# Patient Record
Sex: FEMALE | Race: WHITE | NOT HISPANIC OR LATINO | Employment: UNEMPLOYED | URBAN - METROPOLITAN AREA
[De-identification: names, ages, dates, MRNs, and addresses within clinical notes are randomized per-mention and may not be internally consistent; named-entity substitution may affect disease eponyms.]

---

## 2017-01-30 PROCEDURE — 99285 EMERGENCY DEPT VISIT HI MDM: CPT | Mod: 25

## 2017-01-30 PROCEDURE — 96361 HYDRATE IV INFUSION ADD-ON: CPT

## 2017-01-30 PROCEDURE — 96374 THER/PROPH/DIAG INJ IV PUSH: CPT

## 2017-01-31 ENCOUNTER — HOSPITAL ENCOUNTER (INPATIENT)
Facility: HOSPITAL | Age: 42
LOS: 3 days | Discharge: HOME | DRG: 442 | End: 2017-02-04
Attending: EMERGENCY MEDICINE | Admitting: HOSPITALIST
Payer: COMMERCIAL

## 2017-01-31 DIAGNOSIS — R16.0 HEPATOMEGALY: ICD-10-CM

## 2017-01-31 DIAGNOSIS — K29.70 GASTRITIS, PRESENCE OF BLEEDING UNSPECIFIED, UNSPECIFIED CHRONICITY, UNSPECIFIED GASTRITIS TYPE: Primary | ICD-10-CM

## 2017-01-31 DIAGNOSIS — B17.9 ACUTE HEPATITIS: ICD-10-CM

## 2017-01-31 DIAGNOSIS — R11.0 NAUSEA: ICD-10-CM

## 2017-01-31 DIAGNOSIS — R74.8 ELEVATED LIVER ENZYMES: ICD-10-CM

## 2017-01-31 DIAGNOSIS — R10.13 EPIGASTRIC PAIN: ICD-10-CM

## 2017-01-31 DIAGNOSIS — R10.11 RUQ ABDOMINAL PAIN: ICD-10-CM

## 2017-01-31 PROBLEM — R10.9 ABDOMINAL PAIN: Status: ACTIVE | Noted: 2017-01-31

## 2017-01-31 PROBLEM — Z79.899 ON ESOMEPRAZOLE PROPHYLAXIS: Status: ACTIVE | Noted: 2017-01-31

## 2017-01-31 PROBLEM — K21.9 GERD (GASTROESOPHAGEAL REFLUX DISEASE): Status: ACTIVE | Noted: 2017-01-31

## 2017-01-31 LAB
ALBUMIN SERPL BCP-MCNC: 3.7 G/DL
ALP SERPL-CCNC: 114 U/L
ALT SERPL W/O P-5'-P-CCNC: 316 U/L
AMORPH CRY URNS QL MICRO: NORMAL
ANION GAP SERPL CALC-SCNC: 9 MMOL/L
APAP SERPL-MCNC: <3 UG/ML
APTT BLDCRRT: 26.2 SEC
AST SERPL-CCNC: 491 U/L
BASOPHILS # BLD AUTO: 0.03 K/UL
BASOPHILS NFR BLD: 0.2 %
BILIRUB SERPL-MCNC: 1.7 MG/DL
BILIRUB UR QL STRIP: ABNORMAL
BUN SERPL-MCNC: 10 MG/DL
CALCIUM SERPL-MCNC: 9.9 MG/DL
CHLORIDE SERPL-SCNC: 108 MMOL/L
CHOLEST/HDLC SERPL: 4.4 {RATIO}
CLARITY UR: ABNORMAL
CO2 SERPL-SCNC: 22 MMOL/L
COLOR UR: YELLOW
CREAT SERPL-MCNC: 0.7 MG/DL
DIFFERENTIAL METHOD: ABNORMAL
EOSINOPHIL # BLD AUTO: 0.1 K/UL
EOSINOPHIL NFR BLD: 0.9 %
ERYTHROCYTE [DISTWIDTH] IN BLOOD BY AUTOMATED COUNT: 13.9 %
EST. GFR  (AFRICAN AMERICAN): >60 ML/MIN/1.73 M^2
EST. GFR  (NON AFRICAN AMERICAN): >60 ML/MIN/1.73 M^2
GLUCOSE SERPL-MCNC: 120 MG/DL
GLUCOSE UR QL STRIP: NEGATIVE
HCT VFR BLD AUTO: 46.4 %
HDL/CHOLESTEROL RATIO: 22.7 %
HDLC SERPL-MCNC: 185 MG/DL
HDLC SERPL-MCNC: 42 MG/DL
HGB BLD-MCNC: 15.5 G/DL
HGB UR QL STRIP: ABNORMAL
INR PPP: 1
KETONES UR QL STRIP: NEGATIVE
LDLC SERPL CALC-MCNC: 126.6 MG/DL
LEUKOCYTE ESTERASE UR QL STRIP: NEGATIVE
LIPASE SERPL-CCNC: 15 U/L
LYMPHOCYTES # BLD AUTO: 2 K/UL
LYMPHOCYTES NFR BLD: 13.5 %
MCH RBC QN AUTO: 30 PG
MCHC RBC AUTO-ENTMCNC: 33.4 %
MCV RBC AUTO: 90 FL
MICROSCOPIC COMMENT: NORMAL
MONOCYTES # BLD AUTO: 0.9 K/UL
MONOCYTES NFR BLD: 6.2 %
NEUTROPHILS # BLD AUTO: 11.4 K/UL
NEUTROPHILS NFR BLD: 79.2 %
NITRITE UR QL STRIP: NEGATIVE
NONHDLC SERPL-MCNC: 143 MG/DL
PH UR STRIP: 7 [PH] (ref 5–8)
PLATELET # BLD AUTO: 379 K/UL
PMV BLD AUTO: 10.2 FL
POCT GLUCOSE: 79 MG/DL (ref 70–110)
POTASSIUM SERPL-SCNC: 4.2 MMOL/L
PROT SERPL-MCNC: 7 G/DL
PROT UR QL STRIP: NEGATIVE
PROTHROMBIN TIME: 10.3 SEC
RBC # BLD AUTO: 5.17 M/UL
RBC #/AREA URNS HPF: 2 /HPF (ref 0–4)
SODIUM SERPL-SCNC: 139 MMOL/L
SP GR UR STRIP: 1.02 (ref 1–1.03)
TRIGL SERPL-MCNC: 82 MG/DL
TSH SERPL DL<=0.005 MIU/L-ACNC: 1.13 UIU/ML
URN SPEC COLLECT METH UR: ABNORMAL
UROBILINOGEN UR STRIP-ACNC: ABNORMAL EU/DL
WBC # BLD AUTO: 14.41 K/UL

## 2017-01-31 PROCEDURE — 85025 COMPLETE CBC W/AUTO DIFF WBC: CPT

## 2017-01-31 PROCEDURE — 63600175 PHARM REV CODE 636 W HCPCS: Performed by: EMERGENCY MEDICINE

## 2017-01-31 PROCEDURE — 81000 URINALYSIS NONAUTO W/SCOPE: CPT

## 2017-01-31 PROCEDURE — 21400001 HC TELEMETRY ROOM

## 2017-01-31 PROCEDURE — 25000003 PHARM REV CODE 250: Performed by: EMERGENCY MEDICINE

## 2017-01-31 PROCEDURE — 25000003 PHARM REV CODE 250: Performed by: NURSE PRACTITIONER

## 2017-01-31 PROCEDURE — 80074 ACUTE HEPATITIS PANEL: CPT

## 2017-01-31 PROCEDURE — 85730 THROMBOPLASTIN TIME PARTIAL: CPT

## 2017-01-31 PROCEDURE — 85610 PROTHROMBIN TIME: CPT

## 2017-01-31 PROCEDURE — 93010 ELECTROCARDIOGRAM REPORT: CPT | Mod: ,,, | Performed by: INTERNAL MEDICINE

## 2017-01-31 PROCEDURE — 63600175 PHARM REV CODE 636 W HCPCS: Performed by: NURSE PRACTITIONER

## 2017-01-31 PROCEDURE — 99244 OFF/OP CNSLTJ NEW/EST MOD 40: CPT | Mod: ,,, | Performed by: INTERNAL MEDICINE

## 2017-01-31 PROCEDURE — 83690 ASSAY OF LIPASE: CPT

## 2017-01-31 PROCEDURE — G0378 HOSPITAL OBSERVATION PER HR: HCPCS

## 2017-01-31 PROCEDURE — 80329 ANALGESICS NON-OPIOID 1 OR 2: CPT

## 2017-01-31 PROCEDURE — 80053 COMPREHEN METABOLIC PANEL: CPT

## 2017-01-31 PROCEDURE — 94761 N-INVAS EAR/PLS OXIMETRY MLT: CPT

## 2017-01-31 PROCEDURE — 80061 LIPID PANEL: CPT

## 2017-01-31 PROCEDURE — C9113 INJ PANTOPRAZOLE SODIUM, VIA: HCPCS | Performed by: NURSE PRACTITIONER

## 2017-01-31 PROCEDURE — 84443 ASSAY THYROID STIM HORMONE: CPT

## 2017-01-31 PROCEDURE — 93005 ELECTROCARDIOGRAM TRACING: CPT

## 2017-01-31 RX ORDER — ONDANSETRON 2 MG/ML
4 INJECTION INTRAMUSCULAR; INTRAVENOUS EVERY 12 HOURS PRN
Status: DISCONTINUED | OUTPATIENT
Start: 2017-01-31 | End: 2017-02-04 | Stop reason: HOSPADM

## 2017-01-31 RX ORDER — ACETAMINOPHEN 500 MG
500 TABLET ORAL EVERY 6 HOURS PRN
COMMUNITY
End: 2017-05-09

## 2017-01-31 RX ORDER — GLUCAGON 1 MG
1 KIT INJECTION
Status: DISCONTINUED | OUTPATIENT
Start: 2017-01-31 | End: 2017-02-04 | Stop reason: HOSPADM

## 2017-01-31 RX ORDER — CETIRIZINE HYDROCHLORIDE 10 MG/1
10 TABLET ORAL DAILY
Status: DISCONTINUED | OUTPATIENT
Start: 2017-01-31 | End: 2017-02-04 | Stop reason: HOSPADM

## 2017-01-31 RX ORDER — KETOROLAC TROMETHAMINE 30 MG/ML
30 INJECTION, SOLUTION INTRAMUSCULAR; INTRAVENOUS
Status: COMPLETED | OUTPATIENT
Start: 2017-01-31 | End: 2017-01-31

## 2017-01-31 RX ORDER — KETOROLAC TROMETHAMINE 30 MG/ML
15 INJECTION, SOLUTION INTRAMUSCULAR; INTRAVENOUS EVERY 6 HOURS PRN
Status: DISCONTINUED | OUTPATIENT
Start: 2017-01-31 | End: 2017-02-03

## 2017-01-31 RX ORDER — IBUPROFEN 200 MG
16 TABLET ORAL
Status: DISCONTINUED | OUTPATIENT
Start: 2017-01-31 | End: 2017-02-04 | Stop reason: HOSPADM

## 2017-01-31 RX ORDER — HYDROGEN PEROXIDE 3 %
40 SOLUTION, NON-ORAL MISCELLANEOUS NIGHTLY
Status: ON HOLD | COMMUNITY
End: 2017-02-04 | Stop reason: HOSPADM

## 2017-01-31 RX ORDER — CETIRIZINE HYDROCHLORIDE 10 MG/1
10 TABLET ORAL DAILY
COMMUNITY
End: 2019-09-18

## 2017-01-31 RX ORDER — SODIUM CHLORIDE 9 MG/ML
INJECTION, SOLUTION INTRAVENOUS CONTINUOUS
Status: DISCONTINUED | OUTPATIENT
Start: 2017-01-31 | End: 2017-02-01

## 2017-01-31 RX ORDER — IBUPROFEN 200 MG
24 TABLET ORAL
Status: DISCONTINUED | OUTPATIENT
Start: 2017-01-31 | End: 2017-02-04 | Stop reason: HOSPADM

## 2017-01-31 RX ORDER — PANTOPRAZOLE SODIUM 40 MG/10ML
40 INJECTION, POWDER, LYOPHILIZED, FOR SOLUTION INTRAVENOUS DAILY
Status: DISCONTINUED | OUTPATIENT
Start: 2017-01-31 | End: 2017-02-03

## 2017-01-31 RX ORDER — DICLOFENAC POTASSIUM 50 MG/1
1 POWDER, FOR SOLUTION ORAL DAILY PRN
Status: ON HOLD | COMMUNITY
End: 2017-01-31 | Stop reason: CLARIF

## 2017-01-31 RX ADMIN — CETIRIZINE HYDROCHLORIDE 10 MG: 10 TABLET, FILM COATED ORAL at 01:01

## 2017-01-31 RX ADMIN — KETOROLAC TROMETHAMINE 15 MG: 30 INJECTION, SOLUTION INTRAMUSCULAR at 06:01

## 2017-01-31 RX ADMIN — SODIUM CHLORIDE: 0.9 INJECTION, SOLUTION INTRAVENOUS at 11:01

## 2017-01-31 RX ADMIN — PROMETHAZINE HYDROCHLORIDE 6.25 MG: 25 INJECTION INTRAMUSCULAR; INTRAVENOUS at 11:01

## 2017-01-31 RX ADMIN — SODIUM CHLORIDE 1000 ML: 0.9 INJECTION, SOLUTION INTRAVENOUS at 03:01

## 2017-01-31 RX ADMIN — KETOROLAC TROMETHAMINE 30 MG: 30 INJECTION, SOLUTION INTRAMUSCULAR at 03:01

## 2017-01-31 RX ADMIN — PANTOPRAZOLE SODIUM 40 MG: 40 INJECTION, POWDER, FOR SOLUTION INTRAVENOUS at 01:01

## 2017-01-31 RX ADMIN — LIDOCAINE HYDROCHLORIDE: 20 SOLUTION ORAL; TOPICAL at 04:01

## 2017-01-31 RX ADMIN — SODIUM CHLORIDE: 0.9 INJECTION, SOLUTION INTRAVENOUS at 01:01

## 2017-01-31 NOTE — PLAN OF CARE
01/31/17 1242   Discharge Assessment   Assessment Type Discharge Planning Assessment   Confirmed/corrected address and phone number on facesheet? Yes   Assessment information obtained from? Patient   Expected Length of Stay (days) 1   Prior to hospitilization cognitive status: Alert/Oriented   Prior to hospitalization functional status: Independent   Current cognitive status: Alert/Oriented   Current Functional Status: Independent   Arrived From home or self-care   Lives With significant other   Able to Return to Prior Arrangements yes   Is patient able to care for self after discharge? Yes   How many people do you have in your home that can help with your care after discharge? 2   Who are your caregiver(s) and their phone number(s)? Ashok Hemphill Other [phone]    Patient's perception of discharge disposition home or selfcare   Readmission Within The Last 30 Days no previous admission in last 30 days   Patient currently being followed by outpatient case management? No   Patient currently receives home health services? No   Does the patient currently use HME? No   Patient currently receives private duty nursing? No   Equipment Currently Used at Home none   Do you have any problems affording any of your prescribed medications? TBD   Is the patient taking medications as prescribed? yes   Do you have any financial concerns preventing you from receiving the healthcare you need? No   Does the patient have transportation to healthcare appointments? Yes   Transportation Available family or friend will provide   On Dialysis? No   Does the patient receive services at the Coumadin Clinic? No   Discharge Plan A Home with family   Patient/Family In Agreement With Plan yes   Jeremías Grissom RN  Transitional Navigator/Case Management  [phone]

## 2017-01-31 NOTE — IP AVS SNAPSHOT
[facility]  [address]  [place] [address]  Phone: [phone]           Patient Discharge Instructions     Our goal is to set you up for success. This packet includes information on your condition, medications, and your home care. It will help you to care for yourself so you don't get sicker and need to go back to the hospital.     Please ask your nurse if you have any questions.        There are many details to remember when preparing to leave the hospital. Here is what you will need to do:    1. Take your medicine. If you are prescribed medications, review your Medication List in the following pages. You may have new medications to  at the pharmacy and others that you'll need to stop taking. Review the instructions for how and when to take your medications. Talk with your doctor or nurses if you are unsure of what to do.     2. Go to your follow-up appointments. Specific follow-up information is listed in the following pages. Your may be contacted by a transition nurse or clinical provider about future appointments. Be sure we have all of the phone numbers to reach you, if needed. Please contact your provider's office if you are unable to make an appointment.     3. Watch for warning signs. Your doctor or nurse will give you detailed warning signs to watch for and when to call for assistance. These instructions may also include educational information about your condition. If you experience any of warning signs to your health, call your doctor.               ** Verify the list of medication(s) below is accurate and up to date. Carry this with you in case of emergency. If your medications have changed, please notify your healthcare provider.             Medication List      START taking these medications        Additional Info                      ondansetron 4 MG Jonelle   Commonly known as:  ZOFRAN-ODT   Quantity:  50 tablet   Refills:  1   Dose:  4 mg    Instructions:  Take 1 tablet  (4 mg total) by mouth every 6 (six) hours as needed.     Begin Date    AM    Noon    PM    Bedtime       pantoprazole 40 MG tablet   Commonly known as:  PROTONIX   Quantity:  60 tablet   Refills:  1   Dose:  40 mg    Instructions:  Take 1 tablet (40 mg total) by mouth 2 (two) times daily.     Begin Date    AM    Noon    PM    Bedtime       promethazine 25 MG tablet   Commonly known as:  PHENERGAN   Quantity:  30 tablet   Refills:  1   Dose:  25 mg    Instructions:  Take 1 tablet (25 mg total) by mouth every 6 (six) hours as needed for Nausea.     Begin Date    AM    Noon    PM    Bedtime       sucralfate 100 mg/mL suspension   Commonly known as:  CARAFATE   Quantity:  1 Bottle   Refills:  3   Dose:  500 mg    Last time this was given:  1 g on 2/4/2017  6:08 AM   Instructions:  Take 5 mLs (500 mg total) by mouth 4 (four) times daily with meals and nightly.     Begin Date    AM    Noon    PM    Bedtime         CONTINUE taking these medications        Additional Info                      acetaminophen 500 MG tablet   Commonly known as:  TYLENOL   Refills:  0   Dose:  500 mg   Indications:  Headache Disorder    Instructions:  Take 500 mg by mouth every 6 (six) hours as needed.     Begin Date    AM    Noon    PM    Bedtime       cetirizine 10 MG tablet   Commonly known as:  ZYRTEC   Refills:  0   Dose:  10 mg    Last time this was given:  10 mg on 2/4/2017  8:43 AM   Instructions:  Take 10 mg by mouth once daily.     Begin Date    AM    Noon    PM    Bedtime         STOP taking these medications     esomeprazole 20 MG capsule   Commonly known as:  NEXIUM            Where to Get Your Medications      You can get these medications from any pharmacy     Bring a paper prescription for each of these medications     ondansetron 4 MG Tbdl    pantoprazole 40 MG tablet    promethazine 25 MG tablet    sucralfate 100 mg/mL suspension                  Please bring to all follow up appointments:    1. A copy of your discharge  instructions.  2. All medicines you are currently taking in their original bottles.  3. Identification and insurance card.    Please arrive 15 minutes ahead of scheduled appointment time.    Please call 24 hours in advance if you must reschedule your appointment and/or time.        Your Scheduled Appointments     Feb 16, 2017  9:20 AM CST   Hospital Follow Up with Nataliia Boston PA-C   [facility] ([facility])    [address], [address]  [facility] [id number] [phone]            Mar 13, 2017  2:30 PM CDT   Physical with MD Santos Lackey [facility] ([facility])    [address]  [place] [address] [phone]              Follow-up Information     Follow up with Mikel Rizvi MD.    Specialty:  Gastroenterology    Why:  This clinic will call you with your appointment.    Contact information:    [address]  [address]  [facility] [address]  [phone]          Follow up with Nataliia Boston PA-C. Go on 2/16/2017.    Specialty:  Family Medicine    Why:  @9:20am for your hospital follow up appointment    Contact information:    [address]  [address]  [facility]  [facility] [address]  [phone]          Follow up with Annette Burton MD. Go on 3/13/2017.    Specialty:  Family Medicine    Why:  @2:30pm    Contact information:    [address]  [place] [id number] [phone]          Discharge Instructions     Future Orders    Activity as tolerated     Call MD for:  persistent nausea and vomiting or diarrhea     Call MD for:  severe uncontrolled pain     Diet general     Scheduling Instructions:    Low Fat, [place]    Questions:    Total calories:      Fat restriction, if any:      Protein restriction, if any:      Na restriction, if any:      Fluid restriction:      Additional restrictions:          Discharge Instructions       ABDOMINAL PAIN, ADULT  "(ENGLISH) View Edit Remove  EPIGASTRIC PAIN (UNCERTAIN CAUSE) (ENGLISH) View Edit Remove  ACID REFLUX, TIPS TO CONTROL (ENGLISH) View Edit Remove  ACID REFLUX, MEDICINES FOR (ENGLISH) View Edit Remove  GASTRITIS (ADULT) (ENGLISH) View Edit Remove  DIET, BLAND (ADULT) (ENGLISH) View Edit Remove  DIET: SOFT, DISCHARGE INSTRUCTIONS (ENGLISH) View Edit Remove  ONDANSETRON TABLETS (ENGLISH) View Edit Remove  PROMETHAZINE TABLETS (ENGLISH) View Edit Remove  PANTOPRAZOLE TABLETS (ENGLISH) View Edit Remove  SUCRALFATE TABLETS (ENGLISH) View Edit Remove  DIET, LOW FAT (ENGLISH) View Edit Remove          Discharge References/Attachments     ABDOMINAL PAIN, ADULT (ENGLISH)    EPIGASTRIC PAIN (UNCERTAIN CAUSE) (ENGLISH)    ACID REFLUX, TIPS TO CONTROL (ENGLISH)    ACID REFLUX, MEDICINES FOR (ENGLISH)    GASTRITIS (ADULT) (ENGLISH)    DIET, BLAND (ADULT) (ENGLISH)    DIET: SOFT, DISCHARGE INSTRUCTIONS (ENGLISH)    ONDANSETRON TABLETS (ENGLISH)    PROMETHAZINE TABLETS (ENGLISH)    PANTOPRAZOLE TABLETS (ENGLISH)    SUCRALFATE TABLETS (ENGLISH)    DIET, LOW FAT (ENGLISH)        Primary Diagnosis     Your primary diagnosis was:  Elevated Liver Enzymes      Admission Information     Date & Time Provider Department CSN    1/31/2017  2:30 AM Forrest Bergman MD [facility] [id number]      Care Providers     Provider Role Specialty Primary office phone    Forrest Bergman MD Attending Provider Hospitalist [phone]    Forrest Bergman MD Physician  Hospitalist  [phone]    Mikel Rizvi MD Consulting Physician  Gastroenterology [phone]    Mikel Rizvi MD Surgeon  Gastroenterology [phone]    Aristides Castillo MD Surgeon  Gastroenterology [phone]      Your Vitals Were     BP Pulse Temp Resp Height Weight    89/50 53 98.2 °F (36.8 °C) (Oral) 18 5' 9" (1.753 m) 114.9 kg (253 lb 4.9 oz)    SpO2 BMI             98% 37.41 kg/m2         Recent Lab Values     No lab values to display.    "   Pending Labs     Order Current Status    Specimen to Pathology - Surgery Collected (02/03/17 2443)      Allergies as of 2/4/2017        Reactions    Codeine Nausea And Vomiting    Demerol [Meperidine] Nausea And Vomiting    Imitrex [Sumatriptan Succinate]     Hysterics    Morphine Nausea And Vomiting    Tetracyclines Nausea And Vomiting    Azithromycin Rash    Ciprofloxacin Hcl Rash      [facility]     [facility] Nurse Care Line - 24/7 Assistance  Unless otherwise directed by your provider, please contact [facility], our nurse care line that is available for 24/7 assistance.     Registered nurses in the [facility] provide clinical advisement, health education, appointment booking, and other advisory services.  Call for this free service at [phone].        Advance Directives     An advance directive is a document which, in the event you are no longer able to make decisions for yourself, tells your healthcare team what kind of treatment you do or do not want to receive, or who you would like to make those decisions for you.  If you do not currently have an advance directive, [facility] encourages you to create one.  For more information call:  [phone]), [phone] ([phone]),  or log on to [email or web address].        Smoking Cessation     If you would like to quit smoking:   You may be eligible for free services if you are a [place] resident and started smoking cigarettes before September 1, 1988.  Call the Smoking Cessation Trust ([facility]) toll free at (576) 675-9921 or (995) 932-5016.   Call 8-999-QUIT-NOW if you do not meet the above criteria.            Language Assistance Services     ATTENTION: Language assistance services are available, free of charge. Please call [phone].      ATENCIÓN: Si habla español, tiene a jacques disposición servicios gratuitos de asistencia lingüística. Llame al [phone].     CHÚ Ý: N?u b?n nói Ti?ng Vi?t, có các  d?ch v? h? tr? ngôn ng? mi?n phí dành cho b?n. G?i s? [phone].        [facility] Sign-Up     Activating your [facility] account is as easy as 1-2-3!     1) Visit [email or web address], select Sign Up Now, enter this activation code and your date of birth, then select Next.  [email or web address]  Expires: 3/9/2017  2:56 PM      2) Create a username and password to use when you visit [facility] in the future and select a security question in case you lose your password and select Next.    3) Enter your e-mail address and click Sign Up!    Additional Information  If you have questions, please e-mail [email or web address] or call [phone] to talk to our [facility] staff. Remember, [facility] is NOT to be used for urgent needs. For medical emergencies, dial 911.          [facility] complies with applicable Federal civil rights laws and does not discriminate on the basis of race, color, national origin, age, disability, or sex.

## 2017-01-31 NOTE — ED NOTES
Pt presents to ED c/o abd pain to epigastric/ LUQ. Pt states eating a granola bar earlier today made the pain worse. Pt reports that she is nauseous, denies vomiting and diarrhea. Reports that the pain travels to her upper back. States that it feels like gall stones, but she has had her gall bladder removed and a partial hysterectomy. Pt reports that the pain felt like gas at first, felt like a cramp. Pain is rated 5/10.

## 2017-01-31 NOTE — ED PROVIDER NOTES
Encounter Date: 1/30/2017       History   Chief Complaint: Abdominal pain    History of Present Illness: Patient presents to the ED with epigastric abdominal pain that started this morning described as sharp, constant, worse with eating and radiating from right side into the back with associated with nausea.  Patient appears his to previous gallstones but notes history of cholecystectomy as well as partial hysterectomy.  Patient does have a history of GERD and takes daily Nexium.  No additional medications taken prior to arrival.  Patient denies any ill contacts or suspicious foods eaten.  No vomiting or diarrhea reported.    Review of Symptoms:  Constitutional: No fever, no chills  Eyes: No discharge, No pain  ENT: No nasal drainage, No earache, No sore throat  Cardiovascular: No chest pain, no palpitations  Respiratory: No cough, no shortness of breath  Gastrointestinal: As above  Musculoskeletal: No back pain  Skin: No rashes or lesions  Neurological: No headache, no focal weakness  Chief Complaint   Patient presents with    Abdominal Pain     epigastric pain, described as cramping. Moving/ sitting up makes pain worse, lying on stomach gives slight relief. nausea, -vomiting, -diarrhea. radiates up ribs, into shoulders      Review of patient's allergies indicates:   Allergen Reactions    Codeine Nausea And Vomiting    Demerol [meperidine] Nausea And Vomiting    Imitrex [sumatriptan succinate]      Hysterics      Morphine Nausea And Vomiting    Tetracyclines Nausea And Vomiting    Azithromycin Rash     The history is provided by the patient.     History reviewed. No pertinent past medical history.  No past medical history pertinent negatives.  No past surgical history on file.  History reviewed. No pertinent family history.  Social History   Substance Use Topics    Smoking status: None    Smokeless tobacco: None    Alcohol use None     Review of Systems   Constitutional: Negative for fever.   Respiratory:  Negative for shortness of breath.    Cardiovascular: Negative for chest pain.   Genitourinary: Negative for dysuria.   All other systems reviewed and are negative.      Physical Exam   Initial Vitals   BP Pulse Resp Temp SpO2   01/30/17 2244 01/30/17 2244 01/30/17 2244 01/30/17 2244 01/31/17 0233   119/79 109 30 98.1 °F (36.7 °C) 99 %     Physical Exam    Nursing note and vitals reviewed.  Constitutional: She appears well-developed and well-nourished. She is not diaphoretic. No distress.   HENT:   Head: Normocephalic and atraumatic.   Right Ear: External ear normal.   Left Ear: External ear normal.   Nose: Nose normal.   Mouth/Throat: Oropharynx is clear and moist. No oropharyngeal exudate.   Eyes: Conjunctivae and EOM are normal. Pupils are equal, round, and reactive to light. No scleral icterus.   Neck: Normal range of motion. Neck supple. No thyromegaly present. No tracheal deviation present.   Cardiovascular: Normal rate, regular rhythm, normal heart sounds and intact distal pulses. Exam reveals no gallop and no friction rub.    No murmur heard.  Pulmonary/Chest: Breath sounds normal. No respiratory distress. She exhibits no tenderness.   Abdominal: Soft. Bowel sounds are normal. She exhibits no distension and no mass. There is tenderness. There is no rebound and no guarding.   Epigastric mild abdominal pain noted to deep palpation   Musculoskeletal: Normal range of motion. She exhibits no edema or tenderness.   Lymphadenopathy:     She has no cervical adenopathy.   Neurological: She is alert and oriented to person, place, and time. She has normal strength. No cranial nerve deficit or sensory deficit.   Skin: Skin is warm and dry. No rash noted. No erythema.   Psychiatric: She has a normal mood and affect. Her behavior is normal. Judgment and thought content normal.         ED Course   Procedures  Labs Reviewed   CBC W/ AUTO DIFFERENTIAL - Abnormal; Notable for the following:        Result Value    WBC 14.41 (*)      Platelets 379 (*)     Gran # 11.4 (*)     Gran% 79.2 (*)     Lymph% 13.5 (*)     All other components within normal limits   COMPREHENSIVE METABOLIC PANEL   LIPASE   URINALYSIS     EKG Readings: (Independently Interpreted)   Initial Reading: No STEMI. Rhythm: Normal Sinus Rhythm. Heart Rate: 61. Ectopy: No Ectopy. Clinical Impression: Normal Sinus Rhythm Other Impression: No ischemic changes.          Medical Decision Making:   Differential Diagnosis:   Pancreatitis, choledocholithiasis, hepatitis, gastritis, referred, peptic ulcer disease, IBS, gastroparesis, viral syndrome, food poisoning, medication side effect, stress.  Clinical Tests:   Lab Tests: Ordered and Reviewed  The following lab test(s) were unremarkable: CBC, CMP and Lipase  Radiological Study: Ordered and Reviewed  ED Management:  Stable ED course.  Pain and nausea sniffily improved with medication.  Abnormal liver enzymes lead II ultrasound which is unremarkable other than some liver enlargement without obstruction status post cholecystectomy.  Had lengthy discussion with patient who denies any history of stomach and alcohol use or known liver issues.  I discussed this with the hospitalist who agreed patient should be admitted for further observation and further evaluation including acute hepatitis panel                   ED Course     Clinical Impression:   The primary encounter diagnosis was Acute hepatitis. Diagnoses of Epigastric pain and RUQ abdominal pain were also pertinent to this visit.    Disposition:   Disposition: Admitted  Condition: Stable       Yony Jackson,   01/31/17 0739

## 2017-01-31 NOTE — SUBJECTIVE & OBJECTIVE
Past Medical History   Diagnosis Date    GERD (gastroesophageal reflux disease)     Hyperlipidemia     Pollen allergies        Past Surgical History   Procedure Laterality Date    Cholecystectomy      Hysterectomy         Review of patient's allergies indicates:   Allergen Reactions    Codeine Nausea And Vomiting    Demerol [meperidine] Nausea And Vomiting    Imitrex [sumatriptan succinate]      Hysterics      Morphine Nausea And Vomiting    Tetracyclines Nausea And Vomiting    Azithromycin Rash       No current facility-administered medications on file prior to encounter.      No current outpatient prescriptions on file prior to encounter.     Family History     Problem Relation (Age of Onset)    No Known Problems Mother, Father        Social History Main Topics    Smoking status: Current Every Day Smoker     Packs/day: 0.50     Years: 15.00    Smokeless tobacco: Not on file    Alcohol use No      Comment: 2-3 drinks per year    Drug use: No    Sexual activity: Yes     Partners: Male     Birth control/ protection: Other-see comments      Comment: Hysterectomy     Review of Systems   Constitutional: Negative for chills and fever.   HENT: Negative for sore throat and voice change.    Eyes: Negative for photophobia and visual disturbance.   Respiratory: Positive for shortness of breath (with the abdominal pain). Negative for cough and wheezing.    Cardiovascular: Negative for chest pain, palpitations and leg swelling.   Gastrointestinal: Positive for abdominal pain and nausea. Negative for blood in stool, constipation, diarrhea and vomiting.   Endocrine: Negative for polydipsia and polyphagia.   Genitourinary: Negative for dysuria, flank pain and hematuria.   Musculoskeletal: Negative for joint swelling, neck pain and neck stiffness.   Skin: Negative for pallor and rash.   Allergic/Immunologic: Positive for environmental allergies and food allergies (Gluten Intolerance). Negative for immunocompromised  state.   Neurological: Negative for syncope, weakness, light-headedness and numbness.   Hematological: Does not bruise/bleed easily.   Psychiatric/Behavioral: Negative for agitation. The patient is not nervous/anxious.         Stress related to work     Objective:     Vital Signs (Most Recent):  Temp: 96.7 °F (35.9 °C) (01/31/17 0233)  Pulse: 79 (01/31/17 0821)  Resp: (!) 30 (01/30/17 2244)  BP: 119/86 (01/31/17 0821)  SpO2: 98 % (01/31/17 0821) Vital Signs (24h Range):  Temp:  [96.7 °F (35.9 °C)-98.1 °F (36.7 °C)] 96.7 °F (35.9 °C)  Pulse:  [] 79  Resp:  [30] 30  SpO2:  [98 %-99 %] 98 %  BP: (119-137)/(70-86) 119/86     Weight: 99.8 kg (220 lb)  Body mass index is 32.49 kg/(m^2).    Physical Exam   Constitutional: She is oriented to person, place, and time. She appears well-developed and well-nourished. No distress.   HENT:   Head: Normocephalic and atraumatic.   Mouth/Throat: Oropharynx is clear and moist. No oropharyngeal exudate.   Eyes: Conjunctivae are normal. Pupils are equal, round, and reactive to light. No scleral icterus.   Neck: Neck supple.   Cardiovascular: Normal rate, regular rhythm, normal heart sounds and intact distal pulses.  Exam reveals no gallop and no friction rub.    No murmur heard.  Pulmonary/Chest: Effort normal and breath sounds normal. No respiratory distress. She has no wheezes. She has no rales.   Abdominal: Soft. Bowel sounds are normal. She exhibits no distension. There is tenderness (Epigastric). There is no rebound and no guarding.   Musculoskeletal: She exhibits no edema, tenderness or deformity.   Neurological: She is alert and oriented to person, place, and time. She exhibits normal muscle tone.   Skin: Skin is warm and dry. No rash noted. She is not diaphoretic.   Psychiatric: She has a normal mood and affect. Her behavior is normal.   Nursing note and vitals reviewed.       Significant Labs:   CBC:   Recent Labs  Lab 01/31/17  0320   WBC 14.41*   HGB 15.5   HCT 46.4    *     CMP:   Recent Labs  Lab 01/31/17  0320      K 4.2      CO2 22*   *   BUN 10   CREATININE 0.7   CALCIUM 9.9   PROT 7.0   ALBUMIN 3.7   BILITOT 1.7*   ALKPHOS 114   *   *   ANIONGAP 9   EGFRNONAA >60     Coagulation:   Recent Labs  Lab 01/31/17  0753   INR 1.0   APTT 26.2     Urine Studies:   Recent Labs  Lab 01/31/17  0408   COLORU Yellow   APPEARANCEUA Cloudy*   PHUR 7.0   SPECGRAV 1.020   PROTEINUA Negative   GLUCUA Negative   KETONESU Negative   BILIRUBINUA 1+*   OCCULTUA 2+*   NITRITE Negative   UROBILINOGEN 4.0-6.0*   LEUKOCYTESUR Negative   RBCUA 2       Significant Imaging:   Imaging Results         US Abdomen Limited (Gallbladder) (Final result) Result time:  01/31/17 06:27:21    Final result by Agnieszka Garduno MD (01/31/17 06:27:21)    Impression:        1. Status post cholecystectomy. Normal caliber of the visualized common bile duct without evidence of intrahepatic biliary ductal dilatation.    2. Mild hepatomegaly.        Electronically signed by: AGNIESZKA GARDUNO  Date:     01/31/17  Time:    06:27     Narrative:    Time of Procedure: 01/31/17 06:12:35  Accession # [id number]    Reason for study: Elevated liver enzymes    Comparison: None.    Technique: Limited right upper quadrant ultrasound was performed.    Findings: The liver is mildly enlarged measuring 19.2cm.  Hepatic parenchyma is homogeneous without evidence for masses.  No intra- or extrahepatic biliary ductal dilatation. The common bile duct measures 0.5 cm.  The gallbladder is surgically absent.  The visualized portions of the pancreas appear normal. The right kidney is normal in size without evidence of hydronephrosis. No ascites.            X-Ray Abdomen Flat And Erect (Final result) Result time:  01/31/17 04:21:32    Final result by Josephine Martins MD (01/31/17 04:21:32)    Impression:      1.  Nonobstructive bowel gas pattern.      Electronically signed by: JOSEPHINE MARTINS MD  Date:      01/31/17  Time:    04:21     Narrative:    Abdomen flat and erect    Clinical history: Epigastric pain    Comparison: None    Findings:  2 upright views, one supine view.    No significant air-fluid levels are appreciated on upright view.  Air and stool is seen within the large bowel, and projected over the rectum.  No focally dilated small bowel loops.  Post surgical changes overlie the right upper quadrant.  There no calcifications to convincingly suggest nephrolithiasis.  Lower lung zones are grossly clear.  No large volume free air or pneumatosis.  No acute osseous abnormality.

## 2017-01-31 NOTE — PHARMACY MED REC
"[facility] Medication Reconciliation  Template    Patient was admitted on 1/31/2017 for Elevated liver enzymes.      Patient's prior to admission medication regimen was as follows:  Prescriptions Prior to Admission   Medication Sig Dispense Refill Last Dose    acetaminophen (TYLENOL) 500 MG tablet Take 500 mg by mouth every 6 (six) hours as needed.       cetirizine (ZYRTEC) 10 MG tablet Take 10 mg by mouth once daily.   1/30/2017 at Unknown time    esomeprazole (NEXIUM) 20 MG capsule Take 40 mg by mouth nightly.    1/30/2017 at Unknown time         Please add appropriate    SmartPhrase below:      Admission Medication Reconciliation - Pharmacy Consult Note    The home medication history was taken by Elda Tovar CPHT.  Based on information gathered and subsequent review by the clinical pharmacist, the items below may need attention.    You may go to "Admission" then "Reconcile Home Medications" tabs to review and/or act upon these items.        No issues noted with the medication reconciliation.        Potential issues to be addressed PRIOR TO DISCHARGE  none  Please address this information as you see fit.  Feel free to contact us if you have any questions or require assistance.    Curtis Lilly  [phone]      "

## 2017-01-31 NOTE — ASSESSMENT & PLAN NOTE
Hepatomegaly, Abdominal Pain  ,  , Tbili 1.7, Normal Alk Phos. Abd US shows enlarged liver.  History of cholecystectomy  Abdominal Pain since yesterday, with nausea and no vomiting. Vaccinated against Hep A and Hep B.  Does not admit to high risk behaviors for Hep B or C. or history of hepatitis.  Check Acute Hep Panel, Acetaminophen level, NPO, IVF, Consult GI. PRN IV Ketoralac.

## 2017-01-31 NOTE — PLAN OF CARE
Pt arrived on unit from ER via wheelchair, pt AAO x4,vital signs stable, tele applied, fall/safety precautions maintained,pt oriented to room and call bell, no acute distress noted, will continue to monitor pt.

## 2017-01-31 NOTE — H&P
[facility]  History & Physical    Patient Name: Dianne Scott  MRN: [id number]  Admission Date: 1/31/2017  Attending Physician: Forrest Bergman MD   Primary Care Provider: No primary care provider on file.         Patient information was obtained from patient and ER records.     Subjective:     Principal Problem:Elevated liver enzymes    Chief Complaint:  Abdominal Pain     HPI: Dianne Scott is a 41 year  from [place] who relocated here about 6 months ago.  She has a history of GERD, seasonal allergies, Cholecystectomy, hysterectomy, and hyperlipidemia. She has a gluten intolerance, sometimes causing a rash.  She has never had an endoscopy.  She has not seen a PCP here, but has an appointment Dr. ERIC Burton in the future.  She presented to [facility] ED with abdominal pain that started yesterday evening and worsened when she ate a granola bar, so severe that it took her breath away.  She has not felt this type of pain since she was a teenager, when she had her cholecystectomy.  She also has associated nausea, but no vomiting. She states that she is under increased stress do to her job. She had a normal bowel movement yesterday, and denies diarrhea, constipation, dark or bloody stools, chest pain, fever or chills.  The pain was relieved in the ED with IV ketorolac.  But she is still displaying epigastric tenderness on exam. An abdominal US showed Normal caliber of the visualized common bile duct without evidence of intrahepatic biliary ductal dilatation with hepatomegaly.  She has elevated liver enzymes, Tbile 1.7, ,  and WBC 14.41.  She has been vaccinated for Hep A and Hep B.  She is in a monogamous relationship with her boyfriend.  She is admitted to [facility] with Hepatomegaly. Acute Hepatitis panel pending. GI consulted.         Past Medical History   Diagnosis Date    GERD (gastroesophageal reflux disease)     Hyperlipidemia      Pollen allergies        Past Surgical History   Procedure Laterality Date    Cholecystectomy      Hysterectomy         Review of patient's allergies indicates:   Allergen Reactions    Codeine Nausea And Vomiting    Demerol [meperidine] Nausea And Vomiting    Imitrex [sumatriptan succinate]      Hysterics      Morphine Nausea And Vomiting    Tetracyclines Nausea And Vomiting    Azithromycin Rash       No current facility-administered medications on file prior to encounter.      No current outpatient prescriptions on file prior to encounter.     Family History     Problem Relation (Age of Onset)    No Known Problems Mother, Father        Social History Main Topics    Smoking status: Current Every Day Smoker     Packs/day: 0.50     Years: 15.00    Smokeless tobacco: Not on file    Alcohol use No      Comment: 2-3 drinks per year    Drug use: No    Sexual activity: Yes     Partners: Male     Birth control/ protection: Other-see comments      Comment: Hysterectomy     Review of Systems   Constitutional: Negative for chills and fever.   HENT: Negative for sore throat and voice change.    Eyes: Negative for photophobia and visual disturbance.   Respiratory: Positive for shortness of breath (with the abdominal pain). Negative for cough and wheezing.    Cardiovascular: Negative for chest pain, palpitations and leg swelling.   Gastrointestinal: Positive for abdominal pain and nausea. Negative for blood in stool, constipation, diarrhea and vomiting.   Endocrine: Negative for polydipsia and polyphagia.   Genitourinary: Negative for dysuria, flank pain and hematuria.   Musculoskeletal: Negative for joint swelling, neck pain and neck stiffness.   Skin: Negative for pallor and rash.   Allergic/Immunologic: Positive for environmental allergies and food allergies (Gluten Intolerance). Negative for immunocompromised state.   Neurological: Negative for syncope, weakness, light-headedness and numbness.   Hematological:  Does not bruise/bleed easily.   Psychiatric/Behavioral: Negative for agitation. The patient is not nervous/anxious.         Stress related to work     Objective:     Vital Signs (Most Recent):  Temp: 96.7 °F (35.9 °C) (01/31/17 0233)  Pulse: 79 (01/31/17 0821)  Resp: (!) 30 (01/30/17 2244)  BP: 119/86 (01/31/17 0821)  SpO2: 98 % (01/31/17 0821) Vital Signs (24h Range):  Temp:  [96.7 °F (35.9 °C)-98.1 °F (36.7 °C)] 96.7 °F (35.9 °C)  Pulse:  [] 79  Resp:  [30] 30  SpO2:  [98 %-99 %] 98 %  BP: (119-137)/(70-86) 119/86     Weight: 99.8 kg (220 lb)  Body mass index is 32.49 kg/(m^2).    Physical Exam   Constitutional: She is oriented to person, place, and time. She appears well-developed and well-nourished. No distress.   HENT:   Head: Normocephalic and atraumatic.   Mouth/Throat: Oropharynx is clear and moist. No oropharyngeal exudate.   Eyes: Conjunctivae are normal. Pupils are equal, round, and reactive to light. No scleral icterus.   Neck: Neck supple.   Cardiovascular: Normal rate, regular rhythm, normal heart sounds and intact distal pulses.  Exam reveals no gallop and no friction rub.    No murmur heard.  Pulmonary/Chest: Effort normal and breath sounds normal. No respiratory distress. She has no wheezes. She has no rales.   Abdominal: Soft. Bowel sounds are normal. She exhibits no distension. There is tenderness (Epigastric). There is no rebound and no guarding.   Musculoskeletal: She exhibits no edema, tenderness or deformity.   Neurological: She is alert and oriented to person, place, and time. She exhibits normal muscle tone.   Skin: Skin is warm and dry. No rash noted. She is not diaphoretic.   Psychiatric: She has a normal mood and affect. Her behavior is normal.   Nursing note and vitals reviewed.       Significant Labs:   CBC:   Recent Labs  Lab 01/31/17  0320   WBC 14.41*   HGB 15.5   HCT 46.4   *     CMP:   Recent Labs  Lab 01/31/17  0320      K 4.2      CO2 22*   *   BUN  10   CREATININE 0.7   CALCIUM 9.9   PROT 7.0   ALBUMIN 3.7   BILITOT 1.7*   ALKPHOS 114   *   *   ANIONGAP 9   EGFRNONAA >60     Coagulation:   Recent Labs  Lab 01/31/17  0753   INR 1.0   APTT 26.2     Urine Studies:   Recent Labs  Lab 01/31/17  0408   COLORU Yellow   APPEARANCEUA Cloudy*   PHUR 7.0   SPECGRAV 1.020   PROTEINUA Negative   GLUCUA Negative   KETONESU Negative   BILIRUBINUA 1+*   OCCULTUA 2+*   NITRITE Negative   UROBILINOGEN 4.0-6.0*   LEUKOCYTESUR Negative   RBCUA 2       Significant Imaging:   Imaging Results         US Abdomen Limited (Gallbladder) (Final result) Result time:  01/31/17 06:27:21    Final result by Agnieszka Garduno MD (01/31/17 06:27:21)    Impression:        1. Status post cholecystectomy. Normal caliber of the visualized common bile duct without evidence of intrahepatic biliary ductal dilatation.    2. Mild hepatomegaly.        Electronically signed by: AGNIESZKA GARDUNO  Date:     01/31/17  Time:    06:27     Narrative:    Time of Procedure: 01/31/17 06:12:35  Accession # [id number]    Reason for study: Elevated liver enzymes    Comparison: None.    Technique: Limited right upper quadrant ultrasound was performed.    Findings: The liver is mildly enlarged measuring 19.2cm.  Hepatic parenchyma is homogeneous without evidence for masses.  No intra- or extrahepatic biliary ductal dilatation. The common bile duct measures 0.5 cm.  The gallbladder is surgically absent.  The visualized portions of the pancreas appear normal. The right kidney is normal in size without evidence of hydronephrosis. No ascites.            X-Ray Abdomen Flat And Erect (Final result) Result time:  01/31/17 04:21:32    Final result by Josephine Martins MD (01/31/17 04:21:32)    Impression:      1.  Nonobstructive bowel gas pattern.      Electronically signed by: JOSEPHINE MARTINS MD  Date:     01/31/17  Time:    04:21     Narrative:    Abdomen flat and erect    Clinical history: Epigastric  pain    Comparison: None    Findings:  2 upright views, one supine view.    No significant air-fluid levels are appreciated on upright view.  Air and stool is seen within the large bowel, and projected over the rectum.  No focally dilated small bowel loops.  Post surgical changes overlie the right upper quadrant.  There no calcifications to convincingly suggest nephrolithiasis.  Lower lung zones are grossly clear.  No large volume free air or pneumatosis.  No acute osseous abnormality.                Assessment/Plan:     * Elevated liver enzymes  Hepatomegaly, Abdominal Pain  ,  , Tbili 1.7, Normal Alk Phos. Abd US shows enlarged liver.  History of cholecystectomy  Abdominal Pain since yesterday, with nausea and no vomiting. Vaccinated against Hep A and Hep B.  Does not admit to high risk behaviors for Hep B or C. or history of hepatitis.  Check Acute Hep Panel, Acetaminophen level, NPO, IVF, Consult GI. PRN IV Ketoralac.       Nausea  PRN nausea medications      GERD (gastroesophageal reflux disease)  Takes esomeprazole daily.  IV pantoprazole.        VTE Risk Mitigation     None        Sun Bueno NP  Department of Hospital Medicine   [facility]

## 2017-01-31 NOTE — PLAN OF CARE
Problem: Patient Care Overview  Goal: Plan of Care Review  Outcome: Ongoing (interventions implemented as appropriate)  Pt stable, no apparent distress noted, fall and safety precautions maintained,  Bed in lowest position, locked , call light within reach, side rails up x's 2, slip resistant socks on. Bed alarm on, pt remains NPO for GI evaluation, Toradol ordered PRN for pt's pain, plan of care reviewed with pt, pt on telemetry, no ectopy or true red alarms noted, will continue to monitor pt.

## 2017-02-01 PROBLEM — R10.11 RUQ ABDOMINAL PAIN: Status: ACTIVE | Noted: 2017-02-01

## 2017-02-01 LAB
ALBUMIN SERPL BCP-MCNC: 2.9 G/DL
ALBUMIN SERPL BCP-MCNC: 2.9 G/DL
ALP SERPL-CCNC: 97 U/L
ALP SERPL-CCNC: 97 U/L
ALT SERPL W/O P-5'-P-CCNC: 220 U/L
ALT SERPL W/O P-5'-P-CCNC: 220 U/L
ANION GAP SERPL CALC-SCNC: 7 MMOL/L
AST SERPL-CCNC: 99 U/L
AST SERPL-CCNC: 99 U/L
BASOPHILS # BLD AUTO: 0.05 K/UL
BASOPHILS NFR BLD: 0.6 %
BILIRUB DIRECT SERPL-MCNC: 0.2 MG/DL
BILIRUB SERPL-MCNC: 0.7 MG/DL
BILIRUB SERPL-MCNC: 0.7 MG/DL
BUN SERPL-MCNC: 8 MG/DL
CALCIUM SERPL-MCNC: 8.3 MG/DL
CHLORIDE SERPL-SCNC: 108 MMOL/L
CO2 SERPL-SCNC: 23 MMOL/L
CREAT SERPL-MCNC: 0.7 MG/DL
DIFFERENTIAL METHOD: ABNORMAL
EOSINOPHIL # BLD AUTO: 0.7 K/UL
EOSINOPHIL NFR BLD: 7.8 %
ERYTHROCYTE [DISTWIDTH] IN BLOOD BY AUTOMATED COUNT: 13.6 %
EST. GFR  (AFRICAN AMERICAN): >60 ML/MIN/1.73 M^2
EST. GFR  (NON AFRICAN AMERICAN): >60 ML/MIN/1.73 M^2
GLUCOSE SERPL-MCNC: 86 MG/DL
HAV IGM SERPL QL IA: NEGATIVE
HBV CORE IGM SERPL QL IA: NEGATIVE
HBV SURFACE AG SERPL QL IA: NEGATIVE
HCT VFR BLD AUTO: 39 %
HCV AB SERPL QL IA: NEGATIVE
HGB BLD-MCNC: 12.9 G/DL
LYMPHOCYTES # BLD AUTO: 3.5 K/UL
LYMPHOCYTES NFR BLD: 39.7 %
MAGNESIUM SERPL-MCNC: 1.9 MG/DL
MCH RBC QN AUTO: 30.4 PG
MCHC RBC AUTO-ENTMCNC: 33.1 %
MCV RBC AUTO: 92 FL
MONOCYTES # BLD AUTO: 0.6 K/UL
MONOCYTES NFR BLD: 6.7 %
NEUTROPHILS # BLD AUTO: 4 K/UL
NEUTROPHILS NFR BLD: 45.1 %
PHOSPHATE SERPL-MCNC: 3.3 MG/DL
PLATELET # BLD AUTO: 314 K/UL
PMV BLD AUTO: 10.1 FL
POCT GLUCOSE: 78 MG/DL (ref 70–110)
POTASSIUM SERPL-SCNC: 3.7 MMOL/L
PROT SERPL-MCNC: 5.4 G/DL
PROT SERPL-MCNC: 5.4 G/DL
RBC # BLD AUTO: 4.25 M/UL
SODIUM SERPL-SCNC: 138 MMOL/L
WBC # BLD AUTO: 8.81 K/UL

## 2017-02-01 PROCEDURE — 36415 COLL VENOUS BLD VENIPUNCTURE: CPT

## 2017-02-01 PROCEDURE — C9113 INJ PANTOPRAZOLE SODIUM, VIA: HCPCS | Performed by: NURSE PRACTITIONER

## 2017-02-01 PROCEDURE — 25000003 PHARM REV CODE 250: Performed by: NURSE PRACTITIONER

## 2017-02-01 PROCEDURE — 63600175 PHARM REV CODE 636 W HCPCS: Performed by: NURSE PRACTITIONER

## 2017-02-01 PROCEDURE — 25000003 PHARM REV CODE 250: Performed by: PHYSICIAN ASSISTANT

## 2017-02-01 PROCEDURE — 84100 ASSAY OF PHOSPHORUS: CPT

## 2017-02-01 PROCEDURE — 83735 ASSAY OF MAGNESIUM: CPT

## 2017-02-01 PROCEDURE — 85025 COMPLETE CBC W/AUTO DIFF WBC: CPT

## 2017-02-01 PROCEDURE — 94761 N-INVAS EAR/PLS OXIMETRY MLT: CPT

## 2017-02-01 PROCEDURE — 21400001 HC TELEMETRY ROOM

## 2017-02-01 PROCEDURE — 80053 COMPREHEN METABOLIC PANEL: CPT

## 2017-02-01 PROCEDURE — 99232 SBSQ HOSP IP/OBS MODERATE 35: CPT | Mod: ,,, | Performed by: INTERNAL MEDICINE

## 2017-02-01 RX ORDER — HYDROMORPHONE HYDROCHLORIDE 1 MG/ML
0.2 INJECTION, SOLUTION INTRAMUSCULAR; INTRAVENOUS; SUBCUTANEOUS EVERY 6 HOURS PRN
Status: DISCONTINUED | OUTPATIENT
Start: 2017-02-01 | End: 2017-02-04 | Stop reason: HOSPADM

## 2017-02-01 RX ADMIN — KETOROLAC TROMETHAMINE 15 MG: 30 INJECTION, SOLUTION INTRAMUSCULAR at 12:02

## 2017-02-01 RX ADMIN — PANTOPRAZOLE SODIUM 40 MG: 40 INJECTION, POWDER, FOR SOLUTION INTRAVENOUS at 09:02

## 2017-02-01 RX ADMIN — PROMETHAZINE HYDROCHLORIDE 6.25 MG: 25 INJECTION INTRAMUSCULAR; INTRAVENOUS at 02:02

## 2017-02-01 RX ADMIN — SODIUM CHLORIDE 1000 ML: 0.9 INJECTION, SOLUTION INTRAVENOUS at 03:02

## 2017-02-01 RX ADMIN — SODIUM CHLORIDE 500 ML: 0.9 INJECTION, SOLUTION INTRAVENOUS at 05:02

## 2017-02-01 RX ADMIN — CETIRIZINE HYDROCHLORIDE 10 MG: 10 TABLET, FILM COATED ORAL at 09:02

## 2017-02-01 RX ADMIN — KETOROLAC TROMETHAMINE 15 MG: 30 INJECTION, SOLUTION INTRAMUSCULAR at 08:02

## 2017-02-01 NOTE — PLAN OF CARE
Problem: Patient Care Overview  Goal: Plan of Care Review  No distress noted at this time, will continue to monitor.

## 2017-02-01 NOTE — PLAN OF CARE
Problem: Patient Care Overview  Goal: Plan of Care Review  Outcome: Ongoing (interventions implemented as appropriate)  Plan of care reviewed with patient. Patient and spouse verbalized complete understanding. Fall/safety precautions maintained. Slip resistant socks on. Bed in lowest position, locked, call light within reach. Side rails up x's 2. Nurse instructed patient to notify staff for any assistance and pt verbalized complete understanding.      Phenergan given for nausea, GI to come and see pt due to unable to tolerate regular diet, bolus in progress due to pt c/o dizziness and lightheadedneed upon standing, pt stable, pt resting at this time, will continue to monitor pt.

## 2017-02-01 NOTE — ASSESSMENT & PLAN NOTE
Hepatomegaly, Abdominal Pain  ,  , Tbili 1.7, Normal Alk Phos. Abd US shows enlarged liver.  History of cholecystectomy  Abdominal Pain since yesterday, with nausea and no vomiting. Vaccinated against Hep A and Hep B.  Does not admit to high risk behaviors for Hep B or C. or history of hepatitis.    Acute Hep. A, B, and C negative. Acetaminophen leve negative. Abd MRI for evidence of Duct stone or obstruction.  Liver enzymes are improving.    If tolerates regular diet, will discharge and follow up with GI out-patient.

## 2017-02-01 NOTE — PROGRESS NOTES
[facility] Medicine  Progress Note    Patient Name: Dianne Scott  MRN: [id number]  Patient Class: OP- Observation   Admission Date: 1/31/2017  Length of Stay: 0 days  Attending Physician: Forrest Bergman MD  Primary Care Provider: Primary Doctor No        Subjective:     Principal Problem:Elevated liver enzymes    HPI:  Dianne Scott is a 41 year  from [place] who relocated here about 6 months ago.  She has a history of GERD, seasonal allergies, Cholecystectomy, hysterectomy, and hyperlipidemia. She has a gluten intolerance, sometimes causing a rash.  She has never had an endoscopy.  She has not seen a PCP here, but has an appointment Dr. ERIC Burton in the future.  She presented to [facility] ED with abdominal pain that started yesterday evening and worsened when she ate a granola bar, so severe that it took her breath away.  She has not felt this type of pain since she was a teenager, when she had her cholecystectomy.  She also has associated nausea, but no vomiting. She states that she is under increased stress do to her job. She had a normal bowel movement yesterday, and denies diarrhea, constipation, dark or bloody stools, chest pain, fever or chills.  The pain was relieved in the ED with IV ketorolac.  But she is still displaying epigastric tenderness on exam. An abdominal US showed Normal caliber of the visualized common bile duct without evidence of intrahepatic biliary ductal dilatation with hepatomegaly.  She has elevated liver enzymes, Tbile 1.7, ,  and WBC 14.41.  She has been vaccinated for Hep A and Hep B.  She is in a monogamous relationship with her boyfriend.  She is admitted to [facility] Medicine with Hepatomegaly. Acute Hepatitis panel pending. GI consulted.         Hospital Course:  Had Abdominal MRI which showed no evidence of stone or obstruction of common bile duct. Acute Hep. A, B, and C negative.  Liver enzymes are improving.       Interval History:   Negative abdominal MRI.  If she tolerates a regular diet, she will be discharge with out-patient follow up to GI.  She had one episode of hypotension (80/50).  Will check orthostatics.     Addendum: She did not tolerated diet. Became nauseous and had epigastric pain.  Also was symptomatic when checking orthostatics with subjective lightheadedness and visible swaying, although HR and BP has no significant change.  1 liter NS Bolus ordered. Will keep overngiht.    Review of Systems   Constitutional: Negative for chills, diaphoresis and fever.   Respiratory: Negative for cough, shortness of breath and wheezing.    Cardiovascular: Negative for chest pain and palpitations.   Gastrointestinal: Positive for abdominal pain (Mild epigastric ). Negative for nausea and vomiting.     Objective:     Vital Signs (Most Recent):  Temp: 96.3 °F (35.7 °C) (02/01/17 0700)  Pulse: (!) 58 (02/01/17 0700)  Resp: 20 (02/01/17 0700)  BP: 110/72 (02/01/17 0930)  SpO2: 96 % (02/01/17 0300) Vital Signs (24h Range):  Temp:  [96.3 °F (35.7 °C)-98.7 °F (37.1 °C)] 96.3 °F (35.7 °C)  Pulse:  [56-66] 58  Resp:  [20] 20  SpO2:  [96 %-98 %] 96 %  BP: ()/(49-72) 110/72     Weight: 114.9 kg (253 lb 4.9 oz)  Body mass index is 37.41 kg/(m^2).    Intake/Output Summary (Last 24 hours) at 02/01/17 1346  Last data filed at 02/01/17 0600   Gross per 24 hour   Intake          2203.75 ml   Output              800 ml   Net          1403.75 ml      Physical Exam   Constitutional: She is oriented to person, place, and time. No distress.   HENT:   Head: Normocephalic and atraumatic.   Eyes: No scleral icterus.   Cardiovascular: Normal rate, regular rhythm, normal heart sounds and intact distal pulses.    No murmur heard.  Pulmonary/Chest: Effort normal and breath sounds normal. No respiratory distress. She has no wheezes. She has no rales.   Abdominal: Soft. Bowel sounds are normal. She exhibits no distension. There is tenderness  (Mild epigastric).   Neurological: She is alert and oriented to person, place, and time.   Skin: Skin is warm and dry. She is not diaphoretic.   Psychiatric: She has a normal mood and affect.   Nursing note and vitals reviewed.      Significant Labs:   Bilirubin:   Recent Labs  Lab 01/31/17 0320 02/01/17 0445   BILIDIR  --  0.2   BILITOT 1.7* 0.7  0.7     CBC:   Recent Labs  Lab 01/31/17 0320 02/01/17 0445   WBC 14.41* 8.81   HGB 15.5 12.9   HCT 46.4 39.0   * 314     CMP:   Recent Labs  Lab 01/31/17 0320 02/01/17 0445    138   K 4.2 3.7    108   CO2 22* 23   * 86   BUN 10 8   CREATININE 0.7 0.7   CALCIUM 9.9 8.3*   PROT 7.0 5.4*  5.4*   ALBUMIN 3.7 2.9*  2.9*   BILITOT 1.7* 0.7  0.7   ALKPHOS 114 97  97   * 99*  99*   * 220*  220*   ANIONGAP 9 7*   EGFRNONAA >60 >60     Coagulation:   Recent Labs  Lab 01/31/17  0753   INR 1.0   APTT 26.2     Lipid Panel:   Recent Labs  Lab 01/31/17  0753   CHOL 185   HDL 42   LDLCALC 126.6   TRIG 82   CHOLHDL 22.7       Significant Imaging:   Imaging Results         MRI MRCP Without Contrast (Final result) Result time:  02/01/17 10:31:58    Final result by Bere Hernandes MD (02/01/17 10:31:58)    Impression:     No filling defect along the length of the common bile duct to suggest stone is seen on MRCP.      Electronically signed by: BERE HERNANDES MD  Date:     02/01/17  Time:    10:31     Narrative:    MRI abdomen without contrast MRCP    MIP reconstructions were provided.    The patient is status post cholecystectomy. There is no evidence of hepatic lesion. The common bile duct is minimally prominent at 6 mm. No discrete filling defect is seen along the length of the common bile duct is seen.    There is no evidence of hepatic lesion. The liver, spleen, adrenal glands, pancreas are unremarkable.            US Abdomen Limited (Gallbladder) (Final result) Result time:  01/31/17 06:27:21    Final result by Alex Garduno MD  (01/31/17 06:27:21)    Impression:        1. Status post cholecystectomy. Normal caliber of the visualized common bile duct without evidence of intrahepatic biliary ductal dilatation.    2. Mild hepatomegaly.        Electronically signed by: AGNIESZKA MÉNDEZ  Date:     01/31/17  Time:    06:27     Narrative:    Time of Procedure: 01/31/17 06:12:35  Accession # [id number]    Reason for study: Elevated liver enzymes    Comparison: None.    Technique: Limited right upper quadrant ultrasound was performed.    Findings: The liver is mildly enlarged measuring 19.2cm.  Hepatic parenchyma is homogeneous without evidence for masses.  No intra- or extrahepatic biliary ductal dilatation. The common bile duct measures 0.5 cm.  The gallbladder is surgically absent.  The visualized portions of the pancreas appear normal. The right kidney is normal in size without evidence of hydronephrosis. No ascites.            X-Ray Abdomen Flat And Erect (Final result) Result time:  01/31/17 04:21:32    Final result by Paul Martins MD (01/31/17 04:21:32)    Impression:      1.  Nonobstructive bowel gas pattern.      Electronically signed by: PAUL MARTINS MD  Date:     01/31/17  Time:    04:21     Narrative:    Abdomen flat and erect    Clinical history: Epigastric pain    Comparison: None    Findings:  2 upright views, one supine view.    No significant air-fluid levels are appreciated on upright view.  Air and stool is seen within the large bowel, and projected over the rectum.  No focally dilated small bowel loops.  Post surgical changes overlie the right upper quadrant.  There no calcifications to convincingly suggest nephrolithiasis.  Lower lung zones are grossly clear.  No large volume free air or pneumatosis.  No acute osseous abnormality.                Assessment/Plan:      * Elevated liver enzymes  Hepatomegaly, Abdominal Pain  ,  , Tbili 1.7, Normal Alk Phos. Abd US shows enlarged liver.  History of  cholecystectomy  Abdominal Pain since yesterday, with nausea and no vomiting. Vaccinated against Hep A and Hep B.  Does not admit to high risk behaviors for Hep B or C. or history of hepatitis.    Acute Hep. A, B, and C negative. Acetaminophen leve negative. Abd MRI for evidence of Duct stone or obstruction.  Liver enzymes are improving.    If tolerates regular diet, will discharge and follow up with GI out-patient.     GERD (gastroesophageal reflux disease)  Takes esomeprazole daily.  IV pantoprazole.        VTE Risk Mitigation         Ordered     Low Risk of VTE  Once      01/31/17 4419          Sun Bueno NP  Department of Hospital Medicine   [facility]

## 2017-02-01 NOTE — PROGRESS NOTES
"Gastroenterology  CC: Abdominal pain    HPI 41 y.o. female - here with acute abdominal pain. Feels better, no current pain.       Past Medical History   Diagnosis Date    GERD (gastroesophageal reflux disease)     Hyperlipidemia     Pollen allergies          Review of Systems  General ROS: negative for chills, fever or weight loss  Cardiovascular ROS: no chest pain or dyspnea on exertion  Gastrointestinal ROS: no  change in bowel habits, or black/ bloody stools; + abdominal pain,    Physical Examination  Visit Vitals    /72 (BP Location: Left arm, Patient Position: Lying, BP Method: Automatic)    Pulse (!) 58    Temp 96.3 °F (35.7 °C) (Oral)    Resp 20    Ht 5' 9" (1.753 m)    Wt 114.9 kg (253 lb 4.9 oz)    SpO2 96%    Breastfeeding No    BMI 37.41 kg/m2     General appearance: alert, cooperative, no distress  HENT: Normocephalic, atraumatic, neck symmetrical, no nasal discharge   Lungs: clear to auscultation bilaterally, no dullness to percussion bilaterally  Heart: regular rate and rhythm without rub; no displacement of the PMI   Abdomen: soft, non-tender; bowel sounds normoactive; no organomegaly  Extremities: extremities symmetric; no clubbing, cyanosis, or edema  Neurologic: Alert and oriented X 3, normal strength, normal coordination and gait    Labs: LFTs improving    Imaging: MRCP report/images reviewed, no retained stone identified    Assessment/Plan: 42yo female with:    1. Abdominal pain/Elevated LFTs   - lfts trending down   - will try enteral nutrition   - egd vs eus if pain worsens  "

## 2017-02-01 NOTE — PLAN OF CARE
Pt BP 80/50 manually, THERESE Hassan notified. Pt is asymptomatic. No new orders. Will continue to monitor.

## 2017-02-01 NOTE — SUBJECTIVE & OBJECTIVE
Interval History:   Negative abdominal MRI.  If she tolerates a regular diet, she will be discharge with out-patient follow up to GI.  She had one episode of hypotension (80/50).  Will check orthostatics.     Review of Systems   Constitutional: Negative for chills, diaphoresis and fever.   Respiratory: Negative for cough, shortness of breath and wheezing.    Cardiovascular: Negative for chest pain and palpitations.   Gastrointestinal: Positive for abdominal pain (Mild epigastric ). Negative for nausea and vomiting.     Objective:     Vital Signs (Most Recent):  Temp: 96.3 °F (35.7 °C) (02/01/17 0700)  Pulse: (!) 58 (02/01/17 0700)  Resp: 20 (02/01/17 0700)  BP: 110/72 (02/01/17 0930)  SpO2: 96 % (02/01/17 0300) Vital Signs (24h Range):  Temp:  [96.3 °F (35.7 °C)-98.7 °F (37.1 °C)] 96.3 °F (35.7 °C)  Pulse:  [56-66] 58  Resp:  [20] 20  SpO2:  [96 %-98 %] 96 %  BP: ()/(49-72) 110/72     Weight: 114.9 kg (253 lb 4.9 oz)  Body mass index is 37.41 kg/(m^2).    Intake/Output Summary (Last 24 hours) at 02/01/17 1346  Last data filed at 02/01/17 0600   Gross per 24 hour   Intake          2203.75 ml   Output              800 ml   Net          1403.75 ml      Physical Exam   Constitutional: She is oriented to person, place, and time. No distress.   HENT:   Head: Normocephalic and atraumatic.   Eyes: No scleral icterus.   Cardiovascular: Normal rate, regular rhythm, normal heart sounds and intact distal pulses.    No murmur heard.  Pulmonary/Chest: Effort normal and breath sounds normal. No respiratory distress. She has no wheezes. She has no rales.   Abdominal: Soft. Bowel sounds are normal. She exhibits no distension. There is tenderness (Mild epigastric).   Neurological: She is alert and oriented to person, place, and time.   Skin: Skin is warm and dry. She is not diaphoretic.   Psychiatric: She has a normal mood and affect.   Nursing note and vitals reviewed.      Significant Labs:   Bilirubin:   Recent Labs  Lab  01/31/17 0320 02/01/17  0445   BILIDIR  --  0.2   BILITOT 1.7* 0.7  0.7     CBC:   Recent Labs  Lab 01/31/17 0320 02/01/17 0445   WBC 14.41* 8.81   HGB 15.5 12.9   HCT 46.4 39.0   * 314     CMP:   Recent Labs  Lab 01/31/17 0320 02/01/17 0445    138   K 4.2 3.7    108   CO2 22* 23   * 86   BUN 10 8   CREATININE 0.7 0.7   CALCIUM 9.9 8.3*   PROT 7.0 5.4*  5.4*   ALBUMIN 3.7 2.9*  2.9*   BILITOT 1.7* 0.7  0.7   ALKPHOS 114 97  97   * 99*  99*   * 220*  220*   ANIONGAP 9 7*   EGFRNONAA >60 >60     Coagulation:   Recent Labs  Lab 01/31/17 0753   INR 1.0   APTT 26.2     Lipid Panel:   Recent Labs  Lab 01/31/17  0753   CHOL 185   HDL 42   LDLCALC 126.6   TRIG 82   CHOLHDL 22.7       Significant Imaging:   Imaging Results         MRI MRCP Without Contrast (Final result) Result time:  02/01/17 10:31:58    Final result by Bere Hernandes MD (02/01/17 10:31:58)    Impression:     No filling defect along the length of the common bile duct to suggest stone is seen on MRCP.      Electronically signed by: BERE HERNANDES MD  Date:     02/01/17  Time:    10:31     Narrative:    MRI abdomen without contrast MRCP    MIP reconstructions were provided.    The patient is status post cholecystectomy. There is no evidence of hepatic lesion. The common bile duct is minimally prominent at 6 mm. No discrete filling defect is seen along the length of the common bile duct is seen.    There is no evidence of hepatic lesion. The liver, spleen, adrenal glands, pancreas are unremarkable.            US Abdomen Limited (Gallbladder) (Final result) Result time:  01/31/17 06:27:21    Final result by Agnieszka Garduno MD (01/31/17 06:27:21)    Impression:        1. Status post cholecystectomy. Normal caliber of the visualized common bile duct without evidence of intrahepatic biliary ductal dilatation.    2. Mild hepatomegaly.        Electronically signed by: AGNIESZKA GARDUNO  Date:      01/31/17  Time:    06:27     Narrative:    Time of Procedure: 01/31/17 06:12:35  Accession # [id number]    Reason for study: Elevated liver enzymes    Comparison: None.    Technique: Limited right upper quadrant ultrasound was performed.    Findings: The liver is mildly enlarged measuring 19.2cm.  Hepatic parenchyma is homogeneous without evidence for masses.  No intra- or extrahepatic biliary ductal dilatation. The common bile duct measures 0.5 cm.  The gallbladder is surgically absent.  The visualized portions of the pancreas appear normal. The right kidney is normal in size without evidence of hydronephrosis. No ascites.            X-Ray Abdomen Flat And Erect (Final result) Result time:  01/31/17 04:21:32    Final result by Josephine Martins MD (01/31/17 04:21:32)    Impression:      1.  Nonobstructive bowel gas pattern.      Electronically signed by: JOSEPHINE MARTINS MD  Date:     01/31/17  Time:    04:21     Narrative:    Abdomen flat and erect    Clinical history: Epigastric pain    Comparison: None    Findings:  2 upright views, one supine view.    No significant air-fluid levels are appreciated on upright view.  Air and stool is seen within the large bowel, and projected over the rectum.  No focally dilated small bowel loops.  Post surgical changes overlie the right upper quadrant.  There no calcifications to convincingly suggest nephrolithiasis.  Lower lung zones are grossly clear.  No large volume free air or pneumatosis.  No acute osseous abnormality.

## 2017-02-01 NOTE — PLAN OF CARE
EDY Bueno made of aware of orthostatic BPs and that became symptomatic upon standing, pt feeling dizzy and lightheaded and nurse observing pt swaying, pt also nauseous at the time, pt unable to tolerate regular diet.

## 2017-02-01 NOTE — CONSULTS
Gastroenterology  CC: Abdominal pain    HPI 41 y.o. female here with one day of abdominal pain. It is an epigastric pain radiating to her back beginning suddenly the day prior to presentation. She stated it felt similar to the pain when her gallbladder was removed for cholelithiasis at age 14.  No associated changes in bowel habits. No chest pain or SOB. She denies fever or dark urine. No tylenol ingestion.  The pain is constant, but fluctuates in intensity and is sharp. No vomiting or nausea. No history of liver disease or etoh ingestion.       Past Medical History   Diagnosis Date    GERD (gastroesophageal reflux disease)     Hyperlipidemia     Pollen allergies          Past Surgical History   Procedure Laterality Date    Cholecystectomy      Hysterectomy         Social History  Social History   Substance Use Topics    Smoking status: Current Every Day Smoker     Packs/day: 0.50     Years: 15.00    Smokeless tobacco: None    Alcohol use No      Comment: 2-3 drinks per year         Family History   Problem Relation Age of Onset    No Known Problems Mother     No Known Problems Father        Review of Systems  General ROS: negative for chills, fever or weight loss  Psychological ROS: negative for hallucination, depression or suicidal ideation  Ophthalmic ROS: negative for blurry vision, photophobia or eye pain  ENT ROS: negative for epistaxis, sore throat or rhinorrhea  Respiratory ROS: no cough, shortness of breath, or wheezing  Cardiovascular ROS: no chest pain or dyspnea on exertion  Gastrointestinal ROS: no change in bowel habits, or black/ bloody stools  Genito-Urinary ROS: no dysuria, trouble voiding, or hematuria  Musculoskeletal ROS: negative for gait disturbance or muscular weakness  Neurological ROS: no syncope or seizures; no ataxia  Dermatological ROS: negative for pruritis, rash and jaundice    Physical Examination  Visit Vitals    BP (!) 121/57 (BP Location: Right arm, Patient Position:  "Lying, BP Method: Automatic)    Pulse 66    Temp 97.7 °F (36.5 °C) (Axillary)    Resp 20    Ht 5' 9" (1.753 m)    Wt 114.9 kg (253 lb 4.9 oz)    SpO2 97%    Breastfeeding No    BMI 37.41 kg/m2     General appearance: alert, cooperative, no distress  HENT: Normocephalic, atraumatic, neck symmetrical, no nasal discharge   Eyes: conjunctivae/corneas clear, PERRL, EOM's intact  Lungs: clear to auscultation bilaterally, no dullness to percussion bilaterally  Heart: regular rate and rhythm without rub; no displacement of the PMI   Abdomen: soft, ttp in epigastrium; bowel sounds normoactive; no organomegaly  Extremities: extremities symmetric; no clubbing, cyanosis, or edema  Integument: Skin color, texture, turgor normal; no rashes; hair distrubution normal  Neurologic: Alert and oriented X 3, normal strength, normal coordination and gait  Psychiatric: no pressured speech; normal affect; no evidence of impaired cognition     Labs: TB 1.7    Imaging: U/s images/report reviewed, normal cbd    Assessment/Plan: 40yo female with:    1. Epigastric pain/Elevated LFTs   - will check MRCP   - may need endoscopic evaluation as well    - LFTs in am   - pain control  "

## 2017-02-02 ENCOUNTER — ANESTHESIA (OUTPATIENT)
Dept: ENDOSCOPY | Facility: HOSPITAL | Age: 42
DRG: 442 | End: 2017-02-02
Payer: COMMERCIAL

## 2017-02-02 ENCOUNTER — ANESTHESIA EVENT (OUTPATIENT)
Dept: ENDOSCOPY | Facility: HOSPITAL | Age: 42
DRG: 442 | End: 2017-02-02
Payer: COMMERCIAL

## 2017-02-02 LAB
ALBUMIN SERPL BCP-MCNC: 3.1 G/DL
ALP SERPL-CCNC: 142 U/L
ALT SERPL W/O P-5'-P-CCNC: 366 U/L
ANION GAP SERPL CALC-SCNC: 9 MMOL/L
AST SERPL-CCNC: 352 U/L
BASOPHILS # BLD AUTO: 0.03 K/UL
BASOPHILS NFR BLD: 0.3 %
BILIRUB SERPL-MCNC: 1.2 MG/DL
BUN SERPL-MCNC: 8 MG/DL
CALCIUM SERPL-MCNC: 8.8 MG/DL
CHLORIDE SERPL-SCNC: 110 MMOL/L
CO2 SERPL-SCNC: 22 MMOL/L
CREAT SERPL-MCNC: 0.7 MG/DL
DIFFERENTIAL METHOD: NORMAL
EOSINOPHIL # BLD AUTO: 0.4 K/UL
EOSINOPHIL NFR BLD: 4.9 %
ERYTHROCYTE [DISTWIDTH] IN BLOOD BY AUTOMATED COUNT: 13.3 %
EST. GFR  (AFRICAN AMERICAN): >60 ML/MIN/1.73 M^2
EST. GFR  (NON AFRICAN AMERICAN): >60 ML/MIN/1.73 M^2
GLUCOSE SERPL-MCNC: 105 MG/DL
HCT VFR BLD AUTO: 40.3 %
HGB BLD-MCNC: 13.6 G/DL
LIPASE SERPL-CCNC: 19 U/L
LYMPHOCYTES # BLD AUTO: 2.4 K/UL
LYMPHOCYTES NFR BLD: 26.9 %
MCH RBC QN AUTO: 30.5 PG
MCHC RBC AUTO-ENTMCNC: 33.7 %
MCV RBC AUTO: 90 FL
MONOCYTES # BLD AUTO: 0.7 K/UL
MONOCYTES NFR BLD: 8 %
NEUTROPHILS # BLD AUTO: 5.2 K/UL
NEUTROPHILS NFR BLD: 59.7 %
PLATELET # BLD AUTO: 318 K/UL
PMV BLD AUTO: 9.8 FL
POTASSIUM SERPL-SCNC: 3.9 MMOL/L
PROT SERPL-MCNC: 6 G/DL
RBC # BLD AUTO: 4.46 M/UL
SODIUM SERPL-SCNC: 141 MMOL/L
WBC # BLD AUTO: 8.75 K/UL

## 2017-02-02 PROCEDURE — C9113 INJ PANTOPRAZOLE SODIUM, VIA: HCPCS | Performed by: NURSE PRACTITIONER

## 2017-02-02 PROCEDURE — 63600175 PHARM REV CODE 636 W HCPCS: Performed by: NURSE PRACTITIONER

## 2017-02-02 PROCEDURE — 25000003 PHARM REV CODE 250: Performed by: NURSE PRACTITIONER

## 2017-02-02 PROCEDURE — 80053 COMPREHEN METABOLIC PANEL: CPT

## 2017-02-02 PROCEDURE — 83690 ASSAY OF LIPASE: CPT

## 2017-02-02 PROCEDURE — 21400001 HC TELEMETRY ROOM

## 2017-02-02 PROCEDURE — 63600175 PHARM REV CODE 636 W HCPCS: Performed by: PHYSICIAN ASSISTANT

## 2017-02-02 PROCEDURE — 36415 COLL VENOUS BLD VENIPUNCTURE: CPT

## 2017-02-02 PROCEDURE — 85025 COMPLETE CBC W/AUTO DIFF WBC: CPT

## 2017-02-02 PROCEDURE — 94761 N-INVAS EAR/PLS OXIMETRY MLT: CPT

## 2017-02-02 RX ORDER — DEXTROSE MONOHYDRATE AND SODIUM CHLORIDE 5; .45 G/100ML; G/100ML
INJECTION, SOLUTION INTRAVENOUS ONCE
Status: COMPLETED | OUTPATIENT
Start: 2017-02-02 | End: 2017-02-02

## 2017-02-02 RX ORDER — DEXTROSE MONOHYDRATE AND SODIUM CHLORIDE 5; .45 G/100ML; G/100ML
INJECTION, SOLUTION INTRAVENOUS CONTINUOUS
Status: DISCONTINUED | OUTPATIENT
Start: 2017-02-02 | End: 2017-02-03

## 2017-02-02 RX ADMIN — DEXTROSE AND SODIUM CHLORIDE: 5; .45 INJECTION, SOLUTION INTRAVENOUS at 07:02

## 2017-02-02 RX ADMIN — PANTOPRAZOLE SODIUM 40 MG: 40 INJECTION, POWDER, FOR SOLUTION INTRAVENOUS at 09:02

## 2017-02-02 RX ADMIN — DEXTROSE AND SODIUM CHLORIDE: 5; .45 INJECTION, SOLUTION INTRAVENOUS at 12:02

## 2017-02-02 RX ADMIN — ONDANSETRON 4 MG: 2 INJECTION INTRAMUSCULAR; INTRAVENOUS at 12:02

## 2017-02-02 RX ADMIN — CETIRIZINE HYDROCHLORIDE 10 MG: 10 TABLET, FILM COATED ORAL at 09:02

## 2017-02-02 RX ADMIN — HYDROMORPHONE HYDROCHLORIDE 0.2 MG: 1 INJECTION, SOLUTION INTRAMUSCULAR; INTRAVENOUS; SUBCUTANEOUS at 12:02

## 2017-02-02 NOTE — ASSESSMENT & PLAN NOTE
Hepatomegaly, Abdominal Pain  ,  , Tbili 1.7, Normal Alk Phos. Abd US shows enlarged liver.  History of cholecystectomy  Abdominal Pain since yesterday, with nausea and no vomiting. Vaccinated against Hep A and Hep B.  Does not admit to high risk behaviors for Hep B or C. or history of hepatitis.    Acute Hep. A, B, and C negative. Acetaminophen leve negative. Abd MRI for evidence of Duct stone or obstruction.  Liver enzymes are improved, but anny again after eating.  Increased Abd pain and Nausea after eating.  EUS with GI today.

## 2017-02-02 NOTE — ANESTHESIA PREPROCEDURE EVALUATION
02/02/2017     Dianne Scott is a 41 y.o., female admitted thru ED for severe epigastric pain;, elevatedf liver enzymes & hepatomegaly; for EGD    Hx of PONV in Past.    Vitals:    02/02/17 1110   BP: (!) 101/59   Pulse: (!) 58   Resp: 20   Temp: 36.4 °C (97.6 °F)     Past Medical History   Diagnosis Date    GERD (gastroesophageal reflux disease)     Hyperlipidemia     Pollen allergies      Past Surgical History   Procedure Laterality Date    Cholecystectomy      Hysterectomy         Review of patient's allergies indicates:   Allergen Reactions    Codeine Nausea And Vomiting    Demerol [meperidine] Nausea And Vomiting    Imitrex [sumatriptan succinate]      Hysterics      Morphine Nausea And Vomiting    Tetracyclines Nausea And Vomiting    Azithromycin Rash    Ciprofloxacin hcl Rash       ANES-RELATED MAR  pantoprazole      [facility] Anesthesia Evaluation    I have reviewed the Patient Summary Reports.    I have reviewed the Nursing Notes.   I have reviewed the Medications.     Review of Systems  Anesthesia Hx:  No problems with previous Anesthesia  History of prior surgery of interest to airway management or planning: Denies Family Hx of Anesthesia complications.  Personal Hx of Anesthesia complications, Post-Operative Nausea/Vomiting, in the past, but not with recent anesthetics / prophylaxis   Social:  No Alcohol Use, Smoker 0.5 pk/day   Hematology/Oncology:  Hematology Normal   Oncology Normal     Cardiovascular:   Exercise tolerance: good    Renal/:  Renal/ Normal     Hepatic/GI:   GERD Liver Disease,    Musculoskeletal:  Musculoskeletal Normal    Neurological:  Neurology Normal    Endocrine:   Gluten intolerance     Wt Readings from Last 3 Encounters:   01/31/17 114.9 kg (253 lb 4.9 oz)     Temp Readings from Last 3 Encounters:   02/02/17 36.4 °C (97.6 °F) (Oral)     BP Readings from Last  3 Encounters:   02/02/17 (!) 101/59     Pulse Readings from Last 3 Encounters:   02/02/17 (!) 58       Physical Exam  General:  Well nourished, Obesity    Airway/Jaw/Neck:       Eyes/Ears/Nose:  EYES/EARS/NOSE FINDINGS: Normal    Chest/Lungs:  Chest/Lungs Findings: Clear to auscultation, Normal Respiratory Rate     Heart/Vascular:  Heart Findings: Rate: Normal  Rhythm: Regular Rhythm        Mental Status:  Mental Status Findings: Normal      Lab Results   Component Value Date    WBC 8.75 02/02/2017    HGB 13.6 02/02/2017    HCT 40.3 02/02/2017    MCV 90 02/02/2017     02/02/2017       Chemistry        Component Value Date/Time     02/02/2017 0837    K 3.9 02/02/2017 0837     02/02/2017 0837    CO2 22 (L) 02/02/2017 0837    BUN 8 02/02/2017 0837    CREATININE 0.7 02/02/2017 0837     02/02/2017 0837        Component Value Date/Time    CALCIUM 8.8 02/02/2017 0837    ALKPHOS 142 (H) 02/02/2017 0837     (H) 02/02/2017 0837     (H) 02/02/2017 0837    BILITOT 1.2 (H) 02/02/2017 0837            Anesthesia Plan  Type of Anesthesia, risks & benefits discussed:  Anesthesia Type:  general, MAC  Patient's Preference:   Intra-op Monitoring Plan:   Intra-op Monitoring Plan Comments:   Post Op Pain Control Plan:   Post Op Pain Control Plan Comments:   Induction:   IV  Beta Blocker:  Patient is not currently on a Beta-Blocker (No further documentation required).       Informed Consent: Patient understands risks and agrees with Anesthesia plan.  Questions answered. Anesthesia consent signed with patient.  ASA Score: 2  emergent   Day of Surgery Review of History & Physical:     H&P completed by Anesthesiologist.   Anesthesia Plan Notes: Patient does not want opiates. They make her shake and feel nauseated. Prefers tylenol        Ready For Surgery From Anesthesia Perspective.

## 2017-02-02 NOTE — SUBJECTIVE & OBJECTIVE
Interval History:   Patient had pain nausea through the night and still has lightheadedness when she ambulates, although HR and BR remain stable. Started in D5/.45 NS infusaion. Will have EUS today with GI.       Review of Systems   Constitutional: Negative for chills, diaphoresis and fever.   Respiratory: Negative for cough, shortness of breath and wheezing.    Cardiovascular: Negative for chest pain and palpitations.   Gastrointestinal: Positive for abdominal pain (worse after eating) and vomiting. Negative for nausea.   Neurological: Positive for light-headedness.     Objective:     Vital Signs (Most Recent):  Temp: 97.6 °F (36.4 °C) (02/02/17 1110)  Pulse: (!) 58 (02/02/17 1110)  Resp: 20 (02/02/17 1110)  BP: (!) 101/59 (02/02/17 1110)  SpO2: 97 % (02/02/17 1100) Vital Signs (24h Range):  Temp:  [96.6 °F (35.9 °C)-98.5 °F (36.9 °C)] 97.6 °F (36.4 °C)  Pulse:  [58-98] 58  Resp:  [18-20] 20  SpO2:  [96 %-97 %] 97 %  BP: (100-139)/(52-86) 101/59     Weight: 114.9 kg (253 lb 4.9 oz)  Body mass index is 37.41 kg/(m^2).    Intake/Output Summary (Last 24 hours) at 02/02/17 1529  Last data filed at 02/02/17 1225   Gross per 24 hour   Intake              880 ml   Output             3350 ml   Net            -2470 ml      Physical Exam   Constitutional: She is oriented to person, place, and time. No distress.   HENT:   Head: Normocephalic and atraumatic.   Eyes: No scleral icterus.   Cardiovascular: Normal rate, regular rhythm, normal heart sounds and intact distal pulses.    No murmur heard.  Pulmonary/Chest: Effort normal and breath sounds normal. No respiratory distress. She has no wheezes. She has no rales.   Abdominal: Soft. Bowel sounds are normal. She exhibits no distension. There is tenderness (Mild epigastric).   Neurological: She is alert and oriented to person, place, and time.   Skin: Skin is warm and dry. She is not diaphoretic.   Psychiatric: She has a normal mood and affect.   Nursing note and vitals  reviewed.      Significant Labs:   CBC:   Recent Labs  Lab 02/01/17  0445 02/02/17  0837   WBC 8.81 8.75   HGB 12.9 13.6   HCT 39.0 40.3    318     CMP:   Recent Labs  Lab 02/01/17  0445 02/02/17  0837    141   K 3.7 3.9    110   CO2 23 22*   GLU 86 105   BUN 8 8   CREATININE 0.7 0.7   CALCIUM 8.3* 8.8   PROT 5.4*  5.4* 6.0   ALBUMIN 2.9*  2.9* 3.1*   BILITOT 0.7  0.7 1.2*   ALKPHOS 97  97 142*   AST 99*  99* 352*   *  220* 366*   ANIONGAP 7* 9   EGFRNONAA >60 >60     POCT Glucose:   Recent Labs  Lab 01/31/17  1713 02/01/17  1104   POCTGLUCOSE 79 78       Significant Imaging:   No New

## 2017-02-02 NOTE — PROGRESS NOTES
TN rounded, patient unable to eat, planning gi scope today.  Jeremías Grissom RN  Transitional Navigator/Case Management  [phone]

## 2017-02-02 NOTE — PROGRESS NOTES
Pt with worsened pain, LFTs increased again. I think an EUS is the next best step. SOD also a possibility. D/w Dr. Olvera as well. D/w pt and family as well at bedside, answered questions/concerns. Risks/benefits explained. They understand and are amenable to the plan.

## 2017-02-02 NOTE — PROGRESS NOTES
[facility]  Progress Note    Patient Name: Dianne Scott  MRN: [id number]  Patient Class: IP- Inpatient   Admission Date: 1/31/2017  Length of Stay: 1 days  Attending Physician: Forrest Bergman MD  Primary Care Provider: Primary Doctor No        Subjective:     Principal Problem:Elevated liver enzymes    HPI:  Dianne Scott is a 41 year  from [place] who relocated here about 6 months ago.  She has a history of GERD, seasonal allergies, Cholecystectomy, hysterectomy, and hyperlipidemia. She has a gluten intolerance, sometimes causing a rash.  She has never had an endoscopy.  She has not seen a PCP here, but has an appointment Dr. ERIC Burton in the future.  She presented to [facility] [place] with abdominal pain that started yesterday evening and worsened when she ate a granola bar, so severe that it took her breath away.  She has not felt this type of pain since she was a teenager, when she had her cholecystectomy.  She also has associated nausea, but no vomiting. She states that she is under increased stress do to her job. She had a normal bowel movement yesterday, and denies diarrhea, constipation, dark or bloody stools, chest pain, fever or chills.  The pain was relieved in the ED with IV ketorolac.  But she is still displaying epigastric tenderness on exam. An abdominal US showed Normal caliber of the visualized common bile duct without evidence of intrahepatic biliary ductal dilatation with hepatomegaly.  She has elevated liver enzymes, Tbile 1.7, ,  and WBC 14.41.  She has been vaccinated for Hep A and Hep B.  She is in a monogamous relationship with her boyfriend.  She is admitted to [facility] Medicine with Hepatomegaly. Acute Hepatitis panel pending. GI consulted.         Hospital Course:  Had Abdominal MRI which showed no evidence of stone or obstruction of common bile duct. Acute Hep. A, B, and C negative.  Liver enzymes are improved, but when  she tried a regular diet, her pain got worse and liver enzymes anny again.    Interval History:   Patient had pain nausea through the night and still has lightheadedness when she ambulates, although HR and BR remain stable. Started in D5/.45 NS infusaion. Will have EUS today with GI.       Review of Systems   Constitutional: Negative for chills, diaphoresis and fever.   Respiratory: Negative for cough, shortness of breath and wheezing.    Cardiovascular: Negative for chest pain and palpitations.   Gastrointestinal: Positive for abdominal pain (worse after eating) and vomiting. Negative for nausea.   Neurological: Positive for light-headedness.     Objective:     Vital Signs (Most Recent):  Temp: 97.6 °F (36.4 °C) (02/02/17 1110)  Pulse: (!) 58 (02/02/17 1110)  Resp: 20 (02/02/17 1110)  BP: (!) 101/59 (02/02/17 1110)  SpO2: 97 % (02/02/17 1100) Vital Signs (24h Range):  Temp:  [96.6 °F (35.9 °C)-98.5 °F (36.9 °C)] 97.6 °F (36.4 °C)  Pulse:  [58-98] 58  Resp:  [18-20] 20  SpO2:  [96 %-97 %] 97 %  BP: (100-139)/(52-86) 101/59     Weight: 114.9 kg (253 lb 4.9 oz)  Body mass index is 37.41 kg/(m^2).    Intake/Output Summary (Last 24 hours) at 02/02/17 1529  Last data filed at 02/02/17 1225   Gross per 24 hour   Intake              880 ml   Output             3350 ml   Net            -2470 ml      Physical Exam   Constitutional: She is oriented to person, place, and time. No distress.   HENT:   Head: Normocephalic and atraumatic.   Eyes: No scleral icterus.   Cardiovascular: Normal rate, regular rhythm, normal heart sounds and intact distal pulses.    No murmur heard.  Pulmonary/Chest: Effort normal and breath sounds normal. No respiratory distress. She has no wheezes. She has no rales.   Abdominal: Soft. Bowel sounds are normal. She exhibits no distension. There is tenderness (Mild epigastric).   Neurological: She is alert and oriented to person, place, and time.   Skin: Skin is warm and dry. She is not diaphoretic.    Psychiatric: She has a normal mood and affect.   Nursing note and vitals reviewed.      Significant Labs:   CBC:   Recent Labs  Lab 02/01/17  0445 02/02/17  0837   WBC 8.81 8.75   HGB 12.9 13.6   HCT 39.0 40.3    318     CMP:   Recent Labs  Lab 02/01/17  0445 02/02/17  0837    141   K 3.7 3.9    110   CO2 23 22*   GLU 86 105   BUN 8 8   CREATININE 0.7 0.7   CALCIUM 8.3* 8.8   PROT 5.4*  5.4* 6.0   ALBUMIN 2.9*  2.9* 3.1*   BILITOT 0.7  0.7 1.2*   ALKPHOS 97  97 142*   AST 99*  99* 352*   *  220* 366*   ANIONGAP 7* 9   EGFRNONAA >60 >60     POCT Glucose:   Recent Labs  Lab 01/31/17  1713 02/01/17  1104   POCTGLUCOSE 79 78       Significant Imaging:   No New    Assessment/Plan:      * Elevated liver enzymes  Hepatomegaly, Abdominal Pain  ,  , Tbili 1.7, Normal Alk Phos. Abd US shows enlarged liver.  History of cholecystectomy  Abdominal Pain since yesterday, with nausea and no vomiting. Vaccinated against Hep A and Hep B.  Does not admit to high risk behaviors for Hep B or C. or history of hepatitis.    Acute Hep. A, B, and C negative. Acetaminophen leve negative. Abd MRI for evidence of Duct stone or obstruction.  Liver enzymes are improved, but anny again after eating.  Increased Abd pain and Nausea after eating.  EUS with GI today.        Nausea  PRN nausea medications. IVF      GERD (gastroesophageal reflux disease)  Takes esomeprazole daily.  IV pantoprazole.        VTE Risk Mitigation         Ordered     Low Risk of VTE  Once      01/31/17 2885          Sun Bueno NP  Department of Hospital Medicine   [facility]

## 2017-02-03 LAB
ALBUMIN SERPL BCP-MCNC: 3.2 G/DL
ALP SERPL-CCNC: 136 U/L
ALT SERPL W/O P-5'-P-CCNC: 288 U/L
ANION GAP SERPL CALC-SCNC: 7 MMOL/L
AST SERPL-CCNC: 100 U/L
BILIRUB SERPL-MCNC: 0.6 MG/DL
BUN SERPL-MCNC: 6 MG/DL
CALCIUM SERPL-MCNC: 8.7 MG/DL
CHLORIDE SERPL-SCNC: 110 MMOL/L
CO2 SERPL-SCNC: 24 MMOL/L
CREAT SERPL-MCNC: 0.7 MG/DL
EST. GFR  (AFRICAN AMERICAN): >60 ML/MIN/1.73 M^2
EST. GFR  (NON AFRICAN AMERICAN): >60 ML/MIN/1.73 M^2
GLUCOSE SERPL-MCNC: 107 MG/DL
POTASSIUM SERPL-SCNC: 4 MMOL/L
PROT SERPL-MCNC: 6 G/DL
SODIUM SERPL-SCNC: 141 MMOL/L

## 2017-02-03 PROCEDURE — 0DB68ZX EXCISION OF STOMACH, VIA NATURAL OR ARTIFICIAL OPENING ENDOSCOPIC, DIAGNOSTIC: ICD-10-PCS | Performed by: INTERNAL MEDICINE

## 2017-02-03 PROCEDURE — 43239 EGD BIOPSY SINGLE/MULTIPLE: CPT | Performed by: INTERNAL MEDICINE

## 2017-02-03 PROCEDURE — 43239 EGD BIOPSY SINGLE/MULTIPLE: CPT | Mod: 59,,, | Performed by: INTERNAL MEDICINE

## 2017-02-03 PROCEDURE — 25000003 PHARM REV CODE 250: Performed by: HOSPITALIST

## 2017-02-03 PROCEDURE — 88305 TISSUE EXAM BY PATHOLOGIST: CPT | Performed by: PATHOLOGY

## 2017-02-03 PROCEDURE — 36415 COLL VENOUS BLD VENIPUNCTURE: CPT

## 2017-02-03 PROCEDURE — 88305 TISSUE EXAM BY PATHOLOGIST: CPT | Mod: 26,,, | Performed by: PATHOLOGY

## 2017-02-03 PROCEDURE — 25000003 PHARM REV CODE 250: Performed by: NURSE ANESTHETIST, CERTIFIED REGISTERED

## 2017-02-03 PROCEDURE — 63600175 PHARM REV CODE 636 W HCPCS: Performed by: NURSE PRACTITIONER

## 2017-02-03 PROCEDURE — 43231 ESOPHAGOSCOP ULTRASOUND EXAM: CPT | Performed by: INTERNAL MEDICINE

## 2017-02-03 PROCEDURE — 63600175 PHARM REV CODE 636 W HCPCS: Performed by: HOSPITALIST

## 2017-02-03 PROCEDURE — 21400001 HC TELEMETRY ROOM

## 2017-02-03 PROCEDURE — 43237 ENDOSCOPIC US EXAM ESOPH: CPT | Mod: ,,, | Performed by: INTERNAL MEDICINE

## 2017-02-03 PROCEDURE — 63600175 PHARM REV CODE 636 W HCPCS: Performed by: NURSE ANESTHETIST, CERTIFIED REGISTERED

## 2017-02-03 PROCEDURE — 37000009 HC ANESTHESIA EA ADD 15 MINS: Performed by: INTERNAL MEDICINE

## 2017-02-03 PROCEDURE — C9113 INJ PANTOPRAZOLE SODIUM, VIA: HCPCS | Performed by: NURSE PRACTITIONER

## 2017-02-03 PROCEDURE — 94761 N-INVAS EAR/PLS OXIMETRY MLT: CPT

## 2017-02-03 PROCEDURE — C9113 INJ PANTOPRAZOLE SODIUM, VIA: HCPCS | Performed by: HOSPITALIST

## 2017-02-03 PROCEDURE — 80053 COMPREHEN METABOLIC PANEL: CPT

## 2017-02-03 PROCEDURE — 37000008 HC ANESTHESIA 1ST 15 MINUTES: Performed by: INTERNAL MEDICINE

## 2017-02-03 PROCEDURE — 27201012 HC FORCEPS, HOT/COLD, DISP: Performed by: INTERNAL MEDICINE

## 2017-02-03 PROCEDURE — 25000003 PHARM REV CODE 250: Performed by: NURSE PRACTITIONER

## 2017-02-03 RX ORDER — ONDANSETRON 2 MG/ML
4 INJECTION INTRAMUSCULAR; INTRAVENOUS DAILY PRN
Status: DISCONTINUED | OUTPATIENT
Start: 2017-02-03 | End: 2017-02-04 | Stop reason: HOSPADM

## 2017-02-03 RX ORDER — HYDROMORPHONE HYDROCHLORIDE 2 MG/ML
0.4 INJECTION, SOLUTION INTRAMUSCULAR; INTRAVENOUS; SUBCUTANEOUS EVERY 5 MIN PRN
Status: DISCONTINUED | OUTPATIENT
Start: 2017-02-03 | End: 2017-02-04 | Stop reason: HOSPADM

## 2017-02-03 RX ORDER — LIDOCAINE HCL/PF 100 MG/5ML
SYRINGE (ML) INTRAVENOUS
Status: DISCONTINUED | OUTPATIENT
Start: 2017-02-03 | End: 2017-02-03

## 2017-02-03 RX ORDER — PANTOPRAZOLE SODIUM 40 MG/10ML
40 INJECTION, POWDER, LYOPHILIZED, FOR SOLUTION INTRAVENOUS 2 TIMES DAILY
Status: DISCONTINUED | OUTPATIENT
Start: 2017-02-03 | End: 2017-02-04 | Stop reason: HOSPADM

## 2017-02-03 RX ORDER — MIDAZOLAM HYDROCHLORIDE 1 MG/ML
INJECTION, SOLUTION INTRAMUSCULAR; INTRAVENOUS
Status: DISCONTINUED | OUTPATIENT
Start: 2017-02-03 | End: 2017-02-03

## 2017-02-03 RX ORDER — PROPOFOL 10 MG/ML
VIAL (ML) INTRAVENOUS CONTINUOUS PRN
Status: DISCONTINUED | OUTPATIENT
Start: 2017-02-03 | End: 2017-02-03

## 2017-02-03 RX ORDER — SUCRALFATE 1 G/10ML
1 SUSPENSION ORAL EVERY 6 HOURS
Status: DISCONTINUED | OUTPATIENT
Start: 2017-02-03 | End: 2017-02-04 | Stop reason: HOSPADM

## 2017-02-03 RX ORDER — SODIUM CHLORIDE 0.9 % (FLUSH) 0.9 %
3 SYRINGE (ML) INJECTION
Status: DISCONTINUED | OUTPATIENT
Start: 2017-02-03 | End: 2017-02-04 | Stop reason: HOSPADM

## 2017-02-03 RX ORDER — PROPOFOL 10 MG/ML
VIAL (ML) INTRAVENOUS
Status: DISCONTINUED | OUTPATIENT
Start: 2017-02-03 | End: 2017-02-03

## 2017-02-03 RX ORDER — SODIUM CHLORIDE 9 MG/ML
INJECTION, SOLUTION INTRAVENOUS CONTINUOUS PRN
Status: DISCONTINUED | OUTPATIENT
Start: 2017-02-03 | End: 2017-02-03

## 2017-02-03 RX ADMIN — PANTOPRAZOLE SODIUM 40 MG: 40 INJECTION, POWDER, FOR SOLUTION INTRAVENOUS at 09:02

## 2017-02-03 RX ADMIN — MIDAZOLAM 2 MG: 1 INJECTION INTRAMUSCULAR; INTRAVENOUS at 04:02

## 2017-02-03 RX ADMIN — SUCRALFATE 1 G: 1 SUSPENSION ORAL at 07:02

## 2017-02-03 RX ADMIN — PROPOFOL 150 MCG/KG/MIN: 10 INJECTION, EMULSION INTRAVENOUS at 04:02

## 2017-02-03 RX ADMIN — DEXTROSE AND SODIUM CHLORIDE: 5; .45 INJECTION, SOLUTION INTRAVENOUS at 09:02

## 2017-02-03 RX ADMIN — CETIRIZINE HYDROCHLORIDE 10 MG: 10 TABLET, FILM COATED ORAL at 09:02

## 2017-02-03 RX ADMIN — LIDOCAINE HYDROCHLORIDE 50 MG: 20 INJECTION, SOLUTION INTRAVENOUS at 04:02

## 2017-02-03 RX ADMIN — PROPOFOL 50 MG: 10 INJECTION, EMULSION INTRAVENOUS at 04:02

## 2017-02-03 RX ADMIN — SODIUM CHLORIDE: 0.9 INJECTION, SOLUTION INTRAVENOUS at 04:02

## 2017-02-03 NOTE — PLAN OF CARE
Problem: Patient Care Overview  Goal: Plan of Care Review  Outcome: Ongoing (interventions implemented as appropriate)  General well being improved after initiation of iv fluids   Remained npo until orders attained for liquid diet   egd postponed until tomorrow  Dr potts  Spoke to pt regarding delay over the phone    No pain complaints greater than 3   Requested nothing for pain this shift  Safety maintained   No complaint of nausea or vomiting

## 2017-02-03 NOTE — TRANSFER OF CARE
"Anesthesia Transfer of Care Note    Patient: Dianne Scott    Procedure(s) Performed: Procedure(s) (LRB):  ESOPHAGOGASTRODUODENOSCOPY (EGD) (N/A)  ULTRASOUND-ENDOSCOPIC-UPPER (N/A)    Patient location: PACU    Anesthesia Type: MAC    Transport from OR: Transported from OR on room air with adequate spontaneous ventilation    Post pain: adequate analgesia    Post assessment: no apparent anesthetic complications and tolerated procedure well    Post vital signs: stable    Level of consciousness: awake, alert and oriented    Nausea/Vomiting: no nausea/vomiting    Complications: none          Last vitals:   Visit Vitals    BP (!) 95/52    Pulse (!) 50    Temp 36.9 °C (98.4 °F) (Oral)    Resp 20    Ht 5' 9" (1.753 m)    Wt 114.9 kg (253 lb 4.9 oz)    SpO2 96%    Breastfeeding No    BMI 37.41 kg/m2     "

## 2017-02-03 NOTE — PLAN OF CARE
Problem: Nausea/Vomiting (Adult)  Goal: Identify Related Risk Factors and Signs and Symptoms  Related risk factors and signs and symptoms are identified upon initiation of Human Response Clinical Practice Guideline (CPG)   Outcome: Ongoing (interventions implemented as appropriate)  Alteration  In comfort due to abdominal  Pain. Patient denies any pain at this time.  Iv restarted in left arm #22 at this time. Patient up to shower, tolerated well. Will continue to observe at tjos t o,e/

## 2017-02-03 NOTE — ASSESSMENT & PLAN NOTE
Hepatomegaly, Abdominal Pain  ,  , Tbili 1.7, Normal Alk Phos. Abd US shows enlarged liver.  History of cholecystectomy  Abdominal Pain since yesterday, with nausea and no vomiting. Vaccinated against Hep A and Hep B.  Does not admit to high risk behaviors for Hep B or C. or history of hepatitis.    Acute Hep. A, B, and C negative. Acetaminophen leve negative. Abd MRI for evidence of Duct stone or obstruction.  Liver enzymes are improved, but anny again after eating.  Increased Abd pain and Nausea after eating solid foof, but able to tolerate full liquid.   EUS with GI today.

## 2017-02-03 NOTE — PROGRESS NOTES
[facility] Medicine  Progress Note    Patient Name: Dianne Scott  MRN: [id number]  Patient Class: IP- Inpatient   Admission Date: 1/31/2017  Length of Stay: 2 days  Attending Physician: Forrest Bergman MD  Primary Care Provider: Primary Doctor No        Subjective:     Principal Problem:Elevated liver enzymes    HPI:  Dianne Scott is a 41 year  from [place] who relocated here about 6 months ago.  She has a history of GERD, seasonal allergies, Cholecystectomy, hysterectomy, and hyperlipidemia. She has a gluten intolerance, sometimes causing a rash.  She has never had an endoscopy.  She has not seen a PCP here, but has an appointment Dr. ERIC Burton in the future.  She presented to [facility] ED with abdominal pain that started yesterday evening and worsened when she ate a granola bar, so severe that it took her breath away.  She has not felt this type of pain since she was a teenager, when she had her cholecystectomy.  She also has associated nausea, but no vomiting. She states that she is under increased stress do to her job. She had a normal bowel movement yesterday, and denies diarrhea, constipation, dark or bloody stools, chest pain, fever or chills.  The pain was relieved in the ED with IV ketorolac.  But she is still displaying epigastric tenderness on exam. An abdominal US showed Normal caliber of the visualized common bile duct without evidence of intrahepatic biliary ductal dilatation with hepatomegaly.  She has elevated liver enzymes, Tbile 1.7, ,  and WBC 14.41.  She has been vaccinated for Hep A and Hep B.  She is in a monogamous relationship with her boyfriend.  She is admitted to [facility] with Hepatomegaly. Acute Hepatitis panel pending. GI consulted.         Hospital Course:  Had Abdominal MRI which showed no evidence of stone or obstruction of common bile duct. Acute Hep. A, B, and C negative.  Liver enzymes are improved, but when  she tried a regular diet, her pain got worse and liver enzymes anny again. Was able to tolerate Full Liquid diet, with enzymes coming down again.     Interval History:   Tolerated Full Liquid diet without increase in pain.  Liver enzymes trending down today.  Still having gnawing nausea.  EUS and possible ERCP this afternoon.        She hopes to be discharged  By tomorrow morning of her daughters birthday party.     Review of Systems  Objective:     Vital Signs (Most Recent):  Temp: 98.4 °F (36.9 °C) (02/03/17 1110)  Pulse: (!) 50 (02/03/17 1110)  Resp: 20 (02/03/17 1110)  BP: (!) 95/52 (02/03/17 1110)  SpO2: 96 % (02/03/17 1138) Vital Signs (24h Range):  Temp:  [98 °F (36.7 °C)-98.5 °F (36.9 °C)] 98.4 °F (36.9 °C)  Pulse:  [48-62] 50  Resp:  [18-20] 20  SpO2:  [96 %-99 %] 96 %  BP: ()/(48-80) 95/52     Weight: 114.9 kg (253 lb 4.9 oz)  Body mass index is 37.41 kg/(m^2).    Intake/Output Summary (Last 24 hours) at 02/03/17 1148  Last data filed at 02/03/17 0827   Gross per 24 hour   Intake             1850 ml   Output             1400 ml   Net              450 ml      Physical Exam    Significant Labs:   CMP:   Recent Labs  Lab 02/02/17  0837 02/03/17  0835    141   K 3.9 4.0    110   CO2 22* 24    107   BUN 8 6   CREATININE 0.7 0.7   CALCIUM 8.8 8.7   PROT 6.0 6.0   ALBUMIN 3.1* 3.2*   BILITOT 1.2* 0.6   ALKPHOS 142* 136*   * 100*   * 288*   ANIONGAP 9 7*   EGFRNONAA >60 >60       Significant Imaging:  No new    Assessment/Plan:      * Elevated liver enzymes  Hepatomegaly, Abdominal Pain  ,  , Tbili 1.7, Normal Alk Phos. Abd US shows enlarged liver.  History of cholecystectomy  Abdominal Pain since yesterday, with nausea and no vomiting. Vaccinated against Hep A and Hep B.  Does not admit to high risk behaviors for Hep B or C. or history of hepatitis.    Acute Hep. A, B, and C negative. Acetaminophen leve negative. Abd MRI for evidence of Duct stone or  obstruction.  Liver enzymes are improved, but anny again after eating.  Increased Abd pain and Nausea after eating solid foof, but able to tolerate full liquid.   EUS with GI today.        Nausea  PRN nausea medications. IVF      GERD (gastroesophageal reflux disease)  Takes esomeprazole daily.  IV pantoprazole.        VTE Risk Mitigation         Ordered     Low Risk of VTE  Once      01/31/17 4532          Sun Bueno NP  Department of Hospital Medicine   [facility]

## 2017-02-03 NOTE — OR NURSING
"Hep loc, #22g left forearm clotted and patient states it's "uncomfortable", discontinued and new IV place, see flowsheet.  "

## 2017-02-03 NOTE — SUBJECTIVE & OBJECTIVE
Interval History:   Tolerated Full Liquid diet without increase in pain.  Liver enzymes trending down today.  Still having gnawing nausea.  EUS and possible ERCP this afternoon.        She hopes to be discharged  By tomorrow morning of her daughters birthday party.     Review of Systems  Objective:     Vital Signs (Most Recent):  Temp: 98.4 °F (36.9 °C) (02/03/17 1110)  Pulse: (!) 50 (02/03/17 1110)  Resp: 20 (02/03/17 1110)  BP: (!) 95/52 (02/03/17 1110)  SpO2: 96 % (02/03/17 1138) Vital Signs (24h Range):  Temp:  [98 °F (36.7 °C)-98.5 °F (36.9 °C)] 98.4 °F (36.9 °C)  Pulse:  [48-62] 50  Resp:  [18-20] 20  SpO2:  [96 %-99 %] 96 %  BP: ()/(48-80) 95/52     Weight: 114.9 kg (253 lb 4.9 oz)  Body mass index is 37.41 kg/(m^2).    Intake/Output Summary (Last 24 hours) at 02/03/17 1148  Last data filed at 02/03/17 0827   Gross per 24 hour   Intake             1850 ml   Output             1400 ml   Net              450 ml      Physical Exam    Significant Labs:   CMP:   Recent Labs  Lab 02/02/17  0837 02/03/17  0835    141   K 3.9 4.0    110   CO2 22* 24    107   BUN 8 6   CREATININE 0.7 0.7   CALCIUM 8.8 8.7   PROT 6.0 6.0   ALBUMIN 3.1* 3.2*   BILITOT 1.2* 0.6   ALKPHOS 142* 136*   * 100*   * 288*   ANIONGAP 9 7*   EGFRNONAA >60 >60       Significant Imaging:  No new

## 2017-02-03 NOTE — PLAN OF CARE
Future Appointments  Date Time Provider Department Center   2/16/2017 9:20 AM Nataliia Boston PA-C [facility]   3/13/2017 2:30 PM Annette Burton MD [facility]     Patient going for scope today (was canceled yesterday)    No dc needs identified.       02/03/17 1040   Discharge Reassessment   Assessment Type Discharge Planning Reassessment   Can the patient answer the patient profile reliably? Yes, cognitively intact   How does the patient rate their overall health at the present time? Good   Describe the patient's ability to walk at the present time. No restrictions   How often would a person be available to care for the patient? Whenever needed   During the past month, has the patient often been bothered by feeling down, depressed or hopeless? No   During the past month, has the patient often been bothered by little interest or pleasure in doing things? No   Discharge plan remains the same: Yes   Provided patient/caregiver education on the expected discharge date and the discharge plan Yes   Discharge Plan A Home;Home with family   Discharge Plan B Home;Home with family   Change in patient condition or support system No   Patient choice form signed by patient/caregiver No   Involved the patient/caregiver in establishing a new discharge plan: No     Radha Pollock, RN, CCM, CMSRN  RN Transition Navigator  [phone]

## 2017-02-04 VITALS
OXYGEN SATURATION: 98 % | HEIGHT: 69 IN | DIASTOLIC BLOOD PRESSURE: 50 MMHG | TEMPERATURE: 98 F | BODY MASS INDEX: 37.52 KG/M2 | SYSTOLIC BLOOD PRESSURE: 89 MMHG | HEART RATE: 53 BPM | RESPIRATION RATE: 18 BRPM | WEIGHT: 253.31 LBS

## 2017-02-04 PROBLEM — K29.70 GASTRITIS: Status: ACTIVE | Noted: 2017-02-04

## 2017-02-04 LAB
ALBUMIN SERPL BCP-MCNC: 3.1 G/DL
ALP SERPL-CCNC: 113 U/L
ALT SERPL W/O P-5'-P-CCNC: 201 U/L
ANION GAP SERPL CALC-SCNC: 7 MMOL/L
AST SERPL-CCNC: 46 U/L
BILIRUB SERPL-MCNC: 0.6 MG/DL
BUN SERPL-MCNC: 7 MG/DL
CALCIUM SERPL-MCNC: 8.8 MG/DL
CHLORIDE SERPL-SCNC: 108 MMOL/L
CO2 SERPL-SCNC: 24 MMOL/L
CREAT SERPL-MCNC: 0.7 MG/DL
EST. GFR  (AFRICAN AMERICAN): >60 ML/MIN/1.73 M^2
EST. GFR  (NON AFRICAN AMERICAN): >60 ML/MIN/1.73 M^2
GLUCOSE SERPL-MCNC: 95 MG/DL
POTASSIUM SERPL-SCNC: 3.7 MMOL/L
PROT SERPL-MCNC: 5.8 G/DL
SODIUM SERPL-SCNC: 139 MMOL/L

## 2017-02-04 PROCEDURE — 25000003 PHARM REV CODE 250: Performed by: NURSE PRACTITIONER

## 2017-02-04 PROCEDURE — 80053 COMPREHEN METABOLIC PANEL: CPT

## 2017-02-04 PROCEDURE — C9113 INJ PANTOPRAZOLE SODIUM, VIA: HCPCS | Performed by: HOSPITALIST

## 2017-02-04 PROCEDURE — 63600175 PHARM REV CODE 636 W HCPCS: Performed by: HOSPITALIST

## 2017-02-04 PROCEDURE — 25000003 PHARM REV CODE 250: Performed by: HOSPITALIST

## 2017-02-04 PROCEDURE — 94761 N-INVAS EAR/PLS OXIMETRY MLT: CPT

## 2017-02-04 PROCEDURE — 36415 COLL VENOUS BLD VENIPUNCTURE: CPT

## 2017-02-04 RX ORDER — ONDANSETRON 4 MG/1
4 TABLET, ORALLY DISINTEGRATING ORAL EVERY 6 HOURS PRN
Qty: 50 TABLET | Refills: 1 | Status: SHIPPED | OUTPATIENT
Start: 2017-02-04 | End: 2017-05-09

## 2017-02-04 RX ORDER — ONDANSETRON 4 MG/1
4 TABLET, ORALLY DISINTEGRATING ORAL EVERY 6 HOURS PRN
Qty: 50 TABLET | Refills: 1 | OUTPATIENT
Start: 2017-02-04 | End: 2017-02-04

## 2017-02-04 RX ORDER — SUCRALFATE 1 G/10ML
500 SUSPENSION ORAL
Qty: 1 BOTTLE | Refills: 3 | OUTPATIENT
Start: 2017-02-04 | End: 2017-02-04

## 2017-02-04 RX ORDER — PANTOPRAZOLE SODIUM 40 MG/1
40 TABLET, DELAYED RELEASE ORAL 2 TIMES DAILY
Qty: 60 TABLET | Refills: 1 | OUTPATIENT
Start: 2017-02-04 | End: 2017-02-04

## 2017-02-04 RX ORDER — PANTOPRAZOLE SODIUM 40 MG/1
40 TABLET, DELAYED RELEASE ORAL 2 TIMES DAILY
Status: DISCONTINUED | OUTPATIENT
Start: 2017-02-04 | End: 2017-02-04 | Stop reason: HOSPADM

## 2017-02-04 RX ORDER — PROMETHAZINE HYDROCHLORIDE 25 MG/1
25 TABLET ORAL EVERY 6 HOURS PRN
Qty: 30 TABLET | Refills: 1 | Status: SHIPPED | OUTPATIENT
Start: 2017-02-04 | End: 2017-05-09

## 2017-02-04 RX ORDER — PANTOPRAZOLE SODIUM 40 MG/1
40 TABLET, DELAYED RELEASE ORAL 2 TIMES DAILY
Qty: 60 TABLET | Refills: 1 | Status: SHIPPED | OUTPATIENT
Start: 2017-02-04 | End: 2017-05-15

## 2017-02-04 RX ORDER — SUCRALFATE 1 G/10ML
500 SUSPENSION ORAL
Qty: 1 BOTTLE | Refills: 3 | Status: SHIPPED | OUTPATIENT
Start: 2017-02-04 | End: 2017-03-13

## 2017-02-04 RX ORDER — ONDANSETRON 4 MG/1
4 TABLET, ORALLY DISINTEGRATING ORAL EVERY 6 HOURS PRN
Status: DISCONTINUED | OUTPATIENT
Start: 2017-02-04 | End: 2017-02-04 | Stop reason: HOSPADM

## 2017-02-04 RX ORDER — PROMETHAZINE HYDROCHLORIDE 25 MG/1
25 TABLET ORAL EVERY 6 HOURS PRN
Qty: 30 TABLET | Refills: 1 | OUTPATIENT
Start: 2017-02-04 | End: 2017-02-04

## 2017-02-04 RX ADMIN — PANTOPRAZOLE SODIUM 40 MG: 40 INJECTION, POWDER, FOR SOLUTION INTRAVENOUS at 08:02

## 2017-02-04 RX ADMIN — SUCRALFATE 1 G: 1 SUSPENSION ORAL at 12:02

## 2017-02-04 RX ADMIN — CETIRIZINE HYDROCHLORIDE 10 MG: 10 TABLET, FILM COATED ORAL at 08:02

## 2017-02-04 RX ADMIN — SUCRALFATE 1 G: 1 SUSPENSION ORAL at 06:02

## 2017-02-04 NOTE — SUBJECTIVE & OBJECTIVE
Interval History:   Had EGD with Dr. Castillo yesterday that significant chronic gastritis, which was biopsied.  There was no pathology in the bile duct or pancreas. She was allowed a full liquid diet and liver enzymes again trended down.      Review of Systems   Constitutional: Negative for chills, diaphoresis and fever.   Respiratory: Negative for cough, shortness of breath and wheezing.    Cardiovascular: Negative for chest pain and palpitations.   Gastrointestinal: Negative for abdominal pain (worse after eating), nausea and vomiting.   Neurological: Negative for light-headedness.     Objective:     Vital Signs (Most Recent):  Temp: 98.2 °F (36.8 °C) (02/04/17 0800)  Pulse: (!) 53 (02/04/17 0800)  Resp: 18 (02/04/17 0800)  BP: (!) 89/50 (02/04/17 0800)  SpO2: 98 % (02/04/17 0918) Vital Signs (24h Range):  Temp:  [98 °F (36.7 °C)-98.6 °F (37 °C)] 98.2 °F (36.8 °C)  Pulse:  [50-82] 53  Resp:  [12-23] 18  SpO2:  [96 %-99 %] 98 %  BP: ()/(50-87) 89/50     Weight: 114.9 kg (253 lb 4.9 oz)  Body mass index is 37.41 kg/(m^2).    Intake/Output Summary (Last 24 hours) at 02/04/17 0920  Last data filed at 02/03/17 1710   Gross per 24 hour   Intake           1019.5 ml   Output                0 ml   Net           1019.5 ml      Physical Exam   Constitutional: She is oriented to person, place, and time. No distress.   HENT:   Head: Normocephalic and atraumatic.   Eyes: No scleral icterus.   Cardiovascular: Normal rate, regular rhythm, normal heart sounds and intact distal pulses.    No murmur heard.  Pulmonary/Chest: Effort normal and breath sounds normal. No respiratory distress. She has no wheezes. She has no rales.   Abdominal: Soft. Bowel sounds are normal. She exhibits no distension. There is tenderness (Mild epigastric).   Neurological: She is alert and oriented to person, place, and time.   Skin: Skin is warm and dry. She is not diaphoretic.   Psychiatric: She has a normal mood and affect.   Nursing note and vitals  reviewed.      Significant Labs:   CMP:   Recent Labs  Lab 17  0835 17  0424    139   K 4.0 3.7    108   CO2 24 24    95   BUN 6 7   CREATININE 0.7 0.7   CALCIUM 8.7 8.8   PROT 6.0 5.8*   ALBUMIN 3.2* 3.1*   BILITOT 0.6 0.6   ALKPHOS 136* 113   * 46*   * 201*   ANIONGAP 7* 7*   EGFRNONAA >60 >60       Significant Imagin/3/17 EUS Dr. Castillo  Normal esophagus.                        - Chronic gastritis. Biopsied.                        - Normal examined duodenum.                        - There was no sign of significant pathology in the                         entire pancreas.                        - A few abnormal lymph nodes were visualized in the                         right hilar region (level 10R).                        - There was no sign of significant pathology in the                         common bile duct.

## 2017-02-04 NOTE — PLAN OF CARE
Problem: Patient Care Overview  Goal: Plan of Care Review  Outcome: Ongoing (interventions implemented as appropriate)  Pt's SpO2 98% on RA. No respiratory distress noted. Will continue to monitor SpO2.

## 2017-02-04 NOTE — PLAN OF CARE
Problem: Patient Care Overview  Goal: Plan of Care Review  Outcome: Ongoing (interventions implemented as appropriate)  Pt received on RA.  SPO2  99%.  Pt in no apparent respiratory distress.  Will continue to monitor.

## 2017-02-04 NOTE — DISCHARGE INSTRUCTIONS
ABDOMINAL PAIN, ADULT (ENGLISH) View Edit Remove  EPIGASTRIC PAIN (UNCERTAIN CAUSE) (ENGLISH) View Edit Remove  ACID REFLUX, TIPS TO CONTROL (ENGLISH) View Edit Remove  ACID REFLUX, MEDICINES FOR (ENGLISH) View Edit Remove  GASTRITIS (ADULT) (ENGLISH) View Edit Remove  DIET, BLAND (ADULT) (ENGLISH) View Edit Remove  DIET: SOFT, DISCHARGE INSTRUCTIONS (ENGLISH) View Edit Remove  ONDANSETRON TABLETS (ENGLISH) View Edit Remove  PROMETHAZINE TABLETS (ENGLISH) View Edit Remove  PANTOPRAZOLE TABLETS (ENGLISH) View Edit Remove  SUCRALFATE TABLETS (ENGLISH) View Edit Remove  DIET, LOW FAT (ENGLISH) View Edit Remove

## 2017-02-04 NOTE — PLAN OF CARE
Problem: Patient Care Overview  Goal: Plan of Care Review  Outcome: Ongoing (interventions implemented as appropriate)  Plan of care reviewed with the patient and spouse. Verbalized clear understanding. Bed in lowest position. Call light within reach. Denies any pain or discomfort. No report of SOB or lightheadedness. IV fluid infusing. Will continue to monitor.

## 2017-02-04 NOTE — DISCHARGE SUMMARY
[facility] Medicine  Discharge Summary      Patient Name: Dianne Scott  MRN: [id number]  Admission Date: 1/31/2017  Hospital Length of Stay: 3 days  Discharge Date and Time: 2/4/2017 10:43 AM  Attending Physician: Forrest Bergman MD   Discharging Provider: Sun Bueno NP  Primary Care Provider: Primary Doctor Jaci      HPI:   Dianne Scott is a 41 year  from [place] who relocated here about 6 months ago.  She has a history of GERD, seasonal allergies, Cholecystectomy, hysterectomy, and hyperlipidemia. She has a gluten intolerance, sometimes causing a rash.  She has never had an endoscopy.  She has not seen a PCP here, but has an appointment Dr. ERIC Burton in the future.  She presented to [facility] ED with abdominal pain that started yesterday evening and worsened when she ate a granola bar, so severe that it took her breath away.  She has not felt this type of pain since she was a teenager, when she had her cholecystectomy.  She also has associated nausea, but no vomiting. She states that she is under increased stress do to her job. She had a normal bowel movement yesterday, and denies diarrhea, constipation, dark or bloody stools, chest pain, fever or chills.  The pain was relieved in the ED with IV ketorolac.  But she is still displaying epigastric tenderness on exam. An abdominal US showed Normal caliber of the visualized common bile duct without evidence of intrahepatic biliary ductal dilatation with hepatomegaly.  She has elevated liver enzymes, Tbile 1.7, ,  and WBC 14.41.  She has been vaccinated for Hep A and Hep B.  She is in a monogamous relationship with her boyfriend.  She is admitted to [facility] Medicine with Hepatomegaly. Acute Hepatitis panel pending. GI consulted.         Procedure(s) (LRB):  ESOPHAGOGASTRODUODENOSCOPY (EGD) (N/A)  ULTRASOUND-ENDOSCOPIC-UPPER (N/A)      Indwelling Lines/Drains at time of discharge:    Lines/Drains/Airways     Airway                 Airway - Non-Surgical 02/03/17 1628 Nasal Cannula less than 1 day              Hospital Course:   Had Abdominal MRI which showed no evidence of stone or obstruction of common bile duct. Acute Hep. A, B, and C negative.  Liver enzymes are improved, but when she tried a regular diet, her pain got worse and liver enzymes anny again. Was able to tolerate Full Liquid diet, with enzymes coming down again. Had EGD with Dr. Castillo yesterday that significant chronic gastritis, which was biopsied.  There was no pathology in the bile duct or pancreas. She was allowed a full liquid diet and liver enzymes again trended down.      Patient discharged with instructions on very bland, very low fat diet. She was given paper prescription for pantoprazole 40 mg BID, Carafate 5 ml AC and HS, ondansetron 4 mg SL every 6 hours prn nausea and promethazine 25 mg PO every 6 hours prn nausea. She has a follow up appointment with her PCP and will follow up with GI for further workup.       Consults:   Consults         Status Ordering Provider     Inpatient consult to [facility]     Provider:  Mikel Rizvi MD    Completed ESTRELLA WILLARD          Significant Diagnostic Studies: Labs:   CMP   Recent Labs  Lab 02/03/17  0835 02/04/17  0424    139   K 4.0 3.7    108   CO2 24 24    95   BUN 6 7   CREATININE 0.7 0.7   CALCIUM 8.7 8.8   PROT 6.0 5.8*   ALBUMIN 3.2* 3.1*   BILITOT 0.6 0.6   ALKPHOS 136* 113   * 46*   * 201*   ANIONGAP 7* 7*   ESTGFRAFRICA >60 >60   EGFRNONAA >60 >60   , CBC No results for input(s): WBC, HGB, HCT, PLT in the last 48 hours., INR   Lab Results   Component Value Date    INR 1.0 01/31/2017    and Lipid Panel   Lab Results   Component Value Date    CHOL 185 01/31/2017    HDL 42 01/31/2017    LDLCALC 126.6 01/31/2017    TRIG 82 01/31/2017    CHOLHDL 22.7 01/31/2017     Imaging Results         MRI MRCP Without  Contrast (Final result) Result time:  02/01/17 10:31:58    Final result by Christelle Hernandes MD (02/01/17 10:31:58)    Impression:     No filling defect along the length of the common bile duct to suggest stone is seen on MRCP.      Electronically signed by: CHRISTELLE HERNANDES MD  Date:     02/01/17  Time:    10:31     Narrative:    MRI abdomen without contrast MRCP    MIP reconstructions were provided.    The patient is status post cholecystectomy. There is no evidence of hepatic lesion. The common bile duct is minimally prominent at 6 mm. No discrete filling defect is seen along the length of the common bile duct is seen.    There is no evidence of hepatic lesion. The liver, spleen, adrenal glands, pancreas are unremarkable.            US Abdomen Limited (Gallbladder) (Final result) Result time:  01/31/17 06:27:21    Final result by Agnieszka Garduno MD (01/31/17 06:27:21)    Impression:        1. Status post cholecystectomy. Normal caliber of the visualized common bile duct without evidence of intrahepatic biliary ductal dilatation.    2. Mild hepatomegaly.        Electronically signed by: AGNIESZKA GARDUNO  Date:     01/31/17  Time:    06:27     Narrative:    Time of Procedure: 01/31/17 06:12:35  Accession # [id number]    Reason for study: Elevated liver enzymes    Comparison: None.    Technique: Limited right upper quadrant ultrasound was performed.    Findings: The liver is mildly enlarged measuring 19.2cm.  Hepatic parenchyma is homogeneous without evidence for masses.  No intra- or extrahepatic biliary ductal dilatation. The common bile duct measures 0.5 cm.  The gallbladder is surgically absent.  The visualized portions of the pancreas appear normal. The right kidney is normal in size without evidence of hydronephrosis. No ascites.            X-Ray Abdomen Flat And Erect (Final result) Result time:  01/31/17 04:21:32    Final result by Paul Martins MD (01/31/17 04:21:32)    Impression:      1.  Nonobstructive  bowel gas pattern.      Electronically signed by: JOSEPHINE ARENAS MD  Date:     01/31/17  Time:    04:21     Narrative:    Abdomen flat and erect    Clinical history: Epigastric pain    Comparison: None    Findings:  2 upright views, one supine view.    No significant air-fluid levels are appreciated on upright view.  Air and stool is seen within the large bowel, and projected over the rectum.  No focally dilated small bowel loops.  Post surgical changes overlie the right upper quadrant.  There no calcifications to convincingly suggest nephrolithiasis.  Lower lung zones are grossly clear.  No large volume free air or pneumatosis.  No acute osseous abnormality.            EGD with EUS 2/4/17 Dr. Castillo  Impression:   - Normal esophagus.                        - Chronic gastritis. Biopsied.                        - Normal examined duodenum.                        - There was no sign of significant pathology in the                         entire pancreas.                        - A few abnormal lymph nodes were visualized in the                         right hilar region (level 10R).                        - There was no sign of significant pathology in the                         common bile duct.    Pending Diagnostic Studies:     None        Final Active Diagnoses:    Diagnosis Date Noted POA    PRINCIPAL PROBLEM:  Elevated liver enzymes [R74.8] 01/31/2017 Unknown    Gastritis [K29.70] 02/04/2017 Yes    RUQ abdominal pain [R10.11] 02/01/2017 Yes    On esomeprazole prophylaxis [Z79.899] 01/31/2017 Unknown    Nausea [R11.0] 01/31/2017 Unknown    Abdominal pain [R10.9] 01/31/2017 Unknown    Hepatomegaly [R16.0] 01/31/2017 Unknown    GERD (gastroesophageal reflux disease) [K21.9] 01/31/2017 Unknown      Problems Resolved During this Admission:    Diagnosis Date Noted Date Resolved POA      * Elevated liver enzymes  Hepatomegaly, Gastritis/ Abdominal Pain  ,  , Tbili 1.7, Normal Alk Phos. Abd US shows  enlarged liver.  History of cholecystectomy  Abdominal Pain since yesterday, with nausea and no vomiting. Vaccinated against Hep A and Hep B.  Does not admit to high risk behaviors for Hep B or C. or history of hepatitis.    Acute Hep. A, B, and C negative. Acetaminophen level negative. Abd MRI for evidence of Duct stone or obstruction.  Liver enzymes are improved, but anny again after eating.  Increased Abd pain and Nausea after eating solid foof, but able to tolerate full liquid.       EUS yesterday with Dr. Castillo showed Had EGD with Dr. Castillo yesterday that significant chronic gastritis, which was biopsied.  There was no pathology in the bile duct or pancreas. She was allowed a full liquid diet and liver enzymes again trended down.    Will discharge with bland, low fat diet, BID po Pantoprazole, Carafate and nausea medication with GI follow up.       Nausea  Will discharge with prescriptions for Oral ondansetron 4 mg SL and Oral promethazine 25 mg .      GERD (gastroesophageal reflux disease)  Takes esomeprazole daily.  Will discharge with   Pantoprazole 40 mg BID and Carafate QID before meals and at bedtime.        Discharged Condition: good    Disposition: Home or Self Care    Follow Up:  Follow-up Information     Follow up with Mikel Rizvi MD.    Specialty:  Gastroenterology    Why:  This clinic will call you with your appointment.    Contact information:    [address]  [address]  [place] [address]  [phone]          Follow up with Nataliia Boston PA-C. Go on 2/16/2017.    Specialty:  Family Medicine    Why:  @9:20am for your hospital follow up appointment    Contact information:    [address]  [address]  [facility]  [place] [address]  [phone]          Follow up with Annette Burton MD. Go on 3/13/2017.    Specialty:  Family Medicine    Why:  @2:30pm    Contact information:    [address]  [place] [id number] [phone]          Patient  Instructions:     Diet general   Scheduling Instructions: Low Fat, [place]     Activity as tolerated     Call MD for:  persistent nausea and vomiting or diarrhea     Call MD for:  severe uncontrolled pain       Medications:  Reconciled Home Medications:   Current Discharge Medication List      START taking these medications    Details   ondansetron (ZOFRAN-ODT) 4 MG TbDL Take 1 tablet (4 mg total) by mouth every 6 (six) hours as needed.  Qty: 50 tablet, Refills: 1    Associated Diagnoses: Acute hepatitis; Gastritis, presence of bleeding unspecified, unspecified chronicity, unspecified gastritis type; Nausea      pantoprazole (PROTONIX) 40 MG tablet Take 1 tablet (40 mg total) by mouth 2 (two) times daily.  Qty: 60 tablet, Refills: 1    Associated Diagnoses: Acute hepatitis      promethazine (PHENERGAN) 25 MG tablet Take 1 tablet (25 mg total) by mouth every 6 (six) hours as needed for Nausea.  Qty: 30 tablet, Refills: 1    Associated Diagnoses: Acute hepatitis; Nausea      sucralfate (CARAFATE) 100 mg/mL suspension Take 5 mLs (500 mg total) by mouth 4 (four) times daily with meals and nightly.  Qty: 1 Bottle, Refills: 3    Associated Diagnoses: Acute hepatitis         CONTINUE these medications which have NOT CHANGED    Details   acetaminophen (TYLENOL) 500 MG tablet Take 500 mg by mouth every 6 (six) hours as needed.      cetirizine (ZYRTEC) 10 MG tablet Take 10 mg by mouth once daily.         STOP taking these medications       esomeprazole (NEXIUM) 20 MG capsule Comments:   Reason for Stopping:             Time spent on the discharge of patient: 35 minutes    Sun Bueno NP  Department of Hospital Medicine  [facility]

## 2017-02-04 NOTE — PLAN OF CARE
Problem: Patient Care Overview  Goal: Plan of Care Review  Outcome: Ongoing (interventions implemented as appropriate)  Had small container of vanilla pudding. C/o mild abdominal pain afterwards, but did not require any pain medication. No c/o pain this am.

## 2017-02-04 NOTE — ASSESSMENT & PLAN NOTE
Hepatomegaly, Gastritis/ Abdominal Pain  ,  , Tbili 1.7, Normal Alk Phos. Abd US shows enlarged liver.  History of cholecystectomy  Abdominal Pain since yesterday, with nausea and no vomiting. Vaccinated against Hep A and Hep B.  Does not admit to high risk behaviors for Hep B or C. or history of hepatitis.    Acute Hep. A, B, and C negative. Acetaminophen level negative. Abd MRI for evidence of Duct stone or obstruction.  Liver enzymes are improved, but anny again after eating.  Increased Abd pain and Nausea after eating solid foof, but able to tolerate full liquid.       EUS yesterday with Dr. Castillo showed Had EGD with Dr. Castillo yesterday that significant chronic gastritis, which was biopsied.  There was no pathology in the bile duct or pancreas. She was allowed a full liquid diet and liver enzymes again trended down.    Will discharge with bland, low fat diet, BID po Pantoprazole, Carafate and nausea medication with GI follow up.

## 2017-02-04 NOTE — ASSESSMENT & PLAN NOTE
Takes esomeprazole daily.  Will discharge with   Pantoprazole 40 mg BID and Carafate QID before meals and at bedtime.

## 2017-02-04 NOTE — NURSING
Pt arrived from surgery to unit. She is AAO x 4. Family at the bedside. Restarted IV fluid. No pain reported. VS documented refer to flowsheet.

## 2017-02-04 NOTE — PROGRESS NOTES
[facility] Medicine  Progress Note    Patient Name: Dianne Scott  MRN: [id number]  Patient Class: IP- Inpatient   Admission Date: 1/31/2017  Length of Stay: 3 days  Attending Physician: Forrest Bergman MD  Primary Care Provider: Primary Doctor No        Subjective:     Principal Problem:Elevated liver enzymes    HPI:  Dianne Scott is a 41 year  from [place] who relocated here about 6 months ago.  She has a history of GERD, seasonal allergies, Cholecystectomy, hysterectomy, and hyperlipidemia. She has a gluten intolerance, sometimes causing a rash.  She has never had an endoscopy.  She has not seen a PCP here, but has an appointment Dr. ERIC Burton in the future.  She presented to [facility] ED with abdominal pain that started yesterday evening and worsened when she ate a granola bar, so severe that it took her breath away.  She has not felt this type of pain since she was a teenager, when she had her cholecystectomy.  She also has associated nausea, but no vomiting. She states that she is under increased stress do to her job. She had a normal bowel movement yesterday, and denies diarrhea, constipation, dark or bloody stools, chest pain, fever or chills.  The pain was relieved in the ED with IV ketorolac.  But she is still displaying epigastric tenderness on exam. An abdominal US showed Normal caliber of the visualized common bile duct without evidence of intrahepatic biliary ductal dilatation with hepatomegaly.  She has elevated liver enzymes, Tbile 1.7, ,  and WBC 14.41.  She has been vaccinated for Hep A and Hep B.  She is in a monogamous relationship with her boyfriend.  She is admitted to [facility] with Hepatomegaly. Acute Hepatitis panel pending. GI consulted.         Hospital Course:  Had Abdominal MRI which showed no evidence of stone or obstruction of common bile duct. Acute Hep. A, B, and C negative.  Liver enzymes are improved, but when  she tried a regular diet, her pain got worse and liver enzymes anny again. Was able to tolerate Full Liquid diet, with enzymes coming down again.     Interval History:   Had EGD with Dr. Castillo yesterday that significant chronic gastritis, which was biopsied.  There was no pathology in the bile duct or pancreas. She was allowed a full liquid diet and liver enzymes again trended down.      Review of Systems   Constitutional: Negative for chills, diaphoresis and fever.   Respiratory: Negative for cough, shortness of breath and wheezing.    Cardiovascular: Negative for chest pain and palpitations.   Gastrointestinal: Negative for abdominal pain (worse after eating), nausea and vomiting.   Neurological: Negative for light-headedness.     Objective:     Vital Signs (Most Recent):  Temp: 98.2 °F (36.8 °C) (02/04/17 0800)  Pulse: (!) 53 (02/04/17 0800)  Resp: 18 (02/04/17 0800)  BP: (!) 89/50 (02/04/17 0800)  SpO2: 98 % (02/04/17 0918) Vital Signs (24h Range):  Temp:  [98 °F (36.7 °C)-98.6 °F (37 °C)] 98.2 °F (36.8 °C)  Pulse:  [50-82] 53  Resp:  [12-23] 18  SpO2:  [96 %-99 %] 98 %  BP: ()/(50-87) 89/50     Weight: 114.9 kg (253 lb 4.9 oz)  Body mass index is 37.41 kg/(m^2).    Intake/Output Summary (Last 24 hours) at 02/04/17 0920  Last data filed at 02/03/17 1710   Gross per 24 hour   Intake           1019.5 ml   Output                0 ml   Net           1019.5 ml      Physical Exam   Constitutional: She is oriented to person, place, and time. No distress.   HENT:   Head: Normocephalic and atraumatic.   Eyes: No scleral icterus.   Cardiovascular: Normal rate, regular rhythm, normal heart sounds and intact distal pulses.    No murmur heard.  Pulmonary/Chest: Effort normal and breath sounds normal. No respiratory distress. She has no wheezes. She has no rales.   Abdominal: Soft. Bowel sounds are normal. She exhibits no distension. There is tenderness (Mild epigastric).   Neurological: She is alert and oriented to  person, place, and time.   Skin: Skin is warm and dry. She is not diaphoretic.   Psychiatric: She has a normal mood and affect.   Nursing note and vitals reviewed.      Significant Labs:   CMP:   Recent Labs  Lab 17  0835 17  0424    139   K 4.0 3.7    108   CO2 24 24    95   BUN 6 7   CREATININE 0.7 0.7   CALCIUM 8.7 8.8   PROT 6.0 5.8*   ALBUMIN 3.2* 3.1*   BILITOT 0.6 0.6   ALKPHOS 136* 113   * 46*   * 201*   ANIONGAP 7* 7*   EGFRNONAA >60 >60       Significant Imagin/3/17 EUS Dr. Castillo  Normal esophagus.                        - Chronic gastritis. Biopsied.                        - Normal examined duodenum.                        - There was no sign of significant pathology in the                         entire pancreas.                        - A few abnormal lymph nodes were visualized in the                         right hilar region (level 10R).                        - There was no sign of significant pathology in the                         common bile duct.    Assessment/Plan:      * Elevated liver enzymes  Hepatomegaly, Gastritis/ Abdominal Pain  ,  , Tbili 1.7, Normal Alk Phos. Abd US shows enlarged liver.  History of cholecystectomy  Abdominal Pain since yesterday, with nausea and no vomiting. Vaccinated against Hep A and Hep B.  Does not admit to high risk behaviors for Hep B or C. or history of hepatitis.    Acute Hep. A, B, and C negative. Acetaminophen level negative. Abd MRI for evidence of Duct stone or obstruction.  Liver enzymes are improved, but anny again after eating.  Increased Abd pain and Nausea after eating solid foof, but able to tolerate full liquid.       EUS yesterday with Dr. Castillo showed Had EGD with Dr. Castillo yesterday that significant chronic gastritis, which was biopsied.  There was no pathology in the bile duct or pancreas. She was allowed a full liquid diet and liver enzymes again trended down.    Will discharge  with bland, low fat diet, BID po Pantoprazole, Carafate and nausea medication with GI follow up.       Nausea  Will discharge with prescriptions for Oral ondansetron 8 mg SL and Oral promethazine 25 mg .      GERD (gastroesophageal reflux disease)  Takes esomeprazole daily.  Will discharge with   Pantoprazole 40 mg BID and Carafate QID before meals and at bedtime.      VTE Risk Mitigation         Ordered     Low Risk of VTE  Once      01/31/17 3525          Sun Bueno NP  Department of Hospital Medicine   [facility]

## 2017-02-04 NOTE — PLAN OF CARE
Pt stable, discharge instructions reviewed with pt , pt verbalized understanding, Pt educated on newly started, continuing , and/or discontinuing medications. Prescriptions given to pt,Abd pain/gastritis education given. IV removed, cath tip intact. Pt awaiting to be discharged via wheelchair with personal belongings with staff to main entrance.

## 2017-02-04 NOTE — PLAN OF CARE
Discharge orders noted. No HH or DME.    Future Appointments  Date Time Provider Department Center   2/16/2017 9:20 AM Nataliia Boston PA-C [facility]   3/13/2017 2:30 PM Annette Burton MD [facility]          02/04/17 1012   Final Note   Assessment Type Final Discharge Note   Discharge Disposition Home   What phone number can be called within the next 1-3 days to see how you are doing after discharge? [phone]   Hospital Follow Up  Appt(s) scheduled? Yes  (GI clinic to call pt with follow up)   Offered [facility] Bedside Delivery? n/a  (not available on weekend)   Discharge/Hospital Encounter Summary to ([facility]) PCP No  (Pt does not have PCP)   Referral to Outpatient Case Management complete? n/a   Referral to / orders for Home Health Complete? n/a   30 day supply of medicines given at discharge, if documented non-compliance / non-adherence? n/a   Any social issues identified prior to discharge? n/a   Did you assess the readiness or willingness of the family or caregiver to support self management of care? n/a   Right Care Referral Info   Post Acute Recommendation No Care     Deepthi Bonilla, RN Transitional Navigator  [phone]

## 2017-02-05 ENCOUNTER — TELEPHONE (OUTPATIENT)
Dept: GASTROENTEROLOGY | Facility: CLINIC | Age: 42
End: 2017-02-05

## 2017-02-05 NOTE — ANESTHESIA RELEASE NOTE
"Anesthesia Release from PACU Note    Patient: Dianne Scott    Procedure(s) Performed: Procedure(s) (LRB):  ESOPHAGOGASTRODUODENOSCOPY (EGD) (N/A)  ULTRASOUND-ENDOSCOPIC-UPPER (N/A)    Anesthesia type: MAC    Post pain: Adequate analgesia    Post assessment: no apparent anesthetic complications, tolerated procedure well and no evidence of recall    Last Vitals:   Visit Vitals    BP (!) 89/50    Pulse (!) 53    Temp 36.8 °C (98.2 °F) (Oral)    Resp 18    Ht 5' 9" (1.753 m)    Wt 114.9 kg (253 lb 4.9 oz)    SpO2 98%    Breastfeeding No    BMI 37.41 kg/m2       Post vital signs: stable    Level of consciousness: awake, alert  and oriented    Nausea/Vomiting: no nausea/no vomiting    Complications: none    Airway Patency: patent    Respiratory: unassisted    Cardiovascular: stable and blood pressure at baseline    Hydration: euvolemic  "

## 2017-02-05 NOTE — ANESTHESIA POSTPROCEDURE EVALUATION
"Anesthesia Post Evaluation    Patient: Dianne Scott    Procedure(s) Performed: Procedure(s) (LRB):  ESOPHAGOGASTRODUODENOSCOPY (EGD) (N/A)  ULTRASOUND-ENDOSCOPIC-UPPER (N/A)    Final Anesthesia Type: MAC  Patient location during evaluation: PACU  Patient participation: Yes- Able to Participate  Level of consciousness: awake and alert  Post-procedure vital signs: reviewed and stable  Pain management: adequate  Airway patency: patent  PONV status at discharge: No PONV  Anesthetic complications: no      Cardiovascular status: hemodynamically stable  Respiratory status: unassisted  Hydration status: euvolemic  Follow-up not needed.        Visit Vitals    BP (!) 89/50    Pulse (!) 53    Temp 36.8 °C (98.2 °F) (Oral)    Resp 18    Ht 5' 9" (1.753 m)    Wt 114.9 kg (253 lb 4.9 oz)    SpO2 98%    Breastfeeding No    BMI 37.41 kg/m2       Pain/Laureen Score: Pain Assessment Performed: Yes (2/4/2017  9:00 AM)  Presence of Pain: denies (2/4/2017  9:00 AM)      "

## 2017-02-06 ENCOUNTER — TELEPHONE (OUTPATIENT)
Dept: GASTROENTEROLOGY | Facility: CLINIC | Age: 42
End: 2017-02-06

## 2017-02-06 ENCOUNTER — PATIENT OUTREACH (OUTPATIENT)
Dept: ADMINISTRATIVE | Facility: CLINIC | Age: 42
End: 2017-02-06
Payer: COMMERCIAL

## 2017-02-06 NOTE — PROGRESS NOTES
C3 nurse attempted to contact patient. No answer. The following message was left for the patient to return the call:  Good afternoon  I am a nurse calling on behalf of [facility] from the [facility]ination [facility].  This is a Transitional Care Call for Dianne Scott. When you have a moment please contact us at [phone] or [phone] Monday through Friday, between the hours of 8 am to 4 pm. We look forward to speaking with you. On behalf of [facility] have a nice day.    The patient has a scheduled [facility] appointment with Nataliia Boston PA-C ON Feb 16, 2017 @ 9:20 AM. Message sent to Physician staff.

## 2017-02-06 NOTE — PATIENT INSTRUCTIONS
Gastritis (Adult)    Gastritis is inflammation and irritation of the stomach lining. It can be present for a short time (acute) or be long lasting (chronic). Gastritis is often caused by infection with bacteria called H pylori. More than a third of people in the [place] have this bacteria in their bodies. In many cases, H pylori causes no problems or symptoms. In some people, though, the infection irritates the stomach lining and causes gastritis. Other causes of stomach irritation include drinking alcohol or taking pain-relieving medicines called NSAIDs (such as aspirin or ibuprofen).   Symptoms of gastritis can include:  · Abdominal pain or bloating  · Loss of appetite  · Nausea or vomiting  · Vomiting blood or having black stools  · Feeling more tired than usual  An inflamed and irritated stomach lining is more likely to develop a sore called an ulcer. To help prevent this, gastritis should be treated.  Home care  If needed, medicines may be prescribed. If you have H pylori infection, treating it will likely relieve your symptoms. Other changes can help reduce stomach irritation and help it heal.  · If you have been prescribed medicines for H pylori infection, take them as directed. Take all of the medicine until it is finished or your healthcare provider tells you to stop, even if you feel better.  · Your healthcare provider may recommend avoiding NSAIDs. If you take daily aspirin for your heart or other medical reasons, do not stop without talking to your healthcare provider first.  · Avoid drinking alcohol.  · Stop smoking. Smoking can irritate the stomach and delay healing. As much as possible, stay away from second hand smoke.  Follow-up care  Follow up with your healthcare provider, or as advised by our staff. Testing may be needed to check for inflammation or an ulcer.  When to seek medical advice  Call your healthcare provider for any of the following:  · Stomach pain that gets worse or moves to the lower  right abdomen (appendix area)  · Chest pain that appears or gets worse, or spreads to the back, neck, shoulder, or arm  · Frequent vomiting (cant keep down liquids)  · Blood in the stool or vomit (red or black in color)  · Feeling weak or dizzy  · Fever of 100.4ºF (38ºC) or higher, or as directed by your healthcare provider  Date Last Reviewed: 6/22/2015  © 5361-4649 [employer]. [address] [place] [address]. All rights reserved. This information is not intended as a substitute for professional medical care. Always follow your healthcare professional's instructions.

## 2017-02-06 NOTE — TELEPHONE ENCOUNTER
----- Message from Monica Fabienne sent at 2/6/2017  9:56 AM CST -----  Contact: self, [phone]  Patient has an appointment scheduled on 2/8 but requests to be seen sooner if possible, states she was seen at the hospital this past weekend by Dr. Rizvi also. Please advise.

## 2017-02-06 NOTE — TELEPHONE ENCOUNTER
Called pt. To inform that Dr. Rizvi will have to see her on Wednesday as that is the soonest opening he has avaliable.

## 2017-02-08 ENCOUNTER — LAB VISIT (OUTPATIENT)
Dept: LAB | Facility: HOSPITAL | Age: 42
End: 2017-02-08
Attending: INTERNAL MEDICINE
Payer: COMMERCIAL

## 2017-02-08 ENCOUNTER — INITIAL CONSULT (OUTPATIENT)
Dept: GASTROENTEROLOGY | Facility: CLINIC | Age: 42
End: 2017-02-08
Payer: COMMERCIAL

## 2017-02-08 VITALS
HEIGHT: 69 IN | SYSTOLIC BLOOD PRESSURE: 130 MMHG | HEART RATE: 70 BPM | DIASTOLIC BLOOD PRESSURE: 90 MMHG | WEIGHT: 240.75 LBS | BODY MASS INDEX: 35.66 KG/M2

## 2017-02-08 DIAGNOSIS — R10.11 RUQ ABDOMINAL PAIN: ICD-10-CM

## 2017-02-08 DIAGNOSIS — B17.9 ACUTE HEPATITIS: ICD-10-CM

## 2017-02-08 DIAGNOSIS — R74.8 ELEVATED LIVER ENZYMES: ICD-10-CM

## 2017-02-08 DIAGNOSIS — B17.9 ACUTE HEPATITIS: Primary | ICD-10-CM

## 2017-02-08 LAB
ALBUMIN SERPL BCP-MCNC: 3.9 G/DL
ALP SERPL-CCNC: 95 U/L
ALT SERPL W/O P-5'-P-CCNC: 68 U/L
AST SERPL-CCNC: 23 U/L
BILIRUB DIRECT SERPL-MCNC: 0.2 MG/DL
BILIRUB SERPL-MCNC: 0.4 MG/DL
PROT SERPL-MCNC: 7.1 G/DL

## 2017-02-08 PROCEDURE — 99999 PR PBB SHADOW E&M-EST. PATIENT-LVL III: CPT | Mod: PBBFAC,,, | Performed by: INTERNAL MEDICINE

## 2017-02-08 PROCEDURE — 80076 HEPATIC FUNCTION PANEL: CPT

## 2017-02-08 PROCEDURE — 99214 OFFICE O/P EST MOD 30 MIN: CPT | Mod: S$GLB,,, | Performed by: INTERNAL MEDICINE

## 2017-02-08 PROCEDURE — 36415 COLL VENOUS BLD VENIPUNCTURE: CPT

## 2017-02-08 NOTE — PHYSICIAN QUERY
PT Name: Dianne Scott  MR #: [id number]     Physician Query Form - Documentation Clarification    Reviewer  [email or web address]        This form is a permanent document in the medical record.     Query Date: February 8, 2017  By submitting this query, we are merely seeking further clarification of documentation to reflect the severity of illness of your patient. Please utilize your independent clinical judgment when addressing the question(s) below.    (The Medical record reflects the following:)      Supporting Clinical Findings Location in Medical Record   * Elevated liver enzymes   Hepatomegaly, Gastritis/ Abdominal Pain   ,  , Tbili 1.7, Normal Alk Phos. Abd US shows enlarged liver.  History of cholecystectomy   Abdominal Pain since yesterday, with nausea and no vomiting. Vaccinated against Hep A and Hep B.  Does not admit to high risk behaviors for Hep B or C. or history of hepatitis.   N  IV Ketoralac.        Discharge summary                                                                                      Doctor, Please further specify hepatomegaly, hypertrophic liver    Physician Use Only      ( x ) Acute - presumed  (  ) Cirrhotic  (  ) Fatty  (  ) Other (please specify) __________________  (  ) Unable to determine

## 2017-02-08 NOTE — LETTER
February 8, 2017      Nataliia [place] PA-C  [address]  [address]  [facility]  [place] [address]           [place] [facility]  [address]  [address]  [place] [address]  Phone: [phone]          Patient: Dianne Scott   MR Number: [id number]   YOB: 1975   Date of Visit: 2/8/2017       Dear Nataliia [place]:    Thank you for referring Dianne Scott to me for evaluation. Attached you will find relevant portions of my assessment and plan of care.    If you have questions, please do not hesitate to call me. I look forward to following Dianne Scott along with you.    Sincerely,    Mikel Rizvi MD    Enclosure  CC:  No Recipients    If you would like to receive this communication electronically, please contact [email or web address] or [phone] to request more information on [employer] Link access.    For providers and/or their staff who would like to refer a patient to [facility], please contact us through our one-stop-shop provider referral line, [facility], at [phone].    If you feel you have received this communication in error or would no longer like to receive these types of communications, please e-mail [email or web address]

## 2017-02-08 NOTE — PROGRESS NOTES
Subjective:       Patient ID: Dianne Scott is a 41 y.o. female.    Chief Complaint: Abdominal Pain (follow up post hospitalization)    This is a 41-year-old female here for a follow-up visit.  She is known to me from recent hospitalization when she presented with sharp epigastric and right upper quadrant pain which was severe, nonradiating and associated with nausea.  It was worsened with eating, relieved by rest.  She underwent a remote cholecystectomy for cholelithiasis and subsequent evaluation with endoscopic ultrasound and MRCP was negative for a retained common bile duct stone.  She improved slowly with improvement in her liver function she subsequently discharged home.  Since returning home she has noted continued problems with postprandial abdominal pain.  It is unchanged in character is mild to moderate in intensity and nonradiating.  It is still associated with nausea but no vomiting.  No dark urine, Tylenol usage.  No chest pain.    The following portions of the patient's history were reviewed and updated as appropriate: allergies, current medications, past family history, past medical history, past social history, past surgical history and problem list.    (Portions of this note were dictated using voice recognition software and may contain dictation related errors in spelling/grammar/syntax not found on text review)    HPI  Review of Systems   Constitutional: Negative for appetite change, chills and fever.   HENT: Negative for postnasal drip and trouble swallowing.    Eyes: Negative for pain and redness.   Respiratory: Negative for cough, choking, chest tightness and shortness of breath.    Cardiovascular: Negative for chest pain and leg swelling.   Gastrointestinal: Positive for abdominal distention, abdominal pain and nausea. Negative for blood in stool, constipation, diarrhea, rectal pain and vomiting.   Endocrine: Negative for cold intolerance and heat intolerance.   Genitourinary: Negative for  difficulty urinating and hematuria.   Musculoskeletal: Negative for arthralgias and back pain.   Skin: Negative for color change and pallor.   Allergic/Immunologic: Negative for environmental allergies and food allergies.   Neurological: Negative for dizziness and light-headedness.   Hematological: Negative for adenopathy. Does not bruise/bleed easily.   Psychiatric/Behavioral: Negative for agitation and behavioral problems.       Objective:      Physical Exam   Constitutional: She is oriented to person, place, and time. She appears well-developed and well-nourished. No distress.   HENT:   Head: Normocephalic and atraumatic.   Eyes: Conjunctivae are normal. No scleral icterus.   Neck: Normal range of motion. Neck supple. No tracheal deviation present. No thyromegaly present.   Cardiovascular: Normal rate and regular rhythm.  Exam reveals no gallop and no friction rub.    No murmur heard.  Pulmonary/Chest: Effort normal and breath sounds normal. No respiratory distress. She has no wheezes.   Abdominal: Soft. Bowel sounds are normal. She exhibits distension. There is tenderness.   Musculoskeletal:        Right wrist: She exhibits normal range of motion and no tenderness.        Left wrist: She exhibits normal range of motion and no tenderness.   Lymphadenopathy:        Head (right side): No submental and no submandibular adenopathy present.        Head (left side): No submental and no submandibular adenopathy present.   Neurological: She is alert and oriented to person, place, and time.   Skin: Skin is warm and dry. No rash noted. She is not diaphoretic. No erythema.   Psychiatric: She has a normal mood and affect. Her behavior is normal.   Nursing note and vitals reviewed.      Labs: LFTs trending down  Assessment:       1. Acute hepatitis    2. Elevated liver enzymes    3. RUQ abdominal pain        Plan:   1. Check LFTs today  2. CCK stimulated u/s  3. Consideration for sod, ercp with manometry

## 2017-02-16 ENCOUNTER — OFFICE VISIT (OUTPATIENT)
Dept: FAMILY MEDICINE | Facility: HOSPITAL | Age: 42
End: 2017-02-16
Attending: FAMILY MEDICINE
Payer: COMMERCIAL

## 2017-02-16 VITALS
DIASTOLIC BLOOD PRESSURE: 76 MMHG | HEIGHT: 69 IN | WEIGHT: 237 LBS | SYSTOLIC BLOOD PRESSURE: 112 MMHG | BODY MASS INDEX: 35.1 KG/M2 | TEMPERATURE: 98 F | HEART RATE: 70 BPM

## 2017-02-16 DIAGNOSIS — Z72.0 TOBACCO ABUSE: ICD-10-CM

## 2017-02-16 DIAGNOSIS — R74.8 ELEVATED LIVER ENZYMES: ICD-10-CM

## 2017-02-16 DIAGNOSIS — R05.9 COUGH IN ADULT: Primary | ICD-10-CM

## 2017-02-16 PROCEDURE — 99214 OFFICE O/P EST MOD 30 MIN: CPT | Performed by: PHYSICIAN ASSISTANT

## 2017-02-16 RX ORDER — BENZONATATE 100 MG/1
100 CAPSULE ORAL 3 TIMES DAILY PRN
Qty: 42 CAPSULE | Refills: 1 | Status: SHIPPED | OUTPATIENT
Start: 2017-02-16 | End: 2017-02-26

## 2017-02-16 NOTE — MR AVS SNAPSHOT
[facility] [address], [address]  [place] [address]  Phone: [phone]  Fax: [phone]                  Dianne Scott   2017 9:20 AM   Office Visit    Description:  Female : 1975   Provider:  Nataliia [place] PA-C   Department:  [facility]           Reason for Visit     Hospital Follow Up     Cough           Diagnoses this Visit        Comments    Cough in adult    -  Primary            To Do List           Future Appointments        Provider Department Dept Phone    3/13/2017 2:30 PM Annette [place] MD [facility] - Internal Medicine [phone]      Goals (5 Years of Data)     None       These Medications        Disp Refills Start End    benzonatate (TESSALON) 100 MG capsule 42 capsule 1 2017    Take 1 capsule (100 mg total) by mouth 3 (three) times daily as needed for Cough. - Oral    Pharmacy: [email or web address] [address] [place], [place] [address]JO[address]JAKE [address]ROLANDO AT [facility] #: [phone]         [facility]     [facility] Care [facility] -  Assistance  Registered nurses in the [facility] provide clinical advisement, health education, appointment booking, and other advisory services.  Call for this free service at [phone].             Medications           Message regarding Medications     Verify the changes and/or additions to your medication regime listed below are the same as discussed with your clinician today.  If any of these changes or additions are incorrect, please notify your healthcare provider.        START taking these NEW medications        Refills    benzonatate (TESSALON) 100 MG capsule 1    Sig: Take 1 capsule (100 mg total) by mouth 3 (three) times daily as needed for Cough.    Class: Print    Route: Oral           Verify that the below list of medications is an accurate representation of the medications you are currently taking.  If none  "reported, the list may be blank. If incorrect, please contact your healthcare provider. Carry this list with you in case of emergency.           Current Medications     acetaminophen (TYLENOL) 500 MG tablet Take 500 mg by mouth every 6 (six) hours as needed.    cetirizine (ZYRTEC) 10 MG tablet Take 10 mg by mouth once daily.    pantoprazole (PROTONIX) 40 MG tablet Take 1 tablet (40 mg total) by mouth 2 (two) times daily.    promethazine (PHENERGAN) 25 MG tablet Take 1 tablet (25 mg total) by mouth every 6 (six) hours as needed for Nausea.    sucralfate (CARAFATE) 100 mg/mL suspension Take 5 mLs (500 mg total) by mouth 4 (four) times daily with meals and nightly.    benzonatate (TESSALON) 100 MG capsule Take 1 capsule (100 mg total) by mouth 3 (three) times daily as needed for Cough.    ondansetron (ZOFRAN-ODT) 4 MG TbDL Take 1 tablet (4 mg total) by mouth every 6 (six) hours as needed.           Clinical Reference Information           Your Vitals Were     BP Pulse Temp Height Weight BMI    112/76 70 97.7 °F (36.5 °C) 5' 9" (1.753 m) 107.5 kg (236 lb 15.9 oz) 35 kg/m2      Blood Pressure          Most Recent Value    BP  112/76      Allergies as of 2/16/2017     Codeine    Demerol [Meperidine]    Imitrex [Sumatriptan Succinate]    Morphine    Tetracyclines    Azithromycin    Ciprofloxacin Hcl      Immunizations Administered on Date of Encounter - 2/16/2017     None      [facility] Sign-Up     Activating your [facility] account is as easy as 1-2-3!     1) Visit [email or web address], select Sign Up Now, enter this activation code and your date of birth, then select Next.  [email or web address]  Expires: 3/9/2017  2:56 PM      2) Create a username and password to use when you visit [facility] in the future and select a security question in case you lose your password and select Next.    3) Enter your e-mail address and click Sign Up!    Additional Information  If you have questions, please e-mail [email or web address] or call " [phone] to talk to our [facility] staff. Remember, [facility] is NOT to be used for urgent needs. For medical emergencies, dial 911.         Smoking Cessation     If you would like to quit smoking:   You may be eligible for free services if you are a [place] resident and started smoking cigarettes before September 1, 1988.  Call the Smoking Cessation Trust (SCT) toll free at (086) 848-8861 or (394) 686-3066.   Call 1-800-QUIT-NOW if you do not meet the above criteria.            Language Assistance Services     ATTENTION: Language assistance services are available, free of charge. Please call [phone].      ATENCIÓN: Si habla español, tiene a jacques disposición servicios gratuitos de asistencia lingüística. Llame al [phone].     CHÚ Ý: N?u b?n nói Ti?ng Vi?t, có các d?ch v? h? tr? ngôn ng? mi?n phí dành cho b?n. G?i s? [phone].         [facility] complies with applicable Federal civil rights laws and does not discriminate on the basis of race, color, national origin, age, disability, or sex.

## 2017-02-16 NOTE — PROGRESS NOTES
"Subjective:       Patient ID: Dianne Scott is a 41 y.o. female.    Chief Complaint: "Here for hospital followup"     HPI: Patient is a 40 yo female with hx of tobacco abuse here for hospital followup of acute hepatitis and abdominal pain. Patient continues to be worked up by gastroenterology for the cause of her continued pain (See Dr. Rizvi's note from 2/8). Patient reports that she still cannot eat a normal diet with having pain after meals. She is taking phenergan only if necessary to sleep. Taking protonix and carafate as directed by GI.     Patient also reports a cough for 5 days. Non-productive. No history of COPD or asthma. Does smoke 0.5ppd. Not interested in quitting at this time. No fever, chills, nausea, vomiting, ear pain, sore throat, or generalized body aches. Taking Robitussin but not getting much relief.    Review of Systems   Constitutional: Negative for chills and fever.   HENT: Positive for postnasal drip and sinus pressure. Negative for ear pain, rhinorrhea and sore throat.    Eyes: Negative for pain.   Respiratory: Positive for cough. Negative for chest tightness and shortness of breath.    Cardiovascular: Negative for chest pain and palpitations.   Gastrointestinal: Positive for abdominal pain and nausea. Negative for abdominal distention, constipation, diarrhea and vomiting.   Genitourinary: Negative for dysuria and urgency.   Musculoskeletal: Negative for back pain.   Skin: Negative for color change, pallor and rash.   Neurological: Negative for numbness and headaches.   Psychiatric/Behavioral: The patient is not nervous/anxious.        Objective:      Vitals:    02/16/17 0939   BP: 112/76   Pulse: 70   Temp: 97.7 °F (36.5 °C)     Physical Exam   Constitutional: She is oriented to person, place, and time. She appears well-developed and well-nourished.   overweight   HENT:   Head: Normocephalic and atraumatic.   Right Ear: External ear normal.   Left Ear: External ear normal. "   Mouth/Throat: Oropharynx is clear and moist.   Eyes: EOM are normal.   Neck: Normal range of motion.   Cardiovascular: Normal rate, regular rhythm and normal heart sounds.    Pulmonary/Chest: Effort normal and breath sounds normal. No respiratory distress. She has no wheezes.   Musculoskeletal: Normal range of motion.   Neurological: She is alert and oriented to person, place, and time.   Skin: Skin is warm and dry.   Psychiatric: She has a normal mood and affect. Her behavior is normal. Judgment and thought content normal.       Assessment:       1. Cough in adult    2. Elevated liver enzymes    3. Tobacco abuse        Plan:       Cough in adult  -do not feel that patient needs any antibiotics at this time  -instructed patient to continue flonase and zyrtec   -robitussin and tessalon as needed  -     benzonatate (TESSALON) 100 MG capsule; Take 1 capsule (100 mg total) by mouth 3 (three) times daily as needed for Cough.  Dispense: 42 capsule; Refill: 1    Elevated liver enzymes  -continue GI workup    Tobacco abuse  -strongly encouraged cessation      Followup in 2 months

## 2017-02-16 NOTE — PROGRESS NOTES
I reviewed the note and discussed with Nataliia. The GI evaluation continues. I agree with the plans for cough control

## 2017-02-17 DIAGNOSIS — K21.9 GASTROESOPHAGEAL REFLUX DISEASE, ESOPHAGITIS PRESENCE NOT SPECIFIED: ICD-10-CM

## 2017-02-17 DIAGNOSIS — R10.9 ABDOMINAL PAIN, UNSPECIFIED LOCATION: Primary | ICD-10-CM

## 2017-03-07 ENCOUNTER — TELEPHONE (OUTPATIENT)
Dept: GASTROENTEROLOGY | Facility: CLINIC | Age: 42
End: 2017-03-07

## 2017-03-07 ENCOUNTER — HOSPITAL ENCOUNTER (OUTPATIENT)
Dept: RADIOLOGY | Facility: HOSPITAL | Age: 42
Discharge: HOME | End: 2017-03-07
Attending: NURSE PRACTITIONER
Payer: COMMERCIAL

## 2017-03-07 DIAGNOSIS — K21.9 GASTROESOPHAGEAL REFLUX DISEASE, ESOPHAGITIS PRESENCE NOT SPECIFIED: ICD-10-CM

## 2017-03-07 DIAGNOSIS — R10.9 ABDOMINAL PAIN, UNSPECIFIED LOCATION: ICD-10-CM

## 2017-03-07 DIAGNOSIS — B17.9 ACUTE HEPATITIS: ICD-10-CM

## 2017-03-07 PROCEDURE — 78264 GASTRIC EMPTYING IMG STUDY: CPT | Mod: 26,,, | Performed by: RADIOLOGY

## 2017-03-07 PROCEDURE — 78264 GASTRIC EMPTYING IMG STUDY: CPT | Mod: TC

## 2017-03-07 NOTE — TELEPHONE ENCOUNTER
Spoke to Pt about how her GES. Pt was also concerned about the next steps that needed to be taken and I let her know that I would forward this message to Dr. Rizvi about her concerns. Verbal agreement.

## 2017-03-08 ENCOUNTER — TELEPHONE (OUTPATIENT)
Dept: GASTROENTEROLOGY | Facility: CLINIC | Age: 42
End: 2017-03-08

## 2017-03-08 DIAGNOSIS — R79.89 LFT ELEVATION: Primary | ICD-10-CM

## 2017-03-08 DIAGNOSIS — R10.9 ABDOMINAL PAIN, UNSPECIFIED LOCATION: ICD-10-CM

## 2017-03-09 ENCOUNTER — HOSPITAL ENCOUNTER (OUTPATIENT)
Dept: RADIOLOGY | Facility: HOSPITAL | Age: 42
Discharge: HOME | End: 2017-03-09
Attending: INTERNAL MEDICINE
Payer: COMMERCIAL

## 2017-03-09 DIAGNOSIS — R79.89 LFT ELEVATION: ICD-10-CM

## 2017-03-09 DIAGNOSIS — R10.9 ABDOMINAL PAIN, UNSPECIFIED LOCATION: ICD-10-CM

## 2017-03-09 PROCEDURE — 74177 CT ABD & PELVIS W/CONTRAST: CPT | Mod: 26,,, | Performed by: RADIOLOGY

## 2017-03-09 PROCEDURE — 25500020 PHARM REV CODE 255: Performed by: INTERNAL MEDICINE

## 2017-03-09 PROCEDURE — 74177 CT ABD & PELVIS W/CONTRAST: CPT | Mod: TC

## 2017-03-09 RX ADMIN — IOHEXOL 30 ML: 350 INJECTION, SOLUTION INTRAVENOUS at 12:03

## 2017-03-09 RX ADMIN — IOHEXOL 100 ML: 350 INJECTION, SOLUTION INTRAVENOUS at 02:03

## 2017-03-10 ENCOUNTER — TELEPHONE (OUTPATIENT)
Dept: GASTROENTEROLOGY | Facility: CLINIC | Age: 42
End: 2017-03-10

## 2017-03-10 NOTE — TELEPHONE ENCOUNTER
----- Message from Pavithra Alamo sent at 3/10/2017  1:41 PM CST -----  Contact: [phone]/self   Patient called in returning your call. Please advise

## 2017-03-13 ENCOUNTER — LAB VISIT (OUTPATIENT)
Dept: LAB | Facility: HOSPITAL | Age: 42
End: 2017-03-13
Attending: FAMILY MEDICINE
Payer: COMMERCIAL

## 2017-03-13 ENCOUNTER — LAB VISIT (OUTPATIENT)
Dept: LAB | Facility: HOSPITAL | Age: 42
End: 2017-03-13
Attending: INTERNAL MEDICINE
Payer: COMMERCIAL

## 2017-03-13 ENCOUNTER — OFFICE VISIT (OUTPATIENT)
Dept: INTERNAL MEDICINE | Facility: CLINIC | Age: 42
End: 2017-03-13
Payer: COMMERCIAL

## 2017-03-13 ENCOUNTER — OFFICE VISIT (OUTPATIENT)
Dept: GASTROENTEROLOGY | Facility: CLINIC | Age: 42
End: 2017-03-13
Payer: COMMERCIAL

## 2017-03-13 ENCOUNTER — TELEPHONE (OUTPATIENT)
Dept: GASTROENTEROLOGY | Facility: CLINIC | Age: 42
End: 2017-03-13

## 2017-03-13 VITALS
HEIGHT: 69 IN | DIASTOLIC BLOOD PRESSURE: 85 MMHG | BODY MASS INDEX: 35.53 KG/M2 | SYSTOLIC BLOOD PRESSURE: 130 MMHG | HEART RATE: 74 BPM | WEIGHT: 239.88 LBS

## 2017-03-13 VITALS
WEIGHT: 237.19 LBS | BODY MASS INDEX: 35.13 KG/M2 | OXYGEN SATURATION: 99 % | HEART RATE: 87 BPM | HEIGHT: 69 IN | DIASTOLIC BLOOD PRESSURE: 82 MMHG | SYSTOLIC BLOOD PRESSURE: 124 MMHG

## 2017-03-13 DIAGNOSIS — R74.8 ELEVATED LIVER ENZYMES: ICD-10-CM

## 2017-03-13 DIAGNOSIS — Z72.0 TOBACCO ABUSE: ICD-10-CM

## 2017-03-13 DIAGNOSIS — R10.9 ABDOMINAL PAIN, UNSPECIFIED LOCATION: ICD-10-CM

## 2017-03-13 DIAGNOSIS — N83.209 CYST OF OVARY, UNSPECIFIED LATERALITY: ICD-10-CM

## 2017-03-13 DIAGNOSIS — R53.83 FATIGUE, UNSPECIFIED TYPE: ICD-10-CM

## 2017-03-13 DIAGNOSIS — R16.0 HEPATOMEGALY: ICD-10-CM

## 2017-03-13 DIAGNOSIS — Z13.21 ENCOUNTER FOR VITAMIN DEFICIENCY SCREENING: ICD-10-CM

## 2017-03-13 DIAGNOSIS — R63.0 DECREASED APPETITE: ICD-10-CM

## 2017-03-13 DIAGNOSIS — Z00.00 ANNUAL PHYSICAL EXAM: Primary | ICD-10-CM

## 2017-03-13 DIAGNOSIS — Z80.8 FAMILY HISTORY OF SKIN CANCER: ICD-10-CM

## 2017-03-13 DIAGNOSIS — Z12.83 SKIN CANCER SCREENING: ICD-10-CM

## 2017-03-13 DIAGNOSIS — R74.8 ELEVATED LIVER ENZYMES: Primary | ICD-10-CM

## 2017-03-13 DIAGNOSIS — Z12.31 ENCOUNTER FOR SCREENING MAMMOGRAM FOR BREAST CANCER: ICD-10-CM

## 2017-03-13 DIAGNOSIS — R10.11 RUQ ABDOMINAL PAIN: ICD-10-CM

## 2017-03-13 DIAGNOSIS — K21.9 GASTROESOPHAGEAL REFLUX DISEASE, ESOPHAGITIS PRESENCE NOT SPECIFIED: ICD-10-CM

## 2017-03-13 DIAGNOSIS — Z79.899 USE OF PROTON PUMP INHIBITOR THERAPY: ICD-10-CM

## 2017-03-13 DIAGNOSIS — R19.7 DIARRHEA, UNSPECIFIED TYPE: ICD-10-CM

## 2017-03-13 PROBLEM — B17.9 ACUTE HEPATITIS: Status: RESOLVED | Noted: 2017-01-31 | Resolved: 2017-03-13

## 2017-03-13 LAB
ALBUMIN SERPL BCP-MCNC: 3.9 G/DL
ALP SERPL-CCNC: 68 U/L
ALT SERPL W/O P-5'-P-CCNC: 24 U/L
AST SERPL-CCNC: 15 U/L
BASOPHILS # BLD AUTO: 0.05 K/UL
BASOPHILS NFR BLD: 0.4 %
BILIRUB DIRECT SERPL-MCNC: 0.1 MG/DL
BILIRUB SERPL-MCNC: 0.3 MG/DL
DIFFERENTIAL METHOD: ABNORMAL
EOSINOPHIL # BLD AUTO: 0.3 K/UL
EOSINOPHIL NFR BLD: 2.8 %
ERYTHROCYTE [DISTWIDTH] IN BLOOD BY AUTOMATED COUNT: 13.5 %
HCT VFR BLD AUTO: 45.3 %
HGB BLD-MCNC: 15.5 G/DL
LYMPHOCYTES # BLD AUTO: 3.7 K/UL
LYMPHOCYTES NFR BLD: 32.9 %
MCH RBC QN AUTO: 30.3 PG
MCHC RBC AUTO-ENTMCNC: 34.2 %
MCV RBC AUTO: 89 FL
MONOCYTES # BLD AUTO: 0.5 K/UL
MONOCYTES NFR BLD: 4.8 %
NEUTROPHILS # BLD AUTO: 6.7 K/UL
NEUTROPHILS NFR BLD: 58.8 %
PLATELET # BLD AUTO: 421 K/UL
PMV BLD AUTO: 10.4 FL
PROT SERPL-MCNC: 7.1 G/DL
RBC # BLD AUTO: 5.11 M/UL
RETICS/RBC NFR AUTO: 1.4 %
T4 FREE SERPL-MCNC: 0.79 NG/DL
TSH SERPL DL<=0.005 MIU/L-ACNC: 2.67 UIU/ML
WBC # BLD AUTO: 11.36 K/UL

## 2017-03-13 PROCEDURE — 36415 COLL VENOUS BLD VENIPUNCTURE: CPT

## 2017-03-13 PROCEDURE — 99999 PR PBB SHADOW E&M-EST. PATIENT-LVL II: CPT | Mod: PBBFAC,,, | Performed by: INTERNAL MEDICINE

## 2017-03-13 PROCEDURE — 84439 ASSAY OF FREE THYROXINE: CPT

## 2017-03-13 PROCEDURE — 85045 AUTOMATED RETICULOCYTE COUNT: CPT

## 2017-03-13 PROCEDURE — 84443 ASSAY THYROID STIM HORMONE: CPT

## 2017-03-13 PROCEDURE — 99999 PR PBB SHADOW E&M-EST. PATIENT-LVL IV: CPT | Mod: PBBFAC,,, | Performed by: FAMILY MEDICINE

## 2017-03-13 PROCEDURE — 80076 HEPATIC FUNCTION PANEL: CPT

## 2017-03-13 PROCEDURE — 1160F RVW MEDS BY RX/DR IN RCRD: CPT | Mod: S$GLB,,, | Performed by: INTERNAL MEDICINE

## 2017-03-13 PROCEDURE — 83540 ASSAY OF IRON: CPT

## 2017-03-13 PROCEDURE — 83735 ASSAY OF MAGNESIUM: CPT

## 2017-03-13 PROCEDURE — 99214 OFFICE O/P EST MOD 30 MIN: CPT | Mod: S$GLB,,, | Performed by: INTERNAL MEDICINE

## 2017-03-13 PROCEDURE — 82728 ASSAY OF FERRITIN: CPT

## 2017-03-13 PROCEDURE — 82746 ASSAY OF FOLIC ACID SERUM: CPT

## 2017-03-13 PROCEDURE — 82530 CORTISOL FREE: CPT

## 2017-03-13 PROCEDURE — 82607 VITAMIN B-12: CPT

## 2017-03-13 PROCEDURE — 85025 COMPLETE CBC W/AUTO DIFF WBC: CPT

## 2017-03-13 PROCEDURE — 82306 VITAMIN D 25 HYDROXY: CPT

## 2017-03-13 PROCEDURE — 99396 PREV VISIT EST AGE 40-64: CPT | Mod: S$GLB,,, | Performed by: FAMILY MEDICINE

## 2017-03-13 RX ORDER — BUPROPION HYDROCHLORIDE 150 MG/1
150 TABLET, EXTENDED RELEASE ORAL 2 TIMES DAILY
Qty: 60 TABLET | Refills: 11 | Status: SHIPPED | OUTPATIENT
Start: 2017-03-13 | End: 2017-03-13 | Stop reason: SDUPTHER

## 2017-03-13 RX ORDER — DICLOFENAC POTASSIUM 50 MG/1
1 POWDER, FOR SOLUTION ORAL DAILY PRN
COMMUNITY
End: 2017-05-09

## 2017-03-13 RX ORDER — BUPROPION HYDROCHLORIDE 150 MG/1
150 TABLET, EXTENDED RELEASE ORAL 2 TIMES DAILY
Qty: 60 TABLET | Refills: 11 | Status: SHIPPED | OUTPATIENT
Start: 2017-03-13 | End: 2017-05-09

## 2017-03-13 NOTE — PROGRESS NOTES
Subjective:       Patient ID: Dianne Scott is a 41 y.o. female.    Chief Complaint: Follow-up    This is a 41-year-old female who presents for a follow-up visit.  Her abdominal pain overall is improving, though she still notes a daily discomfort.  It is located in the right upper quadrant is nonradiating and is sharp.  It can be intense at times though at this point is less than a 5 out of 10.  Associated with some nausea.  Occasionally she'll have some postprandial loose stools.  No unintentional weight loss, dark urine, joint pains, arthralgias, blurry vision, jaundice.  A CT scan was done noting an adnexal cyst, which she states has been present for many years and she will bring up with her physicians appointment later today as well.  Of note the liver and pancreas were normal appearing.  She's began eating regular food again.  A gastric emptying study was also normal.  Some fatigue and lethargy persist.  No other exacerbating or relieving factors or other associated symptoms.    The following portions of the patient's history were reviewed and updated as appropriate: allergies, current medications, past family history, past medical history, past social history, past surgical history and problem list.    (Portions of this note were dictated using voice recognition software and may contain dictation related errors in spelling/grammar/syntax not found on text review)    HPI  Review of Systems   Constitutional: Positive for appetite change. Negative for chills and fever.   Cardiovascular: Negative for chest pain and leg swelling.   Gastrointestinal: Positive for abdominal distention, abdominal pain and nausea. Negative for blood in stool, constipation and rectal pain.   Musculoskeletal: Negative for arthralgias and back pain.       Objective:      Physical Exam   Constitutional: She is oriented to person, place, and time. She appears well-developed and well-nourished. No distress.   HENT:   Head: Normocephalic  and atraumatic.   Eyes: Conjunctivae are normal. No scleral icterus.   Neck: Normal range of motion. Neck supple. No tracheal deviation present. No thyromegaly present.   Cardiovascular: Normal rate and regular rhythm.  Exam reveals no gallop and no friction rub.    No murmur heard.  Pulmonary/Chest: Effort normal and breath sounds normal. No respiratory distress. She has no wheezes.   Abdominal: Soft. Bowel sounds are normal. She exhibits no distension. There is no tenderness.   Neurological: She is alert and oriented to person, place, and time.   Skin: Skin is warm and dry. No rash noted. She is not diaphoretic. No erythema.   Psychiatric: She has a normal mood and affect. Her behavior is normal.   Nursing note and vitals reviewed.      Labs: ALT 68  Imaging: CT images/report reviewed as above  Assessment:       1. Elevated liver enzymes    2. RUQ abdominal pain        Plan:   1. Unclear etiology, CT without obstructing lesion. SOD possible  2. No current imaging studies available here to investigate, reached out to colleagues and manometry done by Dr. Sánchez in [place] if needed  3. Recheck labs

## 2017-03-13 NOTE — PROGRESS NOTES
Subjective:       Patient ID: Dianne Scott is a 41 y.o. female.    Chief Complaint: Establish Care    HPI 40 y/o female smoker is here to establish care. She recently moved here from [place]. She was in the ER in January with abdominal pain, found to have gastritis on EGD and enlarged lymph node, since then she has had normal CT abdomen and pelvis except ovarian cyst, gastric emptying study normal.  She reports since early December she is tired all the time despite nighttime sleep.  She is able to eat more regularly now for the past week, denies f/body aches, she has nausea on and off, nausea and worse with her sharp abdominal pain, she tends to be worse after meals, denies v, she she reports after any food she has watery diarrhea, just a little, denies cp/sob/urinary sx. She has continued heartburn despite bid ppi use.  Her appetite has decreased since December, she has however gained 50 pounds in the past 6 months. 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    Elevated liver enzymes, have resolved  Hx of migraines, takes Cambia about 1 time every six months  Eye exam utd and normal  Dental due  mmg due  OTC: zyrtec, cambia   Vaccines: script for pcv 23, tdap utd, declined flu    Review of Systems   Constitutional: Positive for appetite change and fatigue. Negative for activity change and fever.   Respiratory: Negative for cough and shortness of breath.    Cardiovascular: Negative for chest pain, palpitations and leg swelling.   Gastrointestinal: Positive for abdominal pain, diarrhea and nausea. Negative for constipation and vomiting.   Genitourinary: Negative for difficulty urinating and dysuria.   Skin: Negative for rash.   Neurological: Negative for dizziness and light-headedness.   Psychiatric/Behavioral:  "Negative for sleep disturbance.       Objective:      /82  Pulse 87  Ht 5' 9" (1.753 m)  Wt 107.6 kg (237 lb 3.4 oz)  SpO2 99%  BMI 35.03 kg/m2  Physical Exam   Constitutional: She appears well-developed and well-nourished.   HENT:   Head: Normocephalic and atraumatic.   Mouth/Throat: No oropharyngeal exudate.   Neck: Normal range of motion. Neck supple. No thyromegaly present.   Cardiovascular: Normal rate, regular rhythm and normal heart sounds.    Pulmonary/Chest: Effort normal and breath sounds normal. No respiratory distress.   Abdominal: Soft. Bowel sounds are normal. She exhibits no distension. There is tenderness (epigastric).   Musculoskeletal: She exhibits no edema.   Lymphadenopathy:     She has no cervical adenopathy.   Neurological: She is alert.   Skin: Skin is warm and dry.   Psychiatric: She has a normal mood and affect.   Nursing note and vitals reviewed.      Assessment:       1. Annual physical exam    2. Hepatomegaly    3. Elevated liver enzymes    4. Encounter for screening mammogram for breast cancer    5. Tobacco abuse    6. Abdominal pain, unspecified location    7. Decreased appetite    8. Diarrhea, unspecified type    9. Fatigue, unspecified type    10. Encounter for vitamin deficiency screening    11. Use of proton pump inhibitor therapy    12. Cyst of ovary, unspecified laterality    13. Skin cancer screening    14. Family history of skin cancer    15. Gastroesophageal reflux disease, esophagitis presence not specified        Plan:   Dianne was seen today for establish care.    Diagnoses and all orders for this visit:    Annual physical exam    Hepatomegaly    Elevated liver enzymes    Encounter for screening mammogram for breast cancer  -     Mammo Digital Screening Bilat with CAD; Future    Tobacco abuse  -     Discontinue: buPROPion (WELLBUTRIN SR) 150 MG TBSR 12 hr tablet; Take 1 tablet (150 mg total) by mouth 2 (two) times daily.  -     buPROPion (WELLBUTRIN SR) 150 MG " TBSR 12 hr tablet; Take 1 tablet (150 mg total) by mouth 2 (two) times daily.    Abdominal pain, unspecified location  -     US Transvaginal Non OB; Future  -     Discontinue: ranitidine (ZANTAC) 150 MG tablet; Take 1 tablet (150 mg total) by mouth 2 (two) times daily.  -     ranitidine (ZANTAC) 150 MG tablet; Take 1 tablet (150 mg total) by mouth 2 (two) times daily.    Decreased appetite  -     Discontinue: ranitidine (ZANTAC) 150 MG tablet; Take 1 tablet (150 mg total) by mouth 2 (two) times daily.  -     ranitidine (ZANTAC) 150 MG tablet; Take 1 tablet (150 mg total) by mouth 2 (two) times daily.    Diarrhea, unspecified type    Fatigue, unspecified type  -     Iron and TIBC; Future  -     Ferritin; Future  -     Folate; Future  -     Vitamin B12; Future  -     Reticulocytes; Future  -     CBC auto differential; Future  -     Discontinue: ranitidine (ZANTAC) 150 MG tablet; Take 1 tablet (150 mg total) by mouth 2 (two) times daily.  -     ranitidine (ZANTAC) 150 MG tablet; Take 1 tablet (150 mg total) by mouth 2 (two) times daily.    Encounter for vitamin deficiency screening  -     Vitamin D; Future    Use of proton pump inhibitor therapy  -     Magnesium; Future    Cyst of ovary, unspecified laterality  -     US Transvaginal Non OB; Future    Skin cancer screening  -     Ambulatory Referral to Dermatology    Family history of skin cancer  -     Ambulatory Referral to Dermatology    Gastroesophageal reflux disease, esophagitis presence not specified  -     Discontinue: ranitidine (ZANTAC) 150 MG tablet; Take 1 tablet (150 mg total) by mouth 2 (two) times daily.  -     ranitidine (ZANTAC) 150 MG tablet; Take 1 tablet (150 mg total) by mouth 2 (two) times daily.

## 2017-03-13 NOTE — TELEPHONE ENCOUNTER
Spoke with patient to inform her that LFTs are normal. Advised patient that we are waiting other labs. Patient verbalized understanding.  ----- Message from Mikel Rizvi MD sent at 3/13/2017 11:50 AM CDT -----  Lfts normal, await other labs

## 2017-03-13 NOTE — MR AVS SNAPSHOT
[facility]  [address]  [place] [address]  Phone: [phone]  Fax: [phone]                  Dianne Scott   3/13/2017 2:30 PM   Office Visit    Description:  Female : 1975   Provider:  Annette Burton MD   Department:  [facility]           Reason for Visit     Establish Care           Diagnoses this Visit        Comments    Annual physical exam    -  Primary     Hepatomegaly         Elevated liver enzymes         Encounter for screening mammogram for breast cancer         Tobacco abuse         Abdominal pain, unspecified location         Decreased appetite         Diarrhea, unspecified type         Fatigue, unspecified type         Encounter for vitamin deficiency screening         Use of proton pump inhibitor therapy         Cyst of ovary, unspecified laterality         Skin cancer screening         Family history of skin cancer         Gastroesophageal reflux disease, esophagitis presence not specified                To Do List           Goals (5 Years of Data)     None      Follow-Up and Disposition     Return in about 8 weeks (around 2017), or if symptoms worsen or fail to improve.       These Medications        Disp Refills Start End    buPROPion (WELLBUTRIN SR) 150 MG TBSR 12 hr tablet 60 tablet 11 3/13/2017 3/13/2018    Take 1 tablet (150 mg total) by mouth 2 (two) times daily. - Oral    [facility] [address] [place] [address] [place] [address] AT [facility] Ph #: [phone]       ranitidine (ZANTAC) 150 MG tablet 60 tablet 6 3/13/2017 3/13/2018    Take 1 tablet (150 mg total) by mouth 2 (two) times daily. - Oral    Pharmacy: [facility] [address] [place], [place] [address] - [address] AT [facility] Ph #: [phone]         [facility] On Call     [facility] On Call Nurse Care Line -  Assistance  Registered nurses in the [facility] provide clinical  advisement, health education, appointment booking, and other advisory services.  Call for this free service at [phone].             Medications           Message regarding Medications     Verify the changes and/or additions to your medication regime listed below are the same as discussed with your clinician today.  If any of these changes or additions are incorrect, please notify your healthcare provider.        START taking these NEW medications        Refills    buPROPion (WELLBUTRIN SR) 150 MG TBSR 12 hr tablet 11    Sig: Take 1 tablet (150 mg total) by mouth 2 (two) times daily.    Class: Print    Route: Oral    ranitidine (ZANTAC) 150 MG tablet 6    Sig: Take 1 tablet (150 mg total) by mouth 2 (two) times daily.    Class: Print    Route: Oral           Verify that the below list of medications is an accurate representation of the medications you are currently taking.  If none reported, the list may be blank. If incorrect, please contact your healthcare provider. Carry this list with you in case of emergency.           Current Medications     acetaminophen (TYLENOL) 500 MG tablet Take 500 mg by mouth every 6 (six) hours as needed.    cetirizine (ZYRTEC) 10 MG tablet Take 10 mg by mouth once daily.    diclofenac potassium 50 mg PwPk Take 1 tablet by mouth daily as needed.    pantoprazole (PROTONIX) 40 MG tablet Take 1 tablet (40 mg total) by mouth 2 (two) times daily.    promethazine (PHENERGAN) 25 MG tablet Take 1 tablet (25 mg total) by mouth every 6 (six) hours as needed for Nausea.    buPROPion (WELLBUTRIN SR) 150 MG TBSR 12 hr tablet Take 1 tablet (150 mg total) by mouth 2 (two) times daily.    ondansetron (ZOFRAN-ODT) 4 MG TbDL Take 1 tablet (4 mg total) by mouth every 6 (six) hours as needed.    ranitidine (ZANTAC) 150 MG tablet Take 1 tablet (150 mg total) by mouth 2 (two) times daily.           Clinical Reference Information           Your Vitals Were     BP Pulse Height Weight SpO2 BMI    124/82  "87 5' 9" (1.753 m) 107.6 kg (237 lb 3.4 oz) 99% 35.03 kg/m2      Blood Pressure          Most Recent Value    BP  124/82      Allergies as of 3/13/2017     Codeine    Demerol [Meperidine]    Imitrex [Sumatriptan Succinate]    Morphine    Tetracyclines    Azithromycin    Ciprofloxacin Hcl      Immunizations Administered on Date of Encounter - 3/13/2017     None      Orders Placed During Today's Visit      Normal Orders This Visit    Ambulatory Referral to Dermatology     Future Labs/Procedures Expected by Expires    CBC auto differential  3/13/2017 5/12/2018    Ferritin  3/13/2017 5/12/2018    Folate  3/13/2017 5/12/2018    Iron and TIBC  3/13/2017 5/12/2018    Magnesium  3/13/2017 5/12/2018    Mammo Digital Screening Bilat with CAD  3/13/2017 (Approximate) 3/13/2018    Reticulocytes  3/13/2017 5/12/2018    US Transvaginal Non OB  3/13/2017 3/13/2018    Vitamin B12  3/13/2017 5/12/2018    Vitamin D  3/13/2017 5/12/2018      Smoking Cessation     If you would like to quit smoking:   You may be eligible for free services if you are a [place] resident and started smoking cigarettes before September 1, 1988.  Call the Smoking Cessation Trust ([facility]) toll free at ([phone] or [phone] 747-0818.   Call 6-800-QUIT-NOW if you do not meet the above criteria.            Language Assistance Services     ATTENTION: Language assistance services are available, free of charge. Please call [phone].      ATENCIÓN: Si habla betsy, tiene a jacques disposición servicios gratuitos de asistencia lingüística. Llame al [phone].     CHÚ Ý: N?u b?n nói Ti?ng Vi?t, có các d?ch v? h? tr? ngôn ng? mi?n phí dành cho b?n. G?i s? [phone].         [facility] complies with applicable Federal civil rights laws and does not discriminate on the basis of race, color, national origin, age, disability, or sex.        "

## 2017-03-14 LAB
25(OH)D3+25(OH)D2 SERPL-MCNC: 17 NG/ML
FERRITIN SERPL-MCNC: 142 NG/ML
FOLATE SERPL-MCNC: 10.4 NG/ML
IRON SERPL-MCNC: 75 UG/DL
MAGNESIUM SERPL-MCNC: 2.1 MG/DL
SATURATED IRON: 23 %
TOTAL IRON BINDING CAPACITY: 333 UG/DL
TRANSFERRIN SERPL-MCNC: 225 MG/DL
VIT B12 SERPL-MCNC: 545 PG/ML

## 2017-03-20 LAB — CORTIS F SERPL-MCNC: 0.24 MCG/DL

## 2017-03-22 ENCOUNTER — PATIENT MESSAGE (OUTPATIENT)
Dept: INTERNAL MEDICINE | Facility: CLINIC | Age: 42
End: 2017-03-22

## 2017-04-10 ENCOUNTER — HOSPITAL ENCOUNTER (OUTPATIENT)
Dept: RADIOLOGY | Facility: HOSPITAL | Age: 42
Discharge: HOME | End: 2017-04-10
Attending: FAMILY MEDICINE
Payer: COMMERCIAL

## 2017-04-10 ENCOUNTER — TELEPHONE (OUTPATIENT)
Dept: INTERNAL MEDICINE | Facility: CLINIC | Age: 42
End: 2017-04-10

## 2017-04-10 DIAGNOSIS — N83.209 CYST OF OVARY, UNSPECIFIED LATERALITY: ICD-10-CM

## 2017-04-10 DIAGNOSIS — N83.209 CYST OF OVARY, UNSPECIFIED LATERALITY: Primary | ICD-10-CM

## 2017-04-10 DIAGNOSIS — R10.9 ABDOMINAL PAIN, UNSPECIFIED LOCATION: ICD-10-CM

## 2017-04-10 DIAGNOSIS — Z12.31 ENCOUNTER FOR SCREENING MAMMOGRAM FOR BREAST CANCER: ICD-10-CM

## 2017-04-10 PROCEDURE — 77067 SCR MAMMO BI INCL CAD: CPT | Mod: TC

## 2017-04-10 PROCEDURE — 76856 US EXAM PELVIC COMPLETE: CPT | Mod: TC

## 2017-04-10 PROCEDURE — 76830 TRANSVAGINAL US NON-OB: CPT | Mod: 26,,, | Performed by: RADIOLOGY

## 2017-04-10 PROCEDURE — 77067 SCR MAMMO BI INCL CAD: CPT | Mod: 26,,, | Performed by: RADIOLOGY

## 2017-04-10 PROCEDURE — 77063 BREAST TOMOSYNTHESIS BI: CPT | Mod: 26,,, | Performed by: RADIOLOGY

## 2017-04-10 PROCEDURE — 76856 US EXAM PELVIC COMPLETE: CPT | Mod: 26,,, | Performed by: RADIOLOGY

## 2017-04-12 NOTE — TELEPHONE ENCOUNTER
Spoke to patient, scheduled appointments. Patient was busy, she will check date/time and let me know if it will work.

## 2017-05-09 ENCOUNTER — OFFICE VISIT (OUTPATIENT)
Dept: OBSTETRICS AND GYNECOLOGY | Facility: CLINIC | Age: 42
End: 2017-05-09
Payer: COMMERCIAL

## 2017-05-09 VITALS
DIASTOLIC BLOOD PRESSURE: 74 MMHG | SYSTOLIC BLOOD PRESSURE: 120 MMHG | HEIGHT: 69 IN | WEIGHT: 235.88 LBS | BODY MASS INDEX: 34.94 KG/M2

## 2017-05-09 DIAGNOSIS — Z90.710 S/P HYSTERECTOMY: ICD-10-CM

## 2017-05-09 DIAGNOSIS — N83.201 CYST OF RIGHT OVARY: Primary | ICD-10-CM

## 2017-05-09 PROCEDURE — 1160F RVW MEDS BY RX/DR IN RCRD: CPT | Mod: S$GLB,,, | Performed by: NURSE PRACTITIONER

## 2017-05-09 PROCEDURE — 99203 OFFICE O/P NEW LOW 30 MIN: CPT | Mod: S$GLB,,, | Performed by: NURSE PRACTITIONER

## 2017-05-09 PROCEDURE — 99999 PR PBB SHADOW E&M-EST. PATIENT-LVL III: CPT | Mod: PBBFAC,,, | Performed by: NURSE PRACTITIONER

## 2017-05-09 NOTE — LETTER
May 9, 2017      Annette Burton MD  [address]  [facility] [address]           [facility]  [address]  [facility] [address]  Phone: [phone]          Patient: Dianne Scott   MR Number: [id number]   YOB: 1975   Date of Visit: 5/9/2017       Dear Dr. Annette Burton:    Thank you for referring Dianne Scott to me for evaluation. Attached you will find relevant portions of my assessment and plan of care.    If you have questions, please do not hesitate to call me. I look forward to following Dianne Scott along with you.    Sincerely,    JASON Salgado, EDY    Enclosure  CC:  No Recipients    If you would like to receive this communication electronically, please contact [email or web address] or [phone] to request more information on [employer] access.    For providers and/or their staff who would like to refer a patient to [facility], please contact us through our one-stop-shop provider referral line, [facility] , at [phone].    If you feel you have received this communication in error or would no longer like to receive these types of communications, please e-mail [email or web address]

## 2017-05-09 NOTE — PROGRESS NOTES
HISTORY OF PRESENT ILLNESS:    Dianne Scott is a 41 y.o. female,  presents today for follow up of ovarian cyst.    -States cyst was found on Left ovary on CT (ordered for abd pain and GI sx) and then had a pelvic US which documented a mildly complex ovarian cyst.   -She is asymptomatic and has a repeat US scheduled in July.     DATA REVIEWED:  ABD - PELVIC CT 3-8-17  The visualized portion of the base of the lungs is significant for bilateral dependent atelectatic versus fibrotic change. There are several left lower lobe calcifications consistent prior granulomatous disease. The visualized portion of the heart, stomach, and pancreas are unremarkable. There are multiple splenic calcifications consistent prior granulomatous disease. The gallbladder is absent. The liver is unremarkable. The adrenal glands and visualized portion of aorta are unremarkable. The kidneys have a normal appearance. The kidneys appropriately concentrate and excrete contrast on the delayed images. The bladder is unremarkable. There is a 3.4 cm adnexal cyst likely ovarian in origin. The bowel has a normal appearance. There is a small fat containing umbilical hernia. The osseous structures are significant for bilateral L5 pars defects with resultant grade 1 anterolisthesis of L5 in relation to S1.  Impression:  Left adnexal cyst likely ovarian in origin  PELVIC US 3-13-17  UTERUS: Status post hysterectomy.  RIGHT OVARY: The right ovary is not visualized.  LEFT OVARY: The left ovary is normal in size and measures 4.5 x 4.3 x 3.5 cm.   Arterial and venous flow are preserved.  Resistive index is 0.59. A minimally complex cyst is seen within the left ovary measuring 3.6 x 3.3 x 3.0 cm, demonstrating a thin septation. Lesion is likely benign in nature however given its minimally complex characteristics followup ultrasound is recommended in 3 months to assess for stability.  No free fluid is seen.  Impression:  Mildly complex 3.6 x 3.3 x 3.0  cm cyst within the left ovary.  Given its mildly complex morphology, recommend followup ultrasound in 3 months to assess for resolution.    Past Medical History:   Diagnosis Date    GERD (gastroesophageal reflux disease)     History of migraine headaches     Obesity     Pollen allergies     Smoker        Past Surgical History:   Procedure Laterality Date    CHOLECYSTECTOMY      HYSTERECTOMY      fibroids    TONSILLECTOMY         MEDICATIONS AND ALLERGIES:      Current Outpatient Prescriptions:     cetirizine (ZYRTEC) 10 MG tablet, Take 10 mg by mouth once daily., Disp: , Rfl:     pantoprazole (PROTONIX) 40 MG tablet, Take 1 tablet (40 mg total) by mouth 2 (two) times daily., Disp: 60 tablet, Rfl: 1    Review of patient's allergies indicates:   Allergen Reactions    Codeine Nausea And Vomiting    Demerol [meperidine] Nausea And Vomiting    Imitrex [sumatriptan succinate]      Hysterics      Morphine Nausea And Vomiting    Tetracyclines Nausea And Vomiting    Azithromycin Rash    Ciprofloxacin hcl Rash       Family History   Problem Relation Age of Onset    No Known Problems Mother     No Known Problems Father     Cancer Neg Hx     Diabetes Neg Hx     Heart disease Neg Hx     Stroke Neg Hx        Social History     Social History    Marital status: Single     Spouse name: N/A    Number of children: 2    Years of education: N/A     Occupational History    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  Infection History: Denies STDs. Denies PID.  Benign History: Reports uterine fibroids. Denies ovarian cysts. Denies endometriosis. Denies other conditions.  Cancer History: Denies cervical cancer. Denies uterine cancer or hyperplasia. Denies ovarian cancer. Denies vulvar cancer or pre-cancer. Denies vaginal cancer or pre-cancer. Denies breast cancer. Denies colon cancer.  Sexual Activity History: Reports currently being sexually active  Menstrual History: Denies menses, Pt is  (hyst) not on ERT..     ROS:  GENERAL: No fever or chills. + FATIGUE.  ABDOMEN: No pain. No nausea. No vomiting. + LOOSE STOOLS. No constipation.  REPRODUCTIVE: No abnormal bleeding.  VULVA: No pain. No lesions. No itching.  VAGINA: No relaxation. No itching. No odor. No discharge. No lesions.  URINARY: No incontinence. No nocturia. No frequency. No dysuria.    /74  Ht 5' 9" (1.753 m)  Wt 107 kg (235 lb 14.3 oz)  BMI 34.84 kg/m2    PE:  APPEARANCE: Well nourished, well developed, in no acute distress.  AFFECT: WNL, alert and oriented x 3.  PELVIC: Normal external female genitalia without lesions. Normal hair distribution. Adequate perineal body, normal urethral meatus. Vagina pink, well rugated without lesions or discharge. No significant cystocele or rectocele. Bimanual exam shows CERVIX and UTERUS to be SURGICALLY ABSENT. Adnexa without masses or tenderness.    DIAGNOSIS:  1. Mildly complex cyst of right ovary    2. S/P hysterectomy        LABS AND TESTS ORDERED:  Already has 3 month US scheduled    MEDICATIONS PRESCRIBED:  None    COUNSELING:  The patient was counseled today on;  -types of ovarian cysts and recommended follow up in 3 months;  -that if the cyst is larger, more complex or solid will refer to Gyn MD for surgery consul.     FOLLOW-UP with me for next routine visit.     "

## 2017-05-15 ENCOUNTER — OFFICE VISIT (OUTPATIENT)
Dept: INTERNAL MEDICINE | Facility: CLINIC | Age: 42
End: 2017-05-15
Payer: COMMERCIAL

## 2017-05-15 DIAGNOSIS — Z72.0 TOBACCO ABUSE: ICD-10-CM

## 2017-05-15 DIAGNOSIS — G89.29 CHRONIC NECK PAIN: ICD-10-CM

## 2017-05-15 DIAGNOSIS — M54.2 CHRONIC NECK PAIN: ICD-10-CM

## 2017-05-15 DIAGNOSIS — J02.9 PHARYNGITIS, UNSPECIFIED ETIOLOGY: Primary | ICD-10-CM

## 2017-05-15 LAB — DEPRECATED S PYO AG THROAT QL EIA: NEGATIVE

## 2017-05-15 PROCEDURE — 99214 OFFICE O/P EST MOD 30 MIN: CPT | Mod: S$GLB,,, | Performed by: FAMILY MEDICINE

## 2017-05-15 PROCEDURE — 99999 PR PBB SHADOW E&M-EST. PATIENT-LVL IV: CPT | Mod: PBBFAC,,, | Performed by: FAMILY MEDICINE

## 2017-05-15 PROCEDURE — 87880 STREP A ASSAY W/OPTIC: CPT

## 2017-05-15 PROCEDURE — 1160F RVW MEDS BY RX/DR IN RCRD: CPT | Mod: S$GLB,,, | Performed by: FAMILY MEDICINE

## 2017-05-15 PROCEDURE — 87081 CULTURE SCREEN ONLY: CPT

## 2017-05-15 NOTE — MR AVS SNAPSHOT
[facility]  [address]  [place] [address]  Phone: [phone]  Fax: [phone]                  Dianne Scott   5/15/2017 3:00 PM   Office Visit    Description:  Female : 1975   Provider:  Annette Burton MD   Department:  [facility]           Reason for Visit     Follow-up           Diagnoses this Visit        Comments    Pharyngitis, unspecified etiology    -  Primary     Tobacco abuse         Chronic neck pain                To Do List           Future Appointments        Provider Department Dept Phone    2017 8:00 AM [facility] [address] [facility] [phone]      Goals (5 Years of Data)     None      Follow-Up and Disposition     Return if symptoms worsen or fail to improve.      [facility]     [facility] Nurse Care Line -  Assistance  Unless otherwise directed by your provider, please contact [facility], our nurse care line that is available for  assistance.     Registered nurses in the [facility] provide: appointment scheduling, clinical advisement, health education, and other advisory services.  Call: [phone] (toll free)               Medications           Message regarding Medications     Verify the changes and/or additions to your medication regime listed below are the same as discussed with your clinician today.  If any of these changes or additions are incorrect, please notify your healthcare provider.        STOP taking these medications     pantoprazole (PROTONIX) 40 MG tablet Take 1 tablet (40 mg total) by mouth 2 (two) times daily.           Verify that the below list of medications is an accurate representation of the medications you are currently taking.  If none reported, the list may be blank. If incorrect, please contact your healthcare provider. Carry this list with you in case of emergency.           Current Medications     cetirizine (ZYRTEC) 10 MG tablet Take  "10 mg by mouth once daily.           Clinical Reference Information           Your Vitals Were     BP Pulse Height Weight SpO2 BMI    108/70 81 5' 8.5" (1.74 m) 105.8 kg (233 lb 3.2 oz) 98% 34.94 kg/m2      Blood Pressure          Most Recent Value    BP  108/70      Allergies as of 5/15/2017     Codeine    Demerol [Meperidine]    Imitrex [Sumatriptan Succinate]    Morphine    Tetracyclines    Azithromycin    Ciprofloxacin Hcl      Immunizations Administered on Date of Encounter - 5/15/2017     None      Orders Placed During Today's Visit      Normal Orders This Visit    Ambulatory Referral to Medical Massage Therapy     THROAT SCREEN, RAPID       Smoking Cessation     If you would like to quit smoking:   You may be eligible for free services if you are a [place] resident and started smoking cigarettes before September 1, 1988.  Call the Smoking Cessation Trust ([facility]) toll free at (811) 166-8374 or [phone]) 421-0694.   Call 1-800-QUIT-NOW if you do not meet the above criteria.   Contact us via email: [email or web address]   View our website for more information: [email or web address]        Language Assistance Services     ATTENTION: Language assistance services are available, free of charge. Please call [phone].      ATENCIÓN: Si habla español, tiene a jacques disposición servicios gratuitos de asistencia lingüística. Llame al [phone].     CHÚ Ý: N?u b?n nói Ti?ng Vi?t, có các d?ch v? h? tr? ngôn ng? mi?n phí dành cho b?n. G?i s? [phone].         [facility] complies with applicable Federal civil rights laws and does not discriminate on the basis of race, color, national origin, age, disability, or sex.        "

## 2017-05-15 NOTE — PROGRESS NOTES
"Subjective:       Patient ID: Dianne Scott is a 41 y.o. female.    Chief Complaint: Follow-up    HPI 42 y/o female with hepatomegaly, gerd, and tobacco use.  She has L sided abdominal pain for an hour over the weekend, it has since resolved, denies f/n/v/d/constiaption/cp/sob/urinary sx, she started [employer] a week ago , she felt lightheaded 2 times since last visit, both when going from kneeling to standing, was fleeting. She woke up with sore throat, denies sinus pressure, post nasal drip, cough, her son has pick eye, sleeping ok.  Ovarian cyst has repeat US set for June, plan to follow up with Gyn-onc  Has muscle tension in  Her neck, never had mri, has migraines and getting massage helps    Review of Systems   Constitutional: Negative for activity change, appetite change, fatigue and fever.   Respiratory: Negative for cough and shortness of breath.    Cardiovascular: Negative for chest pain, palpitations and leg swelling.   Gastrointestinal: Negative for constipation, diarrhea, nausea and vomiting.   Genitourinary: Negative for difficulty urinating and dysuria.   Skin: Negative for rash.   Neurological: Negative for dizziness and light-headedness.   Psychiatric/Behavioral: Negative for sleep disturbance.       Objective:      /75 (BP Location: Right arm, Patient Position: Standing, BP Method: Automatic)  Pulse 93  Ht 5' 8.5" (1.74 m)  Wt 105.8 kg (233 lb 3.2 oz)  SpO2 96%  BMI 34.94 kg/m2  Physical Exam   Constitutional: She appears well-developed and well-nourished.   HENT:   Head: Normocephalic and atraumatic.   Mouth/Throat: No oropharyngeal exudate.   Mild posterior pharyngeal erythema  Inflamed nasal turbinates bilaterally     Neck: Normal range of motion. Neck supple. No thyromegaly present.   Cardiovascular: Normal rate, regular rhythm and normal heart sounds.    Pulmonary/Chest: Effort normal and breath sounds normal. No respiratory distress.   Musculoskeletal: She exhibits no edema. "   Lymphadenopathy:     She has no cervical adenopathy.   Neurological: She is alert.   Skin: Skin is warm and dry.   Psychiatric: She has a normal mood and affect.   Nursing note and vitals reviewed.      Assessment:       1. Pharyngitis, unspecified etiology    2. Tobacco abuse    3. Chronic neck pain        Plan:   Dianne was seen today for follow-up.    Diagnoses and all orders for this visit:    Pharyngitis, unspecified etiology  -     THROAT SCREEN, RAPID    Tobacco abuse    Chronic neck pain  -     Ambulatory Referral to Medical Massage Therapy    Other orders  -     Strep A culture, throat

## 2017-05-16 VITALS
DIASTOLIC BLOOD PRESSURE: 75 MMHG | HEART RATE: 93 BPM | WEIGHT: 233.19 LBS | HEIGHT: 69 IN | OXYGEN SATURATION: 96 % | SYSTOLIC BLOOD PRESSURE: 110 MMHG | BODY MASS INDEX: 34.54 KG/M2

## 2017-05-17 ENCOUNTER — TELEPHONE (OUTPATIENT)
Dept: INTERNAL MEDICINE | Facility: CLINIC | Age: 42
End: 2017-05-17

## 2017-05-17 DIAGNOSIS — J02.9 PHARYNGITIS, UNSPECIFIED ETIOLOGY: Primary | ICD-10-CM

## 2017-05-17 RX ORDER — METHYLPREDNISOLONE 4 MG/1
TABLET ORAL
Qty: 1 PACKAGE | Refills: 0 | Status: SHIPPED | OUTPATIENT
Start: 2017-05-17 | End: 2017-06-07

## 2017-05-18 LAB — BACTERIA THROAT CULT: NORMAL

## 2017-06-15 ENCOUNTER — TELEPHONE (OUTPATIENT)
Dept: INTERNAL MEDICINE | Facility: CLINIC | Age: 42
End: 2017-06-15

## 2017-06-28 ENCOUNTER — TELEPHONE (OUTPATIENT)
Dept: INTERNAL MEDICINE | Facility: CLINIC | Age: 42
End: 2017-06-28

## 2017-07-24 ENCOUNTER — HOSPITAL ENCOUNTER (OUTPATIENT)
Dept: RADIOLOGY | Facility: HOSPITAL | Age: 42
Discharge: HOME | End: 2017-07-24
Attending: FAMILY MEDICINE
Payer: COMMERCIAL

## 2017-07-24 DIAGNOSIS — N83.209 CYST OF OVARY: ICD-10-CM

## 2017-07-24 PROCEDURE — 76830 TRANSVAGINAL US NON-OB: CPT | Mod: 26,,, | Performed by: RADIOLOGY

## 2017-07-24 PROCEDURE — 76856 US EXAM PELVIC COMPLETE: CPT | Mod: 26,,, | Performed by: RADIOLOGY

## 2017-07-24 PROCEDURE — 76856 US EXAM PELVIC COMPLETE: CPT | Mod: TC

## 2017-09-14 ENCOUNTER — PATIENT MESSAGE (OUTPATIENT)
Dept: INTERNAL MEDICINE | Facility: CLINIC | Age: 42
End: 2017-09-14

## 2017-09-15 ENCOUNTER — OFFICE VISIT (OUTPATIENT)
Dept: INTERNAL MEDICINE | Facility: CLINIC | Age: 42
End: 2017-09-15
Payer: COMMERCIAL

## 2017-09-15 DIAGNOSIS — J32.9 SINUSITIS, UNSPECIFIED CHRONICITY, UNSPECIFIED LOCATION: Primary | ICD-10-CM

## 2017-09-15 PROCEDURE — 99213 OFFICE O/P EST LOW 20 MIN: CPT | Mod: S$GLB,,, | Performed by: INTERNAL MEDICINE

## 2017-09-15 PROCEDURE — 3008F BODY MASS INDEX DOCD: CPT | Mod: S$GLB,,, | Performed by: INTERNAL MEDICINE

## 2017-09-15 PROCEDURE — 99999 PR PBB SHADOW E&M-EST. PATIENT-LVL III: CPT | Mod: PBBFAC,,, | Performed by: INTERNAL MEDICINE

## 2017-09-15 RX ORDER — AMOXICILLIN 875 MG/1
875 TABLET, FILM COATED ORAL EVERY 12 HOURS
Qty: 14 TABLET | Refills: 0 | Status: SHIPPED | OUTPATIENT
Start: 2017-09-15 | End: 2018-03-06 | Stop reason: ALTCHOICE

## 2017-09-17 VITALS
SYSTOLIC BLOOD PRESSURE: 124 MMHG | DIASTOLIC BLOOD PRESSURE: 80 MMHG | OXYGEN SATURATION: 97 % | HEART RATE: 75 BPM | HEIGHT: 69 IN | BODY MASS INDEX: 30.36 KG/M2 | WEIGHT: 205 LBS | TEMPERATURE: 98 F

## 2017-09-17 NOTE — PROGRESS NOTES
Subjective:       Patient ID: Dianne Scott is a 42 y.o. female.    Chief Complaint: Sinus Problem    HPI:  She complains of sinus congestion and green nasal discharge.  No fever, chills, night sweats  Review of Systems   Constitutional: Negative for chills, fatigue, fever and unexpected weight change.   Respiratory: Negative for chest tightness and shortness of breath.    Cardiovascular: Negative for chest pain and palpitations.   Gastrointestinal: Negative for abdominal pain and blood in stool.   Neurological: Negative for dizziness, syncope, numbness and headaches.       Objective:      Physical Exam   HENT:   Right Ear: External ear normal.   Left Ear: External ear normal.   Nose: Nose normal.   Mouth/Throat: Oropharynx is clear and moist.   Eyes: Pupils are equal, round, and reactive to light.   Neck: Normal range of motion.   Cardiovascular: Normal rate and regular rhythm.    No murmur heard.  Pulmonary/Chest: Breath sounds normal.   Abdominal: She exhibits no distension. There is no hepatosplenomegaly. There is no tenderness.   Lymphadenopathy:     She has no cervical adenopathy.     She has no axillary adenopathy.   Neurological: She has normal strength and normal reflexes. No cranial nerve deficit or sensory deficit.       Assessment:         assessment and plan: Sinusitis: Amoxil 875 mg by mouth twice a day ×7 days.  Call if no relief.  She was here for an urgent care only appointment.  She will follow-up with her primary care physician for a physical  Plan:       As above

## 2017-11-07 ENCOUNTER — TELEPHONE (OUTPATIENT)
Dept: GASTROENTEROLOGY | Facility: CLINIC | Age: 42
End: 2017-11-07

## 2017-11-07 DIAGNOSIS — R10.9 ACUTE ABDOMINAL PAIN: Primary | ICD-10-CM

## 2017-11-07 NOTE — TELEPHONE ENCOUNTER
----- Message from Shantel Rojas sent at 11/7/2017  2:59 PM CST -----  Contact: Self/ [phone]  Patient would like to speak with you about a personal matter. Please advise

## 2017-11-08 ENCOUNTER — LAB VISIT (OUTPATIENT)
Dept: LAB | Facility: HOSPITAL | Age: 42
End: 2017-11-08
Attending: INTERNAL MEDICINE
Payer: COMMERCIAL

## 2017-11-08 DIAGNOSIS — R10.9 ACUTE ABDOMINAL PAIN: ICD-10-CM

## 2017-11-08 LAB
ALBUMIN SERPL BCP-MCNC: 3.8 G/DL
ALP SERPL-CCNC: 67 U/L
ALT SERPL W/O P-5'-P-CCNC: 14 U/L
AST SERPL-CCNC: 13 U/L
BASOPHILS # BLD AUTO: 0.07 K/UL
BASOPHILS NFR BLD: 0.6 %
BILIRUB DIRECT SERPL-MCNC: 0.1 MG/DL
BILIRUB SERPL-MCNC: 0.2 MG/DL
DIFFERENTIAL METHOD: ABNORMAL
EOSINOPHIL # BLD AUTO: 0.5 K/UL
EOSINOPHIL NFR BLD: 3.7 %
ERYTHROCYTE [DISTWIDTH] IN BLOOD BY AUTOMATED COUNT: 13.3 %
HCT VFR BLD AUTO: 44.9 %
HGB BLD-MCNC: 14.9 G/DL
LIPASE SERPL-CCNC: 25 U/L
LYMPHOCYTES # BLD AUTO: 4.5 K/UL
LYMPHOCYTES NFR BLD: 35.7 %
MCH RBC QN AUTO: 30.5 PG
MCHC RBC AUTO-ENTMCNC: 33.2 G/DL
MCV RBC AUTO: 92 FL
MONOCYTES # BLD AUTO: 0.6 K/UL
MONOCYTES NFR BLD: 4.7 %
NEUTROPHILS # BLD AUTO: 7 K/UL
NEUTROPHILS NFR BLD: 55.1 %
PLATELET # BLD AUTO: 413 K/UL
PMV BLD AUTO: 10.5 FL
PROT SERPL-MCNC: 7.4 G/DL
RBC # BLD AUTO: 4.88 M/UL
WBC # BLD AUTO: 12.66 K/UL

## 2017-11-08 PROCEDURE — 80076 HEPATIC FUNCTION PANEL: CPT

## 2017-11-08 PROCEDURE — 85025 COMPLETE CBC W/AUTO DIFF WBC: CPT

## 2017-11-08 PROCEDURE — 83690 ASSAY OF LIPASE: CPT

## 2017-11-08 PROCEDURE — 36415 COLL VENOUS BLD VENIPUNCTURE: CPT

## 2017-11-14 ENCOUNTER — TELEPHONE (OUTPATIENT)
Dept: GASTROENTEROLOGY | Facility: CLINIC | Age: 42
End: 2017-11-14

## 2017-11-14 NOTE — TELEPHONE ENCOUNTER
----- Message from Mikel Rizvi MD sent at 11/13/2017  3:07 PM CST -----  Labs normal, can we check on her symptoms

## 2017-12-14 ENCOUNTER — PATIENT MESSAGE (OUTPATIENT)
Dept: INTERNAL MEDICINE | Facility: CLINIC | Age: 42
End: 2017-12-14

## 2018-03-06 ENCOUNTER — OFFICE VISIT (OUTPATIENT)
Dept: INTERNAL MEDICINE | Facility: CLINIC | Age: 43
End: 2018-03-06
Payer: COMMERCIAL

## 2018-03-06 ENCOUNTER — PATIENT MESSAGE (OUTPATIENT)
Dept: INTERNAL MEDICINE | Facility: CLINIC | Age: 43
End: 2018-03-06

## 2018-03-06 VITALS
OXYGEN SATURATION: 99 % | WEIGHT: 231.56 LBS | SYSTOLIC BLOOD PRESSURE: 118 MMHG | HEIGHT: 69 IN | HEART RATE: 78 BPM | DIASTOLIC BLOOD PRESSURE: 74 MMHG | BODY MASS INDEX: 34.3 KG/M2

## 2018-03-06 DIAGNOSIS — Z79.899 ENCOUNTER FOR MONITORING LONG-TERM PROTON PUMP INHIBITOR THERAPY: ICD-10-CM

## 2018-03-06 DIAGNOSIS — R14.0 FLATULENCE/GAS PAIN/BELCHING: ICD-10-CM

## 2018-03-06 DIAGNOSIS — R10.12 LEFT UPPER QUADRANT PAIN: ICD-10-CM

## 2018-03-06 DIAGNOSIS — K29.70 GASTRITIS, PRESENCE OF BLEEDING UNSPECIFIED, UNSPECIFIED CHRONICITY, UNSPECIFIED GASTRITIS TYPE: Primary | ICD-10-CM

## 2018-03-06 DIAGNOSIS — Z13.220 ENCOUNTER FOR LIPID SCREENING FOR CARDIOVASCULAR DISEASE: ICD-10-CM

## 2018-03-06 DIAGNOSIS — R11.0 NAUSEA: ICD-10-CM

## 2018-03-06 DIAGNOSIS — R42 DIZZINESS: ICD-10-CM

## 2018-03-06 DIAGNOSIS — Z51.81 ENCOUNTER FOR MONITORING LONG-TERM PROTON PUMP INHIBITOR THERAPY: ICD-10-CM

## 2018-03-06 DIAGNOSIS — R63.5 ABNORMAL WEIGHT GAIN: ICD-10-CM

## 2018-03-06 DIAGNOSIS — R42 LIGHTHEADEDNESS: ICD-10-CM

## 2018-03-06 DIAGNOSIS — Z13.6 ENCOUNTER FOR LIPID SCREENING FOR CARDIOVASCULAR DISEASE: ICD-10-CM

## 2018-03-06 DIAGNOSIS — J30.9 ALLERGIC RHINITIS, UNSPECIFIED CHRONICITY, UNSPECIFIED SEASONALITY, UNSPECIFIED TRIGGER: ICD-10-CM

## 2018-03-06 PROBLEM — R10.11 RUQ ABDOMINAL PAIN: Status: RESOLVED | Noted: 2017-02-01 | Resolved: 2018-03-06

## 2018-03-06 PROCEDURE — 99214 OFFICE O/P EST MOD 30 MIN: CPT | Mod: S$GLB,,, | Performed by: FAMILY MEDICINE

## 2018-03-06 PROCEDURE — 99999 PR PBB SHADOW E&M-EST. PATIENT-LVL III: CPT | Mod: PBBFAC,,, | Performed by: FAMILY MEDICINE

## 2018-03-06 RX ORDER — CHOLECALCIFEROL (VITAMIN D3) 50 MCG
5000 TABLET ORAL DAILY
COMMUNITY

## 2018-03-06 RX ORDER — AZELASTINE 1 MG/ML
1 SPRAY, METERED NASAL 2 TIMES DAILY
Qty: 30 ML | Refills: 0 | Status: SHIPPED | OUTPATIENT
Start: 2018-03-06 | End: 2019-09-18

## 2018-03-06 RX ORDER — PANTOPRAZOLE SODIUM 20 MG/1
20 TABLET, DELAYED RELEASE ORAL DAILY
Qty: 90 TABLET | Refills: 1 | Status: SHIPPED | OUTPATIENT
Start: 2018-03-06 | End: 2018-08-27 | Stop reason: SDUPTHER

## 2018-03-06 RX ORDER — FLUTICASONE PROPIONATE 50 MCG
1 SPRAY, SUSPENSION (ML) NASAL DAILY
COMMUNITY
End: 2018-03-06 | Stop reason: SDUPTHER

## 2018-03-06 RX ORDER — FLUTICASONE PROPIONATE 50 MCG
1 SPRAY, SUSPENSION (ML) NASAL DAILY
Qty: 16 G | Refills: 3 | Status: SHIPPED | OUTPATIENT
Start: 2018-03-06 | End: 2019-09-18

## 2018-03-06 NOTE — PROGRESS NOTES
"Subjective:       Patient ID: Dianne Scott is a 42 y.o. female.    Chief Complaint: Dizziness and Nausea    HPI  41 y/o female smoker with hepatomegaly, gerd,  Is here with dizziness and nausea.  She has been feeling bad for 3 weeks, she has nausea, fatigue, and dizziness, the dizziness occurs from sit to stand, occurs at least once day sometimes more, lasts 5 min and resolves, makes her feel foggy in her head like she may pass out, feels like she is still but the room is crooked, denies v/d/constipation, occasionally a day of looser stools/abdominal pain/bloating, has been belching and more flatulence lately, has occasional heartburn but not lately, denies cp/sob/urinary sx. Does have worsening sinus symptoms lately, she has pnd/congestion/runny nose, denies sinus pressure/sore throat. The Claritin, Xyzal do not work, has not tried Allegra, doing flonase as well. Appetite decreased, sleeping ok. She has been gaining weight over the past 3 months, weight up 30 pounds since December per her, she is eating healthy, walking occasionally no dedicated exercise. Sleeping well, wakes up tired as she does not get enough sleep.    Review of Systems   Constitutional: Positive for appetite change, fatigue and unexpected weight change. Negative for activity change and fever.   HENT: Positive for congestion, postnasal drip and rhinorrhea.    Respiratory: Negative for cough and shortness of breath.    Cardiovascular: Negative for chest pain, palpitations and leg swelling.   Gastrointestinal: Positive for nausea. Negative for constipation, diarrhea and vomiting.   Genitourinary: Negative for difficulty urinating and dysuria.   Skin: Negative for rash.   Neurological: Positive for dizziness. Negative for light-headedness.   Psychiatric/Behavioral: Negative for sleep disturbance.       Objective:      /74   Pulse 78   Ht 5' 8.5" (1.74 m)   Wt 105 kg (231 lb 9.5 oz)   SpO2 99%   BMI 34.70 kg/m²   Physical Exam "   Constitutional: She appears well-developed and well-nourished.   HENT:   Head: Normocephalic and atraumatic.   Mouth/Throat: No oropharyngeal exudate.   Neck: Normal range of motion. Neck supple. No thyromegaly present.   Cardiovascular: Normal rate, regular rhythm and normal heart sounds.    Pulmonary/Chest: Effort normal and breath sounds normal. No respiratory distress.   Abdominal: Soft. Bowel sounds are normal. She exhibits no distension. There is tenderness (L upper quadrant).   Musculoskeletal: She exhibits no edema.   Lymphadenopathy:     She has no cervical adenopathy.   Neurological: She is alert.   Skin: Skin is warm and dry.   Psychiatric: She has a normal mood and affect.   Nursing note and vitals reviewed.      Assessment:       1. Gastritis, presence of bleeding unspecified, unspecified chronicity, unspecified gastritis type    2. Nausea    3. Lightheadedness    4. Dizziness    5. Encounter for lipid screening for cardiovascular disease    6. Encounter for monitoring long-term proton pump inhibitor therapy    7. Abnormal weight gain    8. Left upper quadrant pain    9. Flatulence/gas pain/belching    10. Allergic rhinitis, unspecified chronicity, unspecified seasonality, unspecified trigger        Plan:   Dianne was seen today for dizziness and nausea.    Diagnoses and all orders for this visit:    Gastritis, presence of bleeding unspecified, unspecified chronicity, unspecified gastritis type  -     Ferritin; Future  -     Folate; Future  -     Iron and TIBC; Future  -     Vitamin B12; Future  -     Reticulocytes; Future  -     CBC auto differential; Future  -     Amylase; Future  -     Lipase; Future  -     pantoprazole (PROTONIX) 20 MG tablet; Take 1 tablet (20 mg total) by mouth once daily.  -     Ambulatory referral to Gastroenterology    Nausea  -     CBC auto differential; Future  -     Comprehensive metabolic panel; Future  -     TSH; Future  -     Amylase; Future  -     Lipase; Future  -      pantoprazole (PROTONIX) 20 MG tablet; Take 1 tablet (20 mg total) by mouth once daily.  -     Ambulatory referral to Gastroenterology    Lightheadedness  -     TSH; Future    Dizziness  -     TSH; Future    Encounter for lipid screening for cardiovascular disease  -     Lipid panel; Future    Encounter for monitoring long-term proton pump inhibitor therapy  -     Vitamin B12; Future  -     Vitamin D; Future  -     Magnesium; Future    Abnormal weight gain  -     Hemoglobin A1c; Future  -     TSH; Future  -     Amylase; Future  -     Lipase; Future  -     Ambulatory referral to Gastroenterology    Left upper quadrant pain  -     Amylase; Future  -     Lipase; Future  -     Ambulatory referral to Gastroenterology    Flatulence/gas pain/belching  -     pantoprazole (PROTONIX) 20 MG tablet; Take 1 tablet (20 mg total) by mouth once daily.  -     Ambulatory referral to Gastroenterology    Allergic rhinitis, unspecified chronicity, unspecified seasonality, unspecified trigger  -     azelastine (ASTELIN) 137 mcg (0.1 %) nasal spray; 1 spray (137 mcg total) by Nasal route 2 (two) times daily.  -     fluticasone (FLONASE) 50 mcg/actuation nasal spray; 1 spray (50 mcg total) by Each Nare route once daily.

## 2018-03-07 ENCOUNTER — OFFICE VISIT (OUTPATIENT)
Dept: GASTROENTEROLOGY | Facility: CLINIC | Age: 43
End: 2018-03-07
Payer: COMMERCIAL

## 2018-03-07 ENCOUNTER — LAB VISIT (OUTPATIENT)
Dept: LAB | Facility: HOSPITAL | Age: 43
End: 2018-03-07
Attending: FAMILY MEDICINE
Payer: COMMERCIAL

## 2018-03-07 ENCOUNTER — TELEPHONE (OUTPATIENT)
Dept: GASTROENTEROLOGY | Facility: CLINIC | Age: 43
End: 2018-03-07

## 2018-03-07 ENCOUNTER — LAB VISIT (OUTPATIENT)
Dept: LAB | Facility: HOSPITAL | Age: 43
End: 2018-03-07
Attending: INTERNAL MEDICINE
Payer: COMMERCIAL

## 2018-03-07 VITALS
BODY MASS INDEX: 34.49 KG/M2 | DIASTOLIC BLOOD PRESSURE: 57 MMHG | HEART RATE: 80 BPM | WEIGHT: 230.19 LBS | SYSTOLIC BLOOD PRESSURE: 118 MMHG

## 2018-03-07 DIAGNOSIS — Z13.220 ENCOUNTER FOR LIPID SCREENING FOR CARDIOVASCULAR DISEASE: ICD-10-CM

## 2018-03-07 DIAGNOSIS — Z79.899 ENCOUNTER FOR MONITORING LONG-TERM PROTON PUMP INHIBITOR THERAPY: ICD-10-CM

## 2018-03-07 DIAGNOSIS — R11.0 NAUSEA: Primary | ICD-10-CM

## 2018-03-07 DIAGNOSIS — R10.9 ABDOMINAL PAIN, UNSPECIFIED ABDOMINAL LOCATION: ICD-10-CM

## 2018-03-07 DIAGNOSIS — Z51.81 ENCOUNTER FOR MONITORING LONG-TERM PROTON PUMP INHIBITOR THERAPY: ICD-10-CM

## 2018-03-07 DIAGNOSIS — K29.70 GASTRITIS, PRESENCE OF BLEEDING UNSPECIFIED, UNSPECIFIED CHRONICITY, UNSPECIFIED GASTRITIS TYPE: ICD-10-CM

## 2018-03-07 DIAGNOSIS — R11.0 NAUSEA: ICD-10-CM

## 2018-03-07 DIAGNOSIS — R42 LIGHTHEADEDNESS: ICD-10-CM

## 2018-03-07 DIAGNOSIS — R42 DIZZINESS: ICD-10-CM

## 2018-03-07 DIAGNOSIS — Z13.6 ENCOUNTER FOR LIPID SCREENING FOR CARDIOVASCULAR DISEASE: ICD-10-CM

## 2018-03-07 DIAGNOSIS — R63.5 ABNORMAL WEIGHT GAIN: ICD-10-CM

## 2018-03-07 DIAGNOSIS — R10.12 LEFT UPPER QUADRANT PAIN: ICD-10-CM

## 2018-03-07 LAB
25(OH)D3+25(OH)D2 SERPL-MCNC: 22 NG/ML
ALBUMIN SERPL BCP-MCNC: 3.6 G/DL
ALP SERPL-CCNC: 58 U/L
ALT SERPL W/O P-5'-P-CCNC: 25 U/L
AMYLASE SERPL-CCNC: 44 U/L
ANION GAP SERPL CALC-SCNC: 7 MMOL/L
AST SERPL-CCNC: 13 U/L
BASOPHILS # BLD AUTO: 0.03 K/UL
BASOPHILS NFR BLD: 0.3 %
BILIRUB SERPL-MCNC: 0.2 MG/DL
BUN SERPL-MCNC: 15 MG/DL
CALCIUM SERPL-MCNC: 9.4 MG/DL
CERULOPLASMIN SERPL-MCNC: 24 MG/DL
CHLORIDE SERPL-SCNC: 105 MMOL/L
CHOLEST SERPL-MCNC: 197 MG/DL
CHOLEST/HDLC SERPL: 4 {RATIO}
CO2 SERPL-SCNC: 27 MMOL/L
CREAT SERPL-MCNC: 0.7 MG/DL
DIFFERENTIAL METHOD: ABNORMAL
EOSINOPHIL # BLD AUTO: 0.3 K/UL
EOSINOPHIL NFR BLD: 3.6 %
ERYTHROCYTE [DISTWIDTH] IN BLOOD BY AUTOMATED COUNT: 13.2 %
EST. GFR  (AFRICAN AMERICAN): >60 ML/MIN/1.73 M^2
EST. GFR  (NON AFRICAN AMERICAN): >60 ML/MIN/1.73 M^2
ESTIMATED AVG GLUCOSE: 91 MG/DL
FERRITIN SERPL-MCNC: 94 NG/ML
FOLATE SERPL-MCNC: 9.4 NG/ML
GLUCOSE SERPL-MCNC: 93 MG/DL
HBA1C MFR BLD HPLC: 4.8 %
HCT VFR BLD AUTO: 46.4 %
HDLC SERPL-MCNC: 49 MG/DL
HDLC SERPL: 24.9 %
HGB BLD-MCNC: 15.3 G/DL
IRON SERPL-MCNC: 63 UG/DL
LDLC SERPL CALC-MCNC: 130.2 MG/DL
LIPASE SERPL-CCNC: 17 U/L
LYMPHOCYTES # BLD AUTO: 2.7 K/UL
LYMPHOCYTES NFR BLD: 28.6 %
MAGNESIUM SERPL-MCNC: 2.1 MG/DL
MCH RBC QN AUTO: 30 PG
MCHC RBC AUTO-ENTMCNC: 33 G/DL
MCV RBC AUTO: 91 FL
MONOCYTES # BLD AUTO: 0.6 K/UL
MONOCYTES NFR BLD: 6.1 %
NEUTROPHILS # BLD AUTO: 5.8 K/UL
NEUTROPHILS NFR BLD: 61.2 %
NONHDLC SERPL-MCNC: 148 MG/DL
PLATELET # BLD AUTO: 372 K/UL
PMV BLD AUTO: 10.4 FL
POTASSIUM SERPL-SCNC: 3.9 MMOL/L
PROT SERPL-MCNC: 6.7 G/DL
RBC # BLD AUTO: 5.1 M/UL
RETICS/RBC NFR AUTO: 1.3 %
SATURATED IRON: 18 %
SODIUM SERPL-SCNC: 139 MMOL/L
TOTAL IRON BINDING CAPACITY: 343 UG/DL
TRANSFERRIN SERPL-MCNC: 232 MG/DL
TRIGL SERPL-MCNC: 89 MG/DL
TSH SERPL DL<=0.005 MIU/L-ACNC: 0.66 UIU/ML
VIT B12 SERPL-MCNC: 545 PG/ML
WBC # BLD AUTO: 9.49 K/UL

## 2018-03-07 PROCEDURE — 82728 ASSAY OF FERRITIN: CPT

## 2018-03-07 PROCEDURE — 82607 VITAMIN B-12: CPT

## 2018-03-07 PROCEDURE — 80053 COMPREHEN METABOLIC PANEL: CPT

## 2018-03-07 PROCEDURE — 82150 ASSAY OF AMYLASE: CPT

## 2018-03-07 PROCEDURE — 80061 LIPID PANEL: CPT

## 2018-03-07 PROCEDURE — 83735 ASSAY OF MAGNESIUM: CPT

## 2018-03-07 PROCEDURE — 99999 PR PBB SHADOW E&M-EST. PATIENT-LVL II: CPT | Mod: PBBFAC,,, | Performed by: INTERNAL MEDICINE

## 2018-03-07 PROCEDURE — 83540 ASSAY OF IRON: CPT

## 2018-03-07 PROCEDURE — 82306 VITAMIN D 25 HYDROXY: CPT

## 2018-03-07 PROCEDURE — 36415 COLL VENOUS BLD VENIPUNCTURE: CPT

## 2018-03-07 PROCEDURE — 85045 AUTOMATED RETICULOCYTE COUNT: CPT

## 2018-03-07 PROCEDURE — 84443 ASSAY THYROID STIM HORMONE: CPT

## 2018-03-07 PROCEDURE — 99214 OFFICE O/P EST MOD 30 MIN: CPT | Mod: S$GLB,,, | Performed by: INTERNAL MEDICINE

## 2018-03-07 PROCEDURE — 83690 ASSAY OF LIPASE: CPT

## 2018-03-07 PROCEDURE — 85025 COMPLETE CBC W/AUTO DIFF WBC: CPT

## 2018-03-07 PROCEDURE — 82746 ASSAY OF FOLIC ACID SERUM: CPT

## 2018-03-07 PROCEDURE — 82390 ASSAY OF CERULOPLASMIN: CPT

## 2018-03-07 PROCEDURE — 83036 HEMOGLOBIN GLYCOSYLATED A1C: CPT

## 2018-03-07 RX ORDER — ONDANSETRON 4 MG/1
4 TABLET, ORALLY DISINTEGRATING ORAL
Qty: 60 TABLET | Refills: 0 | Status: SHIPPED | OUTPATIENT
Start: 2018-03-07 | End: 2019-09-18

## 2018-03-07 NOTE — PROGRESS NOTES
Subjective:       Patient ID: Dianne Scott is a 42 y.o. female.    Chief Complaint: Abdominal Pain    This is a 42-year-old female who presents for a follow-up visit.   she has noted worsening of her abdominal symptoms over the past several weeks.  It is a mid abdominal pain in the epigastric region which radiates bilaterally to the flanks.  No particular exacerbating or relieving factors or other associated symptoms.  It is unrelated to food intake.  No associated change in bowel habits.  No dark urine or jaundice.  It is moderate in intensity.  No recent changes in medications.  No constipation or diarrhea currently.   Some fatigue and lethargy persist.  No other exacerbating or relieving factors or other associated symptoms.     The following portions of the patient's history were reviewed and updated as appropriate: allergies, current medications, past family history, past medical history, past social history, past surgical history and problem list.     (Portions of this note were dictated using voice recognition software and may contain dictation related errors in spelling/grammar/syntax not found on text review)    HPI  Review of Systems   Respiratory: Negative for apnea and shortness of breath.    Cardiovascular: Negative for chest pain and leg swelling.   Gastrointestinal: Positive for abdominal distention, abdominal pain and nausea.       Objective:      Physical Exam   Constitutional: She is oriented to person, place, and time. She appears well-developed and well-nourished. No distress.   HENT:   Head: Normocephalic and atraumatic.   Eyes: Conjunctivae are normal. No scleral icterus.   Neck: Normal range of motion. Neck supple. No tracheal deviation present. No thyromegaly present.   Cardiovascular: Normal rate and regular rhythm.  Exam reveals no gallop and no friction rub.    No murmur heard.  Pulmonary/Chest: Effort normal and breath sounds normal. No respiratory distress. She has no wheezes.    Abdominal: Soft. Bowel sounds are normal. She exhibits no distension. There is no tenderness.   Neurological: She is alert and oriented to person, place, and time.   Skin: Skin is warm and dry. No rash noted. She is not diaphoretic. No erythema.   Psychiatric: She has a normal mood and affect. Her behavior is normal.   Nursing note and vitals reviewed.      Labs; last lfts wnl, f/u pending today  Assessment:       1. Nausea    2. Abdominal pain, unspecified abdominal location        Plan:   1. Add ceruloplasmin  2. May need further imaging, would consider MR abdomen  3. F/u LFTs, pancreatic enzymes  4. Zofran for nausea

## 2018-03-20 ENCOUNTER — TELEPHONE (OUTPATIENT)
Dept: GASTROENTEROLOGY | Facility: CLINIC | Age: 43
End: 2018-03-20

## 2018-03-20 NOTE — TELEPHONE ENCOUNTER
----- Message from Mikel Rizvi MD sent at 3/19/2018  7:51 AM CDT -----  Labs are ok including ceruloplasmin, if she still has symptoms we can order an MRA of the abdomen

## 2018-03-27 ENCOUNTER — OFFICE VISIT (OUTPATIENT)
Dept: INTERNAL MEDICINE | Facility: CLINIC | Age: 43
End: 2018-03-27
Payer: COMMERCIAL

## 2018-03-27 ENCOUNTER — PATIENT MESSAGE (OUTPATIENT)
Dept: INTERNAL MEDICINE | Facility: CLINIC | Age: 43
End: 2018-03-27

## 2018-03-27 ENCOUNTER — LAB VISIT (OUTPATIENT)
Dept: LAB | Facility: HOSPITAL | Age: 43
End: 2018-03-27
Attending: NURSE PRACTITIONER
Payer: COMMERCIAL

## 2018-03-27 VITALS
HEART RATE: 82 BPM | DIASTOLIC BLOOD PRESSURE: 82 MMHG | OXYGEN SATURATION: 98 % | SYSTOLIC BLOOD PRESSURE: 122 MMHG | HEIGHT: 68 IN | BODY MASS INDEX: 35.85 KG/M2 | WEIGHT: 236.56 LBS | TEMPERATURE: 98 F

## 2018-03-27 DIAGNOSIS — R63.5 WEIGHT GAIN: ICD-10-CM

## 2018-03-27 DIAGNOSIS — M54.41 ACUTE MIDLINE LOW BACK PAIN WITH RIGHT-SIDED SCIATICA: Primary | ICD-10-CM

## 2018-03-27 LAB
T3FREE SERPL-MCNC: 2.9 PG/ML
T4 FREE SERPL-MCNC: 0.83 NG/DL
TSH SERPL DL<=0.005 MIU/L-ACNC: 1.29 UIU/ML

## 2018-03-27 PROCEDURE — 84439 ASSAY OF FREE THYROXINE: CPT

## 2018-03-27 PROCEDURE — 99214 OFFICE O/P EST MOD 30 MIN: CPT | Mod: S$GLB,,, | Performed by: NURSE PRACTITIONER

## 2018-03-27 PROCEDURE — 84443 ASSAY THYROID STIM HORMONE: CPT

## 2018-03-27 PROCEDURE — 84481 FREE ASSAY (FT-3): CPT

## 2018-03-27 PROCEDURE — 36415 COLL VENOUS BLD VENIPUNCTURE: CPT

## 2018-03-27 PROCEDURE — 99999 PR PBB SHADOW E&M-EST. PATIENT-LVL IV: CPT | Mod: PBBFAC,,, | Performed by: NURSE PRACTITIONER

## 2018-03-27 RX ORDER — SULINDAC 150 MG/1
150 TABLET ORAL 2 TIMES DAILY
Qty: 20 TABLET | Refills: 0 | Status: SHIPPED | OUTPATIENT
Start: 2018-03-27 | End: 2018-04-06

## 2018-03-27 RX ORDER — METHYLPREDNISOLONE 4 MG/1
TABLET ORAL
Qty: 1 PACKAGE | Refills: 0 | Status: SHIPPED | OUTPATIENT
Start: 2018-03-27 | End: 2018-11-16

## 2018-03-27 NOTE — PROGRESS NOTES
Subjective:       Patient ID: Dianne Scott is a 42 y.o. female.    Chief Complaint: Back Pain (also right leg pain)    Ms. Scott present today for back pain that radiates down her right leg, anterior and lateral, and causes tingling and numbness. She has had sciatica before but never this badly. She has a bilateral pars defect of L5, which was shown on CT done 3/9/17.  She is a new patient to me.  She is also concerned about weight gain. She is a new patient to me.       Back Pain   This is a recurrent problem. The current episode started in the past 7 days. The problem occurs constantly. The problem has been gradually worsening since onset. The pain is present in the lumbar spine and sacro-iliac. The quality of the pain is described as aching, shooting and stabbing. The pain radiates to the right thigh. The pain is severe. The pain is the same all the time. The symptoms are aggravated by twisting and position. Associated symptoms include leg pain, numbness, paresthesias, tingling and weakness. Pertinent negatives include no abdominal pain, bladder incontinence, bowel incontinence, chest pain, dysuria, fever, headaches, paresis, pelvic pain, perianal numbness or weight loss. She has tried NSAIDs for the symptoms. The treatment provided mild relief.     Review of Systems   Constitutional: Negative for fever and weight loss.   HENT: Negative for facial swelling.    Eyes: Negative for visual disturbance.   Respiratory: Negative for shortness of breath.    Cardiovascular: Negative for chest pain.   Gastrointestinal: Negative for abdominal pain and bowel incontinence.   Genitourinary: Negative for bladder incontinence, dysuria and pelvic pain.   Musculoskeletal: Positive for back pain.   Skin: Negative for rash.   Neurological: Positive for tingling, weakness, numbness and paresthesias. Negative for headaches.   Psychiatric/Behavioral: Negative for confusion.       Objective:      Physical Exam   Constitutional:  She is oriented to person, place, and time. She appears well-developed. No distress.   obese   HENT:   Head: Atraumatic.   Eyes: No scleral icterus.   Neck: Neck supple.   Cardiovascular: Normal rate, regular rhythm and normal heart sounds.    Pulmonary/Chest: Effort normal and breath sounds normal. No respiratory distress. She has no wheezes. She has no rales.   Musculoskeletal:        Right hip: She exhibits decreased range of motion and tenderness. She exhibits normal strength and no bony tenderness.        Thoracic back: She exhibits tenderness and pain. She exhibits no spasm.        Lumbar back: She exhibits decreased range of motion, tenderness and pain.   Normal dorsi and plantar flexion of feet bilat. +straight leg raise.    Neurological: She is alert and oriented to person, place, and time.   Skin: Skin is warm and dry. She is not diaphoretic.   Psychiatric: She has a normal mood and affect. Her behavior is normal.   Nursing note and vitals reviewed.      Assessment:       1. Acute midline low back pain with right-sided sciatica    2. Weight gain        Plan:   1. Acute midline low back pain with right-sided sciatica  - sulindac (CLINORIL) 150 MG tablet; Take 1 tablet (150 mg total) by mouth 2 (two) times daily.  Dispense: 20 tablet; Refill: 0  - methylPREDNISolone (MEDROL DOSEPACK) 4 mg tablet; use as directed  Dispense: 1 Package; Refill: 0    2. Weight gain  - TSH; Future  - T3, free; Future  - T4, free; Future      Pt has been given instructions populated from [employer] database and has verbalized understanding of the after visit summary and information contained wherein.    Follow up with a primary care provider. May go to ER for acute shortness of breath, lightheadedness, fever, or any other emergent complaints or changes in condition.

## 2018-08-27 ENCOUNTER — PATIENT MESSAGE (OUTPATIENT)
Dept: INTERNAL MEDICINE | Facility: CLINIC | Age: 43
End: 2018-08-27

## 2018-08-27 DIAGNOSIS — K29.70 GASTRITIS, PRESENCE OF BLEEDING UNSPECIFIED, UNSPECIFIED CHRONICITY, UNSPECIFIED GASTRITIS TYPE: ICD-10-CM

## 2018-08-27 DIAGNOSIS — R14.0 FLATULENCE/GAS PAIN/BELCHING: ICD-10-CM

## 2018-08-27 DIAGNOSIS — R11.0 NAUSEA: ICD-10-CM

## 2018-08-27 RX ORDER — PANTOPRAZOLE SODIUM 20 MG/1
20 TABLET, DELAYED RELEASE ORAL DAILY
Qty: 90 TABLET | Refills: 1 | Status: SHIPPED | OUTPATIENT
Start: 2018-08-27 | End: 2018-10-29 | Stop reason: SDUPTHER

## 2018-10-26 ENCOUNTER — PATIENT MESSAGE (OUTPATIENT)
Dept: INTERNAL MEDICINE | Facility: CLINIC | Age: 43
End: 2018-10-26

## 2018-10-26 DIAGNOSIS — K29.70 GASTRITIS, PRESENCE OF BLEEDING UNSPECIFIED, UNSPECIFIED CHRONICITY, UNSPECIFIED GASTRITIS TYPE: ICD-10-CM

## 2018-10-26 DIAGNOSIS — R14.0 FLATULENCE/GAS PAIN/BELCHING: ICD-10-CM

## 2018-10-26 DIAGNOSIS — R11.0 NAUSEA: ICD-10-CM

## 2018-10-29 ENCOUNTER — PATIENT MESSAGE (OUTPATIENT)
Dept: INTERNAL MEDICINE | Facility: CLINIC | Age: 43
End: 2018-10-29

## 2018-10-29 RX ORDER — PANTOPRAZOLE SODIUM 20 MG/1
20 TABLET, DELAYED RELEASE ORAL DAILY
Qty: 90 TABLET | Refills: 1 | Status: SHIPPED | OUTPATIENT
Start: 2018-10-29 | End: 2019-03-27 | Stop reason: SDUPTHER

## 2018-10-29 NOTE — TELEPHONE ENCOUNTER
Lm on pt's voicemail and sent my chart message informing pt prescription was sent to [facility] in [place].

## 2018-11-15 ENCOUNTER — PATIENT MESSAGE (OUTPATIENT)
Dept: INTERNAL MEDICINE | Facility: CLINIC | Age: 43
End: 2018-11-15

## 2018-11-16 ENCOUNTER — HOSPITAL ENCOUNTER (EMERGENCY)
Facility: HOSPITAL | Age: 43
Discharge: HOME | End: 2018-11-16
Attending: EMERGENCY MEDICINE
Payer: COMMERCIAL

## 2018-11-16 ENCOUNTER — TELEPHONE (OUTPATIENT)
Dept: INTERNAL MEDICINE | Facility: CLINIC | Age: 43
End: 2018-11-16

## 2018-11-16 ENCOUNTER — NURSE TRIAGE (OUTPATIENT)
Dept: ADMINISTRATIVE | Facility: CLINIC | Age: 43
End: 2018-11-16

## 2018-11-16 VITALS
HEIGHT: 69 IN | RESPIRATION RATE: 16 BRPM | DIASTOLIC BLOOD PRESSURE: 81 MMHG | WEIGHT: 260 LBS | TEMPERATURE: 98 F | HEART RATE: 85 BPM | SYSTOLIC BLOOD PRESSURE: 138 MMHG | BODY MASS INDEX: 38.51 KG/M2 | OXYGEN SATURATION: 99 %

## 2018-11-16 DIAGNOSIS — R07.9 CHEST PAIN: Primary | ICD-10-CM

## 2018-11-16 LAB
ALBUMIN SERPL BCP-MCNC: 3.8 G/DL
ALP SERPL-CCNC: 61 U/L
ALT SERPL W/O P-5'-P-CCNC: 30 U/L
ANION GAP SERPL CALC-SCNC: 7 MMOL/L
AST SERPL-CCNC: 22 U/L
B-HCG UR QL: NEGATIVE
BASOPHILS # BLD AUTO: 0.07 K/UL
BASOPHILS NFR BLD: 0.7 %
BILIRUB SERPL-MCNC: 0.2 MG/DL
BNP SERPL-MCNC: 20 PG/ML
BUN SERPL-MCNC: 8 MG/DL
CALCIUM SERPL-MCNC: 9.7 MG/DL
CHLORIDE SERPL-SCNC: 108 MMOL/L
CO2 SERPL-SCNC: 26 MMOL/L
CREAT SERPL-MCNC: 0.7 MG/DL
CTP QC/QA: YES
DIFFERENTIAL METHOD: ABNORMAL
EOSINOPHIL # BLD AUTO: 0.5 K/UL
EOSINOPHIL NFR BLD: 5 %
ERYTHROCYTE [DISTWIDTH] IN BLOOD BY AUTOMATED COUNT: 13.1 %
EST. GFR  (AFRICAN AMERICAN): >60 ML/MIN/1.73 M^2
EST. GFR  (NON AFRICAN AMERICAN): >60 ML/MIN/1.73 M^2
GLUCOSE SERPL-MCNC: 89 MG/DL
HCT VFR BLD AUTO: 46.5 %
HGB BLD-MCNC: 15.2 G/DL
IMM GRANULOCYTES # BLD AUTO: 0.02 K/UL
IMM GRANULOCYTES NFR BLD AUTO: 0.2 %
LYMPHOCYTES # BLD AUTO: 3.3 K/UL
LYMPHOCYTES NFR BLD: 31.3 %
MCH RBC QN AUTO: 29.8 PG
MCHC RBC AUTO-ENTMCNC: 32.7 G/DL
MCV RBC AUTO: 91 FL
MONOCYTES # BLD AUTO: 0.6 K/UL
MONOCYTES NFR BLD: 5.8 %
NEUTROPHILS # BLD AUTO: 6 K/UL
NEUTROPHILS NFR BLD: 57 %
NRBC BLD-RTO: 0 /100 WBC
PLATELET # BLD AUTO: 450 K/UL
PMV BLD AUTO: 10.3 FL
POTASSIUM SERPL-SCNC: 4 MMOL/L
PROT SERPL-MCNC: 7.1 G/DL
RBC # BLD AUTO: 5.1 M/UL
SODIUM SERPL-SCNC: 141 MMOL/L
TROPONIN I SERPL DL<=0.01 NG/ML-MCNC: <0.006 NG/ML
TROPONIN I SERPL DL<=0.01 NG/ML-MCNC: <0.006 NG/ML
WBC # BLD AUTO: 10.43 K/UL

## 2018-11-16 PROCEDURE — 83880 ASSAY OF NATRIURETIC PEPTIDE: CPT

## 2018-11-16 PROCEDURE — 93010 ELECTROCARDIOGRAM REPORT: CPT | Mod: ,,, | Performed by: INTERNAL MEDICINE

## 2018-11-16 PROCEDURE — 99284 EMERGENCY DEPT VISIT MOD MDM: CPT | Mod: ,,, | Performed by: PHYSICIAN ASSISTANT

## 2018-11-16 PROCEDURE — 93005 ELECTROCARDIOGRAM TRACING: CPT

## 2018-11-16 PROCEDURE — 99285 EMERGENCY DEPT VISIT HI MDM: CPT | Mod: 25

## 2018-11-16 PROCEDURE — 84484 ASSAY OF TROPONIN QUANT: CPT

## 2018-11-16 PROCEDURE — 85025 COMPLETE CBC W/AUTO DIFF WBC: CPT

## 2018-11-16 PROCEDURE — 81025 URINE PREGNANCY TEST: CPT | Performed by: PHYSICIAN ASSISTANT

## 2018-11-16 PROCEDURE — 25000003 PHARM REV CODE 250: Performed by: PHYSICIAN ASSISTANT

## 2018-11-16 PROCEDURE — 80053 COMPREHEN METABOLIC PANEL: CPT

## 2018-11-16 RX ORDER — FEXOFENADINE HCL AND PSEUDOEPHEDRINE HCI 180; 240 MG/1; MG/1
1 TABLET, EXTENDED RELEASE ORAL DAILY
COMMUNITY
End: 2019-09-18

## 2018-11-16 RX ORDER — ASPIRIN 325 MG
325 TABLET, DELAYED RELEASE (ENTERIC COATED) ORAL
Status: COMPLETED | OUTPATIENT
Start: 2018-11-16 | End: 2018-11-16

## 2018-11-16 RX ORDER — NAPROXEN 500 MG/1
500 TABLET ORAL 2 TIMES DAILY WITH MEALS
Qty: 20 TABLET | Refills: 0 | Status: SHIPPED | OUTPATIENT
Start: 2018-11-16 | End: 2019-04-22

## 2018-11-16 RX ADMIN — ASPIRIN 325 MG: 325 TABLET, DELAYED RELEASE ORAL at 03:11

## 2018-11-16 NOTE — PROVIDER PROGRESS NOTES - EMERGENCY DEPT.
Encounter Date: 11/16/2018    ED Physician Progress Notes         EKG - STEMI Decision  Initial Reading: No STEMI present.    I, Thao Kohli, am scribing for, and in the presence of, Dr. Preciado. I performed the above scribed service and the documentation accurately describes the services I performed. I attest to the accuracy of the note.

## 2018-11-16 NOTE — DISCHARGE INSTRUCTIONS
Please follow-up with your primary care provider for cardiac risk stratification. Please take the prescribed Naprosyn as directed for ongoing management of your pain. Please hydrate by drinking plenty of water. Please return to the ED for new, worsening, or concerning symptoms.     Our goal in the emergency department is to always give you outstanding care and exceptional service. You may receive a survey by mail or e-mail in the next week regarding your experience in our ED. We would greatly appreciate your completing and returning the survey. Your feedback provides us with a way to recognize our staff who give very good care and it helps us learn how to improve when your experience was below our aspiration of excellence.

## 2018-11-16 NOTE — TELEPHONE ENCOUNTER
Reason for Disposition   Chest pain lasting longer than 5 minutes    Protocols used: ST CHEST PAIN-A-[place]    Patient is a smoker and she has been having chest pain for 3 or 4 days. She went to urgent care last night and was told to go to the ED. Her bp last night was normal. Unsure what the patient will decide.

## 2018-11-16 NOTE — ED NOTES
"The patient came to the ER today with c/o chest x 4 days. Seen at  in [facility] and was advised to go to the ER but the pt went home. Pain begins in the midsternal area and radiates to the right arm and goes into the back, described as a tightness at times, and sometimes it is so bad it will "double her over". Pain is intermittent. Pt is a smoker. ekg not done at  last night.   "

## 2018-11-16 NOTE — TELEPHONE ENCOUNTER
Pt states that she is experiencing chest pain that is more of a muscular pain. Pt denies radiating pain in the arm and elephant sitting on the chest. Pt states that the pain comes an goes. Offered pt an appt today with Dr. Vidales and at [facility]. Pt states that she will have to look at her schedule to see if she can go today. Pt also refused to go to ED to have the chest pain looked at. Pt states that she will call back to schedule an appt for urgent care.

## 2018-11-16 NOTE — ED PROVIDER NOTES
Encounter Date: 11/16/2018    SCRIBE #1 NOTE: I, Aman Lyons , am scribing for, and in the presence of,  Parris Garland MD. I have scribed the following portions of the note - the APC attestation.       History     Chief Complaint   Patient presents with    Chest Pain     reports seen at urgent care and told to come to ED     43 year old female with medical history of tobacco use (1/2 PPD), GERD, Migraines presenting to the ED with the chief complaint of chest pain. Patient reports the chest pain began 4 days ago. She describes it as a substernal dull, tight sensation that radiates to the right shoulder. She reports the pain is intermittent lasting a few minutes at a time. She denies exacerbation with activity and reports it occurs randomly. She denies having pain like this previously. She was initially seen at an Urgent Care last night and refused transfer to an ED for evaluation. She reports being at work today and experiencing the chest pain while sitting at her desk at around 11:45am. She reports the pain travelled to her back this time which was new. She denies having pain currently. She reports increased SOB with activity recently. She denies taking any medications for pain today. She denies fever, cough, abdominal pain, nausea, vomiting, dysuria, hematuria, diarrhea, constipation, melena. She denies history of DM, CAD, blood clots, recent travel/surgeries, estrogen use. Family history significant for her father having a heart attack in his 50's.           Review of patient's allergies indicates:   Allergen Reactions    Codeine Nausea And Vomiting    Demerol [meperidine] Nausea And Vomiting    Imitrex [sumatriptan succinate]      Hysterics      Morphine Nausea And Vomiting    Tetracyclines Nausea And Vomiting    Azithromycin Rash    Ciprofloxacin hcl Rash     Past Medical History:   Diagnosis Date    GERD (gastroesophageal reflux disease)     History of migraine headaches     Obesity      Pollen allergies     Smoker      Past Surgical History:   Procedure Laterality Date    CHOLECYSTECTOMY      ESOPHAGOGASTRODUODENOSCOPY (EGD) N/A 2/3/2017    Performed by [facility]ides [facility]illo [facility] at [facility]    HYSTERECTOMY      fibroids    TONSILLECTOMY      ULTRASOUND-ENDOSCOPIC-UPPER N/A 2/3/2017    Performed by [facility] at [facility]     Family History   Problem Relation Age of Onset    No Known Problems Mother     No Known Problems Father     Cancer Neg Hx     Diabetes Neg Hx     Heart disease Neg Hx     Stroke Neg Hx      Social History     Tobacco Use    Smoking status: Current Every Day Smoker     Packs/day: 0.50     Years: 15.00     Pack years: 7.50   Substance Use Topics    Alcohol use: Yes     Comment: 2-3 drinks per year    Drug use: No     Review of Systems   Constitutional: Negative for chills, diaphoresis, fatigue and fever.   HENT: Negative for congestion, sore throat and trouble swallowing.    Eyes: Negative for pain and redness.   Respiratory: Positive for shortness of breath. Negative for cough.    Cardiovascular: Positive for chest pain.   Gastrointestinal: Negative for abdominal pain, diarrhea, nausea and vomiting.   Genitourinary: Negative for dysuria, flank pain and hematuria.   Musculoskeletal: Negative for arthralgias, neck pain and neck stiffness.   Skin: Negative for rash and wound.   Neurological: Negative for weakness, light-headedness, numbness and headaches.       Physical Exam     Initial Vitals [11/16/18 1357]   BP Pulse Resp Temp SpO2   138/81 85 16 97.5 °F (36.4 °C) 99 %      MAP       --         Physical Exam    Constitutional: She appears well-developed and well-nourished. She is not diaphoretic. No distress.   Obese female   HENT:   Head: Normocephalic and atraumatic.   Mouth/Throat: Oropharynx is clear and moist. No oropharyngeal exudate.   Eyes: EOM are normal. Pupils are equal, round, and reactive to light.   Neck: Normal range of motion. Neck supple.    Cardiovascular: Normal rate, regular rhythm and intact distal pulses.   Distant heart sounds 2/2 body habitus   Pulmonary/Chest: Breath sounds normal. No respiratory distress. She has no wheezes. She exhibits no tenderness (No reproducible chest pain).   Speaking in full sentences without difficulty   Abdominal: Soft. There is no tenderness.   Musculoskeletal: Normal range of motion. She exhibits no edema or tenderness.   No midline spinal tenderness   Neurological: She is alert and oriented to person, place, and time. She has normal strength. No cranial nerve deficit or sensory deficit.   Skin: Skin is warm and dry. No erythema.       ED Course   Procedures  Labs Reviewed   CBC W/ AUTO DIFFERENTIAL - Abnormal; Notable for the following components:       Result Value    Platelets 450 (*)     All other components within normal limits    Narrative:     lav shared   COMPREHENSIVE METABOLIC PANEL - Abnormal; Notable for the following components:    Anion Gap 7 (*)     All other components within normal limits    Narrative:     lav shared   POCT URINE PREGNANCY - Normal   B-TYPE NATRIURETIC PEPTIDE    Narrative:     lav shared   TROPONIN I    Narrative:     lav shared   TROPONIN I    Narrative:     Second troponin at 1728        ECG Results          EKG 12-lead (Final result)  Result time 11/16/18 15:21:01    Final result by Interface, Lab In [facility] (11/16/18 15:21:01)                 Narrative:    Test Reason : R07.9,  Vent. Rate : 082 BPM     Atrial Rate : 082 BPM     P-R Int : 184 ms          QRS Dur : 094 ms      QT Int : 398 ms       P-R-T Axes : 057 015 061 degrees     QTc Int : 464 ms    Normal sinus rhythm  Low voltage QRS  Otherwise normal ECG    When compared with ECG of 31-JAN-2017 03:48,  No significant change was found  Confirmed by HERI NEGRETE MD ([id number]) on 11/16/2018 3:20:52 PM    Referred By: BARRETT   SELF           Confirmed By:HERI NEGRETE MD                            Imaging Results          X-Ray  Chest PA And Lateral (Final result)  Result time 11/16/18 14:51:41    Final result by Paul Martins MD (11/16/18 14:51:41)                 Impression:      1. No acute cardiopulmonary process.      Electronically signed by: Paul Martins MD  Date:    11/16/2018  Time:    14:51             Narrative:    EXAMINATION:  XR CHEST PA AND LATERAL    CLINICAL HISTORY:  Chest pain, unspecified    TECHNIQUE:  PA and lateral views of the chest were performed.    COMPARISON:  None    FINDINGS:  The cardiomediastinal silhouette is not enlarged.  There is no pleural effusion.  The trachea is midline.  The lungs are symmetrically expanded bilaterally without evidence of acute parenchymal process. No large focal consolidation seen.  There is no pneumothorax.  The osseous structures are remarkable for mild degenerative change..                                       APC / Resident Notes:   43 year old female with medical history of tobacco use (1/2 PPD), GERD, Migraines presenting to the ED c/o atraumatic chest pain for 4 days. DDx includes but not limited to chest wall strain, ACS, cardiac arrhythmia, pneumonia, pneumothorax, rib injury, viral syndrome, PAUL, electrolyte disturbance, dehydration, aortic dissection, PE. Will get cardiac labs and CXR. Will give ASA.     Work-up shows Trop negative x2, BNP 20, WBC 10, H/H 15/46, CMP WNL, CXR without acute intrathoracic processes. ECG shows NSR at 82 bpm without ST ischemic changes.     Do not suspect ACS or other emergent processes at this time. Heart score 1. Patient reports her chest pain has improved on reassessment. Patient stable for outpatient management with PCP follow-up for cardiac risk stratification. Will give RX for Naprosyn for ongoing pain management. Patient expresses understanding and agreeable to the plan. Return to ED precautions given for new, worsening, or concerning symptoms. I have discussed the care of this patient with my supervising physician.         Scribe Attestation:   Scribe #1: I performed the above scribed service and the documentation accurately describes the services I performed. I attest to the accuracy of the note.    Attending Attestation:     Physician Attestation Statement for NP/PA:   I discussed this assessment and plan of this patient with the NP/PA, but I did not personally examine the patient. The face to face encounter was performed by the NP/PA.                     Clinical Impression:   The encounter diagnosis was Chest pain.      Disposition:   Disposition: Discharged  Condition: Stable                        Yony Callahan PA-C  11/17/18 0918

## 2019-03-27 ENCOUNTER — OFFICE VISIT (OUTPATIENT)
Dept: INTERNAL MEDICINE | Facility: CLINIC | Age: 44
End: 2019-03-27
Payer: COMMERCIAL

## 2019-03-27 ENCOUNTER — PATIENT MESSAGE (OUTPATIENT)
Dept: INTERNAL MEDICINE | Facility: CLINIC | Age: 44
End: 2019-03-27

## 2019-03-27 ENCOUNTER — PATIENT OUTREACH (OUTPATIENT)
Dept: ADMINISTRATIVE | Facility: HOSPITAL | Age: 44
End: 2019-03-27

## 2019-03-27 VITALS
OXYGEN SATURATION: 98 % | SYSTOLIC BLOOD PRESSURE: 126 MMHG | HEART RATE: 69 BPM | WEIGHT: 274.06 LBS | HEIGHT: 69 IN | DIASTOLIC BLOOD PRESSURE: 85 MMHG | BODY MASS INDEX: 40.59 KG/M2

## 2019-03-27 DIAGNOSIS — M48.061 NEURAL FORAMINAL STENOSIS OF LUMBAR SPINE: ICD-10-CM

## 2019-03-27 DIAGNOSIS — M43.10 ANTEROLISTHESIS: ICD-10-CM

## 2019-03-27 DIAGNOSIS — K29.70 GASTRITIS, PRESENCE OF BLEEDING UNSPECIFIED, UNSPECIFIED CHRONICITY, UNSPECIFIED GASTRITIS TYPE: ICD-10-CM

## 2019-03-27 DIAGNOSIS — R14.0 FLATULENCE/GAS PAIN/BELCHING: ICD-10-CM

## 2019-03-27 DIAGNOSIS — Z13.6 ENCOUNTER FOR LIPID SCREENING FOR CARDIOVASCULAR DISEASE: ICD-10-CM

## 2019-03-27 DIAGNOSIS — R06.83 SNORING: ICD-10-CM

## 2019-03-27 DIAGNOSIS — K21.9 GASTROESOPHAGEAL REFLUX DISEASE, ESOPHAGITIS PRESENCE NOT SPECIFIED: ICD-10-CM

## 2019-03-27 DIAGNOSIS — N83.202 CYST OF LEFT OVARY: ICD-10-CM

## 2019-03-27 DIAGNOSIS — Z12.31 SCREENING MAMMOGRAM, ENCOUNTER FOR: ICD-10-CM

## 2019-03-27 DIAGNOSIS — R63.5 UNEXPLAINED WEIGHT GAIN: ICD-10-CM

## 2019-03-27 DIAGNOSIS — Z13.220 ENCOUNTER FOR LIPID SCREENING FOR CARDIOVASCULAR DISEASE: ICD-10-CM

## 2019-03-27 DIAGNOSIS — Z00.00 ANNUAL PHYSICAL EXAM: ICD-10-CM

## 2019-03-27 DIAGNOSIS — R11.0 NAUSEA: ICD-10-CM

## 2019-03-27 DIAGNOSIS — R40.0 DAYTIME SOMNOLENCE: ICD-10-CM

## 2019-03-27 DIAGNOSIS — M47.816 LUMBAR SPONDYLOSIS: ICD-10-CM

## 2019-03-27 DIAGNOSIS — N30.01 ACUTE CYSTITIS WITH HEMATURIA: Primary | ICD-10-CM

## 2019-03-27 DIAGNOSIS — R53.83 FATIGUE, UNSPECIFIED TYPE: ICD-10-CM

## 2019-03-27 LAB
BACTERIA #/AREA URNS AUTO: ABNORMAL /HPF
BILIRUB SERPL-MCNC: ABNORMAL MG/DL
BILIRUB UR QL STRIP: NEGATIVE
BLOOD URINE, POC: ABNORMAL
CLARITY UR REFRACT.AUTO: ABNORMAL
COLOR UR AUTO: YELLOW
COLOR, POC UA: YELLOW
GLUCOSE UR QL STRIP: NEGATIVE
GLUCOSE UR QL STRIP: NORMAL
HGB UR QL STRIP: ABNORMAL
HYALINE CASTS UR QL AUTO: 0 /LPF
KETONES UR QL STRIP: ABNORMAL
KETONES UR QL STRIP: NEGATIVE
LEUKOCYTE ESTERASE UR QL STRIP: ABNORMAL
LEUKOCYTE ESTERASE URINE, POC: ABNORMAL
MICROSCOPIC COMMENT: ABNORMAL
NITRITE UR QL STRIP: NEGATIVE
NITRITE, POC UA: ABNORMAL
PH UR STRIP: 6 [PH] (ref 5–8)
PH, POC UA: 5
PROT UR QL STRIP: NEGATIVE
PROTEIN, POC: ABNORMAL
RBC #/AREA URNS AUTO: 3 /HPF (ref 0–4)
SP GR UR STRIP: 1 (ref 1–1.03)
SPECIFIC GRAVITY, POC UA: 1.01
SQUAMOUS #/AREA URNS AUTO: 16 /HPF
URN SPEC COLLECT METH UR: ABNORMAL
UROBILINOGEN, POC UA: 1
WBC #/AREA URNS AUTO: 31 /HPF (ref 0–5)
YEAST UR QL AUTO: ABNORMAL

## 2019-03-27 PROCEDURE — 3008F BODY MASS INDEX DOCD: CPT | Mod: CPTII,S$GLB,, | Performed by: FAMILY MEDICINE

## 2019-03-27 PROCEDURE — 99999 PR PBB SHADOW E&M-EST. PATIENT-LVL IV: ICD-10-PCS | Mod: PBBFAC,,, | Performed by: FAMILY MEDICINE

## 2019-03-27 PROCEDURE — 87086 URINE CULTURE/COLONY COUNT: CPT

## 2019-03-27 PROCEDURE — 81001 URINALYSIS AUTO W/SCOPE: CPT

## 2019-03-27 PROCEDURE — 87088 URINE BACTERIA CULTURE: CPT

## 2019-03-27 PROCEDURE — 99214 PR OFFICE/OUTPT VISIT, EST, LEVL IV, 30-39 MIN: ICD-10-PCS | Mod: 25,S$GLB,, | Performed by: FAMILY MEDICINE

## 2019-03-27 PROCEDURE — 87106 FUNGI IDENTIFICATION YEAST: CPT

## 2019-03-27 PROCEDURE — 99999 PR PBB SHADOW E&M-EST. PATIENT-LVL IV: CPT | Mod: PBBFAC,,, | Performed by: FAMILY MEDICINE

## 2019-03-27 PROCEDURE — 3008F PR BODY MASS INDEX (BMI) DOCUMENTED: ICD-10-PCS | Mod: CPTII,S$GLB,, | Performed by: FAMILY MEDICINE

## 2019-03-27 PROCEDURE — 81002 URINALYSIS NONAUTO W/O SCOPE: CPT | Mod: S$GLB,,, | Performed by: FAMILY MEDICINE

## 2019-03-27 PROCEDURE — 99214 OFFICE O/P EST MOD 30 MIN: CPT | Mod: 25,S$GLB,, | Performed by: FAMILY MEDICINE

## 2019-03-27 PROCEDURE — 81002 POCT URINE DIPSTICK WITHOUT MICROSCOPE: ICD-10-PCS | Mod: S$GLB,,, | Performed by: FAMILY MEDICINE

## 2019-03-27 RX ORDER — SULFAMETHOXAZOLE AND TRIMETHOPRIM 800; 160 MG/1; MG/1
1 TABLET ORAL 2 TIMES DAILY
Qty: 14 TABLET | Refills: 0 | Status: SHIPPED | OUTPATIENT
Start: 2019-03-27 | End: 2019-04-03

## 2019-03-27 RX ORDER — PANTOPRAZOLE SODIUM 40 MG/1
40 TABLET, DELAYED RELEASE ORAL DAILY
Qty: 90 TABLET | Refills: 1 | Status: SHIPPED | OUTPATIENT
Start: 2019-03-27 | End: 2019-10-23 | Stop reason: SDUPTHER

## 2019-03-27 NOTE — PROGRESS NOTES
Subjective:       Patient ID: Dianne Hemphill is a 43 y.o. female.    Chief Complaint: Anxiety; Cystitis; and Abdominal Pain    HPI  43 y/o female former smoker but now vaping, with hepatomegaly and gerd, is here with urinary sx and reflux.    She has been having urinary sx she was having pelvic pressure a week ago went to outside  and was told her urine culture was normal, she now has R sided flank pain that is achy and worse with urination, denies dysuria/frequency/hematuria.  She has had worsening heartburn over the past 3 months, she has had increased gassiness, nausea, she denies v, does feel like food gets stuck but this has been unchanged since childhood, she occasionally gets reflux of foodstuffs, she has doubled up on her protonix to 20 mg twice a day, she is taking tums and gaviscon, usually having symptoms before meals, she is trying to eat a bland diet. She denies f/d/constipation/abdominal pain/sob.  Appetite good.  She is being followed by GI, EUS done 2017. She is not sleeping well even when she does sleep she wakes up tired, she snores and falls asleep in her class. She is walking about 30 min lightly 3 days a week.  She has had chronic lower back pain for the past 15 years, was evaluated in [place] in the past and done PT, she has been following with a chiropractor, she has had a recent MRI, she takes otc aleve every night which helps some. Weight up 44 pounds in the past year.    She completed 10 day course of Augmentin for tooth abcess.     Review of Systems   Constitutional: Positive for fatigue. Negative for activity change, appetite change and fever.   Respiratory: Negative for cough and shortness of breath.    Cardiovascular: Negative for chest pain, palpitations and leg swelling.   Gastrointestinal: Positive for nausea. Negative for constipation, diarrhea and vomiting.   Genitourinary: Positive for pelvic pain. Negative for difficulty urinating and dysuria.   Musculoskeletal: Positive for  "arthralgias and back pain.   Skin: Negative for rash.   Neurological: Negative for dizziness and light-headedness.   Psychiatric/Behavioral: Negative for sleep disturbance.       Objective:      /85   Pulse 69   Ht 5' 9" (1.753 m)   Wt 124.3 kg (274 lb 0.5 oz)   SpO2 98%   BMI 40.47 kg/m²   Physical Exam   Constitutional: She appears well-developed and well-nourished.   HENT:   Head: Normocephalic and atraumatic.   Mouth/Throat: No oropharyngeal exudate.   Neck: Normal range of motion. Neck supple. No thyromegaly present.   Cardiovascular: Normal rate, regular rhythm and normal heart sounds.   Pulmonary/Chest: Effort normal and breath sounds normal. No respiratory distress.   Musculoskeletal: She exhibits no edema.   Lymphadenopathy:     She has no cervical adenopathy.   Neurological: She is alert.   Skin: Skin is warm and dry.   Psychiatric: She has a normal mood and affect.   Nursing note and vitals reviewed.      Assessment:       1. Acute cystitis with hematuria    2. Gastroesophageal reflux disease, esophagitis presence not specified    3. Annual physical exam    4. Encounter for lipid screening for cardiovascular disease    5. Screening mammogram, encounter for    6. Cyst of left ovary    7. Unexplained weight gain    8. Fatigue, unspecified type    9. Daytime somnolence    10. Snoring    11. Gastritis, presence of bleeding unspecified, unspecified chronicity, unspecified gastritis type    12. Nausea    13. Flatulence/gas pain/belching    14. Lumbar spondylosis    15. Neural foraminal stenosis of lumbar spine    16. Anterolisthesis        Plan:   Dianne was seen today for anxiety, cystitis and abdominal pain.    Diagnoses and all orders for this visit:    Acute cystitis with hematuria  -     Urinalysis  -     Urine culture  -     sulfamethoxazole-trimethoprim 800-160mg (BACTRIM DS) 800-160 mg Tab; Take 1 tablet by mouth 2 (two) times daily. for 7 days  -     POCT URINE DIPSTICK WITHOUT " MICROSCOPE    Gastroesophageal reflux disease, esophagitis presence not specified  -     Vitamin B12; Future  -     Magnesium; Future    Annual physical exam  -     CBC auto differential; Future  -     Comprehensive metabolic panel; Future  -     Hemoglobin A1c; Future  -     Lipid panel; Future  -     TSH; Future  -     Vitamin D; Future  -     Urinalysis; Future  -     Vitamin B12; Future  -     Magnesium; Future    Encounter for lipid screening for cardiovascular disease  -     Lipid panel; Future    Screening mammogram, encounter for  -     Mammo Digital Screening Bilat w/ Gonzales; Future    Cyst of left ovary  -     US Transvaginal Non OB; Future    Unexplained weight gain  -     Luteinizing hormone; Future  -     Follicle stimulating hormone; Future  -     Estradiol; Future    Fatigue, unspecified type  -     Luteinizing hormone; Future  -     Follicle stimulating hormone; Future  -     Estradiol; Future    Daytime somnolence  -     Polysomnogram (CPAP will be added if patient meets diagnostic criteria.); Future    Snoring  -     Polysomnogram (CPAP will be added if patient meets diagnostic criteria.); Future    Gastritis, presence of bleeding unspecified, unspecified chronicity, unspecified gastritis type  -     pantoprazole (PROTONIX) 40 MG tablet; Take 1 tablet (40 mg total) by mouth once daily.    Nausea  -     pantoprazole (PROTONIX) 40 MG tablet; Take 1 tablet (40 mg total) by mouth once daily.    Flatulence/gas pain/belching  -     pantoprazole (PROTONIX) 40 MG tablet; Take 1 tablet (40 mg total) by mouth once daily.    Lumbar spondylosis  -     Ambulatory Referral to Physical/Occupational Therapy    Neural foraminal stenosis of lumbar spine  -     Ambulatory Referral to Physical/Occupational Therapy    Anterolisthesis  -     Ambulatory Referral to Physical/Occupational Therapy    Other orders  -     Urinalysis Microscopic

## 2019-03-28 PROBLEM — M47.816 LUMBAR SPONDYLOSIS: Status: ACTIVE | Noted: 2019-03-28

## 2019-03-28 PROBLEM — M48.061 NEURAL FORAMINAL STENOSIS OF LUMBAR SPINE: Status: ACTIVE | Noted: 2019-03-28

## 2019-03-28 PROBLEM — M43.10 ANTEROLISTHESIS: Status: ACTIVE | Noted: 2019-03-28

## 2019-03-29 LAB — BACTERIA UR CULT: NORMAL

## 2019-04-01 ENCOUNTER — PATIENT MESSAGE (OUTPATIENT)
Dept: INTERNAL MEDICINE | Facility: CLINIC | Age: 44
End: 2019-04-01

## 2019-04-01 DIAGNOSIS — B37.31 YEAST VAGINITIS: Primary | ICD-10-CM

## 2019-04-02 RX ORDER — FLUCONAZOLE 150 MG/1
150 TABLET ORAL DAILY
Qty: 1 TABLET | Refills: 0 | Status: SHIPPED | OUTPATIENT
Start: 2019-04-02 | End: 2019-04-03

## 2019-04-05 ENCOUNTER — TELEPHONE (OUTPATIENT)
Dept: SLEEP MEDICINE | Facility: OTHER | Age: 44
End: 2019-04-05

## 2019-04-08 ENCOUNTER — PATIENT OUTREACH (OUTPATIENT)
Dept: ADMINISTRATIVE | Facility: HOSPITAL | Age: 44
End: 2019-04-08

## 2019-04-17 ENCOUNTER — TELEPHONE (OUTPATIENT)
Dept: INTERNAL MEDICINE | Facility: CLINIC | Age: 44
End: 2019-04-17

## 2019-04-17 ENCOUNTER — TELEPHONE (OUTPATIENT)
Dept: SLEEP MEDICINE | Facility: OTHER | Age: 44
End: 2019-04-17

## 2019-04-17 DIAGNOSIS — R92.8 ABNORMAL MAMMOGRAM OF LEFT BREAST: ICD-10-CM

## 2019-04-17 DIAGNOSIS — N83.202 CYST OF LEFT OVARY: Primary | ICD-10-CM

## 2019-04-17 NOTE — TELEPHONE ENCOUNTER
Please let her know her ultrasound shows that she still has a L sided ovarian cyst, I recommend we get an opinion from her OBGYN.  We should repeat the ultrasound in 6-12 weeks to see if the cyst changes.  Please help her with her appointment and scheduling her repeat US.    Additionally, her mammogram shows an asymmetry in the left breast.  I recommend we do a diagnostic mammogram for further evaluation.

## 2019-04-22 ENCOUNTER — HOSPITAL ENCOUNTER (OUTPATIENT)
Dept: RADIOLOGY | Facility: HOSPITAL | Age: 44
Discharge: HOME | End: 2019-04-22
Attending: FAMILY MEDICINE
Payer: COMMERCIAL

## 2019-04-22 ENCOUNTER — OFFICE VISIT (OUTPATIENT)
Dept: INTERNAL MEDICINE | Facility: CLINIC | Age: 44
End: 2019-04-22
Payer: COMMERCIAL

## 2019-04-22 VITALS
WEIGHT: 269.94 LBS | BODY MASS INDEX: 39.98 KG/M2 | SYSTOLIC BLOOD PRESSURE: 128 MMHG | OXYGEN SATURATION: 99 % | HEART RATE: 89 BPM | HEIGHT: 69 IN | DIASTOLIC BLOOD PRESSURE: 86 MMHG
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
## 2019-04-22 DIAGNOSIS — Z00.00 ANNUAL PHYSICAL EXAM: Primary | ICD-10-CM

## 2019-04-22 DIAGNOSIS — K29.70 GASTRITIS, PRESENCE OF BLEEDING UNSPECIFIED, UNSPECIFIED CHRONICITY, UNSPECIFIED GASTRITIS TYPE: ICD-10-CM

## 2019-04-22 DIAGNOSIS — R92.8 ABNORMAL MAMMOGRAM OF LEFT BREAST: ICD-10-CM

## 2019-04-22 DIAGNOSIS — G89.29 CHRONIC BILATERAL LOW BACK PAIN WITH LEFT-SIDED SCIATICA: ICD-10-CM

## 2019-04-22 DIAGNOSIS — K21.9 GASTROESOPHAGEAL REFLUX DISEASE, ESOPHAGITIS PRESENCE NOT SPECIFIED: ICD-10-CM

## 2019-04-22 DIAGNOSIS — R92.8 ABNORMAL MAMMOGRAM: ICD-10-CM

## 2019-04-22 DIAGNOSIS — M54.42 CHRONIC BILATERAL LOW BACK PAIN WITH LEFT-SIDED SCIATICA: ICD-10-CM

## 2019-04-22 PROCEDURE — 77065 MAMMO DIGITAL DIAGNOSTIC LEFT WITH TOMOSYNTHESIS_CAD: ICD-10-PCS | Mod: 26,LT,, | Performed by: RADIOLOGY

## 2019-04-22 PROCEDURE — 99396 PR PREVENTIVE VISIT,EST,40-64: ICD-10-PCS | Mod: S$GLB,,, | Performed by: FAMILY MEDICINE

## 2019-04-22 PROCEDURE — 77065 DX MAMMO INCL CAD UNI: CPT | Mod: TC,PO,LT

## 2019-04-22 PROCEDURE — 99999 PR PBB SHADOW E&M-EST. PATIENT-LVL IV: CPT | Mod: PBBFAC,,, | Performed by: FAMILY MEDICINE

## 2019-04-22 PROCEDURE — 99999 PR PBB SHADOW E&M-EST. PATIENT-LVL IV: ICD-10-PCS | Mod: PBBFAC,,, | Performed by: FAMILY MEDICINE

## 2019-04-22 PROCEDURE — 76642 US BREAST LEFT LIMITED: ICD-10-PCS | Mod: 26,LT,, | Performed by: RADIOLOGY

## 2019-04-22 PROCEDURE — 76642 ULTRASOUND BREAST LIMITED: CPT | Mod: 26,LT,, | Performed by: RADIOLOGY

## 2019-04-22 PROCEDURE — 77065 DX MAMMO INCL CAD UNI: CPT | Mod: 26,LT,, | Performed by: RADIOLOGY

## 2019-04-22 PROCEDURE — 77061 MAMMO DIGITAL DIAGNOSTIC LEFT WITH TOMOSYNTHESIS_CAD: ICD-10-PCS | Mod: 26,LT,, | Performed by: RADIOLOGY

## 2019-04-22 PROCEDURE — 99396 PREV VISIT EST AGE 40-64: CPT | Mod: S$GLB,,, | Performed by: FAMILY MEDICINE

## 2019-04-22 PROCEDURE — 76642 ULTRASOUND BREAST LIMITED: CPT | Mod: TC,PO,LT

## 2019-04-22 PROCEDURE — 77061 BREAST TOMOSYNTHESIS UNI: CPT | Mod: 26,LT,, | Performed by: RADIOLOGY

## 2019-04-22 PROCEDURE — 77061 BREAST TOMOSYNTHESIS UNI: CPT | Mod: TC,PO,LT

## 2019-04-22 RX ORDER — DICLOFENAC SODIUM 50 MG/1
TABLET, DELAYED RELEASE ORAL
Refills: 0 | COMMUNITY
Start: 2019-04-05 | End: 2019-04-22

## 2019-04-22 RX ORDER — MELOXICAM 15 MG/1
15 TABLET ORAL DAILY
Qty: 30 TABLET | Refills: 0 | Status: SHIPPED | OUTPATIENT
Start: 2019-04-22 | End: 2019-04-26

## 2019-04-22 RX ORDER — CYCLOBENZAPRINE HCL 10 MG
TABLET ORAL
Refills: 0 | COMMUNITY
Start: 2019-04-05 | End: 2019-04-22

## 2019-04-22 RX ORDER — TIZANIDINE 4 MG/1
4 TABLET ORAL 2 TIMES DAILY PRN
Qty: 30 TABLET | Refills: 0 | Status: SHIPPED | OUTPATIENT
Start: 2019-04-22 | End: 2019-05-02
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
## 2019-04-23 ENCOUNTER — PATIENT MESSAGE (OUTPATIENT)
Dept: INTERNAL MEDICINE | Facility: CLINIC | Age: 44
End: 2019-04-23
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
## 2019-04-25 ENCOUNTER — TELEPHONE (OUTPATIENT)
Dept: INTERNAL MEDICINE | Facility: CLINIC | Age: 44
End: 2019-04-25

## 2019-04-26 ENCOUNTER — OFFICE VISIT (OUTPATIENT)
Dept: SPINE | Facility: CLINIC | Age: 44
End: 2019-04-26
Attending: PHYSICAL MEDICINE & REHABILITATION
Payer: COMMERCIAL

## 2019-04-26 ENCOUNTER — HOSPITAL ENCOUNTER (OUTPATIENT)
Dept: RADIOLOGY | Facility: OTHER | Age: 44
Discharge: HOME | End: 2019-04-26
Attending: PHYSICAL MEDICINE & REHABILITATION
Payer: COMMERCIAL

## 2019-04-26 ENCOUNTER — PATIENT MESSAGE (OUTPATIENT)
Dept: GASTROENTEROLOGY | Facility: CLINIC | Age: 44
End: 2019-04-26

## 2019-04-26 VITALS
SYSTOLIC BLOOD PRESSURE: 149 MMHG | HEART RATE: 84 BPM | HEIGHT: 69 IN | BODY MASS INDEX: 39.84 KG/M2 | WEIGHT: 269 LBS | DIASTOLIC BLOOD PRESSURE: 70 MMHG

## 2019-04-26 DIAGNOSIS — M53.3 SI (SACROILIAC) JOINT DYSFUNCTION: ICD-10-CM

## 2019-04-26 DIAGNOSIS — M54.42 ACUTE LEFT-SIDED LOW BACK PAIN WITH LEFT-SIDED SCIATICA: ICD-10-CM

## 2019-04-26 DIAGNOSIS — M46.00 SPINAL ENTHESOPATHY: ICD-10-CM

## 2019-04-26 DIAGNOSIS — M54.42 ACUTE LEFT-SIDED LOW BACK PAIN WITH LEFT-SIDED SCIATICA: Primary | ICD-10-CM

## 2019-04-26 PROCEDURE — 99999 PR PBB SHADOW E&M-EST. PATIENT-LVL III: CPT | Mod: PBBFAC,,, | Performed by: PHYSICAL MEDICINE & REHABILITATION

## 2019-04-26 PROCEDURE — 72110 XR LUMBAR SPINE 5 VIEW WITH FLEX AND EXT: ICD-10-PCS | Mod: 26,,, | Performed by: RADIOLOGY

## 2019-04-26 PROCEDURE — 72110 X-RAY EXAM L-2 SPINE 4/>VWS: CPT | Mod: 26,,, | Performed by: RADIOLOGY

## 2019-04-26 PROCEDURE — 99244 PR OFFICE CONSULTATION,LEVEL IV: ICD-10-PCS | Mod: 25,S$GLB,, | Performed by: PHYSICAL MEDICINE & REHABILITATION

## 2019-04-26 PROCEDURE — 99999 PR PBB SHADOW E&M-EST. PATIENT-LVL III: ICD-10-PCS | Mod: PBBFAC,,, | Performed by: PHYSICAL MEDICINE & REHABILITATION

## 2019-04-26 PROCEDURE — 72110 X-RAY EXAM L-2 SPINE 4/>VWS: CPT | Mod: TC,FY

## 2019-04-26 PROCEDURE — 20552 NJX 1/MLT TRIGGER POINT 1/2: CPT | Mod: S$GLB,,, | Performed by: PHYSICAL MEDICINE & REHABILITATION

## 2019-04-26 PROCEDURE — 99244 OFF/OP CNSLTJ NEW/EST MOD 40: CPT | Mod: 25,S$GLB,, | Performed by: PHYSICAL MEDICINE & REHABILITATION

## 2019-04-26 PROCEDURE — 20552 PR INJECT TRIGGER POINT, 1 OR 2: ICD-10-PCS | Mod: S$GLB,,, | Performed by: PHYSICAL MEDICINE & REHABILITATION

## 2019-04-26 RX ORDER — TRIAMCINOLONE ACETONIDE 40 MG/ML
40 INJECTION, SUSPENSION INTRA-ARTICULAR; INTRAMUSCULAR ONCE
Status: COMPLETED | OUTPATIENT
Start: 2019-04-26 | End: 2019-04-26

## 2019-04-26 RX ORDER — TIZANIDINE 2 MG/1
1-2 TABLET ORAL EVERY 8 HOURS PRN
Qty: 60 TABLET | Refills: 2 | Status: SHIPPED | OUTPATIENT
Start: 2019-04-26 | End: 2019-09-18

## 2019-04-26 RX ORDER — DICLOFENAC SODIUM 75 MG/1
75 TABLET, DELAYED RELEASE ORAL 2 TIMES DAILY
Qty: 60 TABLET | Refills: 2 | Status: SHIPPED | OUTPATIENT
Start: 2019-04-26 | End: 2019-09-18

## 2019-04-26 RX ADMIN — TRIAMCINOLONE ACETONIDE 40 MG: 40 INJECTION, SUSPENSION INTRA-ARTICULAR; INTRAMUSCULAR at 09:04

## 2019-04-26 NOTE — LETTER
April 26, 2019      Annette Burton MD  [address] [place] [address]  [address]  [place] [address]           [address]  [address], [address]  [facility] [address]  Phone: [phone]  Fax: [phone]          Patient: Dianne Hemphill   MR Number: [id number]   YOB: 1975   Date of Visit: 4/26/2019       Dear Dr. Annette Burton:    Thank you for referring Dianne Hemphill to me for evaluation. Attached you will find relevant portions of my assessment and plan of care.    If you have questions, please do not hesitate to call me. I look forward to following Dianne Hemphill along with you.    Sincerely,    Sweetie Burris MD    Enclosure  CC:  No Recipients    If you would like to receive this communication electronically, please contact [email or web address] or [phone] to request more information on [employer] access.    For providers and/or their staff who would like to refer a patient to [facility], please contact us through our one-stop-shop provider referral line, [facility], at [phone].    If you feel you have received this communication in error or would no longer like to receive these types of communications, please e-mail [email or web address]

## 2019-05-15 ENCOUNTER — TELEPHONE (OUTPATIENT)
Dept: SLEEP MEDICINE | Facility: OTHER | Age: 44
End: 2019-05-15

## 2019-05-20 ENCOUNTER — PATIENT MESSAGE (OUTPATIENT)
Dept: INTERNAL MEDICINE | Facility: CLINIC | Age: 44
End: 2019-05-20

## 2019-05-20 NOTE — TELEPHONE ENCOUNTER
Spoke to patient, patient declined appointment. Would like a prescription for Nystatin and Fluconazole sent to the pharmacy. She states she just recently got 2 steroid injections and a medrol dose pack. She feels like this is a result of the steroids. She has multiple doctor appointments already scheduled this week and would be unable to come in. She would like either a portal message or a detailed voicemail to let her know if something was called in.

## 2019-05-21 ENCOUNTER — TELEPHONE (OUTPATIENT)
Dept: INTERNAL MEDICINE | Facility: CLINIC | Age: 44
End: 2019-05-21

## 2019-05-21 NOTE — TELEPHONE ENCOUNTER
Offered pt sooner appt with Dr. Burton. Pt refused and states that she will go to urgent care. Pt states that she can not get off of work.

## 2019-08-29 ENCOUNTER — PATIENT OUTREACH (OUTPATIENT)
Dept: ADMINISTRATIVE | Facility: OTHER | Age: 44
End: 2019-08-29

## 2019-08-30 ENCOUNTER — TELEPHONE (OUTPATIENT)
Dept: NEUROSURGERY | Facility: CLINIC | Age: 44
End: 2019-08-30

## 2019-09-03 ENCOUNTER — OFFICE VISIT (OUTPATIENT)
Dept: NEUROSURGERY | Facility: CLINIC | Age: 44
End: 2019-09-03
Payer: COMMERCIAL

## 2019-09-03 VITALS
DIASTOLIC BLOOD PRESSURE: 88 MMHG | SYSTOLIC BLOOD PRESSURE: 133 MMHG | HEART RATE: 87 BPM | HEIGHT: 69 IN | WEIGHT: 275.81 LBS | BODY MASS INDEX: 40.85 KG/M2

## 2019-09-03 DIAGNOSIS — M48.07 FORAMINAL STENOSIS OF LUMBOSACRAL REGION: ICD-10-CM

## 2019-09-03 DIAGNOSIS — M43.17 SPONDYLOLISTHESIS AT L5-S1 LEVEL: ICD-10-CM

## 2019-09-03 DIAGNOSIS — M43.06 PARS DEFECT OF LUMBAR SPINE: ICD-10-CM

## 2019-09-03 DIAGNOSIS — E66.01 MORBID OBESITY WITH BMI OF 40.0-44.9, ADULT: Primary | ICD-10-CM

## 2019-09-03 DIAGNOSIS — M51.36 DEGENERATIVE DISC DISEASE, LUMBAR: ICD-10-CM

## 2019-09-03 DIAGNOSIS — M54.17 LUMBOSACRAL RADICULOPATHY AT L5: ICD-10-CM

## 2019-09-03 PROCEDURE — 99999 PR PBB SHADOW E&M-EST. PATIENT-LVL III: ICD-10-PCS | Mod: PBBFAC,,, | Performed by: NEUROLOGICAL SURGERY

## 2019-09-03 PROCEDURE — 3008F PR BODY MASS INDEX (BMI) DOCUMENTED: ICD-10-PCS | Mod: CPTII,S$GLB,, | Performed by: NEUROLOGICAL SURGERY

## 2019-09-03 PROCEDURE — 3008F BODY MASS INDEX DOCD: CPT | Mod: CPTII,S$GLB,, | Performed by: NEUROLOGICAL SURGERY

## 2019-09-03 PROCEDURE — 99204 PR OFFICE/OUTPT VISIT, NEW, LEVL IV, 45-59 MIN: ICD-10-PCS | Mod: S$GLB,,, | Performed by: NEUROLOGICAL SURGERY

## 2019-09-03 PROCEDURE — 99204 OFFICE O/P NEW MOD 45 MIN: CPT | Mod: S$GLB,,, | Performed by: NEUROLOGICAL SURGERY

## 2019-09-03 PROCEDURE — 99999 PR PBB SHADOW E&M-EST. PATIENT-LVL III: CPT | Mod: PBBFAC,,, | Performed by: NEUROLOGICAL SURGERY

## 2019-09-03 RX ORDER — GABAPENTIN 300 MG/1
300 CAPSULE ORAL 3 TIMES DAILY
Qty: 90 CAPSULE | Refills: 11 | Status: ON HOLD | OUTPATIENT
Start: 2019-09-03 | End: 2019-11-10 | Stop reason: HOSPADM

## 2019-09-03 RX ORDER — CYCLOBENZAPRINE HCL 10 MG
TABLET ORAL
COMMUNITY
End: 2019-09-18
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
## 2019-09-04 ENCOUNTER — TELEPHONE (OUTPATIENT)
Dept: NEUROSURGERY | Facility: CLINIC | Age: 44
End: 2019-09-04

## 2019-09-04 DIAGNOSIS — Z98.1 S/P LUMBAR FUSION: ICD-10-CM

## 2019-09-04 DIAGNOSIS — M54.16 LUMBAR RADICULOPATHY: Primary | ICD-10-CM

## 2019-09-05 ENCOUNTER — PATIENT MESSAGE (OUTPATIENT)
Dept: SURGERY | Facility: HOSPITAL | Age: 44
End: 2019-09-05

## 2019-09-11 ENCOUNTER — TELEPHONE (OUTPATIENT)
Dept: NEUROSURGERY | Facility: CLINIC | Age: 44
End: 2019-09-11

## 2019-09-11 ENCOUNTER — PATIENT MESSAGE (OUTPATIENT)
Dept: SURGERY | Facility: HOSPITAL | Age: 44
End: 2019-09-11

## 2019-09-17 ENCOUNTER — PATIENT MESSAGE (OUTPATIENT)
Dept: SURGERY | Facility: HOSPITAL | Age: 44
End: 2019-09-17

## 2019-09-17 ENCOUNTER — PATIENT MESSAGE (OUTPATIENT)
Dept: NEUROSURGERY | Facility: CLINIC | Age: 44
End: 2019-09-17

## 2019-09-18 ENCOUNTER — OFFICE VISIT (OUTPATIENT)
Dept: INTERNAL MEDICINE | Facility: CLINIC | Age: 44
End: 2019-09-18
Payer: COMMERCIAL

## 2019-09-18 VITALS
WEIGHT: 278.13 LBS | OXYGEN SATURATION: 98 % | HEIGHT: 68 IN | BODY MASS INDEX: 42.15 KG/M2 | DIASTOLIC BLOOD PRESSURE: 76 MMHG | SYSTOLIC BLOOD PRESSURE: 120 MMHG | HEART RATE: 93 BPM

## 2019-09-18 DIAGNOSIS — M47.816 LUMBAR SPONDYLOSIS: ICD-10-CM

## 2019-09-18 DIAGNOSIS — G89.29 CHRONIC NECK PAIN: ICD-10-CM

## 2019-09-18 DIAGNOSIS — M54.2 CHRONIC NECK PAIN: ICD-10-CM

## 2019-09-18 DIAGNOSIS — R20.0 NUMBNESS AND TINGLING IN BOTH HANDS: ICD-10-CM

## 2019-09-18 DIAGNOSIS — Z01.818 PRE-OP EXAM: Primary | ICD-10-CM

## 2019-09-18 DIAGNOSIS — M48.061 NEURAL FORAMINAL STENOSIS OF LUMBAR SPINE: ICD-10-CM

## 2019-09-18 DIAGNOSIS — R20.2 NUMBNESS AND TINGLING IN BOTH HANDS: ICD-10-CM

## 2019-09-18 PROBLEM — Z87.891 FORMER SMOKELESS TOBACCO USE: Status: ACTIVE | Noted: 2017-02-16

## 2019-09-18 PROCEDURE — 99214 PR OFFICE/OUTPT VISIT, EST, LEVL IV, 30-39 MIN: ICD-10-PCS | Mod: S$GLB,,, | Performed by: FAMILY MEDICINE

## 2019-09-18 PROCEDURE — 3008F BODY MASS INDEX DOCD: CPT | Mod: CPTII,S$GLB,, | Performed by: FAMILY MEDICINE

## 2019-09-18 PROCEDURE — 99214 OFFICE O/P EST MOD 30 MIN: CPT | Mod: S$GLB,,, | Performed by: FAMILY MEDICINE

## 2019-09-18 PROCEDURE — 99999 PR PBB SHADOW E&M-EST. PATIENT-LVL IV: CPT | Mod: PBBFAC,,, | Performed by: FAMILY MEDICINE

## 2019-09-18 PROCEDURE — 3008F PR BODY MASS INDEX (BMI) DOCUMENTED: ICD-10-PCS | Mod: CPTII,S$GLB,, | Performed by: FAMILY MEDICINE

## 2019-09-18 PROCEDURE — 99999 PR PBB SHADOW E&M-EST. PATIENT-LVL IV: ICD-10-PCS | Mod: PBBFAC,,, | Performed by: FAMILY MEDICINE

## 2019-09-18 RX ORDER — GABAPENTIN 100 MG/1
100 CAPSULE ORAL 3 TIMES DAILY
Qty: 90 CAPSULE | Refills: 0 | Status: SHIPPED | OUTPATIENT
Start: 2019-09-18 | End: 2019-10-23 | Stop reason: SDUPTHER
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
## 2019-09-18 NOTE — PATIENT INSTRUCTIONS
Trial of Gabapentin 100 mg am, 100 mg lunch and keep 400 mg in the evening    Look into Cymbalta for pain control and mood

## 2019-09-19 ENCOUNTER — TELEPHONE (OUTPATIENT)
Dept: INTERNAL MEDICINE | Facility: CLINIC | Age: 44
End: 2019-09-19

## 2019-09-19 DIAGNOSIS — M54.2 NECK PAIN: Primary | ICD-10-CM

## 2019-09-19 NOTE — TELEPHONE ENCOUNTER
Unable to reach patient, incorrect number on file. Faxed referral to [facility]. Sent portal message.

## 2019-09-20 ENCOUNTER — TELEPHONE (OUTPATIENT)
Dept: NEUROSURGERY | Facility: CLINIC | Age: 44
End: 2019-09-20

## 2019-09-20 NOTE — TELEPHONE ENCOUNTER
----- Message from Richelle Ramsay sent at 9/20/2019 10:19 AM CDT -----  Contact: Dianne Hemphill  [phone]    Patient is calling regarding paperwork she needs in order to be off work. She states her employer is about to terminate her without appropriate documentation.

## 2019-09-23 ENCOUNTER — TELEPHONE (OUTPATIENT)
Dept: NEUROSURGERY | Facility: CLINIC | Age: 44
End: 2019-09-23

## 2019-09-23 NOTE — TELEPHONE ENCOUNTER
Spoke with Ms eHmphill, last day was on 09/03/19- pt states she needs paperwork now, she will loose her job.       Please advise

## 2019-09-24 ENCOUNTER — PATIENT MESSAGE (OUTPATIENT)
Dept: INTERNAL MEDICINE | Facility: CLINIC | Age: 44
End: 2019-09-24

## 2019-09-24 ENCOUNTER — TELEPHONE (OUTPATIENT)
Dept: INTERNAL MEDICINE | Facility: CLINIC | Age: 44
End: 2019-09-24

## 2019-09-24 ENCOUNTER — PATIENT MESSAGE (OUTPATIENT)
Dept: SURGERY | Facility: HOSPITAL | Age: 44
End: 2019-09-24

## 2019-09-24 DIAGNOSIS — M48.061 NEURAL FORAMINAL STENOSIS OF LUMBAR SPINE: ICD-10-CM

## 2019-09-24 DIAGNOSIS — M47.816 LUMBAR SPONDYLOSIS: ICD-10-CM

## 2019-09-24 DIAGNOSIS — M43.10 ANTEROLISTHESIS: Primary | ICD-10-CM

## 2019-09-24 DIAGNOSIS — F43.21 SITUATIONAL DEPRESSION: ICD-10-CM

## 2019-09-24 DIAGNOSIS — M43.10 ANTEROLISTHESIS: ICD-10-CM

## 2019-09-24 RX ORDER — DULOXETIN HYDROCHLORIDE 30 MG/1
30 CAPSULE, DELAYED RELEASE ORAL DAILY
Qty: 30 CAPSULE | Refills: 1 | Status: SHIPPED | OUTPATIENT
Start: 2019-09-24 | End: 2019-10-23 | Stop reason: SDUPTHER

## 2019-09-24 NOTE — TELEPHONE ENCOUNTER
I sent a my chart message.  Please have her come in for follow-up in 4-6 weeks after starting Cymbalta

## 2019-09-25 ENCOUNTER — TELEPHONE (OUTPATIENT)
Dept: NEUROSURGERY | Facility: CLINIC | Age: 44
End: 2019-09-25

## 2019-09-25 RX ORDER — DULOXETIN HYDROCHLORIDE 30 MG/1
CAPSULE, DELAYED RELEASE ORAL
Qty: 90 CAPSULE | OUTPATIENT
Start: 2019-09-25

## 2019-09-25 NOTE — TELEPHONE ENCOUNTER
----- Message from Richelle Crossturnerjesusita sent at 9/25/2019  2:52 PM CDT -----  Contact: Ariana Keenan  [facility]  [phone]    Ariana is trying to reach Richelle Bridges regarding FMLA paperwork they need in order for the patient to be paid for the month while she has been out of work.

## 2019-09-26 ENCOUNTER — TELEPHONE (OUTPATIENT)
Dept: NEUROSURGERY | Facility: CLINIC | Age: 44
End: 2019-09-26

## 2019-09-26 NOTE — TELEPHONE ENCOUNTER
----- Message from Corona Bazan MD sent at 9/26/2019 12:54 PM CDT -----  Contact: Ophelia Correa [facility] [phone]      ----- Message -----  From: Perlita Murillo  Sent: 9/26/2019   8:09 AM CDT  To: Beni Jeter Staff    Patient Returning Your Phone Call

## 2019-10-07 ENCOUNTER — PATIENT MESSAGE (OUTPATIENT)
Dept: NEUROSURGERY | Facility: CLINIC | Age: 44
End: 2019-10-07

## 2019-10-08 ENCOUNTER — TELEPHONE (OUTPATIENT)
Dept: INTERNAL MEDICINE | Facility: CLINIC | Age: 44
End: 2019-10-08

## 2019-10-08 ENCOUNTER — PATIENT MESSAGE (OUTPATIENT)
Dept: SURGERY | Facility: HOSPITAL | Age: 44
End: 2019-10-08

## 2019-10-08 NOTE — TELEPHONE ENCOUNTER
Spoke with pt and rescheduled Dr Burton appt to 10/23/19 at 10:40 AM and scheduled pt EKG on 10/21/19 at 10:45 AM.

## 2019-10-08 NOTE — TELEPHONE ENCOUNTER
----- Message from Ashanti Massey sent at 10/8/2019  3:06 PM CDT -----  Contact: self  Pt called to speak with nurse in office to ayaan appt.                Pt callback number [phone]

## 2019-10-16 ENCOUNTER — TELEPHONE (OUTPATIENT)
Dept: INTERNAL MEDICINE | Facility: CLINIC | Age: 44
End: 2019-10-16

## 2019-10-16 DIAGNOSIS — R31.9 HEMATURIA, UNSPECIFIED TYPE: Primary | ICD-10-CM

## 2019-10-17 ENCOUNTER — LAB VISIT (OUTPATIENT)
Dept: INTERNAL MEDICINE | Facility: CLINIC | Age: 44
End: 2019-10-17
Payer: COMMERCIAL

## 2019-10-17 DIAGNOSIS — R31.9 HEMATURIA, UNSPECIFIED TYPE: ICD-10-CM

## 2019-10-17 PROCEDURE — 87086 URINE CULTURE/COLONY COUNT: CPT

## 2019-10-17 NOTE — TELEPHONE ENCOUNTER
Spoke with Nery at [facility] who stated they have plenty of urine and are able to add a urine culture as long as we put the order in the computer. Urine culture pended.

## 2019-10-17 NOTE — TELEPHONE ENCOUNTER
It would not let me sign this order without changing to future/lab collect.  Please make sure this is OK, thanks.

## 2019-10-17 NOTE — TELEPHONE ENCOUNTER
Spoke with Nery at [facility] who stated to collect the lab and he will be able to add on the urine culture this afternoon. Urine culture was collected.

## 2019-10-19 LAB — BACTERIA UR CULT: NORMAL

## 2019-10-21 ENCOUNTER — HOSPITAL ENCOUNTER (OUTPATIENT)
Dept: CARDIOLOGY | Facility: CLINIC | Age: 44
Discharge: HOME | End: 2019-10-21
Payer: COMMERCIAL

## 2019-10-21 DIAGNOSIS — Z01.818 PRE-OP EXAM: ICD-10-CM

## 2019-10-21 PROCEDURE — 93005 EKG 12-LEAD: ICD-10-PCS | Mod: S$GLB,,, | Performed by: FAMILY MEDICINE

## 2019-10-21 PROCEDURE — 93005 ELECTROCARDIOGRAM TRACING: CPT | Mod: S$GLB,,, | Performed by: FAMILY MEDICINE

## 2019-10-21 PROCEDURE — 93010 EKG 12-LEAD: ICD-10-PCS | Mod: S$GLB,,, | Performed by: INTERNAL MEDICINE

## 2019-10-21 PROCEDURE — 93010 ELECTROCARDIOGRAM REPORT: CPT | Mod: S$GLB,,, | Performed by: INTERNAL MEDICINE

## 2019-10-23 ENCOUNTER — OFFICE VISIT (OUTPATIENT)
Dept: INTERNAL MEDICINE | Facility: CLINIC | Age: 44
End: 2019-10-23
Payer: COMMERCIAL

## 2019-10-23 VITALS
SYSTOLIC BLOOD PRESSURE: 122 MMHG | BODY MASS INDEX: 44.01 KG/M2 | HEART RATE: 78 BPM | WEIGHT: 290.38 LBS | OXYGEN SATURATION: 97 % | HEIGHT: 68 IN | DIASTOLIC BLOOD PRESSURE: 76 MMHG | TEMPERATURE: 98 F

## 2019-10-23 DIAGNOSIS — K29.70 GASTRITIS, PRESENCE OF BLEEDING UNSPECIFIED, UNSPECIFIED CHRONICITY, UNSPECIFIED GASTRITIS TYPE: ICD-10-CM

## 2019-10-23 DIAGNOSIS — F43.21 SITUATIONAL DEPRESSION: ICD-10-CM

## 2019-10-23 DIAGNOSIS — M47.816 LUMBAR SPONDYLOSIS: ICD-10-CM

## 2019-10-23 DIAGNOSIS — M43.10 ANTEROLISTHESIS: ICD-10-CM

## 2019-10-23 DIAGNOSIS — M48.061 NEURAL FORAMINAL STENOSIS OF LUMBAR SPINE: Primary | ICD-10-CM

## 2019-10-23 DIAGNOSIS — R11.0 NAUSEA: ICD-10-CM

## 2019-10-23 DIAGNOSIS — R14.0 FLATULENCE/GAS PAIN/BELCHING: ICD-10-CM

## 2019-10-23 PROCEDURE — 99999 PR PBB SHADOW E&M-EST. PATIENT-LVL IV: CPT | Mod: PBBFAC,,, | Performed by: FAMILY MEDICINE

## 2019-10-23 PROCEDURE — 99214 OFFICE O/P EST MOD 30 MIN: CPT | Mod: S$GLB,,, | Performed by: FAMILY MEDICINE

## 2019-10-23 PROCEDURE — 3008F PR BODY MASS INDEX (BMI) DOCUMENTED: ICD-10-PCS | Mod: CPTII,S$GLB,, | Performed by: FAMILY MEDICINE

## 2019-10-23 PROCEDURE — 99214 PR OFFICE/OUTPT VISIT, EST, LEVL IV, 30-39 MIN: ICD-10-PCS | Mod: S$GLB,,, | Performed by: FAMILY MEDICINE

## 2019-10-23 PROCEDURE — 99999 PR PBB SHADOW E&M-EST. PATIENT-LVL IV: ICD-10-PCS | Mod: PBBFAC,,, | Performed by: FAMILY MEDICINE

## 2019-10-23 PROCEDURE — 3008F BODY MASS INDEX DOCD: CPT | Mod: CPTII,S$GLB,, | Performed by: FAMILY MEDICINE

## 2019-10-23 RX ORDER — LEVOCETIRIZINE DIHYDROCHLORIDE 5 MG/1
5 TABLET, FILM COATED ORAL NIGHTLY
COMMUNITY
End: 2022-02-16

## 2019-10-23 RX ORDER — GABAPENTIN 300 MG/1
300 CAPSULE ORAL 3 TIMES DAILY
Qty: 90 CAPSULE | Refills: 11 | Status: CANCELLED | OUTPATIENT
Start: 2019-10-23 | End: 2020-10-22

## 2019-10-23 RX ORDER — PANTOPRAZOLE SODIUM 40 MG/1
40 TABLET, DELAYED RELEASE ORAL DAILY
Qty: 90 TABLET | Refills: 1 | Status: SHIPPED | OUTPATIENT
Start: 2019-10-23 | End: 2020-01-24

## 2019-10-23 RX ORDER — MINERAL OIL
180 ENEMA (ML) RECTAL
COMMUNITY

## 2019-10-23 RX ORDER — GABAPENTIN 100 MG/1
CAPSULE ORAL
Qty: 270 CAPSULE | Refills: 1 | Status: ON HOLD | OUTPATIENT
Start: 2019-10-23 | End: 2019-11-10 | Stop reason: HOSPADM

## 2019-10-23 RX ORDER — DULOXETIN HYDROCHLORIDE 30 MG/1
30 CAPSULE, DELAYED RELEASE ORAL DAILY
Qty: 90 CAPSULE | Refills: 1 | Status: SHIPPED | OUTPATIENT
Start: 2019-10-23 | End: 2020-03-18 | Stop reason: SDUPTHER
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
## 2019-10-31 ENCOUNTER — ANESTHESIA EVENT (OUTPATIENT)
Dept: SURGERY | Facility: HOSPITAL | Age: 44
DRG: 460 | End: 2019-10-31
Payer: COMMERCIAL

## 2019-11-01 ENCOUNTER — HOSPITAL ENCOUNTER (OUTPATIENT)
Dept: PREADMISSION TESTING | Facility: HOSPITAL | Age: 44
Discharge: HOME | End: 2019-11-01
Attending: NEUROLOGICAL SURGERY
Payer: COMMERCIAL

## 2019-11-01 DIAGNOSIS — M47.816 LUMBAR SPONDYLOSIS: Primary | ICD-10-CM

## 2019-11-01 RX ORDER — LIDOCAINE HYDROCHLORIDE 10 MG/ML
1 INJECTION, SOLUTION EPIDURAL; INFILTRATION; INTRACAUDAL; PERINEURAL ONCE
Status: CANCELLED | OUTPATIENT
Start: 2019-11-01 | End: 2019-11-01

## 2019-11-01 RX ORDER — SODIUM CHLORIDE, SODIUM LACTATE, POTASSIUM CHLORIDE, CALCIUM CHLORIDE 600; 310; 30; 20 MG/100ML; MG/100ML; MG/100ML; MG/100ML
INJECTION, SOLUTION INTRAVENOUS CONTINUOUS
Status: CANCELLED | OUTPATIENT
Start: 2019-11-01

## 2019-11-01 RX ORDER — SCOLOPAMINE TRANSDERMAL SYSTEM 1 MG/1
1 PATCH, EXTENDED RELEASE TRANSDERMAL
Status: CANCELLED | OUTPATIENT
Start: 2019-11-01

## 2019-11-01 RX ORDER — ONDANSETRON 8 MG/1
8 TABLET, ORALLY DISINTEGRATING ORAL EVERY 6 HOURS PRN
Status: CANCELLED | OUTPATIENT
Start: 2019-11-01

## 2019-11-01 NOTE — DISCHARGE INSTRUCTIONS
Your surgery is scheduled for 11/7/19.    Please report to Procedure Check In Room on the 2nd FLOOR at 7:00a.m.          INSTRUCTIONS IMPORTANT!!!  ¨ Do not eat or drink after 12 midnight-including water. OK to brush teeth, no   gum, candy or mints!          ____  Proceed to [facility] on 11/1/19 for additional testing.        ____  Do not wear makeup, including mascara.  ____  No powder, lotions or creams to surgical area.  ____  Please remove all jewelry, including piercings and leave at home.  ____  No money or valuables needed. Please leave at home.  ____  Please bring any documents given by your doctor.  ____  If going home the same day, arrange for a ride home. You will not be able to             drive if Anesthesia was used.  ____  Wear loose fitting clothing. Allow for dressings, bandages.  ____  Stop Aspirin, Ibuprofen, Motrin and Aleve at least 3-5 days before surgery, unless otherwise instructed by your doctor, or the nurse.   You MAY use Tylenol/acetaminophen until day of surgery.  ____  Wash the surgical area with Hibiclens the night before surgery, and again the             morning of surgery.  Be sure to rinse hibiclens off completely (if instructed by   nurse).  ____  If you take diabetic medication, do not take am of surgery unless instructed by Doctor.  ____  Call MD for temperature above 101 degrees or any other signs of infection such as Urinary (bladder) infection, Upper respiratory infection, skin boils, etc.  ____ Stop taking any Fish Oil supplement or any Vitamins that contain Vitamin E at least 5 days prior to surgery.  ____ Do Not wear your contact lenses the day of your procedure.  You may wear your glasses.      ____Do not shave surgical site for 3 days prior to surgery.  ____ Practice Good hand washing before, during, and after procedure.      I have read or had read and explained to me, and understand the above information.  Additional comments or instructions:  For  additional questions call [phone]      ANESTHESIA SIDE EFFECTS  -For the first 24 hours after surgery:  Do not drive, use heavy equipment, make important decisions, or drink alcohol  -It is normal to feel sleepy for several hours.  Rest until you are more awake.  -Have someone stay with you, if needed.  They can watch for problems and help keep you safe.  -Some possible post anesthesia side effects include: nausea and vomiting, sore throat and hoarseness, sleepiness, and dizziness.        Pre-Op Bathing Instructions    Before surgery, you can play an important role in your own health.    Because skin is not sterile, we need to be sure that your skin is as free of germs as possible. By following the instructions below, you can reduce the number of germs on your skin before surgery.    IMPORTANT: You will need to shower with a special soap called Hibiclens*, available at any pharmacy.  If you are allergic to Chlorhexidine (the antiseptic in Hibiclens), use an antibacterial soap such as Dial Soap for your preoperative shower.  You will shower with Hibiclens both the night before your surgery and the morning of your surgery.  Do not use Hibiclens on the head, face or genitals to avoid injury to those areas.    STEP #1: THE NIGHT BEFORE YOUR SURGERY     1. Do not shave the area of your body where your surgery will be performed.  2. Shower and wash your hair and body as usual with your normal soap and shampoo.  3. Rinse your hair and body thoroughly after you shower to remove all soap residue.  4. With your hand, apply one packet of Hibiclens soap to the surgical site.   5. Wash the site gently for five (5) minutes. Do not scrub your skin too hard.   6. Do not wash with your regular soap after Hibiclens is used.  7. Rinse your body thoroughly.  8. Pat yourself dry with a clean, soft towel.  9. Do not use lotion, cream, or powder.  10. Wear clean clothes.    STEP #2: THE MORNING OF YOUR SURGERY     1. Repeat Step #1.    *  Not to be used by people allergic to Chlorhexidine.

## 2019-11-01 NOTE — PRE-PROCEDURE INSTRUCTIONS
Ashok - [phone] -     Allergies, medical, surgical, family and psychosocial histories reviewed with patient. Periop plan of care reviewed. Preop instructions given, including medications to take and to hold. Hibiclens soap and instructions on use given. Time allotted for questions to be addressed.  Patient verbalized understanding.

## 2019-11-01 NOTE — ANESTHESIA PREPROCEDURE EVALUATION
11/01/2019         Dianne Hemphill is a 44 y.o., female is scheduled for L5-S1 spinal fusion under GETA.     PCP note of medical optimization is in patient chart addendum signed 10/21/2019 for visit 9/18/2019.       Past Medical History:   Diagnosis Date    GERD (gastroesophageal reflux disease)     History of migraine headaches     Obesity     Pollen allergies     Smoker      Past Surgical History:   Procedure Laterality Date    CHOLECYSTECTOMY      ESOPHAGOGASTRODUODENOSCOPY      EXPLORATORY LAPAROTOMY      HYSTERECTOMY      fibroids    TONSILLECTOMY         Review of patient's allergies indicates:   Allergen Reactions    Codeine Nausea And Vomiting    Demerol [meperidine] Nausea And Vomiting    Imitrex [sumatriptan succinate]      Hysterics      Morphine Nausea And Vomiting    Tetracyclines Nausea And Vomiting    Azithromycin Rash    Ciprofloxacin hcl Rash       Anesthesia Evaluation    I have reviewed the Patient Summary Reports.    I have reviewed the Nursing Notes.   I have reviewed the Medications.     Review of Systems  Anesthesia Hx:  No problems with previous Anesthesia  History of prior surgery of interest to airway management or planning: Previous anesthesia: General, MAC Denies Family Hx of Anesthesia complications.  Personal Hx of Anesthesia complications, Post-Operative Nausea/Vomiting, in the past, but not with recent anesthetics / prophylaxis   Social:  No Alcohol Use, Smoker Patient was counseled on smoknig cessation for 6 minutes.       Hematology/Oncology:  Hematology Normal       -- Thrombocytosis (states chronic):   EENT/Dental:EENT/Dental Normal   Cardiovascular:   Exercise tolerance: good Denies Hypertension.  Denies CAD.    ECG has been reviewed.    Pulmonary:   Denies Shortness of breath.  Denies Sleep Apnea.    Renal/:  Renal/ Normal     Hepatic/GI:   GERD, well  controlled Denies Liver Disease.    Musculoskeletal:  Spine Disorders: lumbar Chronic Pain and Degenerative disease    Neurological:  Neurology Normal    Endocrine:  Endocrine Normal    Psych:   anxiety            Physical Exam  General:  Well nourished, Morbid Obesity    Airway/Jaw/Neck:  Airway Findings: Mouth Opening: Normal Tongue: Normal  General Airway Assessment: Adult  Mallampati: II  TM Distance: 4 - 6 cm  Jaw/Neck Findings:  Neck ROM: Normal ROM  Neck Findings: Normal    Eyes/Ears/Nose:  EYES/EARS/NOSE FINDINGS: Normal   Dental:  DENTAL FINDINGS: Normal   Chest/Lungs:  Chest/Lungs Findings: Clear to auscultation, Normal Respiratory Rate     Heart/Vascular:  Heart Findings: Rate: Normal  Rhythm: Regular Rhythm     Abdomen:  Abdomen Findings: Normal    Musculoskeletal:  Musculoskeletal Findings: Tender Joint Lumbar tenderness     Mental Status:  Mental Status Findings: Normal      EKG 10/21/2019  Normal sinus rhythm  Normal ECG  When compared with ECG of 16-NOV-2018 14:03,  No significant change was found  Confirmed by DAVE RIGGS MD ([id number]) on 10/21/2019 11:38:50 AM    Anesthesia Plan  Type of Anesthesia, risks & benefits discussed:  Anesthesia Type:  general  Patient's Preference:   Intra-op Monitoring Plan:   Intra-op Monitoring Plan Comments:   Post Op Pain Control Plan:   Post Op Pain Control Plan Comments:   Induction:   IV  Beta Blocker:  Patient is not currently on a Beta-Blocker (No further documentation required).       Informed Consent: Patient understands risks and agrees with Anesthesia plan.  Questions answered. Anesthesia consent signed with patient.  ASA Score: 3     Day of Surgery Review of History & Physical: I have interviewed and examined the patient. I have reviewed the patient's H&P dated:        Anesthesia Plan Notes: PCP note of medical optimization is in patient chart addendum signed 10/21/2019 for visit 9/18/2019.           Ready For Surgery From Anesthesia Perspective.

## 2019-11-07 ENCOUNTER — ANESTHESIA (OUTPATIENT)
Dept: SURGERY | Facility: HOSPITAL | Age: 44
DRG: 460 | End: 2019-11-07
Payer: COMMERCIAL

## 2019-11-07 ENCOUNTER — HOSPITAL ENCOUNTER (INPATIENT)
Facility: HOSPITAL | Age: 44
LOS: 4 days | Discharge: HOME HEALTH | DRG: 460 | End: 2019-11-11
Attending: NEUROLOGICAL SURGERY | Admitting: NEUROLOGICAL SURGERY
Payer: COMMERCIAL

## 2019-11-07 ENCOUNTER — TELEPHONE (OUTPATIENT)
Dept: NEUROSURGERY | Facility: CLINIC | Age: 44
End: 2019-11-07

## 2019-11-07 DIAGNOSIS — M48.061 NEURAL FORAMINAL STENOSIS OF LUMBAR SPINE: ICD-10-CM

## 2019-11-07 DIAGNOSIS — R16.0 HEPATOMEGALY: ICD-10-CM

## 2019-11-07 DIAGNOSIS — R10.12 LEFT UPPER QUADRANT PAIN: ICD-10-CM

## 2019-11-07 DIAGNOSIS — R11.0 NAUSEA: ICD-10-CM

## 2019-11-07 DIAGNOSIS — M47.816 LUMBAR SPONDYLOSIS: Primary | ICD-10-CM

## 2019-11-07 DIAGNOSIS — M43.16 SPONDYLOLISTHESIS OF LUMBAR REGION: ICD-10-CM

## 2019-11-07 DIAGNOSIS — M43.10 ANTEROLISTHESIS: ICD-10-CM

## 2019-11-07 DIAGNOSIS — N83.202 CYST OF LEFT OVARY: ICD-10-CM

## 2019-11-07 DIAGNOSIS — Z87.891 FORMER SMOKELESS TOBACCO USE: ICD-10-CM

## 2019-11-07 PROCEDURE — 63600175 PHARM REV CODE 636 W HCPCS: Performed by: ANESTHESIOLOGY

## 2019-11-07 PROCEDURE — 11000001 HC ACUTE MED/SURG PRIVATE ROOM

## 2019-11-07 PROCEDURE — 22840 PR POSTERIOR NON-SEGMENTAL INSTRUMENTATION: ICD-10-PCS | Mod: AS,,, | Performed by: PHYSICIAN ASSISTANT

## 2019-11-07 PROCEDURE — 22840 PR POSTERIOR NON-SEGMENTAL INSTRUMENTATION: ICD-10-PCS | Mod: ,,, | Performed by: NEUROLOGICAL SURGERY

## 2019-11-07 PROCEDURE — 37000009 HC ANESTHESIA EA ADD 15 MINS: Performed by: NEUROLOGICAL SURGERY

## 2019-11-07 PROCEDURE — 22840 INSERT SPINE FIXATION DEVICE: CPT | Mod: ,,, | Performed by: NEUROLOGICAL SURGERY

## 2019-11-07 PROCEDURE — 63600175 PHARM REV CODE 636 W HCPCS: Performed by: PHYSICIAN ASSISTANT

## 2019-11-07 PROCEDURE — 22558 PR ARTHRODESIS ANT INTERBODY MIN DISCECTOMY,LUMBAR: ICD-10-PCS | Mod: 22,,, | Performed by: NEUROLOGICAL SURGERY

## 2019-11-07 PROCEDURE — 20930 SP BONE ALGRFT MORSEL ADD-ON: CPT | Mod: ,,, | Performed by: NEUROLOGICAL SURGERY

## 2019-11-07 PROCEDURE — 22853 INSJ BIOMECHANICAL DEVICE: CPT | Mod: AS,,, | Performed by: PHYSICIAN ASSISTANT

## 2019-11-07 PROCEDURE — 22853 PR INSERT BIOMECH DEV W/INTERBODY ARTHRODESIS, EA CONTIGUOUS DEFECT: ICD-10-PCS | Mod: AS,,, | Performed by: PHYSICIAN ASSISTANT

## 2019-11-07 PROCEDURE — 22853 PR INSERT BIOMECH DEV W/INTERBODY ARTHRODESIS, EA CONTIGUOUS DEFECT: ICD-10-PCS | Mod: ,,, | Performed by: NEUROLOGICAL SURGERY

## 2019-11-07 PROCEDURE — 22558 ARTHRD ANT NTRBD MIN DSC LUM: CPT | Mod: 22,,, | Performed by: NEUROLOGICAL SURGERY

## 2019-11-07 PROCEDURE — 25000003 PHARM REV CODE 250: Performed by: NEUROLOGICAL SURGERY

## 2019-11-07 PROCEDURE — 63600175 PHARM REV CODE 636 W HCPCS: Performed by: NURSE ANESTHETIST, CERTIFIED REGISTERED

## 2019-11-07 PROCEDURE — C1769 GUIDE WIRE: HCPCS | Performed by: NEUROLOGICAL SURGERY

## 2019-11-07 PROCEDURE — 22558 PR ARTHRODESIS ANT INTERBODY MIN DISCECTOMY,LUMBAR: ICD-10-PCS | Mod: 22,AS,, | Performed by: PHYSICIAN ASSISTANT

## 2019-11-07 PROCEDURE — 25000003 PHARM REV CODE 250: Performed by: NURSE ANESTHETIST, CERTIFIED REGISTERED

## 2019-11-07 PROCEDURE — 22853 INSJ BIOMECHANICAL DEVICE: CPT | Mod: ,,, | Performed by: NEUROLOGICAL SURGERY

## 2019-11-07 PROCEDURE — 27201423 OPTIME MED/SURG SUP & DEVICES STERILE SUPPLY: Performed by: NEUROLOGICAL SURGERY

## 2019-11-07 PROCEDURE — C1713 ANCHOR/SCREW BN/BN,TIS/BN: HCPCS | Performed by: NEUROLOGICAL SURGERY

## 2019-11-07 PROCEDURE — 25000003 PHARM REV CODE 250: Performed by: PHYSICIAN ASSISTANT

## 2019-11-07 PROCEDURE — 25000003 PHARM REV CODE 250: Performed by: NURSE PRACTITIONER

## 2019-11-07 PROCEDURE — 27800903 OPTIME MED/SURG SUP & DEVICES OTHER IMPLANTS: Performed by: NEUROLOGICAL SURGERY

## 2019-11-07 PROCEDURE — 94761 N-INVAS EAR/PLS OXIMETRY MLT: CPT

## 2019-11-07 PROCEDURE — 71000039 HC RECOVERY, EACH ADD'L HOUR: Performed by: NEUROLOGICAL SURGERY

## 2019-11-07 PROCEDURE — 22840 INSERT SPINE FIXATION DEVICE: CPT | Mod: AS,,, | Performed by: PHYSICIAN ASSISTANT

## 2019-11-07 PROCEDURE — 63600175 PHARM REV CODE 636 W HCPCS: Performed by: NEUROLOGICAL SURGERY

## 2019-11-07 PROCEDURE — 20930 PR ALLOGRAFT FOR SPINE SURGERY ONLY MORSELIZED: ICD-10-PCS | Mod: ,,, | Performed by: NEUROLOGICAL SURGERY

## 2019-11-07 PROCEDURE — 71000033 HC RECOVERY, INTIAL HOUR: Performed by: NEUROLOGICAL SURGERY

## 2019-11-07 PROCEDURE — 36000711: Performed by: NEUROLOGICAL SURGERY

## 2019-11-07 PROCEDURE — 36000710: Performed by: NEUROLOGICAL SURGERY

## 2019-11-07 PROCEDURE — 25000003 PHARM REV CODE 250: Performed by: ANESTHESIOLOGY

## 2019-11-07 PROCEDURE — 37000008 HC ANESTHESIA 1ST 15 MINUTES: Performed by: NEUROLOGICAL SURGERY

## 2019-11-07 PROCEDURE — 22558 ARTHRD ANT NTRBD MIN DSC LUM: CPT | Mod: 22,AS,, | Performed by: PHYSICIAN ASSISTANT

## 2019-11-07 DEVICE — TULIP CREO MIS MOD POLY 30MM: Type: IMPLANTABLE DEVICE | Site: BACK | Status: FUNCTIONAL

## 2019-11-07 DEVICE — SCREW CREO CANN MOD 6.5X45MM: Type: IMPLANTABLE DEVICE | Site: BACK | Status: FUNCTIONAL

## 2019-11-07 DEVICE — PUTTY DBX 1CC: Type: IMPLANTABLE DEVICE | Site: BACK | Status: FUNCTIONAL

## 2019-11-07 DEVICE — ALLOGRAFT TRIFECTA 1CC: Type: IMPLANTABLE DEVICE | Site: BACK | Status: FUNCTIONAL

## 2019-11-07 DEVICE — CAP LOCKING CREO MIS: Type: IMPLANTABLE DEVICE | Site: BACK | Status: FUNCTIONAL

## 2019-11-07 DEVICE — IMPLANTABLE DEVICE: Type: IMPLANTABLE DEVICE | Site: BACK | Status: FUNCTIONAL

## 2019-11-07 DEVICE — WIRE K BLUNT TIP 1.5X500MM: Type: IMPLANTABLE DEVICE | Site: BACK | Status: FUNCTIONAL

## 2019-11-07 RX ORDER — SUCCINYLCHOLINE CHLORIDE 20 MG/ML
INJECTION INTRAMUSCULAR; INTRAVENOUS
Status: DISCONTINUED | OUTPATIENT
Start: 2019-11-07 | End: 2019-11-07

## 2019-11-07 RX ORDER — GABAPENTIN 100 MG/1
100 CAPSULE ORAL NIGHTLY
Status: DISCONTINUED | OUTPATIENT
Start: 2019-11-07 | End: 2019-11-11 | Stop reason: HOSPADM

## 2019-11-07 RX ORDER — DULOXETIN HYDROCHLORIDE 30 MG/1
30 CAPSULE, DELAYED RELEASE ORAL DAILY
Status: DISCONTINUED | OUTPATIENT
Start: 2019-11-07 | End: 2019-11-11 | Stop reason: HOSPADM

## 2019-11-07 RX ORDER — SCOLOPAMINE TRANSDERMAL SYSTEM 1 MG/1
1 PATCH, EXTENDED RELEASE TRANSDERMAL
Status: COMPLETED | OUTPATIENT
Start: 2019-11-07 | End: 2019-11-10

## 2019-11-07 RX ORDER — HYDROMORPHONE HYDROCHLORIDE 2 MG/ML
2 INJECTION, SOLUTION INTRAMUSCULAR; INTRAVENOUS; SUBCUTANEOUS
Status: DISCONTINUED | OUTPATIENT
Start: 2019-11-07 | End: 2019-11-11 | Stop reason: HOSPADM

## 2019-11-07 RX ORDER — ONDANSETRON 2 MG/ML
4 INJECTION INTRAMUSCULAR; INTRAVENOUS DAILY PRN
Status: DISCONTINUED | OUTPATIENT
Start: 2019-11-07 | End: 2019-11-07

## 2019-11-07 RX ORDER — ONDANSETRON 8 MG/1
8 TABLET, ORALLY DISINTEGRATING ORAL EVERY 6 HOURS PRN
Status: DISCONTINUED | OUTPATIENT
Start: 2019-11-07 | End: 2019-11-10

## 2019-11-07 RX ORDER — ACETAMINOPHEN 325 MG/1
650 TABLET ORAL
Status: DISCONTINUED | OUTPATIENT
Start: 2019-11-07 | End: 2019-11-07

## 2019-11-07 RX ORDER — ACETAMINOPHEN 10 MG/ML
INJECTION, SOLUTION INTRAVENOUS
Status: DISCONTINUED | OUTPATIENT
Start: 2019-11-07 | End: 2019-11-07

## 2019-11-07 RX ORDER — ACETAMINOPHEN 325 MG/1
650 TABLET ORAL EVERY 6 HOURS PRN
Status: DISCONTINUED | OUTPATIENT
Start: 2019-11-07 | End: 2019-11-11 | Stop reason: HOSPADM

## 2019-11-07 RX ORDER — BUPIVACAINE HCL/EPINEPHRINE 0.5-1:200K
VIAL (ML) INJECTION
Status: DISCONTINUED | OUTPATIENT
Start: 2019-11-07 | End: 2019-11-07 | Stop reason: HOSPADM

## 2019-11-07 RX ORDER — KETAMINE HCL IN 0.9 % NACL 50 MG/5 ML
SYRINGE (ML) INTRAVENOUS
Status: DISCONTINUED | OUTPATIENT
Start: 2019-11-07 | End: 2019-11-07

## 2019-11-07 RX ORDER — HYDROMORPHONE HYDROCHLORIDE 2 MG/ML
0.5 INJECTION, SOLUTION INTRAMUSCULAR; INTRAVENOUS; SUBCUTANEOUS EVERY 5 MIN PRN
Status: DISCONTINUED | OUTPATIENT
Start: 2019-11-07 | End: 2019-11-07

## 2019-11-07 RX ORDER — SCOLOPAMINE TRANSDERMAL SYSTEM 1 MG/1
1 PATCH, EXTENDED RELEASE TRANSDERMAL
Status: DISCONTINUED | OUTPATIENT
Start: 2019-11-07 | End: 2019-11-07

## 2019-11-07 RX ORDER — PROPOFOL 10 MG/ML
VIAL (ML) INTRAVENOUS CONTINUOUS PRN
Status: DISCONTINUED | OUTPATIENT
Start: 2019-11-07 | End: 2019-11-07

## 2019-11-07 RX ORDER — LIDOCAINE HYDROCHLORIDE 10 MG/ML
1 INJECTION, SOLUTION EPIDURAL; INFILTRATION; INTRACAUDAL; PERINEURAL ONCE
Status: DISCONTINUED | OUTPATIENT
Start: 2019-11-07 | End: 2019-11-07

## 2019-11-07 RX ORDER — METHOCARBAMOL 750 MG/1
750 TABLET, FILM COATED ORAL 3 TIMES DAILY
Status: DISCONTINUED | OUTPATIENT
Start: 2019-11-07 | End: 2019-11-08

## 2019-11-07 RX ORDER — AMOXICILLIN 250 MG
2 CAPSULE ORAL NIGHTLY PRN
Status: DISCONTINUED | OUTPATIENT
Start: 2019-11-07 | End: 2019-11-09

## 2019-11-07 RX ORDER — PANTOPRAZOLE SODIUM 40 MG/1
40 TABLET, DELAYED RELEASE ORAL DAILY
Status: DISCONTINUED | OUTPATIENT
Start: 2019-11-07 | End: 2019-11-11 | Stop reason: HOSPADM

## 2019-11-07 RX ORDER — GENTAMICIN SULFATE 80 MG/100ML
80 INJECTION, SOLUTION INTRAVENOUS
Status: DISCONTINUED | OUTPATIENT
Start: 2019-11-07 | End: 2019-11-07

## 2019-11-07 RX ORDER — PREGABALIN 75 MG/1
75 CAPSULE ORAL
Status: COMPLETED | OUTPATIENT
Start: 2019-11-07 | End: 2019-11-07

## 2019-11-07 RX ORDER — GLYCOPYRROLATE 0.2 MG/ML
INJECTION INTRAMUSCULAR; INTRAVENOUS
Status: DISCONTINUED | OUTPATIENT
Start: 2019-11-07 | End: 2019-11-07

## 2019-11-07 RX ORDER — ONDANSETRON 8 MG/1
8 TABLET, ORALLY DISINTEGRATING ORAL EVERY 12 HOURS PRN
Status: DISCONTINUED | OUTPATIENT
Start: 2019-11-07 | End: 2019-11-11 | Stop reason: HOSPADM

## 2019-11-07 RX ORDER — CELECOXIB 100 MG/1
200 CAPSULE ORAL
Status: COMPLETED | OUTPATIENT
Start: 2019-11-07 | End: 2019-11-07

## 2019-11-07 RX ORDER — OXYCODONE HYDROCHLORIDE 5 MG/1
5 TABLET ORAL EVERY 4 HOURS PRN
Status: DISCONTINUED | OUTPATIENT
Start: 2019-11-07 | End: 2019-11-08

## 2019-11-07 RX ORDER — VANCOMYCIN HYDROCHLORIDE 1 G/20ML
INJECTION, POWDER, LYOPHILIZED, FOR SOLUTION INTRAVENOUS
Status: DISCONTINUED | OUTPATIENT
Start: 2019-11-07 | End: 2019-11-07 | Stop reason: HOSPADM

## 2019-11-07 RX ORDER — MIDAZOLAM HYDROCHLORIDE 1 MG/ML
INJECTION, SOLUTION INTRAMUSCULAR; INTRAVENOUS
Status: DISCONTINUED | OUTPATIENT
Start: 2019-11-07 | End: 2019-11-07

## 2019-11-07 RX ORDER — MIDAZOLAM HYDROCHLORIDE 1 MG/ML
INJECTION INTRAMUSCULAR; INTRAVENOUS
Status: DISPENSED
Start: 2019-11-07 | End: 2019-11-07

## 2019-11-07 RX ORDER — SODIUM CHLORIDE 0.9 % (FLUSH) 0.9 %
3 SYRINGE (ML) INJECTION
Status: DISCONTINUED | OUTPATIENT
Start: 2019-11-07 | End: 2019-11-11 | Stop reason: HOSPADM

## 2019-11-07 RX ORDER — DEXAMETHASONE SODIUM PHOSPHATE 4 MG/ML
INJECTION, SOLUTION INTRA-ARTICULAR; INTRALESIONAL; INTRAMUSCULAR; INTRAVENOUS; SOFT TISSUE
Status: DISCONTINUED | OUTPATIENT
Start: 2019-11-07 | End: 2019-11-07

## 2019-11-07 RX ORDER — CYCLOBENZAPRINE HCL 10 MG
10 TABLET ORAL
Status: COMPLETED | OUTPATIENT
Start: 2019-11-07 | End: 2019-11-07

## 2019-11-07 RX ORDER — ACETAMINOPHEN 500 MG
1000 TABLET ORAL
Status: COMPLETED | OUTPATIENT
Start: 2019-11-07 | End: 2019-11-07

## 2019-11-07 RX ORDER — SODIUM CHLORIDE, SODIUM LACTATE, POTASSIUM CHLORIDE, CALCIUM CHLORIDE 600; 310; 30; 20 MG/100ML; MG/100ML; MG/100ML; MG/100ML
INJECTION, SOLUTION INTRAVENOUS CONTINUOUS
Status: DISCONTINUED | OUTPATIENT
Start: 2019-11-07 | End: 2019-11-07

## 2019-11-07 RX ORDER — BACITRACIN 50000 [IU]/1
INJECTION, POWDER, FOR SOLUTION INTRAMUSCULAR
Status: DISCONTINUED | OUTPATIENT
Start: 2019-11-07 | End: 2019-11-07 | Stop reason: HOSPADM

## 2019-11-07 RX ORDER — LIDOCAINE HCL/PF 100 MG/5ML
SYRINGE (ML) INTRAVENOUS
Status: DISCONTINUED | OUTPATIENT
Start: 2019-11-07 | End: 2019-11-07

## 2019-11-07 RX ORDER — ONDANSETRON 8 MG/1
8 TABLET, ORALLY DISINTEGRATING ORAL EVERY 6 HOURS PRN
Status: DISCONTINUED | OUTPATIENT
Start: 2019-11-07 | End: 2019-11-11 | Stop reason: HOSPADM

## 2019-11-07 RX ORDER — HYDROMORPHONE HYDROCHLORIDE 2 MG/ML
0.25 INJECTION, SOLUTION INTRAMUSCULAR; INTRAVENOUS; SUBCUTANEOUS EVERY 5 MIN PRN
Status: DISCONTINUED | OUTPATIENT
Start: 2019-11-07 | End: 2019-11-07

## 2019-11-07 RX ORDER — FENTANYL CITRATE 50 UG/ML
INJECTION, SOLUTION INTRAMUSCULAR; INTRAVENOUS
Status: DISCONTINUED | OUTPATIENT
Start: 2019-11-07 | End: 2019-11-07

## 2019-11-07 RX ORDER — TRAMADOL HYDROCHLORIDE 50 MG/1
100 TABLET ORAL EVERY 6 HOURS SCHEDULED
Status: DISCONTINUED | OUTPATIENT
Start: 2019-11-07 | End: 2019-11-11 | Stop reason: HOSPADM

## 2019-11-07 RX ORDER — GABAPENTIN 300 MG/1
300 CAPSULE ORAL 3 TIMES DAILY
Status: DISCONTINUED | OUTPATIENT
Start: 2019-11-07 | End: 2019-11-11 | Stop reason: HOSPADM

## 2019-11-07 RX ORDER — SODIUM CHLORIDE 0.9 % (FLUSH) 0.9 %
3 SYRINGE (ML) INJECTION EVERY 8 HOURS
Status: DISCONTINUED | OUTPATIENT
Start: 2019-11-07 | End: 2019-11-07

## 2019-11-07 RX ORDER — SODIUM CHLORIDE 9 MG/ML
INJECTION, SOLUTION INTRAVENOUS CONTINUOUS PRN
Status: DISCONTINUED | OUTPATIENT
Start: 2019-11-07 | End: 2019-11-07

## 2019-11-07 RX ORDER — MUPIROCIN 20 MG/G
1 OINTMENT TOPICAL 2 TIMES DAILY
Status: DISCONTINUED | OUTPATIENT
Start: 2019-11-07 | End: 2019-11-11 | Stop reason: HOSPADM

## 2019-11-07 RX ORDER — SODIUM CHLORIDE 9 MG/ML
INJECTION, SOLUTION INTRAVENOUS CONTINUOUS
Status: DISCONTINUED | OUTPATIENT
Start: 2019-11-07 | End: 2019-11-08

## 2019-11-07 RX ORDER — SODIUM CHLORIDE, SODIUM LACTATE, POTASSIUM CHLORIDE, CALCIUM CHLORIDE 600; 310; 30; 20 MG/100ML; MG/100ML; MG/100ML; MG/100ML
INJECTION, SOLUTION INTRAVENOUS CONTINUOUS PRN
Status: DISCONTINUED | OUTPATIENT
Start: 2019-11-07 | End: 2019-11-07

## 2019-11-07 RX ORDER — MAG HYDROX/ALUMINUM HYD/SIMETH 200-200-20
30 SUSPENSION, ORAL (FINAL DOSE FORM) ORAL EVERY 4 HOURS PRN
Status: DISCONTINUED | OUTPATIENT
Start: 2019-11-07 | End: 2019-11-11 | Stop reason: HOSPADM

## 2019-11-07 RX ORDER — PHENYLEPHRINE HYDROCHLORIDE 10 MG/ML
INJECTION INTRAVENOUS
Status: DISCONTINUED | OUTPATIENT
Start: 2019-11-07 | End: 2019-11-07

## 2019-11-07 RX ORDER — CELECOXIB 100 MG/1
200 CAPSULE ORAL 2 TIMES DAILY
Status: DISCONTINUED | OUTPATIENT
Start: 2019-11-07 | End: 2019-11-11 | Stop reason: HOSPADM

## 2019-11-07 RX ORDER — ACETAMINOPHEN 325 MG/1
650 TABLET ORAL EVERY 6 HOURS SCHEDULED
Status: DISCONTINUED | OUTPATIENT
Start: 2019-11-08 | End: 2019-11-11 | Stop reason: HOSPADM

## 2019-11-07 RX ORDER — PROPOFOL 10 MG/ML
VIAL (ML) INTRAVENOUS
Status: DISCONTINUED | OUTPATIENT
Start: 2019-11-07 | End: 2019-11-07

## 2019-11-07 RX ORDER — BISACODYL 10 MG
10 SUPPOSITORY, RECTAL RECTAL DAILY PRN
Status: DISCONTINUED | OUTPATIENT
Start: 2019-11-07 | End: 2019-11-11 | Stop reason: HOSPADM

## 2019-11-07 RX ORDER — HEPARIN SODIUM 5000 [USP'U]/ML
7500 INJECTION, SOLUTION INTRAVENOUS; SUBCUTANEOUS EVERY 8 HOURS
Status: DISCONTINUED | OUTPATIENT
Start: 2019-11-07 | End: 2019-11-11 | Stop reason: HOSPADM

## 2019-11-07 RX ORDER — BUPIVACAINE HYDROCHLORIDE AND EPINEPHRINE 5; 5 MG/ML; UG/ML
30 INJECTION, SOLUTION EPIDURAL; INTRACAUDAL; PERINEURAL ONCE
Status: DISCONTINUED | OUTPATIENT
Start: 2019-11-07 | End: 2019-11-07

## 2019-11-07 RX ORDER — EPHEDRINE SULFATE 50 MG/ML
INJECTION, SOLUTION INTRAVENOUS
Status: DISCONTINUED | OUTPATIENT
Start: 2019-11-07 | End: 2019-11-07

## 2019-11-07 RX ORDER — OXYCODONE HCL 10 MG/1
10 TABLET, FILM COATED, EXTENDED RELEASE ORAL
Status: COMPLETED | OUTPATIENT
Start: 2019-11-07 | End: 2019-11-07

## 2019-11-07 RX ADMIN — SUCCINYLCHOLINE CHLORIDE 160 MG: 20 INJECTION, SOLUTION INTRAMUSCULAR; INTRAVENOUS at 07:11

## 2019-11-07 RX ADMIN — CYCLOBENZAPRINE 10 MG: 10 TABLET, FILM COATED ORAL at 06:11

## 2019-11-07 RX ADMIN — SCOPALAMINE 1 PATCH: 1 PATCH, EXTENDED RELEASE TRANSDERMAL at 06:11

## 2019-11-07 RX ADMIN — EPHEDRINE SULFATE 10 MG: 50 INJECTION, SOLUTION INTRAMUSCULAR; INTRAVENOUS; SUBCUTANEOUS at 08:11

## 2019-11-07 RX ADMIN — GENTAMICIN SULFATE 80 MG: 80 INJECTION, SOLUTION INTRAVENOUS at 07:11

## 2019-11-07 RX ADMIN — PROPOFOL 200 MG: 10 INJECTION, EMULSION INTRAVENOUS at 07:11

## 2019-11-07 RX ADMIN — PANTOPRAZOLE SODIUM 40 MG: 40 TABLET, DELAYED RELEASE ORAL at 05:11

## 2019-11-07 RX ADMIN — ONDANSETRON 8 MG: 8 TABLET, ORALLY DISINTEGRATING ORAL at 11:11

## 2019-11-07 RX ADMIN — GLYCOPYRROLATE 0.2 MG: 0.2 INJECTION, SOLUTION INTRAMUSCULAR; INTRAVENOUS at 07:11

## 2019-11-07 RX ADMIN — ONDANSETRON 8 MG: 8 TABLET, ORALLY DISINTEGRATING ORAL at 09:11

## 2019-11-07 RX ADMIN — ONDANSETRON 8 MG: 8 TABLET, ORALLY DISINTEGRATING ORAL at 06:11

## 2019-11-07 RX ADMIN — CELECOXIB 200 MG: 100 CAPSULE ORAL at 09:11

## 2019-11-07 RX ADMIN — PREGABALIN 75 MG: 75 CAPSULE ORAL at 06:11

## 2019-11-07 RX ADMIN — EPHEDRINE SULFATE 10 MG: 50 INJECTION, SOLUTION INTRAMUSCULAR; INTRAVENOUS; SUBCUTANEOUS at 10:11

## 2019-11-07 RX ADMIN — DEXAMETHASONE SODIUM PHOSPHATE 8 MG: 4 INJECTION, SOLUTION INTRAMUSCULAR; INTRAVENOUS at 07:11

## 2019-11-07 RX ADMIN — LIDOCAINE HYDROCHLORIDE 100 MG: 20 INJECTION, SOLUTION INTRAVENOUS at 07:11

## 2019-11-07 RX ADMIN — SODIUM CHLORIDE: 0.9 INJECTION, SOLUTION INTRAVENOUS at 10:11

## 2019-11-07 RX ADMIN — MIDAZOLAM 2 MG: 1 INJECTION INTRAMUSCULAR; INTRAVENOUS at 07:11

## 2019-11-07 RX ADMIN — KETAMINE HYDROCHLORIDE 5 MCG/KG/MIN: 50 INJECTION, SOLUTION, CONCENTRATE INTRAMUSCULAR; INTRAVENOUS at 07:11

## 2019-11-07 RX ADMIN — TRAMADOL HYDROCHLORIDE 100 MG: 50 TABLET, FILM COATED ORAL at 05:11

## 2019-11-07 RX ADMIN — GABAPENTIN 300 MG: 100 CAPSULE ORAL at 09:11

## 2019-11-07 RX ADMIN — SODIUM CHLORIDE: 0.9 INJECTION, SOLUTION INTRAVENOUS at 07:11

## 2019-11-07 RX ADMIN — HYDROMORPHONE HYDROCHLORIDE 0.5 MG: 2 INJECTION INTRAMUSCULAR; INTRAVENOUS; SUBCUTANEOUS at 11:11

## 2019-11-07 RX ADMIN — VANCOMYCIN HYDROCHLORIDE 1000 MG: 1.25 INJECTION, POWDER, LYOPHILIZED, FOR SOLUTION INTRAVENOUS at 07:11

## 2019-11-07 RX ADMIN — CELECOXIB 200 MG: 100 CAPSULE ORAL at 06:11

## 2019-11-07 RX ADMIN — Medication 50 MG: at 07:11

## 2019-11-07 RX ADMIN — HYDROMORPHONE HYDROCHLORIDE 2 MG: 2 INJECTION, SOLUTION INTRAMUSCULAR; INTRAVENOUS; SUBCUTANEOUS at 05:11

## 2019-11-07 RX ADMIN — SCOPOLAMINE 1 PATCH: 1 PATCH, EXTENDED RELEASE TRANSDERMAL at 02:11

## 2019-11-07 RX ADMIN — DULOXETINE 30 MG: 30 CAPSULE, DELAYED RELEASE ORAL at 05:11

## 2019-11-07 RX ADMIN — OXYCODONE HYDROCHLORIDE 10 MG: 10 TABLET, FILM COATED, EXTENDED RELEASE ORAL at 06:11

## 2019-11-07 RX ADMIN — HYDROMORPHONE HYDROCHLORIDE 2 MG: 2 INJECTION, SOLUTION INTRAMUSCULAR; INTRAVENOUS; SUBCUTANEOUS at 10:11

## 2019-11-07 RX ADMIN — METHOCARBAMOL TABLETS 750 MG: 750 TABLET, COATED ORAL at 03:11

## 2019-11-07 RX ADMIN — SODIUM CHLORIDE, SODIUM LACTATE, POTASSIUM CHLORIDE, AND CALCIUM CHLORIDE: .6; .31; .03; .02 INJECTION, SOLUTION INTRAVENOUS at 07:11

## 2019-11-07 RX ADMIN — ACETAMINOPHEN 1000 MG: 10 INJECTION, SOLUTION INTRAVENOUS at 11:11

## 2019-11-07 RX ADMIN — GABAPENTIN 100 MG: 100 CAPSULE ORAL at 09:11

## 2019-11-07 RX ADMIN — MUPIROCIN 1 G: 20 OINTMENT TOPICAL at 09:11

## 2019-11-07 RX ADMIN — METHOCARBAMOL TABLETS 750 MG: 750 TABLET, COATED ORAL at 09:11

## 2019-11-07 RX ADMIN — PROPOFOL 150 MCG/KG/MIN: 10 INJECTION, EMULSION INTRAVENOUS at 07:11

## 2019-11-07 RX ADMIN — ACETAMINOPHEN 1000 MG: 500 TABLET ORAL at 06:11

## 2019-11-07 RX ADMIN — SODIUM CHLORIDE: 0.9 INJECTION, SOLUTION INTRAVENOUS at 03:11

## 2019-11-07 RX ADMIN — HEPARIN SODIUM 7500 UNITS: 5000 INJECTION, SOLUTION INTRAVENOUS; SUBCUTANEOUS at 09:11

## 2019-11-07 RX ADMIN — GABAPENTIN 300 MG: 100 CAPSULE ORAL at 03:11

## 2019-11-07 RX ADMIN — FENTANYL CITRATE 100 MCG: 50 INJECTION, SOLUTION INTRAMUSCULAR; INTRAVENOUS at 07:11

## 2019-11-07 RX ADMIN — SODIUM CHLORIDE, SODIUM LACTATE, POTASSIUM CHLORIDE, AND CALCIUM CHLORIDE: .6; .31; .03; .02 INJECTION, SOLUTION INTRAVENOUS at 10:11

## 2019-11-07 RX ADMIN — SODIUM CHLORIDE: 0.9 INJECTION, SOLUTION INTRAVENOUS at 09:11

## 2019-11-07 RX ADMIN — EPHEDRINE SULFATE 10 MG: 50 INJECTION, SOLUTION INTRAMUSCULAR; INTRAVENOUS; SUBCUTANEOUS at 09:11

## 2019-11-07 RX ADMIN — EPHEDRINE SULFATE 10 MG: 50 INJECTION, SOLUTION INTRAMUSCULAR; INTRAVENOUS; SUBCUTANEOUS at 07:11

## 2019-11-07 RX ADMIN — PHENYLEPHRINE HYDROCHLORIDE 200 MCG: 10 INJECTION INTRAVENOUS at 07:11

## 2019-11-07 NOTE — TELEPHONE ENCOUNTER
----- Message from Corona Bazan MD sent at 11/7/2019  7:35 AM CST -----  I ordered thoracic spine MRI to be done before surgery.  Please schedule patient

## 2019-11-07 NOTE — PLAN OF CARE
"Meets criteria for discharge from PACU. VSS. Rouses easily. Pain  "I don't know" moves easily. , Report to     "

## 2019-11-07 NOTE — TRANSFER OF CARE
"Anesthesia Transfer of Care Note    Patient: Dianne Hemphill    Procedure(s) Performed: Procedure(s) (LRB):  FUSION, SPINE, LUMBAR Procedure: STG 1: L5-S1 anterior Lumbar interbody fusion / STG 2:L5-S1 Posterior instrumentation (N/A)    Patient location: PACU    Anesthesia Type: general    Transport from OR: Transported from OR on 6-10 L/min O2 by face mask with adequate spontaneous ventilation    Post pain: adequate analgesia    Post assessment: no apparent anesthetic complications and tolerated procedure well    Post vital signs: stable    Level of consciousness: awake, alert and oriented    Nausea/Vomiting: no nausea/vomiting    Complications: none    Transfer of care protocol was followed      Last vitals:   Visit Vitals  /72   Pulse 98   Temp 36.1 °C (97 °F) (Skin)   Resp 20   Ht 5' 8" (1.727 m)   Wt 127 kg (280 lb)   SpO2 95%   Breastfeeding? No   BMI 42.57 kg/m²     "

## 2019-11-07 NOTE — PROGRESS NOTES
Certification of Assistant at Surgery       Surgery Date: 11/7/2019     Participating Surgeons:  Surgeon(s) and Role:     * Corona Bazan MD - Primary     * Tom Lilly PA-C - Assisting    Procedures:  Procedure(s) (LRB):  FUSION, SPINE, LUMBAR Procedure: STG 1: L5-S1 anterior Lumbar interbody fusion / STG 2:L5-S1 Posterior instrumentation (N/A)    Assistant Surgeon's Certification of Necessity:  I understand that section 1842 (b) (6) (d) of the Social Security Act generally prohibits Medicare Part B reasonable charge payment for the services of assistants at surgery in teaching hospitals when qualified residents are available to furnish such services. I certify that the services for which payment is claimed were medically necessary, and that no qualified resident was available to perform the services. I further understand that these services are subject to post-payment review by the Medicare carrier.      Tom Lilly PA-C    11/07/2019  12:02 PM
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
## 2019-11-07 NOTE — OP NOTE
DATE OF PROCEDURE: 11/07/2019     PREOPERATIVE DIAGNOSES:  1.  L5-S1 grade 1 isthmic spondylolisthesis with bilateral foraminal stenosis and radiculopathy  2. Mechanical low back pain  3.  Morbid obesity with BMI of 42.57     POSTOPERATIVE DIAGNOSES:  1.  L5-S1 grade 1 isthmic spondylolisthesis with bilateral foraminal stenosis and radiculopathy  2. Mechanical low back pain  3.  Morbid obesity with BMI of 42.57      PROCEDURES:  1. Left anterior to the psoas minimally invasive access to the lumbar spine  2. L5-S1 anterior interbody fusion with placement of a Globus Patrick static cage [id number] mm 15 degrees lordotic  3. Posterior instrumentation at L5-S1  with Globus Creo MIS Screw System.  4. Intraoperative neuromonitoring  5. Fluoroscopy      PRIMARY SURGEON: Corona Bazan M.D.      ASSISTANT SURGEON: Tom SALEH was the 1st assistant for this procedure as there was no qualified resident available to assist.    Complexity:  This was deemed to be a more complex procedure requiring more time and skills due to the morbid obesity of the patient.  A longer working length caused by the adipose tissue made the surgery more difficult.  1 hr more was needed to achieve the anterior interbody fusion.     INDICATIONS: This is a 44-year-old male with L5-S1 grade 1 isthmic spondylolisthesis with bilateral foraminal stenosis causing mechanical low back pain and radiculopathy.   Risks included paralysis, leg pain, low back pain, CSF leak, ureter injury, great vessels injury, screw malpositioning, hardware failure or migration, reoperation, medical complications such as MI, pneumonia and death. The patient consented for surgery.         DESCRIPTION OF THE PROCEDURE:   The patient was intubated under general anesthesia. She was placed in lateral decubitus right side down on the sliding table. All the pressure points were carefully padded. Fluoroscopic localization was then   utilized to identify the  L5-S1 levels.     After injection local anesthesia with Lidocaine with epi, and incision was made with a #15 blade at L5-S1 4 finger breaths anterior to the ASIS. The subcutaneous tissue was then dissected bluntly. Hemostasis was carried out with the bipolar. The external oblique, internal oblique and transversalis were then dissected bluntly. We felt the iliac artery passing laterally.      Serial dilators were then passed through the initial probe and a final retractor was placed at L5-S1 on the midline. Under direct look and using the operative microscope, the disk annulus was opened and diskectomy was carried out using rongeurs. A Everett elevator was then inserted in the disk space. The discectomy was completed using serial max and the endplates were scrapped. We were able to insert in the L5-S1 disc space a small ALIF trial. Once the endplates were cleaned from cartilage and decorticated we placed a static 15 degrees lordotic 13 mm x 24 x 30 mm cage filled with allograft Trifecta and DBX.  Irrigation.      The retroperitoneal space was closed by closing the external oblique fascia with 0 Vicryl. The subcutaneous layer was closed with 3-0 Vicryl inverted suture. The skin was closed with staples . The wounds were dressed.      He was then transferred prone on the Jose Carlos table frame. All pressure points   were carefully padded.      The patient was prepped and draped in a typical sterile fashion. Using fluoroscopy, 2 incisions measuring 35-mm were planned bilaterally between L5 and S1, 35mm left and right to the midline and the incision were made with a #15 blade. Subcutaneous tissue hemostasis was completed.      Under dual fluoroscopic guidance and using the JamShidi needles, we cannulated the pedicles of the L5 and S1 vertebrae bilaterally. We placed k-wires in each pedicle and the k-wires were clamped to the drape.       Then after dilating the fascia and tapping and muscles 6.5 x 45 mm pedicle  screws were inserted with fluoroscopy at L5 and 6.5 x 45 mm pedicle screws at S1 bilaterally. Pre-contoured titanium rods were inserted and the screw caps were placed on the reduction towers. The screw caps were tighten manually.             The thoracolumbar fascia was closed with interrupted 0 Vicryl. The subcutaneous layer was closed with inverted 2-0 Vicryl and the skin was closed with staples.       The wounds were then dressed. The patient was then sent to recovery under the care of the anesthesiologist. The blood loss was 40 mL. The final count was completed and nothing was missing. There was no complication. The patient tolerated well the procedure.

## 2019-11-07 NOTE — HOSPITAL COURSE
11/07/2019:  OR for  L5-S1 anterior lumbar interbody fusion with posterior instrumentation.   11/08/2019:  No acute events overnight.  Leukocytosis; afebrile which is likely secondary to recent spinal surgery.  Vitals stable.  Patient complaining of incisional pain and numbness/tingling/pins and needle pain radiating down left leg into big toe.  This new since surgery.  Prior pain rated down both leg into the top of her foot; no pain on right leg since surgery.  Required 1 time straight cathed overnight for urinary tension but voiding appropriately this a.m..  Pending therapy evaluation and standing postop x-rays.  11/9: MAKENNA. Incisional pain improved with oxy ir 10mg. Post op xrays appropriate hardware positioning. Will have therapy work with patient today with anticipation of discharge tomorrow.   11/10/19: MAKENNA. Continues to have incisional pain and pain radiating down left leg to ankle. Therapy recommending home vs. HH with DME. Tolerating diet and voiding appropriately. BM today after stool softener.  Plan to dc home today. Patient as 2 week wound check and 6 wk follow up with Dr. Bazan.
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
## 2019-11-08 LAB
ANION GAP SERPL CALC-SCNC: 5 MMOL/L (ref 8–16)
BASOPHILS # BLD AUTO: 0 K/UL (ref 0–0.2)
BASOPHILS NFR BLD: 0 % (ref 0–1.9)
BUN SERPL-MCNC: 9 MG/DL (ref 6–20)
CALCIUM SERPL-MCNC: 9.1 MG/DL (ref 8.7–10.5)
CHLORIDE SERPL-SCNC: 102 MMOL/L (ref 95–110)
CO2 SERPL-SCNC: 24 MMOL/L (ref 23–29)
CREAT SERPL-MCNC: 0.7 MG/DL (ref 0.5–1.4)
DIFFERENTIAL METHOD: ABNORMAL
EOSINOPHIL # BLD AUTO: 0 K/UL (ref 0–0.5)
EOSINOPHIL NFR BLD: 0 % (ref 0–8)
ERYTHROCYTE [DISTWIDTH] IN BLOOD BY AUTOMATED COUNT: 12.9 % (ref 11.5–14.5)
EST. GFR  (AFRICAN AMERICAN): >60 ML/MIN/1.73 M^2
EST. GFR  (NON AFRICAN AMERICAN): >60 ML/MIN/1.73 M^2
GLUCOSE SERPL-MCNC: 115 MG/DL (ref 70–110)
HCT VFR BLD AUTO: 38.3 % (ref 37–48.5)
HGB BLD-MCNC: 12.3 G/DL (ref 12–16)
LYMPHOCYTES # BLD AUTO: 1.6 K/UL (ref 1–4.8)
LYMPHOCYTES NFR BLD: 8.8 % (ref 18–48)
MCH RBC QN AUTO: 29 PG (ref 27–31)
MCHC RBC AUTO-ENTMCNC: 32.1 G/DL (ref 32–36)
MCV RBC AUTO: 90 FL (ref 82–98)
MONOCYTES # BLD AUTO: 0.7 K/UL (ref 0.3–1)
MONOCYTES NFR BLD: 4.1 % (ref 4–15)
NEUTROPHILS # BLD AUTO: 15.4 K/UL (ref 1.8–7.7)
NEUTROPHILS NFR BLD: 87.1 % (ref 38–73)
PLATELET # BLD AUTO: 431 K/UL (ref 150–350)
PMV BLD AUTO: 9.7 FL (ref 9.2–12.9)
POTASSIUM SERPL-SCNC: 4.9 MMOL/L (ref 3.5–5.1)
RBC # BLD AUTO: 4.24 M/UL (ref 4–5.4)
SODIUM SERPL-SCNC: 131 MMOL/L (ref 136–145)
WBC # BLD AUTO: 17.7 K/UL (ref 3.9–12.7)

## 2019-11-08 PROCEDURE — 36415 COLL VENOUS BLD VENIPUNCTURE: CPT

## 2019-11-08 PROCEDURE — 94761 N-INVAS EAR/PLS OXIMETRY MLT: CPT

## 2019-11-08 PROCEDURE — 97530 THERAPEUTIC ACTIVITIES: CPT

## 2019-11-08 PROCEDURE — 63600175 PHARM REV CODE 636 W HCPCS: Performed by: PHYSICIAN ASSISTANT

## 2019-11-08 PROCEDURE — 99900035 HC TECH TIME PER 15 MIN (STAT)

## 2019-11-08 PROCEDURE — 94799 UNLISTED PULMONARY SVC/PX: CPT

## 2019-11-08 PROCEDURE — 85025 COMPLETE CBC W/AUTO DIFF WBC: CPT

## 2019-11-08 PROCEDURE — 11000001 HC ACUTE MED/SURG PRIVATE ROOM

## 2019-11-08 PROCEDURE — 99024 PR POST-OP FOLLOW-UP VISIT: ICD-10-PCS | Mod: ,,, | Performed by: PHYSICIAN ASSISTANT

## 2019-11-08 PROCEDURE — 25000003 PHARM REV CODE 250: Performed by: PHYSICIAN ASSISTANT

## 2019-11-08 PROCEDURE — 97165 OT EVAL LOW COMPLEX 30 MIN: CPT

## 2019-11-08 PROCEDURE — 97116 GAIT TRAINING THERAPY: CPT

## 2019-11-08 PROCEDURE — 97161 PT EVAL LOW COMPLEX 20 MIN: CPT

## 2019-11-08 PROCEDURE — 99024 POSTOP FOLLOW-UP VISIT: CPT | Mod: ,,, | Performed by: PHYSICIAN ASSISTANT

## 2019-11-08 PROCEDURE — 97535 SELF CARE MNGMENT TRAINING: CPT

## 2019-11-08 PROCEDURE — 80048 BASIC METABOLIC PNL TOTAL CA: CPT

## 2019-11-08 RX ORDER — METHOCARBAMOL 500 MG/1
1000 TABLET, FILM COATED ORAL 3 TIMES DAILY
Status: DISCONTINUED | OUTPATIENT
Start: 2019-11-08 | End: 2019-11-11 | Stop reason: HOSPADM

## 2019-11-08 RX ORDER — OXYCODONE HYDROCHLORIDE 5 MG/1
10 TABLET ORAL EVERY 4 HOURS PRN
Status: DISCONTINUED | OUTPATIENT
Start: 2019-11-08 | End: 2019-11-11 | Stop reason: HOSPADM

## 2019-11-08 RX ADMIN — ACETAMINOPHEN 650 MG: 325 TABLET ORAL at 01:11

## 2019-11-08 RX ADMIN — PANTOPRAZOLE SODIUM 40 MG: 40 TABLET, DELAYED RELEASE ORAL at 09:11

## 2019-11-08 RX ADMIN — METHOCARBAMOL TABLETS 1000 MG: 500 TABLET, COATED ORAL at 03:11

## 2019-11-08 RX ADMIN — GABAPENTIN 300 MG: 100 CAPSULE ORAL at 09:11

## 2019-11-08 RX ADMIN — METHOCARBAMOL TABLETS 1000 MG: 500 TABLET, COATED ORAL at 09:11

## 2019-11-08 RX ADMIN — TRAMADOL HYDROCHLORIDE 100 MG: 50 TABLET, FILM COATED ORAL at 01:11

## 2019-11-08 RX ADMIN — ONDANSETRON 8 MG: 8 TABLET, ORALLY DISINTEGRATING ORAL at 05:11

## 2019-11-08 RX ADMIN — HEPARIN SODIUM 7500 UNITS: 5000 INJECTION, SOLUTION INTRAVENOUS; SUBCUTANEOUS at 01:11

## 2019-11-08 RX ADMIN — ACETAMINOPHEN 650 MG: 325 TABLET ORAL at 05:11

## 2019-11-08 RX ADMIN — GABAPENTIN 300 MG: 100 CAPSULE ORAL at 08:11

## 2019-11-08 RX ADMIN — TRAMADOL HYDROCHLORIDE 100 MG: 50 TABLET, FILM COATED ORAL at 05:11

## 2019-11-08 RX ADMIN — OXYCODONE HYDROCHLORIDE 10 MG: 5 TABLET ORAL at 09:11

## 2019-11-08 RX ADMIN — OXYCODONE HYDROCHLORIDE 10 MG: 5 TABLET ORAL at 03:11

## 2019-11-08 RX ADMIN — METHOCARBAMOL TABLETS 1000 MG: 500 TABLET, COATED ORAL at 08:11

## 2019-11-08 RX ADMIN — CELECOXIB 200 MG: 100 CAPSULE ORAL at 08:11

## 2019-11-08 RX ADMIN — HEPARIN SODIUM 7500 UNITS: 5000 INJECTION, SOLUTION INTRAVENOUS; SUBCUTANEOUS at 06:11

## 2019-11-08 RX ADMIN — GABAPENTIN 100 MG: 100 CAPSULE ORAL at 08:11

## 2019-11-08 RX ADMIN — MUPIROCIN 1 G: 20 OINTMENT TOPICAL at 08:11

## 2019-11-08 RX ADMIN — MUPIROCIN 1 G: 20 OINTMENT TOPICAL at 09:11

## 2019-11-08 RX ADMIN — GABAPENTIN 300 MG: 100 CAPSULE ORAL at 03:11

## 2019-11-08 RX ADMIN — HEPARIN SODIUM 7500 UNITS: 5000 INJECTION, SOLUTION INTRAVENOUS; SUBCUTANEOUS at 10:11

## 2019-11-08 RX ADMIN — CELECOXIB 200 MG: 100 CAPSULE ORAL at 09:11

## 2019-11-08 RX ADMIN — DULOXETINE 30 MG: 30 CAPSULE, DELAYED RELEASE ORAL at 09:11

## 2019-11-08 RX ADMIN — TRAMADOL HYDROCHLORIDE 100 MG: 50 TABLET, FILM COATED ORAL at 06:11

## 2019-11-08 RX ADMIN — ACETAMINOPHEN 325 MG: 325 TABLET ORAL at 06:11

## 2019-11-08 RX ADMIN — OXYCODONE HYDROCHLORIDE 10 MG: 5 TABLET ORAL at 10:11

## 2019-11-08 NOTE — PLAN OF CARE
Pt on room air in no apparent distress.  IS tx. Given with good pt. Effort.  Will cont. To monitor.

## 2019-11-08 NOTE — PLAN OF CARE
TN met with patient and  Ashok, [phone]. Currently patient lives at home with her  and children. Before admission patient ambulating with a straight cane. No other DME or HH noted. Patient will have help at home from her  upon discharge. Patient's  will provide transportation home and to follow up appointments.    TN case manger updated whiteboard with contact information. Blue discharge folder and discharge brochure given to patient. TN instructed patient and  to contact TN if they have any further questions or concerns.       11/08/19 6297   Discharge Assessment   Assessment Type Discharge Planning Assessment   Confirmed/corrected address and phone number on facesheet? Yes   Assessment information obtained from? Patient;Medical Record   Expected Length of Stay (days) 3   Communicated expected length of stay with patient/caregiver yes   Prior to hospitilization cognitive status: Alert/Oriented   Prior to hospitalization functional status: Assistive Equipment   Current cognitive status: Alert/Oriented   Current Functional Status: Assistive Equipment   Lives With spouse;child(samara), dependent   Able to Return to Prior Arrangements yes   Is patient able to care for self after discharge? No  (Patient will have help at home from spouse, Ashok )   Patient's perception of discharge disposition home or selfcare;home health   Readmission Within the Last 30 Days no previous admission in last 30 days   Patient currently being followed by outpatient case management? No   Patient currently receives any other outside agency services? No   Equipment Currently Used at Home cane, straight   Do you have any problems affording any of your prescribed medications? No   Is the patient taking medications as prescribed? yes   Does the patient have transportation home? Yes   Transportation Anticipated family or friend will provide   Does the patient receive services at [facility]? No    Discharge Plan A Home;Home Health   Discharge Plan B Home;Home with family;Home Health   DME Needed Upon Discharge  bedside commode;shower chair;walker, rolling   Patient/Family in Agreement with Plan yes        11/08/19 8929   Discharge Assessment   Assessment Type Discharge Planning Assessment   Confirmed/corrected address and phone number on facesheet? Yes   Assessment information obtained from? Patient;Medical Record   Expected Length of Stay (days) 3   Communicated expected length of stay with patient/caregiver yes   Prior to hospitilization cognitive status: Alert/Oriented   Prior to hospitalization functional status: Assistive Equipment   Current cognitive status: Alert/Oriented   Current Functional Status: Assistive Equipment   Lives With spouse;child(samara), dependent   Able to Return to Prior Arrangements yes   Is patient able to care for self after discharge? No  (Patient will have help at home from spouse, Ashok )   Patient's perception of discharge disposition home or selfcare;home health   Readmission Within the Last 30 Days no previous admission in last 30 days   Patient currently being followed by outpatient case management? No   Patient currently receives any other outside agency services? No   Equipment Currently Used at Home cane, straight   Do you have any problems affording any of your prescribed medications? No   Is the patient taking medications as prescribed? yes   Does the patient have transportation home? Yes   Transportation Anticipated family or friend will provide   Does the patient receive services at the [facility]? No   Discharge Plan A Home;Home Health   Discharge Plan B Home;Home with family;Home Health   DME Needed Upon Discharge  bedside commode;shower chair;walker, rolling   Patient/Family in Agreement with Plan yes

## 2019-11-08 NOTE — PT/OT/SLP PROGRESS
Physical Therapy Treatment    Patient Name:  Dianne Hemphill   MRN:  [id number]    Recommendations:     Discharge Recommendations:  home(vs with home health PT)   Discharge Equipment Recommendations: bedside commode, shower chair, walker, rolling   Barriers to discharge: None    Assessment:     Dianne Hemphill is a 44 y.o. female admitted with a medical diagnosis of Spondylolisthesis of lumbar region.  She presents with the following impairments/functional limitations:  weakness, impaired endurance, impaired self care skills, impaired functional mobilty, gait instability, impaired balance, decreased lower extremity function, pain, decreased ROM, impaired skin, orthopedic precautions, decreased coordination Pt would continue to benefit from P.T. To address impairments listed above.  .    Rehab Prognosis: Good; patient would benefit from acute skilled PT services to address these deficits and reach maximum level of function.    Recent Surgery: Procedure(s) (LRB):  FUSION, SPINE, LUMBAR Procedure: STG 1: L5-S1 anterior Lumbar interbody fusion / STG 2:L5-S1 Posterior instrumentation (N/A) 1 Day Post-Op    Plan:     During this hospitalization, patient to be seen daily(BID day 1, ) to address the identified rehab impairments via gait training, therapeutic activities, therapeutic exercises, neuromuscular re-education and progress toward the following goals:    · Plan of Care Expires:  12/08/19    Subjective     Chief Complaint: back pain  Patient/Family Comments/goals: Pt agreed to tx.  Pain/Comfort:  · Pain Rating 1: 6/10  · Location - Orientation 1: lower  · Location 1: back  · Pain Addressed 1: Pre-medicate for activity, Reposition, Nurse notified, Distraction  · Pain Rating Post-Intervention 1: 8/10      Objective:     Communicated with RN ([place]) prior to session.  Patient found supine with SCD upon PT entry to room.     General Precautions: Standard,     Orthopedic Precautions:(Back precautions)   Braces: LSO      Functional Mobility:  · Bed Mobility:     · Rolling Left:  stand by assistance, contact guard assistance and vc's for log rolling  · Scooting: modified independence, supervision to EOB  · Supine to Sit: contact guard assistance and vc/tc's for log rolling technique  · Sit to Supine: minimum assistance and LEs  · Transfers:     · Sit to Stand:  stand by assistance with rolling walker and vc's for hand placement  · Gait: 80ft x 2 With RW and CGA with vc's for upright posture and proper distance for RW.  STanding rest break between bouts secondary to decreased strength, endurance, and pain.  Pt ambulates with decreased will and 3pt gait.  Pt tried ambulating with step through gait, but this caused increased pain in right side of back to right hip.  Pt returned to 3pt gait.  · Balance: sitting good, standing fair+, gait fair      AM-PAC 6 CLICK MOBILITY  Turning over in bed (including adjusting bedclothes, sheets and blankets)?: 4  Sitting down on and standing up from a chair with arms (e.g., wheelchair, bedside commode, etc.): 3  Moving from lying on back to sitting on the side of the bed?: 3  Moving to and from a bed to a chair (including a wheelchair)?: 3  Need to walk in hospital room?: 3  Climbing 3-5 steps with a railing?: 3  Basic Mobility Total Score: 19       Therapeutic Activities and Exercises:   Pt sat EOB~ 10 mins and performed B APs and LAQs x 15 reps.  Pt was able to yun LSO (sitting EOB)  once assisted with set up placing  LSO on pt's back.  Pt doffed LSO sitting eOB prior to lying down.  PTA review back precautions with pt.    Patient left supine with all lines intact, call button in reach, bed alarm on, RN notified and pt's  present..    GOALS:   Multidisciplinary Problems     Physical Therapy Goals        Problem: Physical Therapy Goal    Goal Priority Disciplines Outcome Goal Variances Interventions   Physical Therapy Goal     PT, PT/OT Ongoing, Progressing     Description:  Patient  will increase functional independence with mobility by performin. Sit<>stand with SPV with RW.  2. Gait x =/>150 feet with RW with SPV.  3. Ascend/descend 1 flight(13-14) step(s) with least restrictive assistive device and SBA.   4. Pt to transfer supine<>sit with SBA                    Time Tracking:     PT Received On: 19  PT Start Time: 1548     PT Stop Time: 1613  PT Total Time (min): 25 min     Billable Minutes: Gait Training 12 and Therapeutic Activity 13    Treatment Type: Treatment  PT/PTA: PTA     PTA Visit Number: 1     Sara Vicente PTA  2019

## 2019-11-08 NOTE — PLAN OF CARE
Pt AAOx4. Spouse at the bedside. Medications administered per order. IVFs infusing. PRN zofran administered for nausea. Moderate relief maintained. PRN hydromorphone subcut injection administered for pain, moderate relief maintained through the night. Encouraged to call with questions/concerns. Will continue to monitor. Safety maintained.

## 2019-11-08 NOTE — PT/OT/SLP EVAL
Physical Therapy Evaluation    Patient Name:  Dianne Hemphill   MRN:  [id number]    Recommendations:     Discharge Recommendations:  home(vs with HH PT)   Discharge Equipment Recommendations: bedside commode, shower chair, walker, rolling(all DME should be bariatric)   Barriers to discharge: euipment needs    Assessment:     Dianne Hemphill is a 44 y.o. female admitted with a medical diagnosis of Spondylolisthesis of lumbar region.  She presents with the following impairments/functional limitations:  impaired endurance, impaired sensation, gait instability, impaired functional mobilty, impaired balance, decreased lower extremity function, decreased coordination, decreased safety awareness, decreased ROM, impaired skin, orthopedic precautions.  Pt  with functional mobility deficits and should benefit from  PT  to maximize I and safety decrease risk of further decline of injury.    Rehab Prognosis: Good; patient would benefit from acute skilled PT services to address these deficits and reach maximum level of function.    Recent Surgery: Procedure(s) (LRB):  FUSION, SPINE, LUMBAR Procedure: STG 1: L5-S1 anterior Lumbar interbody fusion / STG 2:L5-S1 Posterior instrumentation (N/A) 1 Day Post-Op    Plan:     During this hospitalization, patient to be seen daily(BID day 1, ) to address the identified rehab impairments via gait training, therapeutic activities, therapeutic exercises, neuromuscular re-education and progress toward the following goals:    · Plan of Care Expires:  12/08/19    Subjective     Chief Complaint: back pain  Patient/Family Comments/goals: decrease back pain  Pain/Comfort:  Pain Rating 1: 7/10  Location - Orientation 1: lower  Location 1: back  Pain Addressed 1: Pre-medicate for activity, Reposition, Distraction, Nurse notified, Cessation of Activity  Pain Rating Post-Intervention 1: 8/10    Patients cultural, spiritual, Mandaeism conflicts given the current situation:      Living Environment:  Pt  "lives with  2 [facility]-bedroom upstairs, 0 [address]  Prior to admission, patients level of function was mod I with s-cane.  Equipment used at home: cane, straight.  DME owned (not currently used): none.  Upon discharge, patient will have assistance from .    Objective:     Communicated with Starr prior to session.  Patient found sitting EOB with peripheral IV  upon PT entry to room.    General Precautions: Standard,     Orthopedic Precautions:(Back precautions)   Braces: LSO       Exams:  Cognition:   Patient is oriented x 4.  Pt follows approximately 100% of multi-steps commands.    Mood: Pleasant and cooperative.   Musculoskeletal:  Posture:    Rounded shoulders, flex trunk, forward head    B LE ROM/Strength: WFL  Neuromuscular:  Sensation: Intact to light touch bilateral LE, with c/o "pins and needles" sensation to L lE distal to knee  Tone/Reflexes: No impairments identified with functional mobility. No formal testing performed.   Coordination: WFL        Balance: diminished due to guarded posture and pain  Visual-vestibular: No impairments identified with functional mobility. No formal testing performed.  Integument: Visible skin intact  Edema: none noted  ·     Functional Mobility:  · Bed Mobility:     · Sit to Supine: minimum assistance  · Transfers:     · Sit to Stand:  stand by assistance with rolling walker  · Toilet Transfer: stand by assistance with  rolling walker  using  Step Transfer  · Gait: Gait training x 75ft with RW and SBA instruction for posture and step length with good response from pt.      Therapeutic Activities and Exercises:   Pt presented sitting EOB, reported up with nursing.  Pt donned LSO I'ly. Sit >stand to RW with SBA and instruction for hand placement and postioning.  Gait training x 75ft with RW and SBA instruction for posture and step length with good response from pt.  Pt to BR, to sit on BSC with instruction for hand placement and back precautions and SBA, up from toilet " with instruction for use of grab bar and positioning at edge of toilet.  Pt requesting BTB due to pain, removed  LSO I'ly. Instruction for BTB utilizing log roll with min a at B LE. Pt's  instructed in assisting pt supine<>sit.    AM-PAC 6 CLICK MOBILITY  Total Score:19     Patient left supine with nurse notified and  present.    GOALS:   Multidisciplinary Problems     Physical Therapy Goals        Problem: Physical Therapy Goal    Goal Priority Disciplines Outcome Goal Variances Interventions   Physical Therapy Goal     PT, PT/OT Ongoing, Progressing     Description:  Patient will increase functional independence with mobility by performin. Sit<>stand with SPV with RW.  2. Gait x =/>150 feet with RW with SPV.  3. Ascend/descend 1 flight(13-14) step(s) with least restrictive assistive device and SBA.   4. Pt to transfer supine<>sit with SBA                    History:     Past Medical History:   Diagnosis Date    GERD (gastroesophageal reflux disease)     History of migraine headaches     Obesity     Pollen allergies     Smoker        Past Surgical History:   Procedure Laterality Date    CHOLECYSTECTOMY      ESOPHAGOGASTRODUODENOSCOPY      EXPLORATORY LAPAROTOMY      HYSTERECTOMY      fibroids    LUMBAR FUSION N/A 2019    Procedure: FUSION, SPINE, LUMBAR Procedure: STG 1: L5-S1 anterior Lumbar interbody fusion / STG 2:L5-S1 Posterior instrumentation;  Surgeon: Corona Bazan MD;  Location: [facility];  Service: Neurosurgery;  Laterality: N/A;  FUSION, SPINE, LUMBAR  Procedure: STG 1: L5-S1 anterior Lumbar interbody fusion / STG 2:L5-S1 Posterior instrumentation  SURGERY TIME: 2.5hrs  LOS:  ANESTHESIA: General  Blood:    TONSILLECTOMY         Time Tracking:     PT Received On: 19  PT Start Time: 841     PT Stop Time: 912  PT Total Time (min): 31 min     Billable Minutes: Evaluation 20 and Gait Training 11      Dru Mejía, PT  2019

## 2019-11-08 NOTE — PLAN OF CARE
Pt presented sitting EOB, reported up with nursing.  Pt donned LSO I'ly. Sit >stand to RW with SBA and instruction for hand placement and postioning.  Gait training x 75ft with RW and SBA instruction for posture and step length with good response for pt.  Pt to BR, to sit on BSC with instruction for hand placement and back precautions and SBA, up from toilet with instruction for use of grab bar and positioning at edge of toilet.  Pt requesting BTB due to pain, removed  LSO I'ly. Instruction for BTB utilizing log roll with min a at B LE. Pt's  instructed in assisting pt supine<>sit.
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
## 2019-11-08 NOTE — ANESTHESIA POSTPROCEDURE EVALUATION
Anesthesia Post Evaluation    Patient: Dianne Hemphill    Procedure(s) Performed: Procedure(s) (LRB):  FUSION, SPINE, LUMBAR Procedure: STG 1: L5-S1 anterior Lumbar interbody fusion / STG 2:L5-S1 Posterior instrumentation (N/A)    Final Anesthesia Type: general  Patient location during evaluation: PACU  Patient participation: Yes- Able to Participate  Level of consciousness: awake and alert, oriented and awake  Post-procedure vital signs: reviewed and stable  Pain management: adequate  Airway patency: patent  PONV status at discharge: No PONV  Anesthetic complications: no      Cardiovascular status: blood pressure returned to baseline  Respiratory status: unassisted and room air  Hydration status: euvolemic  Follow-up not needed.          Vitals Value Taken Time   /57 11/7/2019  4:12 PM   Temp 36.7 °C (98 °F) 11/7/2019  5:32 PM   Pulse 89 11/7/2019  4:12 PM   Resp 18 11/7/2019  4:12 PM   SpO2 96 % 11/7/2019  4:12 PM         Event Time     Out of Recovery 14:41:35          Pain/Laureen Score: Pain Rating Prior to Med Admin: 9 (11/7/2019  5:44 PM)

## 2019-11-08 NOTE — NURSING
Pt unable to void post bucio removal . Bladder scan greater than 400. straight cath with 800ml of urine from straight cath.

## 2019-11-08 NOTE — NURSING
Cued into patient's room. In supine position with family at bedside. Complaining of 6 out of 10 main to left back area. Asking for pain medications at 2100.Nurse Josefina notified. Instructions given on fall precautions and plan of care reviewed.sriram allowed for questions and concerns. Bed alarm is activated and is on for patient safety. Will continue to be available as needed to assist with care

## 2019-11-08 NOTE — PLAN OF CARE
AAOx3. Bed remains low, locked and call bell within reach. Patient verbalized understanding to call for any needs or assistance. PRN pain medication administered per MD orders. PT/OT worked with patient. Patient ambulated in room and up to bedside chair. Tolerating diet. Will continue to monitor patient.

## 2019-11-08 NOTE — ASSESSMENT & PLAN NOTE
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 Bazan on 11/07/2019.    -Patient is neurologically stable.  Preoperative bilateral leg pain radiating to ankles resolved.  She has some new left leg paresthesia radiating to left great toe since surgery.  -Pending post op imaging scheduled for today.  -Pain controlled on current regimen.  Increased Robaxin to 1 g t.i.d..    Encourage p.o. oxycodone use instead of subcu Dilaudid.  -LSO when OOB or with activity  -PT/OT/OOB daily  -Patient must be OOB for atleast 6 hours daily (may be in intervals: 2 hours in chair with each meal)  -IS to bed side. Patient to use atleast 10x every hour.  -Continue bowel regimen daily.  -Continue SQH, Teds and SCDs for DVTP.  -PPI for GI prophylaxis  -Restarted all home meds for anxiety/depression, GERD, and paresthesias  -patient will start lumbar bone growth stimulator on outpatient basis  -voiding appropriately after Pen[place] removal     Dispo:  Pending therapy evaluation, pain control, and standing x-rays.   Patient has 2 wk wound check with NSR nurse and 6 week follow up with Dr. Bazan     Discussed with Dr. Bazan

## 2019-11-08 NOTE — PLAN OF CARE
Problem: Physical Therapy Goal  Goal: Physical Therapy Goal  Description  Patient will increase functional independence with mobility by performin. Sit<>stand with SPV with RW.  2. Gait x =/>150 feet with RW with SPV.  3. Ascend/descend 1 flight(13-14) step(s) with least restrictive assistive device and SBA.   4. Pt to transfer supine<>sit with SBA   Outcome: Ongoing, Progressing   Continue working toward goals.

## 2019-11-08 NOTE — PLAN OF CARE
Pt unable to void post bucio removal. Attempted using bedpan. Bladder scan greater than 545 ml. Straight cath performed with 1200ml of urine removed. Pt tolerated well.

## 2019-11-08 NOTE — SUBJECTIVE & OBJECTIVE
Interval History: No acute events overnight.  Leukocytosis; afebrile which is likely secondary to recent spinal surgery.  Vitals stable.  Patient complaining of incisional pain and numbness/tingling/pins and needle pain radiating down left leg into big toe.  This new since surgery.  Prior pain rated down both leg into the top of her foot; no pain on right leg since surgery.  Required 1 time straight cathed overnight for urinary tension but voiding appropriately this a.m..  Pending therapy evaluation and standing postop x-rays.    Medications:  Continuous Infusions:  Scheduled Meds:   acetaminophen  650 mg Oral 4 times per day    celecoxib  200 mg Oral BID    DULoxetine  30 mg Oral Daily    gabapentin  300 mg Oral TID    And    gabapentin  100 mg Oral QHS    heparin (porcine)  7,500 Units Subcutaneous Q8H    methocarbamol  1,000 mg Oral TID    mupirocin  1 g Nasal BID    pantoprazole  40 mg Oral Daily    scopolamine  1 patch Transdermal Q3 Days    traMADol  100 mg Oral 4 times per day     PRN Meds:acetaminophen, aluminum-magnesium hydroxide-simethicone, bisacodyl, HYDROmorphone, influenza, ondansetron, ondansetron, ondansetron, oxyCODONE, promethazine (PHENERGAN) IVPB, senna-docusate 8.6-50 mg, sodium chloride 0.9%     Review of Systems  Objective:     Weight: (!) 140.9 kg (310 lb 10.1 oz)  Body mass index is 47.23 kg/m².  Vital Signs (Most Recent):  Temp: 97.9 °F (36.6 °C) (11/08/19 0836)  Pulse: 99 (11/08/19 0836)  Resp: 17 (11/08/19 0836)  BP: 127/72 (11/08/19 0836)  SpO2: (!) 94 % (11/08/19 0805) Vital Signs (24h Range):  Temp:  [96.1 °F (35.6 °C)-98.4 °F (36.9 °C)] 97.9 °F (36.6 °C)  Pulse:  [] 99  Resp:  [12-20] 17  SpO2:  [91 %-99 %] 94 %  BP: (104-134)/(56-73) 127/72                          Neurosurgery Physical Exam   General: well developed, well nourished. no acute distress.  Morbidly obese  Neurologic: Awake, alert and oriented x3. Thought content appropriate.  Head: normocephalic,  atraumatic   GCS: Motor: 6/Verbal: 5/Eyes: 4 GCS Total: 15  Cranial nerves:face symmetric and sensation intact bilaterally, tongue midline, pupils equal, round, reactive to light with accomodation, extraocular muscles intact. CN II-XII grossly intact. Shoulder shrug strength equal. Palate rises symmetrically.  Uvula midline. Hearing equal with finger rub. Gag and taste not tested.     Language: no aphasia  Speech: no dysarthria     Sensory: response to light touch throughout  Motor Strength: Moves all extremities spontaneously with good tone. Full strength upper and lower extremities. No abnormal movements seen.             Left leg pain reproduce left hip flexion    Strength  Deltoids Triceps Biceps Wrist Extension Wrist Flexion Hand    Upper: R 5/5 5/5 5/5 5/5 5/5 5/5    L 5/5 5/5 5/5 5/5 5/5 5/5     Iliopsoas Quadriceps Knee  Flexion Tibialis  anterior Gastro- cnemius EHL   Lower: R 5/5 5/5 5/5 5/5 5/5 5/5    L 5/5 5/5 5/5 5/5 5/5 5/5     Straight leg raise: negative bilaterally  ENT: normal hearing with finger rub  Heart: RRR, no cyanosis, pallor, or edema.   Lungs:  normal respiratory effort  Abdomen: soft, non-tender and symmetric  Extremities: warm with no cyanosis, edema, or clubbing  Pulses: palpable distal pulses  Skin: warm, dry and intact. No visible rashes or lesions.       Left lateral and posterior lumbar incision:  wound is c/d/i, no signs of infection, no erythema, warmth, edema, ttp,  no drainage.  wound edges are well approximated with stable.        Significant Labs:  Recent Labs   Lab 11/08/19  0445   *   *   K 4.9      CO2 24   BUN 9   CREATININE 0.7   CALCIUM 9.1     Recent Labs   Lab 11/08/19 0445   WBC 17.70*   HGB 12.3   HCT 38.3   *     No results for input(s): LABPT, INR, APTT in the last 48 hours.  Microbiology Results (last 7 days)     ** No results found for the last 168 hours. **          Significant Diagnostics:  Pending standing postop lumbar x-rays

## 2019-11-09 PROCEDURE — 94761 N-INVAS EAR/PLS OXIMETRY MLT: CPT

## 2019-11-09 PROCEDURE — 97116 GAIT TRAINING THERAPY: CPT

## 2019-11-09 PROCEDURE — 11000001 HC ACUTE MED/SURG PRIVATE ROOM

## 2019-11-09 PROCEDURE — 99900035 HC TECH TIME PER 15 MIN (STAT)

## 2019-11-09 PROCEDURE — 25000003 PHARM REV CODE 250: Performed by: NURSE PRACTITIONER

## 2019-11-09 PROCEDURE — 99024 PR POST-OP FOLLOW-UP VISIT: ICD-10-PCS | Mod: ,,, | Performed by: PHYSICIAN ASSISTANT

## 2019-11-09 PROCEDURE — 94799 UNLISTED PULMONARY SVC/PX: CPT

## 2019-11-09 PROCEDURE — 25000003 PHARM REV CODE 250: Performed by: PHYSICIAN ASSISTANT

## 2019-11-09 PROCEDURE — 63600175 PHARM REV CODE 636 W HCPCS: Performed by: PHYSICIAN ASSISTANT

## 2019-11-09 PROCEDURE — 99024 POSTOP FOLLOW-UP VISIT: CPT | Mod: ,,, | Performed by: PHYSICIAN ASSISTANT

## 2019-11-09 RX ORDER — OXYCODONE AND ACETAMINOPHEN 10; 325 MG/1; MG/1
1 TABLET ORAL EVERY 6 HOURS PRN
Qty: 90 TABLET | Refills: 0 | Status: SHIPPED | OUTPATIENT
Start: 2019-11-09 | End: 2019-11-10 | Stop reason: SDUPTHER

## 2019-11-09 RX ORDER — AMOXICILLIN 250 MG
2 CAPSULE ORAL DAILY
Status: DISCONTINUED | OUTPATIENT
Start: 2019-11-09 | End: 2019-11-11 | Stop reason: HOSPADM

## 2019-11-09 RX ORDER — METHOCARBAMOL 500 MG/1
1000 TABLET, FILM COATED ORAL 3 TIMES DAILY
Qty: 90 TABLET | Refills: 0 | Status: SHIPPED | OUTPATIENT
Start: 2019-11-09 | End: 2019-11-10

## 2019-11-09 RX ORDER — ONDANSETRON 4 MG/1
8 TABLET, FILM COATED ORAL EVERY 6 HOURS PRN
Qty: 20 TABLET | Refills: 1 | Status: SHIPPED | OUTPATIENT
Start: 2019-11-09 | End: 2019-11-10 | Stop reason: SDUPTHER

## 2019-11-09 RX ADMIN — CELECOXIB 200 MG: 100 CAPSULE ORAL at 09:11

## 2019-11-09 RX ADMIN — SENNOSIDES, DOCUSATE SODIUM 2 TABLET: 50; 8.6 TABLET, FILM COATED ORAL at 11:11

## 2019-11-09 RX ADMIN — METHOCARBAMOL TABLETS 1000 MG: 500 TABLET, COATED ORAL at 03:11

## 2019-11-09 RX ADMIN — PANTOPRAZOLE SODIUM 40 MG: 40 TABLET, DELAYED RELEASE ORAL at 09:11

## 2019-11-09 RX ADMIN — ACETAMINOPHEN 650 MG: 325 TABLET ORAL at 05:11

## 2019-11-09 RX ADMIN — OXYCODONE HYDROCHLORIDE 10 MG: 5 TABLET ORAL at 12:11

## 2019-11-09 RX ADMIN — DULOXETINE 30 MG: 30 CAPSULE, DELAYED RELEASE ORAL at 09:11

## 2019-11-09 RX ADMIN — MUPIROCIN 1 G: 20 OINTMENT TOPICAL at 09:11

## 2019-11-09 RX ADMIN — GABAPENTIN 300 MG: 100 CAPSULE ORAL at 03:11

## 2019-11-09 RX ADMIN — METHOCARBAMOL TABLETS 1000 MG: 500 TABLET, COATED ORAL at 09:11

## 2019-11-09 RX ADMIN — ACETAMINOPHEN 650 MG: 325 TABLET ORAL at 12:11

## 2019-11-09 RX ADMIN — TRAMADOL HYDROCHLORIDE 100 MG: 50 TABLET, FILM COATED ORAL at 05:11

## 2019-11-09 RX ADMIN — TRAMADOL HYDROCHLORIDE 100 MG: 50 TABLET, FILM COATED ORAL at 11:11

## 2019-11-09 RX ADMIN — GABAPENTIN 300 MG: 100 CAPSULE ORAL at 09:11

## 2019-11-09 RX ADMIN — TRAMADOL HYDROCHLORIDE 100 MG: 50 TABLET, FILM COATED ORAL at 12:11

## 2019-11-09 RX ADMIN — GABAPENTIN 100 MG: 100 CAPSULE ORAL at 09:11

## 2019-11-09 RX ADMIN — ONDANSETRON 8 MG: 8 TABLET, ORALLY DISINTEGRATING ORAL at 05:11

## 2019-11-09 RX ADMIN — HEPARIN SODIUM 7500 UNITS: 5000 INJECTION, SOLUTION INTRAVENOUS; SUBCUTANEOUS at 05:11

## 2019-11-09 RX ADMIN — HEPARIN SODIUM 7500 UNITS: 5000 INJECTION, SOLUTION INTRAVENOUS; SUBCUTANEOUS at 09:11

## 2019-11-09 RX ADMIN — BISACODYL 10 MG: 10 SUPPOSITORY RECTAL at 09:11

## 2019-11-09 RX ADMIN — OXYCODONE HYDROCHLORIDE 10 MG: 5 TABLET ORAL at 10:11

## 2019-11-09 RX ADMIN — HEPARIN SODIUM 7500 UNITS: 5000 INJECTION, SOLUTION INTRAVENOUS; SUBCUTANEOUS at 03:11

## 2019-11-09 RX ADMIN — ACETAMINOPHEN 650 MG: 325 TABLET ORAL at 11:11

## 2019-11-09 NOTE — PT/OT/SLP PROGRESS
Physical Therapy Treatment    Patient Name:  Dianne Hemphill   MRN:  [id number]    Recommendations:     Discharge Recommendations:  home   Discharge Equipment Recommendations: bedside commode, shower chair   Barriers to discharge: see precautions    Assessment:     Dianne Hemphill is a 44 y.o. female admitted with a medical diagnosis of Spondylolisthesis of lumbar region.  She presents with the following impairments/functional limitations:  weakness, impaired endurance, impaired sensation, gait instability, decreased lower extremity function, impaired functional mobilty, impaired self care skills, impaired balance, decreased coordination, decreased safety awareness, pain, orthopedic precautions ambulated on level surface to toilet for voiding and then in hallway x 90 ft w/ SBA for safety .    Rehab Prognosis: Good; patient would benefit from acute skilled PT services to address these deficits and reach maximum level of function.    Recent Surgery: Procedure(s) (LRB):  FUSION, SPINE, LUMBAR Procedure: STG 1: L5-S1 anterior Lumbar interbody fusion / STG 2:L5-S1 Posterior instrumentation (N/A) 2 Days Post-Op    Plan:     During this hospitalization, patient to be seen daily(BID day 1, ) to address the identified rehab impairments via gait training, therapeutic activities, therapeutic exercises and progress toward the following goals:    · Plan of Care Expires:  12/08/19    Subjective     Chief Complaint: pain in left LE  Patient/Family Comments/goals: to go home  Pain/Comfort:  · Pain Rating 1: 5/10  · Location - Side 1: Left  · Location 1: leg      Objective:     Communicated with NSG prior to session.  Patient found sitting at edge of bed with   upon PT entry to room.     General Precautions: Standard, fall   Orthopedic Precautions:spinal precautions   Braces: LSO     Functional Mobility:  · Transfers:     · Sit to Stand:  stand by assistance with hand-held assist  · Toilet Transfer: stand by assistance with  grab bars   using  self  · Gait: 90 ft x 1 trial with RW for balance      AM-PAC 6 CLICK MOBILITY  Turning over in bed (including adjusting bedclothes, sheets and blankets)?: 4  Sitting down on and standing up from a chair with arms (e.g., wheelchair, bedside commode, etc.): 3  Moving from lying on back to sitting on the side of the bed?: 3  Moving to and from a bed to a chair (including a wheelchair)?: 3  Need to walk in hospital room?: 3  Climbing 3-5 steps with a railing?: 3  Basic Mobility Total Score: 19       Therapeutic Activities and Exercises:   performed  BLE AROM therex PF/DF, LAQ, hip adduction as per HEP    Patient left seated at edge of bed with all lines intact and  present..    GOALS:   Multidisciplinary Problems     Physical Therapy Goals        Problem: Physical Therapy Goal    Goal Priority Disciplines Outcome Goal Variances Interventions   Physical Therapy Goal     PT, PT/OT Ongoing, Progressing     Description:  Patient will increase functional independence with mobility by performin. Sit<>stand with SPV with RW.  2. Gait x =/>150 feet with RW with SPV.  3. Ascend/descend 1 flight(13-14) step(s) with least restrictive assistive device and SBA.   4. Pt to transfer supine<>sit with SBA                    Time Tracking:     PT Received On: 19  PT Start Time: 1200     PT Stop Time: 1215  PT Total Time (min): 15 min     Billable Minutes: Gait Training 15       PT/PTA: PTA     PTA Visit Number: 2     Iker Aleida, PTA  2019

## 2019-11-09 NOTE — PT/OT/SLP EVAL
Occupational Therapy   Evaluation/treatment    Name: Dianne Hemphill  MRN: [id number]  Admitting Diagnosis:  Spondylolisthesis of lumbar region 1 Day Post-Op   s/p Procedure(s):  FUSION, SPINE, LUMBAR Procedure: STG 1: L5-S1 anterior Lumbar interbody fusion / STG 2:L5-S1 Posterior instrumentation   Recommendations:     Discharge Recommendations: home  Discharge Equipment Recommendations:  bedside commode, shower chair, walker, rolling  Barriers to discharge:  None    Assessment:     Dianne Hemphill is a 44 y.o. female with a medical diagnosis of Spondylolisthesis of lumbar region.  She presents with Performance deficits affecting function: weakness, impaired self care skills, impaired balance, impaired functional mobilty, impaired endurance, gait instability, pain, decreased lower extremity function, orthopedic precautions, decreased ROM, impaired skin.      Rehab Prognosis: Good; patient would benefit from acute skilled OT services to address these deficits and reach maximum level of function.       Plan:     Patient to be seen 5 x/week to address the above listed problems via self-care/home management, therapeutic exercises, therapeutic activities  · Plan of Care Expires: 11/22/19  · Plan of Care Reviewed with: patient, spouse    Subjective     Chief Complaint: back pain 6/10 but pain meds not due  Patient/Family Comments/goals: none    Occupational Profile:  Living Environment: lives with spouse in 2SH with bed and bathroom upstairs; tub/shower combo.  Previous level of function: indep ADLS; ambulated indep, but most recently needing assist for bathing legs. Pt. on medical leave from work, works as a    Roles and Routines: active  Equipment Used at Home:  cane, straight  Assistance upon Discharge: spouse    Pain/Comfort:  · Pain Rating 1: 6/10  · Location - Side 1: Bilateral  · Location - Orientation 1: lower  · Location 1: back  · Pain Addressed 1: Pre-medicate for activity, Reposition,  Distraction, Cessation of Activity  · Pain Rating Post-Intervention 1: 7/10    Patients cultural, spiritual, Restorationist conflicts given the current situation: no    Objective:     Communicated with: nurse prior to session.  Patient found right sidelying with   upon OT entry to room.    General Precautions: Standard, fall   Orthopedic Precautions:spinal precautions   Braces: LSO     Occupational Performance:    Bed Mobility:    · Patient completed Scooting/Bridging with modified independence and with side rail  · Patient completed Supine to Sit with supervision with HOB slightly elevated with bed rail use.  · Patient completed Sit to Supine with stand by assistance, bed rail and HOB slightly elevated.    Functional Mobility/Transfers:  · Patient completed Sit <> Stand Transfer with stand by assistance  with  rolling walker   · Patient completed Toilet Transfer Step Transfer technique with stand by assistance with  rolling walker  · Functional Mobility: Pt ambulated with SBA using RW to bathroom, sink and back to bed, slightly guarded but no LOB, LSO brace in place.    Activities of Daily Living:  · Feeding:  independence .  · Grooming: contact guard assistance standing at sink with RW and use of  proper body mechanics  · Upper Body Dressing: stand by assistance donned/doffed LSO brace seated EOB  · Lower Body Dressing: total assistance socks   · Toileting: stand by assistance for safety and adherence to body mechanics when wiping    Cognitive/Visual Perceptual:  Cognitive/Psychosocial Skills:     -       Oriented to: Person, Place, Time and Situation   -       Follows Commands/attention:Follows multistep  commands  -       Communication: clear/fluent  -       Memory: No Deficits noted  -       Safety awareness/insight to disability: intact   -       Mood/Affect/Coping skills/emotional control: Appropriate to situation  Visual/Perceptual:      -Intact .    Physical Exam:  Balance:    -       sitting:  good  dynamic:   fair   standing: fair plus  dynamic;  poor plus  Postural examination/scapula alignment:    -       Rounded shoulders  Skin integrity: 2 incision low back   Dominant hand:    -       right  Upper Extremity Range of Motion:     -       Right Upper Extremity: WFL  -       Left Upper Extremity: WFL  Upper Extremity Strength:    -       Right Upper Extremity: WFL  -       Left Upper Extremity: WFL   Strength:    -       Right Upper Extremity: WFL  -       Left Upper Extremity: WFL    AMPAC 6 Click ADL:  AMPAC Total Score: 17    Treatment & Education:  -purpose fo OT visit explained to tp and spouse and verbalized understanding  -handout and instruction in proper post op back protection for ADLs and bed mobility.  Pt verbalized 3/3 bed positoning tech with pillow: place pilow under knees lying on back, between knees side lying and log roll supine<>sit.  Pt instructed in standing body mechanics at sink brushing teeth, leaned forward and one leg lift behind her keeping toe on floor which maintained her back alignment.  LSO brace in place all times. CGA for balance /steadying assist,.    Education:    Patient left right sidelying with all lines intact, call button in reach, bed alarm on and spouse present    GOALS:   Multidisciplinary Problems     Occupational Therapy Goals        Problem: Occupational Therapy Goal    Goal Priority Disciplines Outcome Interventions   Occupational Therapy Goal     OT, PT/OT Ongoing, Progressing    Description:  Goals to be met by: 11/11/2019    Patient will increase functional independence with ADLs by performing:    LE Dressing with Modified [place] and Assistive Devices as needed.  Grooming while standing at sink with Modified [place].  Toileting from toilet with Modified [place] for hygiene and clothing management.   Toilet transfer to toilet with Modified [place].                      History:     Past Medical History:   Diagnosis Date    GERD (gastroesophageal  reflux disease)     History of migraine headaches     Obesity     Pollen allergies     Smoker        Past Surgical History:   Procedure Laterality Date    CHOLECYSTECTOMY      ESOPHAGOGASTRODUODENOSCOPY      EXPLORATORY LAPAROTOMY      HYSTERECTOMY      fibroids    LUMBAR FUSION N/A 11/7/2019    Procedure: FUSION, SPINE, LUMBAR Procedure: STG 1: L5-S1 anterior Lumbar interbody fusion / STG 2:L5-S1 Posterior instrumentation;  Surgeon: Corona Bazan MD;  Location: [facility];  Service: Neurosurgery;  Laterality: N/A;  FUSION, SPINE, LUMBAR  Procedure: STG 1: L5-S1 anterior Lumbar interbody fusion / STG 2:L5-S1 Posterior instrumentation  SURGERY TIME: 2.5hrs  LOS:  ANESTHESIA: General  Blood:    TONSILLECTOMY         Time Tracking:     OT Date of Treatment: 11/08/19  OT Start Time: 1702  OT Stop Time: 1732  OT Total Time (min): 30 min    Billable Minutes:Evaluation 15  Self Care/Home Management 15  Total Time 30    Verena Mendez, OT  11/8/2019
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
## 2019-11-09 NOTE — PLAN OF CARE
Will continue to monitor.   possibly CAP, aspiration  will start on IV azithro, rocephin  check sputum cs  request Pulm cs  check speech/swallow cs to eval for aspiration

## 2019-11-09 NOTE — PLAN OF CARE
VN cued into room.  Pt resting comfortably in bed with call light and personal belongings within reach.  NADN.  No family at bedside.  Discussed incentive spirometer and using call light before getting up.  Pt stated no needs or complaints at this time.  VN will continue to follow and be available as needed.

## 2019-11-09 NOTE — ASSESSMENT & PLAN NOTE
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 Bazan on 11/07/2019.    -Patient is neurologically stable.  Preoperative bilateral leg pain radiating to ankles resolved.  She has some incisional pain and left leg pain that is expected.  -Post op lumbar xrays with appropriate hardware placement   -Pain controlled on current regimen.  Increased Robaxin to 1 g t.i.d..    Encourage p.o. oxycodone use instead of subcu Dilaudid.  -LSO when OOB or with activity  -PT/OT/OOB daily  -Patient must be OOB for atleast 6 hours daily (may be in intervals: 2 hours in chair with each meal)  -IS to bed side. Patient to use atleast 10x every hour.  -Continue bowel regimen daily.  -Continue SQH, Teds and SCDs for DVTP.  -PPI for GI prophylaxis  -Restarted all home meds for anxiety/depression, GERD, and paresthesias  -patient will start lumbar bone growth stimulator on outpatient basis  -voiding appropriately after [place] removal     Dispo: Likely tomorrow, patient needs additional therapy today.  therapy evaluation recommending hh vs home with rolling walker, commode, and shower chair.   Patient has 2 wk wound check with NSR nurse and 6 week follow up with Dr. Bazan     Discussed with Dr. Bazan

## 2019-11-09 NOTE — SUBJECTIVE & OBJECTIVE
Interval History: NAEON. Incisional pain improved with oxy ir 10mg. Post op xrays appropriate hardware positioning. Will have therapy work with patient today with anticipation of discharge tomorrow.     Medications:  Continuous Infusions:  Scheduled Meds:   acetaminophen  650 mg Oral 4 times per day    celecoxib  200 mg Oral BID    DULoxetine  30 mg Oral Daily    gabapentin  300 mg Oral TID    And    gabapentin  100 mg Oral QHS    heparin (porcine)  7,500 Units Subcutaneous Q8H    methocarbamol  1,000 mg Oral TID    mupirocin  1 g Nasal BID    pantoprazole  40 mg Oral Daily    scopolamine  1 patch Transdermal Q3 Days    traMADol  100 mg Oral 4 times per day     PRN Meds:acetaminophen, aluminum-magnesium hydroxide-simethicone, bisacodyl, HYDROmorphone, influenza, ondansetron, ondansetron, ondansetron, oxyCODONE, promethazine (PHENERGAN) IVPB, senna-docusate 8.6-50 mg, sodium chloride 0.9%     Review of Systems  Objective:     Weight: (!) 142.2 kg (313 lb 7.9 oz)  Body mass index is 47.67 kg/m².  Vital Signs (Most Recent):  Temp: 98.1 °F (36.7 °C) (11/09/19 0831)  Pulse: 81 (11/09/19 0831)  Resp: 20 (11/09/19 0831)  BP: 124/73 (11/09/19 0831)  SpO2: 95 % (11/09/19 0752) Vital Signs (24h Range):  Temp:  [97.9 °F (36.6 °C)-98.5 °F (36.9 °C)] 98.1 °F (36.7 °C)  Pulse:  [75-82] 81  Resp:  [16-20] 20  SpO2:  [93 %-96 %] 95 %  BP: (105-124)/(51-73) 124/73                          Neurosurgery Physical Exam  General: well developed, well nourished. no acute distress.  Morbidly obese  Neurologic: Awake, alert and oriented x3. Thought content appropriate.  Head: normocephalic, atraumatic   GCS: Motor: 6/Verbal: 5/Eyes: 4 GCS Total: 15  Cranial nerves:face symmetric and sensation intact bilaterally, tongue midline, pupils equal, round, reactive to light with accomodation, extraocular muscles intact. CN II-XII grossly intact. Shoulder shrug strength equal. Palate rises symmetrically.  Uvula midline. Hearing equal with  finger rub. Gag and taste not tested.      Language: no aphasia  Speech: no dysarthria      Sensory: response to light touch throughout  Motor Strength: Moves all extremities spontaneously with good tone. Full strength upper and lower extremities. No abnormal movements seen.                 Strength   Deltoids Triceps Biceps Wrist Extension Wrist Flexion Hand    Upper: R 5/5 5/5 5/5 5/5 5/5 5/5     L 5/5 5/5 5/5 5/5 5/5 5/5       Iliopsoas Quadriceps Knee  Flexion Tibialis  anterior Gastro- cnemius EHL   Lower: R 5/5 5/5 5/5 5/5 5/5 5/5     L 5/5 5/5 5/5 5/5 5/5 5/5      Straight leg raise: negative bilaterally  ENT: normal hearing with finger rub  Heart: RRR, no cyanosis, pallor, or edema.   Lungs:  normal respiratory effort  Abdomen: soft, non-tender and symmetric  Extremities: warm with no cyanosis, edema, or clubbing  Pulses: palpable distal pulses  Skin: warm, dry and intact. No visible rashes or lesions.         Left lateral and posterior lumbar incision:  wound is c/d/i, no signs of infection, no erythema, warmth, edema, ttp,  no drainage.  wound edges are well approximated with stable.       Significant Labs:  Recent Labs   Lab 11/08/19  0445   *   *   K 4.9      CO2 24   BUN 9   CREATININE 0.7   CALCIUM 9.1     Recent Labs   Lab 11/08/19  0445   WBC 17.70*   HGB 12.3   HCT 38.3   *     No results for input(s): LABPT, INR, APTT in the last 48 hours.  Microbiology Results (last 7 days)     ** No results found for the last 168 hours. **            Significant Diagnostics:  Appropriate hardware at L5-S1

## 2019-11-09 NOTE — PLAN OF CARE
Problem: Occupational Therapy Goal  Goal: Occupational Therapy Goal  Description  Goals to be met by: 11/11/2019    Patient will increase functional independence with ADLs by performing:    LE Dressing with Modified [place] and Assistive Devices as needed.  Grooming while standing at sink with Modified [place].  Toileting from toilet with Modified [place] for hygiene and clothing management.   Toilet transfer to toilet with Modified [place].     Outcome: Ongoing, Progressing   OT initial eval completed and treatment initiated.  DME recs:  Shower chair, BSC and hip kit.  Continue with OT POC.

## 2019-11-10 PROCEDURE — 11000001 HC ACUTE MED/SURG PRIVATE ROOM

## 2019-11-10 PROCEDURE — 97116 GAIT TRAINING THERAPY: CPT

## 2019-11-10 PROCEDURE — 99024 PR POST-OP FOLLOW-UP VISIT: ICD-10-PCS | Mod: ,,, | Performed by: PHYSICIAN ASSISTANT

## 2019-11-10 PROCEDURE — 63600175 PHARM REV CODE 636 W HCPCS: Performed by: PHYSICIAN ASSISTANT

## 2019-11-10 PROCEDURE — 94761 N-INVAS EAR/PLS OXIMETRY MLT: CPT

## 2019-11-10 PROCEDURE — 99024 POSTOP FOLLOW-UP VISIT: CPT | Mod: ,,, | Performed by: PHYSICIAN ASSISTANT

## 2019-11-10 PROCEDURE — 25000003 PHARM REV CODE 250: Performed by: NURSE PRACTITIONER

## 2019-11-10 PROCEDURE — 27000221 HC OXYGEN, UP TO 24 HOURS

## 2019-11-10 PROCEDURE — 25000003 PHARM REV CODE 250: Performed by: PHYSICIAN ASSISTANT

## 2019-11-10 PROCEDURE — 25000003 PHARM REV CODE 250: Performed by: NEUROLOGICAL SURGERY

## 2019-11-10 PROCEDURE — 97530 THERAPEUTIC ACTIVITIES: CPT

## 2019-11-10 RX ORDER — GABAPENTIN 300 MG/1
600 CAPSULE ORAL 3 TIMES DAILY
Qty: 180 CAPSULE | Refills: 6 | Status: SHIPPED | OUTPATIENT
Start: 2019-11-10 | End: 2019-11-22

## 2019-11-10 RX ORDER — OXYCODONE AND ACETAMINOPHEN 10; 325 MG/1; MG/1
1 TABLET ORAL EVERY 6 HOURS PRN
Qty: 90 TABLET | Refills: 0 | Status: SHIPPED | OUTPATIENT
Start: 2019-11-10 | End: 2019-11-11 | Stop reason: HOSPADM

## 2019-11-10 RX ORDER — SCOLOPAMINE TRANSDERMAL SYSTEM 1 MG/1
1 PATCH, EXTENDED RELEASE TRANSDERMAL
Status: DISCONTINUED | OUTPATIENT
Start: 2019-11-10 | End: 2019-11-11 | Stop reason: HOSPADM

## 2019-11-10 RX ORDER — METHOCARBAMOL 500 MG/1
1000 TABLET, FILM COATED ORAL 3 TIMES DAILY
Qty: 90 TABLET | Refills: 0 | Status: SHIPPED | OUTPATIENT
Start: 2019-11-10 | End: 2019-11-22 | Stop reason: SDUPTHER

## 2019-11-10 RX ORDER — ONDANSETRON 4 MG/1
8 TABLET, FILM COATED ORAL EVERY 6 HOURS PRN
Qty: 20 TABLET | Refills: 1 | Status: SHIPPED | OUTPATIENT
Start: 2019-11-10 | End: 2020-01-24

## 2019-11-10 RX ADMIN — ONDANSETRON 8 MG: 8 TABLET, ORALLY DISINTEGRATING ORAL at 11:11

## 2019-11-10 RX ADMIN — METHOCARBAMOL TABLETS 1000 MG: 500 TABLET, COATED ORAL at 03:11

## 2019-11-10 RX ADMIN — CELECOXIB 200 MG: 100 CAPSULE ORAL at 09:11

## 2019-11-10 RX ADMIN — PANTOPRAZOLE SODIUM 40 MG: 40 TABLET, DELAYED RELEASE ORAL at 09:11

## 2019-11-10 RX ADMIN — ALUMINUM HYDROXIDE, MAGNESIUM HYDROXIDE, AND SIMETHICONE 30 ML: 200; 200; 20 SUSPENSION ORAL at 11:11

## 2019-11-10 RX ADMIN — METHOCARBAMOL TABLETS 1000 MG: 500 TABLET, COATED ORAL at 09:11

## 2019-11-10 RX ADMIN — MUPIROCIN 1 G: 20 OINTMENT TOPICAL at 08:11

## 2019-11-10 RX ADMIN — TRAMADOL HYDROCHLORIDE 100 MG: 50 TABLET, FILM COATED ORAL at 05:11

## 2019-11-10 RX ADMIN — TRAMADOL HYDROCHLORIDE 100 MG: 50 TABLET, FILM COATED ORAL at 11:11

## 2019-11-10 RX ADMIN — GABAPENTIN 100 MG: 100 CAPSULE ORAL at 08:11

## 2019-11-10 RX ADMIN — METHOCARBAMOL TABLETS 1000 MG: 500 TABLET, COATED ORAL at 08:11

## 2019-11-10 RX ADMIN — ACETAMINOPHEN 650 MG: 325 TABLET ORAL at 12:11

## 2019-11-10 RX ADMIN — CELECOXIB 200 MG: 100 CAPSULE ORAL at 08:11

## 2019-11-10 RX ADMIN — ONDANSETRON 8 MG: 8 TABLET, ORALLY DISINTEGRATING ORAL at 06:11

## 2019-11-10 RX ADMIN — ONDANSETRON 8 MG: 8 TABLET, ORALLY DISINTEGRATING ORAL at 05:11

## 2019-11-10 RX ADMIN — HEPARIN SODIUM 7500 UNITS: 5000 INJECTION, SOLUTION INTRAVENOUS; SUBCUTANEOUS at 05:11

## 2019-11-10 RX ADMIN — TRAMADOL HYDROCHLORIDE 100 MG: 50 TABLET, FILM COATED ORAL at 10:11

## 2019-11-10 RX ADMIN — GABAPENTIN 300 MG: 100 CAPSULE ORAL at 03:11

## 2019-11-10 RX ADMIN — DULOXETINE 30 MG: 30 CAPSULE, DELAYED RELEASE ORAL at 09:11

## 2019-11-10 RX ADMIN — PROMETHAZINE HYDROCHLORIDE 6.25 MG: 25 INJECTION INTRAMUSCULAR; INTRAVENOUS at 02:11

## 2019-11-10 RX ADMIN — ACETAMINOPHEN 650 MG: 325 TABLET ORAL at 11:11

## 2019-11-10 RX ADMIN — ACETAMINOPHEN 650 MG: 325 TABLET ORAL at 10:11

## 2019-11-10 RX ADMIN — GABAPENTIN 300 MG: 100 CAPSULE ORAL at 08:11

## 2019-11-10 RX ADMIN — TRAMADOL HYDROCHLORIDE 100 MG: 50 TABLET, FILM COATED ORAL at 12:11

## 2019-11-10 RX ADMIN — GABAPENTIN 300 MG: 100 CAPSULE ORAL at 09:11

## 2019-11-10 RX ADMIN — MUPIROCIN 1 G: 20 OINTMENT TOPICAL at 09:11

## 2019-11-10 RX ADMIN — ACETAMINOPHEN 650 MG: 325 TABLET ORAL at 05:11

## 2019-11-10 RX ADMIN — HEPARIN SODIUM 7500 UNITS: 5000 INJECTION, SOLUTION INTRAVENOUS; SUBCUTANEOUS at 10:11

## 2019-11-10 RX ADMIN — SCOPALAMINE 1 PATCH: 1 PATCH, EXTENDED RELEASE TRANSDERMAL at 08:11
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
## 2019-11-10 NOTE — PLAN OF CARE
[place] contacted [facility], Mr Damon [phone], he gave OK to pull BSC & RW for pt. Signed paperwork to be delivered to DME closet.      Shower/Bath Chair also ordered, not covered by insurance, informed pt on where one could be purchased out of pocket and aprox cost     HH orderes sent via [employer] to several agencies, pt had no preference.     Pt accepted by [facility], their  nurse to contact pt to arrange a visit for Monday 11/11/19    Future Appointments   Date Time Provider Department Center   11/22/2019 10:00 AM [facility] ORTHO CLINIC LIMIT 350LBS [facility]   11/22/2019 10:20 AM Tom Lilly PA-C [facility] NEUROSU [facility]   12/17/2019 11:00 AM Annette Burton MD [facility] FM IN [facility] [address]   12/23/2019  3:15 PM [facility] ORTHO CLINIC LIMIT 350LBS [facility]   12/23/2019  3:30 PM Corona Bazan MD [facility] NEUROSU [facility]       Pt's nurse will go over medications/signs and symptoms prior to discharge       11/10/19 1232   Final Note   Assessment Type Final Discharge Note   Anticipated Discharge Disposition Home-Health  ([facility])   What phone number can be called within the next 1-3 days to see how you are doing after discharge? [phone]   Hospital Follow Up  Appt(s) scheduled? No  (Offices closed for Weekend. Patient to schedule own follow up appointment. )   Right Care Referral Info   Post Acute Recommendation Home-care   Referral Type Home Health   Facility Name [facility]     Deepthi Bonilla, RN Transitional Navigator  [phone]
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
## 2019-11-10 NOTE — PT/OT/SLP PROGRESS
Physical Therapy Treatment    Patient Name:  Dianne Hemphill   MRN:  [id number]    Recommendations:     Discharge Recommendations:  home   Discharge Equipment Recommendations: bedside commode, shower chair   Barriers to discharge: None    Assessment:     Dianne Hemphill is a 44 y.o. female admitted with a medical diagnosis of Spondylolisthesis of lumbar region.  She presents with the following impairments/functional limitations:  weakness, impaired functional mobilty, impaired endurance, gait instability, pain, orthopedic precautions, impaired self care skills, decreased lower extremity function, impaired balance, impaired skin. Pt limited in therapy 2* not feeling well. Nurse Soni notified and present to administer meds.    Rehab Prognosis: Good; patient would benefit from acute skilled PT services to address these deficits and reach maximum level of function.    Recent Surgery: Procedure(s) (LRB):  FUSION, SPINE, LUMBAR Procedure: STG 1: L5-S1 anterior Lumbar interbody fusion / STG 2:L5-S1 Posterior instrumentation (N/A) 3 Days Post-Op    Plan:     During this hospitalization, patient to be seen daily to address the identified rehab impairments via gait training, therapeutic activities, therapeutic exercises and progress toward the following goals:    · Plan of Care Expires:  12/08/19    Subjective     Chief Complaint: I feel sick to my stomach.  Patient/Family Comments/goals: I'm nauseated.  Pain/Comfort:  · Pain Rating 1: 5/10  · Location - Side 1: Left  · Location - Orientation 1: generalized  · Location 1: leg  · Pain Addressed 1: Pre-medicate for activity, Distraction, Cessation of Activity, Reposition  · Pain Rating Post-Intervention 1: 5/10      Objective:     Communicated with nurse Soni prior to session.  Patient found supine /c spouse present upon PT entry to room.     General Precautions: Standard, fall   Orthopedic Precautions:spinal precautions   Braces: LSO     Functional Mobility:  · Bed Mobility:      · Scooting: modified independence  · Supine to Sit: modified independence  · Sit to Supine: modified independence  · Transfers:     · Sit to Stand:  modified independence with rolling walker  · Chair to bed: supervision with using  Step Transfer  · Toilet Transfer: supervision with  rolling walker  via amb from EOB to bathroom  · Gait: 50' /c RW /c S; distance limited 2* pt feeling hot, nauseous, and not well      AM-PAC 6 CLICK MOBILITY  Turning over in bed (including adjusting bedclothes, sheets and blankets)?: 4  Sitting down on and standing up from a chair with arms (e.g., wheelchair, bedside commode, etc.): 4  Moving from lying on back to sitting on the side of the bed?: 4  Moving to and from a bed to a chair (including a wheelchair)?: 4  Need to walk in hospital room?: 4  Climbing 3-5 steps with a railing?: 3  Basic Mobility Total Score: 23       Therapeutic Activities and Exercises:   Performed activities noted above.  Pt donned and doffed LSO brace /c set up assistance and pants /c Mod A sitting at EOB. Spouse assisting pt.  Pt performed pericare /c RW /c S for balance.   Unable to attempt stairs this date 2* pt not feeling well. Pt wanted to return back to bed to rest.  PTA applied cold wet towel to neck to cool pt down.    PTA verbally instructed pt and spouse proper sequencing & technqiues for safety and balance /c ascending/descending steps & ed both on importance of spinal precs.  Both verb'd understanding & recited spinal precs.    Patient left supine with call button in reach, nurse notified and spouse and nurse present..    GOALS:   Multidisciplinary Problems     Physical Therapy Goals        Problem: Physical Therapy Goal    Goal Priority Disciplines Outcome Goal Variances Interventions   Physical Therapy Goal     PT, PT/OT Ongoing, Progressing     Description:  Patient will increase functional independence with mobility by performin. Sit<>stand with SPV with RW.  2. Gait x =/>150 feet with  RW with SPV.  3. Ascend/descend 1 flight(13-14) step(s) with least restrictive assistive device and SBA.   4. Pt to transfer supine<>sit with SBA                    Time Tracking:     PT Received On: 11/10/19  PT Start Time: 1055     PT Stop Time: 1118  PT Total Time (min): 23 min     Billable Minutes: Gait Training 8 and Therapeutic Activity 15    Treatment Type: Treatment  PT/PTA: PTA     PTA Visit Number: 3     Malka bIrahim, PTA  11/10/2019

## 2019-11-10 NOTE — PLAN OF CARE
AAO,abdomen and back incisions covered by dressings,dulcolax suppository requested,reports very large amount of gas expelled,brace worn when up OOB,reports LLE has tingling and pain,meds admin per mar,safety maintained,bed alarm set.

## 2019-11-10 NOTE — PLAN OF CARE
Problem: Physical Therapy Goal  Goal: Physical Therapy Goal  Description  Patient will increase functional independence with mobility by performin. Sit<>stand with SPV with RW. MET 11/10/19  2. Gait x =/>150 feet with RW with SPV.  3. Ascend/descend 1 flight(13-14) step(s) with least restrictive assistive device and SBA.   4. Pt to transfer supine<>sit with SBA MET 11/10/19  Outcome: Ongoing, Progressing    Pt progressing well /c therapy despite not feeling well this date. Unable to perform stairs training due to this reason. Cont POC

## 2019-11-10 NOTE — DISCHARGE INSTRUCTIONS
Please follow ONLY the instructions that are checked below.    Activity Restrictions:  [x]  Return to work will be determined on an individual basis.  [x]  No lifting greater than 5-10 pounds.  [x]  Avoid bending and twisting the area of your surgery more than 45 degrees from neutral position in any direction.  [x]  No driving or operating machinery:  [x]  until cleared by your surgeon.  [x]  while taking narcotic pain medications or muscle relaxants.  [x]  Wear LSO brace at all times except when flat in bed.  [x]  Do not sit at 90 degree angle for more than 30 mins at each setting.     [x]  Increase ambulation over the next 2 weeks so that you are walking 2 miles per day at 2 weeks post-operatively.  [x]  Walk on paved surfaces only. It is okay to walk up and down stairs while holding onto a side rail.      Discharge Medication/Follow-up:  [x]  Please refer to discharge medication reconciliation form.  [x]  Prescriptions for appropriate medication will be given upon discharge.   [x]  Pain control:  Oxycodone every 4-6 hours for pain and Robaxin 3 times daily for muscle spasms. Please alternate these two medications for better pain control.   [x]  Take docusate (Colace 100 mg): take one capsule a day as needed for constipation. You can get this over the counter.  [x]  Follow-up appointment:  [x]  2 weeks post-op for wound check by physician assistant   [x]  An appointment will be mailed to you.    Wound Care:    [x]  Remove outer clear dressing  2  days after surgery. Apply bacitracin to incision twice a day until follow up visit. And, keep your incision open to the air.     [x]  You may shower on the 2nd day after your surgery. Have the force of water hit you opposite from the incision. Pat the incision dry after your shower; do not scrub the incision.  [x]  You cannot take a bath until 8 weeks after surgery.    Call your doctor or go to the Emergency Room for any signs of infection, including: increased redness,  drainage, pain, or fever (temperature ?101.5 for 24 hours). Call your doctor or go to the Emergency Room if there are any localized neurological changes; problems with speech, vision, numbness, tingling, weakness, or severe headache; or for other concerns.    Special Instructions:  [x]  No use of tobacco products.  [x]  Diet: Please eat a regular diet as tolerated.  []  Other diet:              Specific physician instructions:  start gabapentin 600mg three times day (new rx was sent to [facility]). Restart home allergy and vit d meds on discharge. Take zofran with pain meds to prevent nausea/vomiting. Start using lumbar bone stimulator every 2-4 hours a day.          Physicians need 3 days' notice for pain medicine to be refilled. Pain medicine will only be refilled between 8 AM and 5 PM, Monday through Friday, due to Food and Drug Administration regulation of documentation.        If you have any questions about this form, please call [phone] at [facility].    Form No. 18388 (Revised 10/31/2013)

## 2019-11-10 NOTE — SUBJECTIVE & OBJECTIVE
Interval History: MAKENNA. Continues to have incisional pain and pain radiating down left leg to ankle. Therapy recommending home vs. HH with DME. Tolerating diet and voiding appropriately. BM today after stool softener.  Plan to dc home today. Patient as 2 week wound check and 6 wk follow up with Dr. Bazan.     Medications:  Continuous Infusions:  Scheduled Meds:   acetaminophen  650 mg Oral 4 times per day    celecoxib  200 mg Oral BID    DULoxetine  30 mg Oral Daily    gabapentin  300 mg Oral TID    And    gabapentin  100 mg Oral QHS    heparin (porcine)  7,500 Units Subcutaneous Q8H    methocarbamol  1,000 mg Oral TID    mupirocin  1 g Nasal BID    pantoprazole  40 mg Oral Daily    scopolamine  1 patch Transdermal Q3 Days    senna-docusate 8.6-50 mg  2 tablet Oral Daily    traMADol  100 mg Oral 4 times per day     PRN Meds:acetaminophen, aluminum-magnesium hydroxide-simethicone, bisacodyl, HYDROmorphone, influenza, ondansetron, ondansetron, ondansetron, oxyCODONE, promethazine (PHENERGAN) IVPB, sodium chloride 0.9%     Review of Systems  Objective:     Weight: (!) 139.5 kg (307 lb 8.7 oz)  Body mass index is 46.76 kg/m².  Vital Signs (Most Recent):  Temp: 98.8 °F (37.1 °C) (11/10/19 0834)  Pulse: 81 (11/10/19 0834)  Resp: 20 (11/10/19 0834)  BP: (!) 104/59 (11/10/19 0834)  SpO2: 96 % (11/10/19 0752) Vital Signs (24h Range):  Temp:  [97.7 °F (36.5 °C)-98.8 °F (37.1 °C)] 98.8 °F (37.1 °C)  Pulse:  [74-95] 81  Resp:  [17-20] 20  SpO2:  [96 %] 96 %  BP: (104-128)/(55-71) 104/59                          Neurosurgery Physical Exam  General: well developed, well nourished. no acute distress.  Morbidly obese  Neurologic: Awake, alert and oriented x3. Thought content appropriate.  Head: normocephalic, atraumatic   GCS: Motor: 6/Verbal: 5/Eyes: 4 GCS Total: 15  Cranial nerves:face symmetric and sensation intact bilaterally, tongue midline, pupils equal, round, reactive to light with accomodation, extraocular  muscles intact. CN II-XII grossly intact. Shoulder shrug strength equal. Palate rises symmetrically.  Uvula midline. Hearing equal with finger rub. Gag and taste not tested.      Language: no aphasia  Speech: no dysarthria      Sensory: response to light touch throughout  Motor Strength: Moves all extremities spontaneously with good tone. Full strength upper and lower extremities. No abnormal movements seen.                 Strength   Deltoids Triceps Biceps Wrist Extension Wrist Flexion Hand    Upper: R 5/5 5/5 5/5 5/5 5/5 5/5     L 5/5 5/5 5/5 5/5 5/5 5/5       Iliopsoas Quadriceps Knee  Flexion Tibialis  anterior Gastro- cnemius EHL   Lower: R 5/5 5/5 5/5 5/5 5/5 5/5     L 5/5 5/5 5/5 5/5 5/5 5/5      Straight leg raise: negative bilaterally  ENT: normal hearing with finger rub  Heart: RRR, no cyanosis, pallor, or edema.   Lungs:  normal respiratory effort  Abdomen: soft, non-tender and symmetric  Extremities: warm with no cyanosis, edema, or clubbing  Pulses: palpable distal pulses  Skin: warm, dry and intact. No visible rashes or lesions.         Left lateral and posterior lumbar incision:  wound is c/d/i, no signs of infection, no erythema, warmth, edema, ttp,  no drainage.  wound edges are well approximated with stable.      Significant Labs:  No results for input(s): GLU, NA, K, CL, CO2, BUN, CREATININE, CALCIUM, MG in the last 48 hours.  No results for input(s): WBC, HGB, HCT, PLT in the last 48 hours.  No results for input(s): LABPT, INR, APTT in the last 48 hours.  Microbiology Results (last 7 days)     ** No results found for the last 168 hours. **            Significant Diagnostics:  Lumbar xrays showed Appropriate hardware at L5-S1

## 2019-11-10 NOTE — ASSESSMENT & PLAN NOTE
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 Bazan on 11/07/2019.    -Patient is neurologically stable.  Preoperative bilateral leg pain radiating to ankles resolved.  She has some incisional pain and left leg pain that is expected.  -Post op lumbar xrays with appropriate hardware placement   -Pain controlled on current regimen.  Increased Robaxin to 1 g t.i.d..    Encourage p.o. oxycodone use instead of subcu Dilaudid.  -LSO when OOB or with activity  -PT/OT/OOB daily  -Patient must be OOB for atleast 6 hours daily (may be in intervals: 2 hours in chair with each meal)  -IS to bed side. Patient to use atleast 10x every hour.  -Continue bowel regimen daily.  -Continue SQH, Teds and SCDs for DVTP.  -PPI for GI prophylaxis  -Restarted all home meds for anxiety/depression, GERD, and paresthesias  -patient will start lumbar bone growth stimulator on outpatient basis  -voiding appropriately after [place] removal     Dispo: DC home today. therapy evaluation recommending hh vs home with rolling walker, commode, and shower chair.   Patient has 2 wk wound check with NSR nurse and 6 week follow up with Dr. Bazan     Discussed with Dr. Bazan

## 2019-11-10 NOTE — PLAN OF CARE
Problem: Adult Inpatient Plan of Care  Goal: Plan of Care Review  Outcome: Ongoing, Progressing  Goal: Patient-Specific Goal (Individualization)  Outcome: Ongoing, Progressing  Goal: Absence of Hospital-Acquired Illness or Injury  Outcome: Ongoing, Progressing  Goal: Optimal Comfort and Wellbeing  Outcome: Ongoing, Progressing  Goal: Readiness for Transition of Care  Outcome: Ongoing, Progressing  Goal: Rounds/Family Conference  Outcome: Ongoing, Progressing     Problem: Bleeding (Spinal Surgery)  Goal: Absence of Bleeding  Outcome: Ongoing, Progressing     Problem: Bowel Elimination Impaired (Spinal Surgery)  Goal: Effective Bowel Elimination  Outcome: Ongoing, Progressing     Problem: Functional Ability Impaired (Spinal Surgery)  Goal: Optimal Functional Ability  Outcome: Ongoing, Progressing     Problem: Infection (Spinal Surgery)  Goal: Absence of Infection Signs/Symptoms  Outcome: Ongoing, Progressing     Problem: Neurologic Impairment (Spinal Surgery)  Goal: Optimal Neurologic Function  Outcome: Ongoing, Progressing     Problem: Pain (Spinal Surgery)  Goal: Acceptable Pain Control  Outcome: Ongoing, Progressing     Problem: Postoperative Urinary Retention (Spinal Surgery)  Goal: Effective Urinary Elimination  Outcome: Ongoing, Progressing     Problem: Fall Injury Risk  Goal: Absence of Fall and Fall-Related Injury  Outcome: Ongoing, Progressing     Problem: Skin Injury Risk Increased  Goal: Skin Health and Integrity  Outcome: Ongoing, Progressing   Pt AAO x 4. VSS. NAD. Pain and nausea contolled with prn and scheduled meds. Pt requested removal of scopolamine patch. LSO brace used for pt when ambulating. PT saw pt this shift. Tolerating regular diet. Pt will possibly be discharged on tomorrow. Safety maintained. Will continue to monitor.

## 2019-11-10 NOTE — PROGRESS NOTES
[place] contacted [facility], Mr Damon [phone], he gave OK to pull BSC & RW for pt. Signed paperwork to be delivered to DME closet.     Shower/Bath Chair also ordered, not covered by insurance, informed pt on where one could be purchased out of pocket and aprox cost    HH orderes sent via [facility] to several agencies, pt had no preference. Awaiting reply

## 2019-11-11 VITALS
TEMPERATURE: 98 F | HEIGHT: 68 IN | WEIGHT: 293 LBS | SYSTOLIC BLOOD PRESSURE: 136 MMHG | RESPIRATION RATE: 16 BRPM | DIASTOLIC BLOOD PRESSURE: 82 MMHG | BODY MASS INDEX: 44.41 KG/M2 | HEART RATE: 73 BPM | OXYGEN SATURATION: 96 %

## 2019-11-11 LAB
ANION GAP SERPL CALC-SCNC: 7 MMOL/L (ref 8–16)
BASOPHILS # BLD AUTO: 0.05 K/UL (ref 0–0.2)
BASOPHILS NFR BLD: 0.6 % (ref 0–1.9)
BUN SERPL-MCNC: 8 MG/DL (ref 6–20)
CALCIUM SERPL-MCNC: 8.7 MG/DL (ref 8.7–10.5)
CHLORIDE SERPL-SCNC: 103 MMOL/L (ref 95–110)
CO2 SERPL-SCNC: 30 MMOL/L (ref 23–29)
CREAT SERPL-MCNC: 0.7 MG/DL (ref 0.5–1.4)
DIFFERENTIAL METHOD: ABNORMAL
EOSINOPHIL # BLD AUTO: 0.6 K/UL (ref 0–0.5)
EOSINOPHIL NFR BLD: 6.7 % (ref 0–8)
ERYTHROCYTE [DISTWIDTH] IN BLOOD BY AUTOMATED COUNT: 13.2 % (ref 11.5–14.5)
EST. GFR  (AFRICAN AMERICAN): >60 ML/MIN/1.73 M^2
EST. GFR  (NON AFRICAN AMERICAN): >60 ML/MIN/1.73 M^2
GLUCOSE SERPL-MCNC: 93 MG/DL (ref 70–110)
HCT VFR BLD AUTO: 36.3 % (ref 37–48.5)
HGB BLD-MCNC: 11.5 G/DL (ref 12–16)
LYMPHOCYTES # BLD AUTO: 2.6 K/UL (ref 1–4.8)
LYMPHOCYTES NFR BLD: 30.3 % (ref 18–48)
MCH RBC QN AUTO: 28.9 PG (ref 27–31)
MCHC RBC AUTO-ENTMCNC: 31.7 G/DL (ref 32–36)
MCV RBC AUTO: 91 FL (ref 82–98)
MONOCYTES # BLD AUTO: 0.6 K/UL (ref 0.3–1)
MONOCYTES NFR BLD: 6.8 % (ref 4–15)
NEUTROPHILS # BLD AUTO: 4.7 K/UL (ref 1.8–7.7)
NEUTROPHILS NFR BLD: 55.6 % (ref 38–73)
PLATELET # BLD AUTO: 396 K/UL (ref 150–350)
PMV BLD AUTO: 9.6 FL (ref 9.2–12.9)
POTASSIUM SERPL-SCNC: 4.1 MMOL/L (ref 3.5–5.1)
RBC # BLD AUTO: 3.98 M/UL (ref 4–5.4)
SODIUM SERPL-SCNC: 140 MMOL/L (ref 136–145)
WBC # BLD AUTO: 8.57 K/UL (ref 3.9–12.7)

## 2019-11-11 PROCEDURE — 85025 COMPLETE CBC W/AUTO DIFF WBC: CPT

## 2019-11-11 PROCEDURE — 94761 N-INVAS EAR/PLS OXIMETRY MLT: CPT

## 2019-11-11 PROCEDURE — 63600175 PHARM REV CODE 636 W HCPCS: Performed by: PHYSICIAN ASSISTANT

## 2019-11-11 PROCEDURE — 80048 BASIC METABOLIC PNL TOTAL CA: CPT

## 2019-11-11 PROCEDURE — 97530 THERAPEUTIC ACTIVITIES: CPT

## 2019-11-11 PROCEDURE — 36415 COLL VENOUS BLD VENIPUNCTURE: CPT

## 2019-11-11 PROCEDURE — 25000003 PHARM REV CODE 250: Performed by: PHYSICIAN ASSISTANT

## 2019-11-11 PROCEDURE — 97116 GAIT TRAINING THERAPY: CPT

## 2019-11-11 RX ORDER — TRAMADOL HYDROCHLORIDE 50 MG/1
50-100 TABLET ORAL EVERY 6 HOURS PRN
Qty: 90 TABLET | Refills: 0 | Status: SHIPPED | OUTPATIENT
Start: 2019-11-11 | End: 2019-11-22

## 2019-11-11 RX ORDER — SCOLOPAMINE TRANSDERMAL SYSTEM 1 MG/1
1 PATCH, EXTENDED RELEASE TRANSDERMAL
Qty: 1 PATCH | Refills: 0 | Status: SHIPPED | OUTPATIENT
Start: 2019-11-11 | End: 2020-01-24 | Stop reason: SDUPTHER

## 2019-11-11 RX ADMIN — METHOCARBAMOL TABLETS 1000 MG: 500 TABLET, COATED ORAL at 03:11

## 2019-11-11 RX ADMIN — METHOCARBAMOL TABLETS 1000 MG: 500 TABLET, COATED ORAL at 09:11

## 2019-11-11 RX ADMIN — TRAMADOL HYDROCHLORIDE 100 MG: 50 TABLET, FILM COATED ORAL at 11:11

## 2019-11-11 RX ADMIN — MUPIROCIN 1 G: 20 OINTMENT TOPICAL at 09:11

## 2019-11-11 RX ADMIN — HEPARIN SODIUM 7500 UNITS: 5000 INJECTION, SOLUTION INTRAVENOUS; SUBCUTANEOUS at 05:11

## 2019-11-11 RX ADMIN — ACETAMINOPHEN 650 MG: 325 TABLET ORAL at 05:11

## 2019-11-11 RX ADMIN — GABAPENTIN 300 MG: 100 CAPSULE ORAL at 09:11

## 2019-11-11 RX ADMIN — ACETAMINOPHEN 650 MG: 325 TABLET ORAL at 11:11

## 2019-11-11 RX ADMIN — DULOXETINE 30 MG: 30 CAPSULE, DELAYED RELEASE ORAL at 09:11

## 2019-11-11 RX ADMIN — TRAMADOL HYDROCHLORIDE 100 MG: 50 TABLET, FILM COATED ORAL at 05:11

## 2019-11-11 RX ADMIN — PANTOPRAZOLE SODIUM 40 MG: 40 TABLET, DELAYED RELEASE ORAL at 09:11

## 2019-11-11 RX ADMIN — CELECOXIB 200 MG: 100 CAPSULE ORAL at 09:11

## 2019-11-11 NOTE — PLAN OF CARE
Problem: Adult Inpatient Plan of Care  Goal: Plan of Care Review  Outcome: Ongoing, Progressing  Goal: Patient-Specific Goal (Individualization)  Outcome: Ongoing, Progressing  Goal: Absence of Hospital-Acquired Illness or Injury  Outcome: Ongoing, Progressing  Goal: Optimal Comfort and Wellbeing  Outcome: Ongoing, Progressing  Goal: Readiness for Transition of Care  Outcome: Ongoing, Progressing  Goal: Rounds/Family Conference  Outcome: Ongoing, Progressing     Problem: Bleeding (Spinal Surgery)  Goal: Absence of Bleeding  Outcome: Ongoing, Progressing     Problem: Bowel Elimination Impaired (Spinal Surgery)  Goal: Effective Bowel Elimination  Outcome: Ongoing, Progressing     Problem: Functional Ability Impaired (Spinal Surgery)  Goal: Optimal Functional Ability  Outcome: Ongoing, Progressing     Problem: Infection (Spinal Surgery)  Goal: Absence of Infection Signs/Symptoms  Outcome: Ongoing, Progressing     Problem: Neurologic Impairment (Spinal Surgery)  Goal: Optimal Neurologic Function  Outcome: Ongoing, Progressing     Problem: Pain (Spinal Surgery)  Goal: Acceptable Pain Control  Outcome: Ongoing, Progressing     Problem: Postoperative Urinary Retention (Spinal Surgery)  Goal: Effective Urinary Elimination  Outcome: Ongoing, Progressing     Problem: Fall Injury Risk  Goal: Absence of Fall and Fall-Related Injury  Outcome: Ongoing, Progressing     Problem: Skin Injury Risk Increased  Goal: Skin Health and Integrity  Outcome: Ongoing, Progressing   Pt AAO x 4. Vss. NAD. Tolerating regular diet. Pt up with walker and assist to toilet for elimination. Dressing to procedure sites clean, dry, intact. Pt pain controlled with scheduled and prn meds. Pt nausea uncontrolled with prn meds. Spouse at bedside. Safety maintained. Will continue to monitor.

## 2019-11-11 NOTE — PLAN OF CARE
Discharge rounds on patient. Discussed followup appointments, blue discharge folder, discharge nurse will go over home medications and reasons for medications and final discharge instructions. All patient/caregiver questions answered. Patient verbalized understanding.      Patient will receive home health services through [facility]. A  nurse will be out to see the patient tomorrow 11/12. Patient is aware.       11/11/19 1519   Final Note   Assessment Type Final Discharge Note   Anticipated Discharge Disposition Home   What phone number can be called within the next 1-3 days to see how you are doing after discharge? [phone]   Hospital Follow Up  Appt(s) scheduled? Yes   Discharge plans and expectations educations in teach back method with documentation complete? Yes   Right Care Referral Info   Post Acute Recommendation No Care   Referral Type Home Care   Facility Name [facility]       Future Appointments   Date Time Provider Department Center   11/22/2019 10:00 AM [facility] ORTHO CLINIC LIMIT 350[facility]   11/22/2019 10:20 AM Tom Lilly PA-C [facility] NEUROSU [place] [facility]   12/17/2019 11:00 AM Annette Burton MD [facility] FM IN [facility] [address]   12/23/2019  3:15 PM [facility] ORTHO CLINIC LIMIT 350LBS [facility] ORAY [place] [facility]   12/23/2019  3:30 PM Corona Bazan MD [facility] NEUROSU [place] [facility]

## 2019-11-11 NOTE — PLAN OF CARE
AAO,vss,dressings intact to incisions,states she feels much better after scopolamine patch placed,drank large smoothie,no n/v at present,up with walker and brace,bed alarm set,safety maintained.

## 2019-11-11 NOTE — PT/OT/SLP PROGRESS
Physical Therapy Treatment    Patient Name:  Dianne Hemphill   MRN:  [id number]    Recommendations:     Discharge Recommendations:  home   Discharge Equipment Recommendations: bedside commode, shower chair   Barriers to discharge: None    Assessment:     Dianne Hemphill is a 44 y.o. female admitted with a medical diagnosis of Spondylolisthesis of lumbar region.  She presents with the following impairments/functional limitations:  weakness, impaired endurance, impaired self care skills, impaired functional mobilty, gait instability, impaired balance, decreased coordination, decreased upper extremity function, decreased lower extremity function, pain, orthopedic precautions, impaired skin, decreased ROM.  1st session: Pt stated she felt better than yesterday but still weak. Rates pain in left leg at a 6 prior to any movement and higher when ambulating. Also with bilateral hip pain while ambulating which she describes as radiating across in back. Able to perform ambulation training 70 ft with RW and SBA/Sup.    2nd session: Pt performed 2 ambulation trials 20 ft and 10-15 ft with RW requiring SBA/Sup. Taken to the 4th floor to train pt and  for ascending and descending stairs. Able to perform stair climbing ~3-4 steps ascending/descending by ~3 trials 2 with PTA and 1 trial with  assisting. Gait belt issued. Recommending home upon discharge. Would benefit from continued PT services while in this facility to increase pt's independent functional mobility to level prior to admit.    Rehab Prognosis: Good; patient would benefit from acute skilled PT services to address these deficits and reach maximum level of function.    Recent Surgery: Procedure(s) (LRB):  FUSION, SPINE, LUMBAR Procedure: STG 1: L5-S1 anterior Lumbar interbody fusion / STG 2:L5-S1 Posterior instrumentation (N/A) 4 Days Post-Op    Plan:     During this hospitalization, patient to be seen daily to address the identified rehab impairments via  "gait training, therapeutic activities, therapeutic exercises and progress toward the following goals:    · Plan of Care Expires:  12/08/19    Subjective     Chief Complaint: Left leg pain 6/10  Patient/Family Comments/goals: "I feel better than I did yesterday but I do feel very weak. I will try to do what I can".  Pain/Comfort:  · Pain Rating 1: 6/10  · Location - Side 1: Left  · Location - Orientation 1: generalized  · Location 1: leg  · Pain Addressed 1: Pre-medicate for activity, Distraction, Cessation of Activity  · Pain Rating Post-Intervention 1: 8/10(during walk)  · Pain Rating 2: 6/10  · Location - Side 2: Bilateral  · Location - Orientation 2: generalized  · Location 2: hip  · Pain Addressed 2: Cessation of Activity, Distraction      Objective:     Communicated with nurse prior to session.  Patient found supine with bed alarm upon PT entry to room.     General Precautions: Standard, fall   Orthopedic Precautions:spinal precautions   Braces: LSO     Functional Mobility:  · Bed Mobility:     · Rolling Left:  modified independence  · Scooting: modified independence  · Supine to Sit: modified independence  · Transfers:     · Sit to Stand:  modified independence with rolling walker  · Gait:  ~70 ft with RW requiring SBA/Sup with verbal ceuing to stay within RW boundaries  · Stairs:  Pt ascended/descended  ~3-4 steps ascending/descending by 3 trials stair(s) with No Assistive Device with  holding onto wall on one side with Contact Guard Assistance and Minimal Assistance.       AM-PAC 6 CLICK MOBILITY  Turning over in bed (including adjusting bedclothes, sheets and blankets)?: 4  Sitting down on and standing up from a chair with arms (e.g., wheelchair, bedside commode, etc.): 4  Moving from lying on back to sitting on the side of the bed?: 4  Moving to and from a bed to a chair (including a wheelchair)?: 4  Need to walk in hospital room?: 4  Climbing 3-5 steps with a railing?: 3  Basic Mobility Total Score: " 23       Therapeutic Activities and Exercises:  1st session: Pt able to perform ambulation training ~70 ft with RW, SBA/Sup, and verbal cueing to stay within RW boundaries. While ambulating pt stated her pain level increased and was now experiencing pain in both hips describing pain as radiating across back. Seated therapeutic exercises 1 x 5 reps BLE's consisting of LAQ's and hip add/abd. Also 1 x 10 reps of heel/toe raises. Pt declined to perform any further ambulation this session.    2nd session: Pt ambulated by 2 trials ~20 ft and ~10-15 ft with RW requiring SBA/Sup. Pt take by wheelchair to perform stair climbing. Ascended/descended ~3-4 stairs by 3 trials 2 requiring CGA/min A with PTA and 1 with  with PTA standing by. Required min A holding onto wall on one side as pt and  stated there were no railings as house was still under construction. Pt and  able to show understanding through teach back method.    Patient left up in chair with all lines intact, call button in reach, chair alarm on and  nurse notified..    GOALS:   Multidisciplinary Problems     Physical Therapy Goals        Problem: Physical Therapy Goal    Goal Priority Disciplines Outcome Goal Variances Interventions   Physical Therapy Goal     PT, PT/OT Ongoing, Progressing     Description:  Patient will increase functional independence with mobility by performin. Sit<>stand with SPV with RW.  2. Gait x =/>150 feet with RW with SPV.  3. Ascend/descend 1 flight(13-14) step(s) with least restrictive assistive device and SBA.   4. Pt to transfer supine<>sit with SBA                    Time Tracking:     PT Received On: 19  PT Start Time: 1015   2nd session: 1318  PT Stop Time: 1039   2nd session: 1343  PT Total Time (min): 24 min 2nd session: 25 min      Billable Minutes: Gait Training  15        2nd session= 25 and Therapeutic Exercise 9    Treatment Type: Treatment  PT/PTA: PTA     PTA Visit Number: 4     Beryl FRIAS  Xavier, PTA  11/11/2019

## 2019-11-11 NOTE — PT/OT/SLP PROGRESS
Occupational Therapy      Patient Name:  Dianne Hemphill   MRN:  [id number]    Patient not seen today secondary to  Pt politely declined @ 1104 secondary to pt just returned to bed secondary to stomach feeling very queasy and she felt foggy. Requested OT to return at later time. Will follow-up if time allows.    YESENIA Mendoza  11/11/2019

## 2019-11-11 NOTE — NURSING
Progress notes reviewed. Rounds completed. Introduced self as VN for this shift. Educated pt on VN's role in pt care. Educated pt about plan of care and fall precautions.  Opportunity given for pt's questions. Patient stating she is very nauseas still and nausea is not relieved with zofran. Patient stated the phenergan makes her very drowsy, requesting to try scopolamine patch again. Dr. Bazan contacted and new orders entered. Primary RN notified. Will continue to monitor.       11/10/19 2013   Type of Frequent Check   Type Patient Rounds  (VN Rounds)   Safety/Activity   Patient Rounds bed in low position;bed wheels locked;visualized patient;call light in patient/parent reach;clutter free environment maintained   Safety Promotion/Fall Prevention assistive device/personal item within reach;Fall Risk reviewed with patient/family;medications reviewed   Activity Management activity adjusted per tolerance   Positioning   Body Position positioned/repositioned independently;side-lying, right   Head of Bed (HOB) HOB lowered;HOB at 20-30 degrees   Positioning/Transfer Devices pillows;in use   Pain/Comfort/Sleep   Preferred Pain Scale number (Numeric Rating Pain Scale)   Comfort/Acceptable Pain Level 0   Pain Rating (0-10): Rest 0   POSS ([place] Opioid-Induced Sed Scale) 1 - Awake and alert

## 2019-11-11 NOTE — PROGRESS NOTES
NEUROSURGICAL POST-OPERATIVE PROGRESS NOTE    DATE OF SERVICE:  11/11/2019      ATTENDING PHYSICIAN:  Corona Bazan MD    SUBJECTIVE:    INTERIM HISTORY:    This is a very pleasant 44 y.o. y.o. female, who is status post-op L5-S1 fusion. Doing better this morning. Improved leg and back pain. Improved nausea. Mobilized with walker. Voiding bladder ok.                OBJECTIVE:    PHYSICAL EXAMINATION:   Vitals:    11/11/19 0751   BP: 112/62   Pulse: 72   Resp: 20   Temp: 97.9 °F (36.6 °C)       Neurosurgery Physical Exam    Ortho Exam    Neurologic Exam     Motor Exam     Strength   Strength 5/5 throughout.       Wound CDI    DIAGNOSTIC DATA:    X-ray of the lumbar spine, AP and lateral views reveals the fusion hardware is intact. No loosening of screws or migration of hardware.    ASSESMENT:    This is a 44 y.o. female who is s/p day 4 L5-S1 ALIF. 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 Bazan on 11/07/2019.    -Patient is neurologically stable.  Preoperative bilateral leg pain radiating to ankles resolved.  She has some incisional pain and left leg pain that is expected.  -Post op lumbar xrays with appropriate hardware placement   -Pain controlled on current regimen.  Increased Robaxin to 1 g t.i.d..    Encourage p.o. oxycodone use instead of subcu Dilaudid.  -LSO when  OOB or with activity  -PT/OT/OOB daily  -Patient must be OOB for atleast 6 hours daily (may be in intervals: 2 hours in chair with each meal)  -IS to bed side. Patient to use atleast 10x every hour.  -Continue bowel regimen daily.  -Continue SQH, Teds and SCDs for DVTP.  -PPI for GI prophylaxis  -Restarted all home meds for anxiety/depression, GERD, and paresthesias  -patient will start lumbar bone growth stimulator on outpatient basis  -voiding appropriately after [place] removal     Dispo: DC home today. therapy evaluation recommending hh vs home with rolling walker, commode, and shower chair.   Patient has 2 wk wound check with NSR nurse and 6 week follow up with Dr. Bazan     Discussed with Dr. Bazan         Relevant Orders    BATH/SHOWER CHAIR FOR HOME USE    WALKER FOR HOME USE    COMMODE FOR HOME USE    Ambulatory referral to Home Health          PLAN:    OK to dc home with home health.   All questions answered        Corona Bazan MD  Pager: [phone]

## 2019-11-11 NOTE — PLAN OF CARE
Problem: Physical Therapy Goal  Goal: Physical Therapy Goal  Description  Patient will increase functional independence with mobility by performin. Sit<>stand with SPV with RW.  2. Gait x =/>150 feet with RW with SPV.  3. Ascend/descend 1 flight(13-14) step(s) with least restrictive assistive device and SBA.   4. Pt to transfer supine<>sit with SBA   Outcome: Ongoing, Progressing   Goals 1 and 4 MET continues to work toward goals 2 and 3.   ----- Message from Gem Russ, [place] 

## 2019-11-12 ENCOUNTER — PATIENT MESSAGE (OUTPATIENT)
Dept: NEUROSURGERY | Facility: CLINIC | Age: 44
End: 2019-11-12

## 2019-11-12 ENCOUNTER — TELEPHONE (OUTPATIENT)
Dept: INTERNAL MEDICINE | Facility: CLINIC | Age: 44
End: 2019-11-12

## 2019-11-12 ENCOUNTER — PATIENT MESSAGE (OUTPATIENT)
Dept: INTERNAL MEDICINE | Facility: CLINIC | Age: 44
End: 2019-11-12

## 2019-11-12 ENCOUNTER — TELEPHONE (OUTPATIENT)
Dept: NEUROSURGERY | Facility: CLINIC | Age: 44
End: 2019-11-12

## 2019-11-12 DIAGNOSIS — B37.0 ORAL THRUSH: Primary | ICD-10-CM

## 2019-11-12 PROCEDURE — G0180 PR HOME HEALTH MD CERTIFICATION: ICD-10-PCS | Mod: ,,, | Performed by: NEUROLOGICAL SURGERY

## 2019-11-12 PROCEDURE — G0180 MD CERTIFICATION HHA PATIENT: HCPCS | Mod: ,,, | Performed by: NEUROLOGICAL SURGERY

## 2019-11-12 NOTE — TELEPHONE ENCOUNTER
"----- Message from Ceci Holtin sent at 11/12/2019  2:37 PM CST -----  Contact: [facility] / Monique / # [phone]  Name of Who is Calling:[employer] [facility] / Monique     What is the request in detail:   Per patient's home health nurse, "patient recently had back surgery and has developed a case of Thrush; patient would like to have medication called into her local [facility] # [id number]."  Please give a call back at your convenience and further advise.   THANKS!    Reply by [facility]: no      Call Back: [facility] / Monique / # [phone]                                    "

## 2019-11-12 NOTE — TELEPHONE ENCOUNTER
----- Message from Mayelin Winter sent at 11/12/2019  2:33 PM CST -----  [facility]  [phone]   Monique with [facility] requesting to speak with you regarding pt's getting prescription for stronger pain medication. Please call Monique.

## 2019-11-12 NOTE — TELEPHONE ENCOUNTER
Spoke to alan with [facility], patient is having 9/10 pain down her legs and the ultram is not helping. She would like something stronger.

## 2019-11-13 ENCOUNTER — TELEPHONE (OUTPATIENT)
Dept: HOME HEALTH SERVICES | Facility: HOSPITAL | Age: 44
End: 2019-11-13

## 2019-11-13 ENCOUNTER — PATIENT OUTREACH (OUTPATIENT)
Dept: ADMINISTRATIVE | Facility: CLINIC | Age: 44
End: 2019-11-13

## 2019-11-13 RX ORDER — HYDROMORPHONE HYDROCHLORIDE 2 MG/1
2 TABLET ORAL EVERY 4 HOURS PRN
Qty: 40 TABLET | Refills: 0 | Status: SHIPPED | OUTPATIENT
Start: 2019-11-13 | End: 2019-11-22 | Stop reason: SDUPTHER

## 2019-11-13 NOTE — TELEPHONE ENCOUNTER
----- Message from Ethan Corea sent at 11/13/2019 11:07 AM CST -----  Contact: [phone]/ , Ashok   Patients  is requesting to speak with you regarding personal matter. Please call.

## 2019-11-13 NOTE — TELEPHONE ENCOUNTER
Spoke to the patient  instructed I will send message to Dr Bazan regarding the tramadol is not helping her pain and would like something stronger.

## 2019-11-13 NOTE — TELEPHONE ENCOUNTER
----- Message from Kirstin Sood sent at 11/13/2019 11:56 AM CST -----  Contact: [phone]/ Monique villanueva/[facility]   Monique is requesting to speak with you concerning the patient still being in severe pain  Please call back to assist at [phone]

## 2019-11-13 NOTE — TELEPHONE ENCOUNTER
Spoke to patient about urgent appointment.     Patient is not allowed to be in a vehicle for 2 weeks, because of her recent back surgery. It will take patient one hour to get to  clinic and stated that she can not sit up for that long.    Patient also stated she do not know if she was given antibiotics unless they had given it to her in an IV during her surgery. Pt medication list do not show any antibiotics.     Symptoms      Patients tongue is covered in white, and it feels like sand, patient has no taste.

## 2019-11-13 NOTE — TELEPHONE ENCOUNTER
Pt's  states that the pt can not drive or leave the house for next 2 weeks until 11/22/19 or cleared by the surgeon. Pt did not want an appt to be seen. Pt's  states that he does not believe the pt had abx while in the hospital and form the med review there is no abx listed given in the hospital. Pt believes that she got thrush from intubation during the surgery and she feels that her throat is sore, tong is white and nothing tastes right. Pt has had thrush in the past and things usually do not taste right when she has thrush. Recent example is the ginger ale tasting like rosemary on Monday. Pt would like to know if she can medication for the thrush.

## 2019-11-13 NOTE — PATIENT INSTRUCTIONS
Wound Check After Surgery, No Complication  Surgery involves cutting through layers of skin, fatty tissue, muscle, and sometimes bone and cartilage. Sutures (stitches) or staples are used to close all layers of the wound. The sutures on the inside will dissolve in about 2 to 3 weeks. Any sutures or staples used on the outside need to be removed in about 7 to 14 days, depending on the location.  It is normal to have some clear or bloody discharge on the wound covering or bandage (dressing) for the first few days after surgery. If your wound was sutured (sewn) closed, you should not have to change the dressing more than twice a day in the first few days. Bleeding or discharge requiring more frequent dressing changes can be a sign of a problem.  It is normal to feel pain at the incision site. The pain decreases as the wound heals. Most of the pain and soreness from the skin incision should go away by the time the sutures or staples are removed. Soreness and pain from deeper tissues may last another week or two.  Pain that continues more than a few weeks after surgery or pain that worsens anytime after surgery can be a sign of a problem, such as:  · Infection  · Separation of wound edges  · Collection of blood or other below the skin  Home care  Different types of surgery require different types of care and dressing changes. It is important to follow all instructions and advice from your surgeon, as well as other members of your healthcare team.  Wound care  · Keep the wound clean, as directed by your healthcare provider.  · Change the dressing as directed. Change the dressing sooner if it becomes wet or stained with blood or fluid from the wound.  · Bathe with a sponge (no shower or tub baths) for the first few days after surgery, or until there is no more drainage from the wound. Unless you received different instructions from your surgeon, you can then shower. Do not soak the area in water (no baths or swimming)  until the tape, sutures, or staples are removed and any wound opening has dried out and healed.  Changing the dressing  · Wash your hands before changing the dressings.  · Carefully remove the dressing and tape; dont just yank it off. If it sticks to the wound, you may need to wet it a little to remove it, unless your healthcare provider told you not to wet it.  · Wash your hands again before putting on a new, clean dressing.  · Gently clean the wound with clean water (or saline) using gauze or a clean washcloth. Do not rub it or pick at it.  · Do not use soap, alcohol, hydrogen peroxide, or any other cleanser.  · If you were told to dry the wound before putting on a new dressing, gently pat it dry. Do not rub.  · Throw out the old dressing. Do not reuse it!  · Wash your hands again when you are done.  Types of dressings  Your healthcare team will tell you what type of dressing to put on your wound. Follow your healthcare teams instructions carefully, and contact them if you have any questions. Two common types of dressings are described below. You may have one of these or another type.  · Dry dressing. Use dry gauze. If the wound is still draining, use a nonadherent dressing, which shouldnt stick to the wound.  · Wet-to-dry dressing. Wet the gauze, and squeeze out the excess water (or saline), before putting it on. Then, cover this with a dry pad.  Medicines  · If you were given antibiotics, take them until they are used up or your healthcare provider tells you to stop. It is important to finish the antibiotics even though you feel better, to make sure the infection has cleared.  · You can take acetaminophen or ibuprofen for pain, unless you were given a different pain medicine to use. (Note: If you have chronic liver or kidney disease, or have ever had a stomach ulcer or gastrointestinal bleeding, or are taking blood thinner medicines, talk with your healthcare provider before using these  medicines.)  · Aspirin should never be used in anyone under 18 years of age who is ill with a fever. It may cause severe liver damage.  Follow-up care  Follow up with your healthcare provider, or as advised, for your next wound check or removal of your sutures, staples, or tape.  · If a culture was done, you will be notified if the results will affect your treatment. You can call as directed for the results.  · If imaging tests, such as X-rays, an ultrasound, or CT scan were done, they will be reviewed by a specialist. You will be notified of the results, especially if they affect treatment.  Call 911  Call emergency services right away if any of these occur:  · Trouble breathing or swallowing, wheezing  · Hoarse voice or trouble speaking  · Extreme confusion  · Extreme drowsiness or trouble awakening  · Fainting or loss of consciousness  · Rapid heart rate or very slow heart rate  · Vomiting blood, or large amounts of blood in stool  · Discomfort in the center of the chest that feels like pressure, squeezing, a sense of fullness, or pain.  · Discomfort or pain in other upper body areas, such as the back, one or both arms, neck, jaw, or stomach  · Stroke symptoms (spot a stroke FAST)  ¨ F: Face drooping. One side of the face is numb or droops.  ¨ A: Arm weakness. One arm feels weak or numb.  ¨ S: Speech difficulty: Speech is slurred, or the person is unable to speak.  ¨ T: Time to call 911. Even if symptoms go away, call 911.  When to seek medical advice  Call your healthcare provider right away if any of the following occur:  · Increasing pain at the site of surgery  · Fever over 100.4º F (38º C)  · Redness around the wound  · Fluid, pus, or blood draining from the wound  · Vomiting, constipation, or diarrhea  Date Last Reviewed: 9/27/2015  © 6414-5469 [employer]. [address] [place] [address], [place] [address]. All rights reserved. This information is not intended as a substitute for professional  medical care. Always follow your healthcare professional's instructions.

## 2019-11-13 NOTE — TELEPHONE ENCOUNTER
I would rather her come in for evaluation so we can see what is going on please offer her an urgent appointment.  Has she recently been on any antibiotics?  What exactly are her symptoms.

## 2019-11-15 RX ORDER — NYSTATIN 100000 [USP'U]/ML
SUSPENSION ORAL
Qty: 280 ML | Refills: 0 | Status: SHIPPED | OUTPATIENT
Start: 2019-11-15 | End: 2020-01-30

## 2019-11-15 NOTE — TELEPHONE ENCOUNTER
I have sent in a Rx for an antifungal medication that the patient should hold in her mouth for several minutes and spit out.  If not doing better, would likely need to be seen and evaluated.  Thanks.

## 2019-11-18 ENCOUNTER — EXTERNAL HOME HEALTH (OUTPATIENT)
Dept: HOME HEALTH SERVICES | Facility: HOSPITAL | Age: 44
End: 2019-11-18
Payer: COMMERCIAL

## 2019-11-18 ENCOUNTER — TELEPHONE (OUTPATIENT)
Dept: HOME HEALTH SERVICES | Facility: HOSPITAL | Age: 44
End: 2019-11-18

## 2019-11-22 ENCOUNTER — PATIENT MESSAGE (OUTPATIENT)
Dept: NEUROSURGERY | Facility: CLINIC | Age: 44
End: 2019-11-22

## 2019-11-22 ENCOUNTER — OFFICE VISIT (OUTPATIENT)
Dept: NEUROSURGERY | Facility: CLINIC | Age: 44
End: 2019-11-22
Payer: COMMERCIAL

## 2019-11-22 ENCOUNTER — HOSPITAL ENCOUNTER (OUTPATIENT)
Dept: RADIOLOGY | Facility: HOSPITAL | Age: 44
Discharge: HOME | End: 2019-11-22
Attending: NEUROLOGICAL SURGERY
Payer: COMMERCIAL

## 2019-11-22 VITALS
DIASTOLIC BLOOD PRESSURE: 80 MMHG | SYSTOLIC BLOOD PRESSURE: 119 MMHG | HEART RATE: 109 BPM | TEMPERATURE: 99 F | WEIGHT: 293 LBS | BODY MASS INDEX: 44.41 KG/M2 | HEIGHT: 68 IN

## 2019-11-22 DIAGNOSIS — Z98.1 S/P LUMBAR FUSION: ICD-10-CM

## 2019-11-22 DIAGNOSIS — M43.16 SPONDYLOLISTHESIS OF LUMBAR REGION: Primary | ICD-10-CM

## 2019-11-22 PROCEDURE — 99024 POSTOP FOLLOW-UP VISIT: CPT | Mod: S$GLB,,, | Performed by: PHYSICIAN ASSISTANT

## 2019-11-22 PROCEDURE — 72100 X-RAY EXAM L-S SPINE 2/3 VWS: CPT | Mod: TC,PN

## 2019-11-22 PROCEDURE — 99024 PR POST-OP FOLLOW-UP VISIT: ICD-10-PCS | Mod: S$GLB,,, | Performed by: PHYSICIAN ASSISTANT

## 2019-11-22 PROCEDURE — 99999 PR PBB SHADOW E&M-EST. PATIENT-LVL IV: ICD-10-PCS | Mod: PBBFAC,,, | Performed by: PHYSICIAN ASSISTANT

## 2019-11-22 PROCEDURE — 72100 XR LUMBAR SPINE AP AND LATERAL: ICD-10-PCS | Mod: 26,,, | Performed by: RADIOLOGY

## 2019-11-22 PROCEDURE — 72100 X-RAY EXAM L-S SPINE 2/3 VWS: CPT | Mod: 26,,, | Performed by: RADIOLOGY

## 2019-11-22 PROCEDURE — 99999 PR PBB SHADOW E&M-EST. PATIENT-LVL IV: CPT | Mod: PBBFAC,,, | Performed by: PHYSICIAN ASSISTANT

## 2019-11-22 RX ORDER — GABAPENTIN 400 MG/1
1200 CAPSULE ORAL 3 TIMES DAILY
Qty: 180 CAPSULE | Refills: 6 | Status: SHIPPED | OUTPATIENT
Start: 2019-11-22 | End: 2019-12-23

## 2019-11-22 RX ORDER — HYDROMORPHONE HYDROCHLORIDE 2 MG/1
2 TABLET ORAL
Qty: 40 TABLET | Refills: 0 | Status: SHIPPED | OUTPATIENT
Start: 2019-11-22 | End: 2020-04-28

## 2019-11-22 RX ORDER — MUPIROCIN CALCIUM 20 MG/G
CREAM TOPICAL
Refills: 0 | COMMUNITY
Start: 2019-10-24 | End: 2020-01-30

## 2019-11-22 RX ORDER — DICLOFENAC SODIUM 10 MG/G
GEL TOPICAL
Refills: 3 | COMMUNITY
Start: 2019-10-24 | End: 2020-12-03

## 2019-11-22 RX ORDER — METHOCARBAMOL 500 MG/1
1000 TABLET, FILM COATED ORAL 3 TIMES DAILY
Qty: 90 TABLET | Refills: 0 | Status: SHIPPED | OUTPATIENT
Start: 2019-11-22 | End: 2020-01-30

## 2019-11-22 RX ORDER — LIDOCAINE AND TETRACAINE 70; 70 MG/G; MG/G
CREAM TOPICAL
Refills: 3 | COMMUNITY
Start: 2019-10-24 | End: 2020-12-03

## 2019-11-22 RX ORDER — GEL BASE NO.184
GEL (GRAM) TOPICAL
Refills: 3 | COMMUNITY
Start: 2019-10-24 | End: 2020-12-03

## 2019-11-22 RX ORDER — CLOBETASOL PROPIONATE 0.5 MG/G
OINTMENT TOPICAL
Refills: 3 | COMMUNITY
Start: 2019-10-24 | End: 2019-12-17

## 2019-11-22 NOTE — PROGRESS NOTES
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 Bazan on 11/07/2019.  Patient is here with her son for 2 weeks wound check.    Patient states that prior to surgery she has significant back pain and pain radiating down both legs into the top of her foot (right greater than left). since surgery, her right leg pain and back pain has resolved.  However, she feels like her left leg pain has significantly worsened.  She has nagging pain radiating from her left buttocks to her lateral knee and she has severe sharp pain from lateral knee to top of her foot.  This area is very sensitive to touch.  Compression stocking helps with her leg pain.  She is working with home health physical therapy and using rolling walker.  She takes gabapentin 600 mg t.i.d., Robaxin 1000 mg t.i.d., and p.o. Dilaudid 2 mg p.r.n. severe pain.  Denies any bladder/bowel incontinence, fever, chills or incisional issues.        Low Back Pain Scale  R Low Back-Pain Score: 9  R Low Back-Pain Intensity: Pain killers give moderate relief from pain  R Low Back-Pain Score: I need help every day in most aspects of self care  Low Back-Lifting: I can only lift very light weights   Low Back-Walking: I can only walk using a cane or crutches   Low Back-Sitting: Pain prevents me from sitting more than 30 minutes   Low Back-Standing: I cannot stand for longer than 10 minutes with increasing pain   Low Back-Sleeping: Because of pain my normal nights sleep is reduced by less than three quarters   Low Back-Social Life: Pain has restricted my social life and I do not go out very often   Low Back-Traveling: Pain restricts me to short necessary journeys under 30 minutes   Low Back-Changing Degree of Pain: My pain is  gradually worsening           EXAM  Vitals:    11/22/19 0955   BP: 119/80   Pulse: 109   Temp: 98.6 °F (37 °C)     Body mass index is 44.65 kg/m².      General: well developed, well nourished. no acute distress. Comfortable.  Morbidly obese.  Neurologic: Awake, alert and oriented x3. Thought content appropriate.  Head: normocephalic, atraumatic    GCS: Motor: 6/Verbal: 5/Eyes: 4 GCS Total: 15  Cranial nerves: face symmetric, tongue midline, pupils equal, round, reactive to light with accomodation, extraocular muscles intact. CN II-XII grossly intact.    Sensory: response to light touch throughout; except for increased pain to touch at left lower leg into dorsum of left foot  Motor Strength: Moves all extremities spontaneously with good tone. Full strength upper and lower extremities. No abnormal movements seen.      Strength   Deltoids Triceps Biceps Wrist Extension Wrist Flexion Hand    Upper: R 5/5 5/5 5/5 5/5 5/5 5/5     L 5/5 5/5 5/5 5/5 5/5 5/5       Iliopsoas Quadriceps Knee  Flexion Tibialis  anterior Gastro- cnemius EHL   Lower: R 5/5 5/5 5/5 5/5 5/5 5/5     L 5/5 5/5 5/5 5/5 5/5 5/5     Negative straight leg raise bilaterally    Gait is slow but normal with rolling walker.       Heart: RRR, no cyanosis, pallor, or edema.    Lungs: normal respiratory effort  Abdomen: soft, non-tender and symmetric  Extremities: warm with no cyanosis, edema, or clubbing  Skin: warm, dry and intact. No visible rashes or lesions.        Left lateral and posterior lumbar Surgical incision:  C/D/I without any signs of infection.  Incision is healing well  No erythema, warmth, edema, TTP.  No drainage.  Wound edges are well approximated.    Staples intact and removed without difficulty.          IMAGING/LABS  Lumbar AP lateral x-ray 11/22/2019 personally reviewed and compared to previous imaging   Appropriate L5-S1 anterior interbody fusion with posterior instrumentation.  Preop grade 2 spondylolisthesis at L5-S1  resolved.      ASSESSMENT/PLAN    44-year-old morbidly obese female who is 2 weeks status post L5-S1 anterior lumbar interbody fusion with posterior instrumentation presents for 2 wound check      -Patient has increased pain and left lower leg since surgery.  Incision is healing well. Imaging stable. I have reiterated wound care, activity restrictions, and follow up appts as below.       -increase gabapentin to 1200 mg t.i.d. given her weight (start with 900 mg t.i.d. x3 days), refill p.o. Dilaudid to use p.r.n. severe pain, refill Robaxin 1000 mg t.i.d., continue home Cymbalta dose.  -start using lidocaine and diclofenac gel p.r.n. muscle pain.  Do not place on incision directly.  -if no improvements with gabapentin increase, will consider reimaging and switch to Lyrica.          -continue home health physical therapy  -Do not submerge incision for another 6 weeks. Pat the incision dry after your shower.  -Patient to continue using bacitracin twice a day for another few days.  -Keep incision open to air. Turn q2h to promote appropriate wound healing.  -LSO brace when OOB.   -Continue p.r.n. stool softener and miralax to prevent constipation with pain med use.   -Increase ambulation. No heavy lifting >10lbs.   No over bending or twisting at incisional site.  Fall precautions.  -Patient has follow up with Dr. Bazan in 4-6 weeks with lumbar xrays.     All questions were answered. Patient was encouraged to call clinic with any future concerns prior to follow up appt. If any worsening symptoms, patient should report to ED.     Please call with any questions.    Tom Lilly PA-C  Neurosurgery      Note dictated with voice recognition software, please excuse any grammatical errors.

## 2019-11-23 DIAGNOSIS — Z98.1 S/P LUMBAR FUSION: Primary | ICD-10-CM

## 2019-11-23 DIAGNOSIS — M79.605 LEFT LEG PAIN: ICD-10-CM

## 2019-11-25 ENCOUNTER — TELEPHONE (OUTPATIENT)
Dept: NEUROSURGERY | Facility: CLINIC | Age: 44
End: 2019-11-25

## 2019-11-25 NOTE — TELEPHONE ENCOUNTER
----- Message from Corona Bazan MD sent at 11/23/2019  9:44 AM CST -----  I ordered a lumbar spine MRI for her left leg pain after surgery

## 2019-11-27 ENCOUNTER — PATIENT MESSAGE (OUTPATIENT)
Dept: NEUROSURGERY | Facility: CLINIC | Age: 44
End: 2019-11-27

## 2019-11-27 ENCOUNTER — TELEPHONE (OUTPATIENT)
Dept: NEUROSURGERY | Facility: CLINIC | Age: 44
End: 2019-11-27

## 2019-11-27 RX ORDER — TRAMADOL HYDROCHLORIDE 50 MG/1
50 TABLET ORAL EVERY 8 HOURS PRN
Qty: 90 TABLET | Refills: 0 | Status: SHIPPED | OUTPATIENT
Start: 2019-11-27 | End: 2019-12-01 | Stop reason: SDUPTHER

## 2019-11-27 NOTE — TELEPHONE ENCOUNTER
----- Message from Corona Bazan MD sent at 11/27/2019 10:54 AM CST -----  Please let her know that I have reviewed her MRI of the lumbar spine and everything looks good. There is no nerve compression. I expect her leg pain to improve over time. I refilled her tramadol.

## 2019-12-01 RX ORDER — TRAMADOL HYDROCHLORIDE 50 MG/1
50-100 TABLET ORAL EVERY 8 HOURS PRN
Qty: 90 TABLET | Refills: 0 | Status: SHIPPED | OUTPATIENT
Start: 2019-12-01 | End: 2020-01-07 | Stop reason: SDUPTHER

## 2019-12-02 ENCOUNTER — TELEPHONE (OUTPATIENT)
Dept: ORTHOPEDICS | Facility: CLINIC | Age: 44
End: 2019-12-02

## 2019-12-02 ENCOUNTER — TELEPHONE (OUTPATIENT)
Dept: NEUROSURGERY | Facility: CLINIC | Age: 44
End: 2019-12-02

## 2019-12-02 ENCOUNTER — PATIENT MESSAGE (OUTPATIENT)
Dept: NEUROSURGERY | Facility: CLINIC | Age: 44
End: 2019-12-02

## 2019-12-02 NOTE — TELEPHONE ENCOUNTER
----- Message from Verona Tolbert LPN sent at 11/27/2019  4:34 PM CST -----  Contact: [phone] /self  Please see below.   ----- Message -----  From: Karissa Osuna  Sent: 11/27/2019   1:46 PM CST  To: Phill Jean Staff    Type:  Patient Returning Call    Who Called: slf  Who Left Message for Patient: Richelle Bridges LPN    Does the patient know what this is regarding?: MRI results  Would the patient rather a call back or a response via [facility]?  Call   Best Call Back Number:   Additional Information:

## 2019-12-02 NOTE — TELEPHONE ENCOUNTER
----- Message from Kirstin Sood sent at 12/2/2019 11:43 AM CST -----  [facility]  [phone]  Rodrigue calling to verify Rx traMADol (ULTRAM) 50 mg tablet  Please call back to assist at [phone]

## 2019-12-02 NOTE — TELEPHONE ENCOUNTER
----- Message from Devika Shell sent at 12/2/2019  3:20 PM CST -----  Contact: [phone]  [phone] is requesting a callback concerning the Pt having Bladder Contents. Please call

## 2019-12-03 ENCOUNTER — TELEPHONE (OUTPATIENT)
Dept: NEUROSURGERY | Facility: CLINIC | Age: 44
End: 2019-12-03

## 2019-12-03 NOTE — TELEPHONE ENCOUNTER
----- Message from Devika Shell sent at 12/3/2019 10:42 AM CST -----  Contact: [phone]  [email or web address]-[phone] is requesting a callback concerning losing her Bladder function. Please call

## 2019-12-04 ENCOUNTER — TELEPHONE (OUTPATIENT)
Dept: NEUROSURGERY | Facility: CLINIC | Age: 44
End: 2019-12-04

## 2019-12-04 DIAGNOSIS — M79.606 PAIN OF LOWER EXTREMITY, UNSPECIFIED LATERALITY: ICD-10-CM

## 2019-12-04 DIAGNOSIS — Z98.1 S/P LUMBAR AND LUMBOSACRAL FUSION BY ANTERIOR TECHNIQUE: Primary | ICD-10-CM

## 2019-12-04 DIAGNOSIS — N39.498 OTHER URINARY INCONTINENCE: Primary | ICD-10-CM

## 2019-12-04 DIAGNOSIS — Z98.1 S/P LUMBAR AND LUMBOSACRAL FUSION BY ANTERIOR TECHNIQUE: ICD-10-CM

## 2019-12-04 RX ORDER — AMITRIPTYLINE HYDROCHLORIDE 25 MG/1
25 TABLET, FILM COATED ORAL NIGHTLY
Qty: 30 TABLET | Refills: 6 | Status: SHIPPED | OUTPATIENT
Start: 2019-12-04 | End: 2019-12-23

## 2019-12-04 NOTE — TELEPHONE ENCOUNTER
----- Message from Alba Ozuna sent at 12/4/2019  3:01 PM CST -----  Contact: self [phone]  Patient would like a call with test results.    
Spoke to the patient she is wondering where the MRI results in my chart went to instructed I will release them.   
no fever

## 2019-12-05 ENCOUNTER — TELEPHONE (OUTPATIENT)
Dept: NEUROSURGERY | Facility: CLINIC | Age: 44
End: 2019-12-05

## 2019-12-05 ENCOUNTER — PATIENT OUTREACH (OUTPATIENT)
Dept: ADMINISTRATIVE | Facility: OTHER | Age: 44
End: 2019-12-05

## 2019-12-05 NOTE — TELEPHONE ENCOUNTER
Yes she is thanks Norma   Breathing spontaneous and unlabored. Breath sounds clear and equal bilaterally with regular rhythm.

## 2019-12-05 NOTE — TELEPHONE ENCOUNTER
Message sent to Dr rodriguez's office to get earlier appointment. Also orders faxed to [facility] for EMG.

## 2019-12-05 NOTE — TELEPHONE ENCOUNTER
----- Message from Dilcia Ozuna MA sent at 12/5/2019 10:28 AM CST -----  Are she able to come in on tomorrow at 3 pm     ----- Message -----  From: Richelle Bridges LPN  Sent: 12/5/2019   9:03 AM CST  To: Evangelista Coyle    Good morning, Dr Bazan is referring Mrs Hemphill to Dr Naidu for urinary incontinent after surgery. Is there any way she can be seen sooner? Thanks Oanh

## 2019-12-06 ENCOUNTER — OFFICE VISIT (OUTPATIENT)
Dept: UROLOGY | Facility: CLINIC | Age: 44
End: 2019-12-06
Payer: COMMERCIAL

## 2019-12-06 VITALS — HEART RATE: 108 BPM | SYSTOLIC BLOOD PRESSURE: 162 MMHG | TEMPERATURE: 98 F | DIASTOLIC BLOOD PRESSURE: 95 MMHG

## 2019-12-06 DIAGNOSIS — R31.29 MICROSCOPIC HEMATURIA: ICD-10-CM

## 2019-12-06 DIAGNOSIS — N39.3 STRESS INCONTINENCE: ICD-10-CM

## 2019-12-06 DIAGNOSIS — R32 URINARY INCONTINENCE, UNSPECIFIED TYPE: Primary | ICD-10-CM

## 2019-12-06 LAB
BACTERIA #/AREA URNS AUTO: ABNORMAL /HPF
BILIRUB SERPL-MCNC: NORMAL MG/DL
BILIRUB UR QL STRIP: NEGATIVE
BLOOD URINE, POC: NORMAL
CLARITY UR REFRACT.AUTO: CLEAR
COLOR UR AUTO: ABNORMAL
COLOR, POC UA: YELLOW
GLUCOSE UR QL STRIP: NEGATIVE
GLUCOSE UR QL STRIP: NORMAL
HGB UR QL STRIP: ABNORMAL
KETONES UR QL STRIP: NEGATIVE
KETONES UR QL STRIP: NORMAL
LEUKOCYTE ESTERASE UR QL STRIP: NEGATIVE
LEUKOCYTE ESTERASE URINE, POC: NORMAL
MICROSCOPIC COMMENT: ABNORMAL
NITRITE UR QL STRIP: NEGATIVE
NITRITE, POC UA: NORMAL
PH UR STRIP: 5 [PH] (ref 5–8)
PH, POC UA: 5
POC RESIDUAL URINE VOLUME: 2 ML (ref 0–100)
PROT UR QL STRIP: NEGATIVE
PROTEIN, POC: NORMAL
RBC #/AREA URNS AUTO: 9 /HPF (ref 0–4)
SP GR UR STRIP: 1.01 (ref 1–1.03)
SPECIFIC GRAVITY, POC UA: 1.01
URN SPEC COLLECT METH UR: ABNORMAL
UROBILINOGEN, POC UA: NORMAL
WBC #/AREA URNS AUTO: 1 /HPF (ref 0–5)

## 2019-12-06 PROCEDURE — 99204 PR OFFICE/OUTPT VISIT, NEW, LEVL IV, 45-59 MIN: ICD-10-PCS | Mod: 25,S$GLB,, | Performed by: STUDENT IN AN ORGANIZED HEALTH CARE EDUCATION/TRAINING PROGRAM

## 2019-12-06 PROCEDURE — 99999 PR PBB SHADOW E&M-EST. PATIENT-LVL IV: ICD-10-PCS | Mod: PBBFAC,,, | Performed by: STUDENT IN AN ORGANIZED HEALTH CARE EDUCATION/TRAINING PROGRAM

## 2019-12-06 PROCEDURE — 51798 US URINE CAPACITY MEASURE: CPT | Mod: S$GLB,,, | Performed by: STUDENT IN AN ORGANIZED HEALTH CARE EDUCATION/TRAINING PROGRAM

## 2019-12-06 PROCEDURE — 81002 POCT URINE DIPSTICK WITHOUT MICROSCOPE: ICD-10-PCS | Mod: S$GLB,,, | Performed by: STUDENT IN AN ORGANIZED HEALTH CARE EDUCATION/TRAINING PROGRAM

## 2019-12-06 PROCEDURE — 51798 POCT BLADDER SCAN: ICD-10-PCS | Mod: S$GLB,,, | Performed by: STUDENT IN AN ORGANIZED HEALTH CARE EDUCATION/TRAINING PROGRAM

## 2019-12-06 PROCEDURE — 87086 URINE CULTURE/COLONY COUNT: CPT

## 2019-12-06 PROCEDURE — 99999 PR PBB SHADOW E&M-EST. PATIENT-LVL IV: CPT | Mod: PBBFAC,,, | Performed by: STUDENT IN AN ORGANIZED HEALTH CARE EDUCATION/TRAINING PROGRAM

## 2019-12-06 PROCEDURE — 81001 URINALYSIS AUTO W/SCOPE: CPT

## 2019-12-06 PROCEDURE — 81002 URINALYSIS NONAUTO W/O SCOPE: CPT | Mod: S$GLB,,, | Performed by: STUDENT IN AN ORGANIZED HEALTH CARE EDUCATION/TRAINING PROGRAM

## 2019-12-06 PROCEDURE — 99204 OFFICE O/P NEW MOD 45 MIN: CPT | Mod: 25,S$GLB,, | Performed by: STUDENT IN AN ORGANIZED HEALTH CARE EDUCATION/TRAINING PROGRAM

## 2019-12-06 NOTE — LETTER
December 6, 2019      Corona Bazan MD  [address]  [address]  [place] [address]           [facility]  [address]  [facility] [place] [address]  Phone: [phone]          Patient: Dianne Hemphill   MR Number: [id number]   YOB: 1975   Date of Visit: 12/6/2019       Dear Dr. Corona Bazan:    Thank you for referring Dianne Hemphill to me for evaluation. Attached you will find relevant portions of my assessment and plan of care.    If you have questions, please do not hesitate to call me. I look forward to following Dianne Hemphill along with you.    Sincerely,    Delaney Naidu MD    Enclosure  CC:  No Recipients    If you would like to receive this communication electronically, please contact [email or web address] or [phone] to request more information on "[employer] Link access.    For providers and/or their staff who would like to refer a patient to [facility], please contact us through our one-stop-shop provider referral line, [facility], at [phone].    If you feel you have received this communication in error or would no longer like to receive these types of communications, please e-mail [email or web address]

## 2019-12-06 NOTE — PROGRESS NOTES
"Subjective:       Patient ID: Dianne Hemphill is a 44 y.o. female.    Chief Complaint:  Urinary incontinence  This is a 44 y.o.  female patient that is new to me.  The patient is referred to me by Dr. Bazan for "urinary incontinence."  She has a history of L5-S1 grade 1 isthmic spondylolisthesis with bilateral foraminal stenosis and radiculopathy and lower back pain for which she underwent with Dr. Bazan a L5-S1 anterior interbody fusion  On 11/7/19.   She notes that her urinary incontinence has occurred three times. It occurred when she stood up and then felt urine leaking. Her last episode was 11/30/19.   DTF - q2 hours  NTF - 0x/night  Hydration - water; apple juice. Occasional coffee in AM. Rare tea.   Bowel movements - every day.   Takes dilaudid - but hasn't taken for 2 weeks.   Tramadol  - nightly.     PVR 2cc    Lab Results   Component Value Date    CREATININE 0.7 11/11/2019      ---  Past Medical History:   Diagnosis Date    GERD (gastroesophageal reflux disease)     History of migraine headaches     Obesity     Pollen allergies     Smoker        Past Surgical History:   Procedure Laterality Date    CHOLECYSTECTOMY      ESOPHAGOGASTRODUODENOSCOPY      EXPLORATORY LAPAROTOMY      HYSTERECTOMY      fibroids    LUMBAR FUSION N/A 11/7/2019    Procedure: FUSION, SPINE, LUMBAR Procedure: STG 1: L5-S1 anterior Lumbar interbody fusion / STG 2:L5-S1 Posterior instrumentation;  Surgeon: Corona Bazan MD;  Location: [facility];  Service: Neurosurgery;  Laterality: N/A;  FUSION, SPINE, LUMBAR  Procedure: STG 1: L5-S1 anterior Lumbar interbody fusion / STG 2:L5-S1 Posterior instrumentation  SURGERY TIME: 2.5hrs  LOS:  ANESTHESIA: General  Blood:    TONSILLECTOMY         Family History   Problem Relation Age of Onset    No Known Problems Mother     No Known Problems Father     Cancer Neg Hx     Diabetes Neg Hx     Heart disease Neg Hx     Stroke Neg Hx        Social History     Tobacco Use    Smoking " status: Former Smoker     Packs/day: 0.50     Years: 15.00     Pack years: 7.50     Types: Vaping with nicotine     Last attempt to quit: 2019     Years since quittin.2    Smokeless tobacco: Former User   Substance Use Topics    Alcohol use: Yes     Comment: 2-3 drinks per year    Drug use: No       Current Outpatient Medications on File Prior to Visit   Medication Sig Dispense Refill    amitriptyline (ELAVIL) 25 MG tablet Take 1 tablet (25 mg total) by mouth every evening. 30 tablet 6    cholecalciferol, vitamin D3, (VITAMIN D3) 2,000 unit Tab Take by mouth once daily.      clobetasol 0.05% (TEMOVATE) 0.05 % Oint APPLY A THIN LAYER (UP TO 4 GRAMS) TO THE AFFECTED AREA TWICE DAILY FOR NO MORE THAN 15 CONSECUTIVE DAYS STARTING 4 WEEKS POST SURGERY.  3    diclofenac sodium (VOLTAREN) 1 % Gel APPLY 2.5 GRAMS TO THE AFFECTED AREA 3-4 TIMES DAILY STARTING 2 WEEKS POST SURGERY.  3    DULoxetine (CYMBALTA) 30 MG capsule Take 1 capsule (30 mg total) by mouth once daily. 90 capsule 1    fexofenadine (ALLEGRA) 180 MG tablet Take 180 mg by mouth once daily.      gabapentin (NEURONTIN) 400 MG capsule Take 3 capsules (1,200 mg total) by mouth 3 (three) times daily. 180 capsule 6    HYDROmorphone (DILAUDID) 2 MG tablet Take 1 tablet (2 mg total) by mouth every 4 to 6 hours as needed for Pain. 40 tablet 0    levocetirizine (XYZAL) 5 MG tablet Take 5 mg by mouth every evening.      lidocaine-tetracaine 7-7 % Crea APPLY 1-2 GRAMS TO THE AFFECTED AREAS 3-4 TIMES DAILY STARTING 2 WEEKS POST SURGERY.  3    methocarbamol (ROBAXIN) 500 MG Tab Take 2 tablets (1,000 mg total) by mouth 3 (three) times daily. 90 tablet 0    mupirocin calcium 2% (BACTROBAN) 2 % cream APPLY 1/2 GRAM TO EACH NOSTRIL TWICE A DAY FOR UP TO 5 DAYS PRIOR TO SURGERY OR AS DIRECTED BY YOUR PHYSICIAN.  0    ondansetron (ZOFRAN) 4 MG tablet Take 2 tablets (8 mg total) by mouth every 6 (six) hours as needed for Nausea. 20 tablet 1     pantoprazole (PROTONIX) 40 MG tablet Take 1 tablet (40 mg total) by mouth once daily. 90 tablet 1    SANARE ADV SCAR THERAPY BASE Gel APPLY 1/2 GRAM (1 PUMP) TO AFFECTED AREAS TWICE DAILY STARTING 4 WEEKS POST SURGERY.  3    traMADol (ULTRAM) 50 mg tablet Take 1-2 tablets ( mg total) by mouth every 8 (eight) hours as needed for Pain. 90 tablet 0    nystatin (MYCOSTATIN) 100,000 unit/mL suspension Take 5 ml by mouth (swish and spit out medication) four times daily for 7 days 280 mL 0    scopolamine (TRANSDERM-SCOP) 1.3-1.5 mg (1 mg over 3 days) Place 1 patch onto the skin every 72 hours. 1 patch 0     No current facility-administered medications on file prior to visit.        Review of patient's allergies indicates:   Allergen Reactions    Gluten protein Nausea And Vomiting    Milk containing products Hives    Codeine Nausea And Vomiting    Demerol [meperidine] Nausea And Vomiting    Erythropoietin analogues     Imitrex [sumatriptan succinate]      Hysterics      Morphine Nausea And Vomiting    Tetracyclines Nausea And Vomiting    Azithromycin Rash    Ciprofloxacin hcl Rash    Erythromycin Rash       Review of Systems   Constitutional: Negative for activity change.   HENT: Negative for congestion.    Eyes: Negative for visual disturbance.   Respiratory: Negative for shortness of breath.    Cardiovascular: Negative for chest pain.   Gastrointestinal: Negative for abdominal distention.   Musculoskeletal: Negative for gait problem.   Skin: Negative for color change.   Neurological: Negative for dizziness.   Psychiatric/Behavioral: Negative for agitation.       Objective:      Physical Exam   Constitutional: She is oriented to person, place, and time. She appears well-developed.   HENT:   Head: Normocephalic and atraumatic.   Eyes: EOM are normal.   Neck: Normal range of motion.   Cardiovascular: Intact distal pulses.   Pulmonary/Chest: Effort normal.   Abdominal: Soft. She exhibits no distension.  There is no tenderness.   Musculoskeletal: Normal range of motion.   Neurological: She is alert and oriented to person, place, and time.   Skin: Skin is warm and dry.   Psychiatric: She has a normal mood and affect.       Assessment:       1. Urinary incontinence, unspecified type    2. Microscopic hematuria    3. Stress incontinence        Plan:       1. Will send urine for Ua and culture. History of microscopic hematuria in the past. Will alert patient if she meets criteria for a workup (3 red blood cells or more on micro urinalysis in the absence ofa UTI). Will also rule out a UTI.  2. Timed frequent voiding, double voiding, crede maneuver. Avoid postponing urination.  3. Counseled patient that her bladder scan when checked immediately after voiding was 2cc and that she is emptying her bladder well.   4. Avoid bladder irritants including but not limited to caffeine, alcohol, smoking, spicy foods, acidic foods, tomato-based products, citrus, artificial sweeteners, chocolate, coffee or tea.  5. 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    Urinary incontinence, unspecified type  -     POCT URINE DIPSTICK WITHOUT MICROSCOPE  -     POCT Bladder Scan    Microscopic hematuria  -     Urinalysis  -     Urinalysis Microscopic  -     Urine culture    Stress incontinence

## 2019-12-08 LAB — BACTERIA UR CULT: NORMAL

## 2019-12-09 ENCOUNTER — TELEPHONE (OUTPATIENT)
Dept: UROLOGY | Facility: CLINIC | Age: 44
End: 2019-12-09

## 2019-12-09 DIAGNOSIS — R31.21 ASYMPTOMATIC MICROSCOPIC HEMATURIA: ICD-10-CM

## 2019-12-09 NOTE — TELEPHONE ENCOUNTER
----- Message from Delaney Naidu MD sent at 12/9/2019  9:08 AM CST -----  Please reach out to patient to see when she would like to schedule microscopic hematuria workup (BMP, CT urogram, cystoscopy) I sent her a message via the portal this isn't necessarily urgent but we can accommodate when she would like this done.  Thank you!

## 2019-12-11 ENCOUNTER — TELEPHONE (OUTPATIENT)
Dept: HOME HEALTH SERVICES | Facility: HOSPITAL | Age: 44
End: 2019-12-11

## 2019-12-11 ENCOUNTER — PATIENT MESSAGE (OUTPATIENT)
Dept: NEUROSURGERY | Facility: CLINIC | Age: 44
End: 2019-12-11

## 2019-12-13 ENCOUNTER — TELEPHONE (OUTPATIENT)
Dept: HOME HEALTH SERVICES | Facility: HOSPITAL | Age: 44
End: 2019-12-13

## 2019-12-13 ENCOUNTER — PATIENT MESSAGE (OUTPATIENT)
Dept: NEUROSURGERY | Facility: CLINIC | Age: 44
End: 2019-12-13

## 2019-12-15 RX ORDER — BACLOFEN 10 MG/1
10 TABLET ORAL 3 TIMES DAILY
Qty: 90 TABLET | Refills: 11 | Status: SHIPPED | OUTPATIENT
Start: 2019-12-15 | End: 2020-04-24 | Stop reason: SDUPTHER

## 2019-12-17 ENCOUNTER — OFFICE VISIT (OUTPATIENT)
Dept: INTERNAL MEDICINE | Facility: CLINIC | Age: 44
End: 2019-12-17
Payer: COMMERCIAL

## 2019-12-17 ENCOUNTER — PATIENT MESSAGE (OUTPATIENT)
Dept: INTERNAL MEDICINE | Facility: CLINIC | Age: 44
End: 2019-12-17

## 2019-12-17 VITALS
OXYGEN SATURATION: 96 % | HEART RATE: 106 BPM | DIASTOLIC BLOOD PRESSURE: 84 MMHG | BODY MASS INDEX: 43.87 KG/M2 | WEIGHT: 289.44 LBS | SYSTOLIC BLOOD PRESSURE: 126 MMHG | HEIGHT: 68 IN | TEMPERATURE: 98 F

## 2019-12-17 DIAGNOSIS — F43.21 SITUATIONAL DEPRESSION: ICD-10-CM

## 2019-12-17 DIAGNOSIS — K21.9 GASTROESOPHAGEAL REFLUX DISEASE, ESOPHAGITIS PRESENCE NOT SPECIFIED: Primary | ICD-10-CM

## 2019-12-17 DIAGNOSIS — R03.0 ELEVATED BLOOD-PRESSURE READING WITHOUT DIAGNOSIS OF HYPERTENSION: ICD-10-CM

## 2019-12-17 DIAGNOSIS — J32.9 SINUSITIS, UNSPECIFIED CHRONICITY, UNSPECIFIED LOCATION: Primary | ICD-10-CM

## 2019-12-17 DIAGNOSIS — Z98.890 S/P SPINAL SURGERY: ICD-10-CM

## 2019-12-17 DIAGNOSIS — M48.061 NEURAL FORAMINAL STENOSIS OF LUMBAR SPINE: ICD-10-CM

## 2019-12-17 PROCEDURE — 99213 PR OFFICE/OUTPT VISIT, EST, LEVL III, 20-29 MIN: ICD-10-PCS | Mod: S$GLB,,, | Performed by: FAMILY MEDICINE

## 2019-12-17 PROCEDURE — 99999 PR PBB SHADOW E&M-EST. PATIENT-LVL III: CPT | Mod: PBBFAC,,, | Performed by: FAMILY MEDICINE

## 2019-12-17 PROCEDURE — 99213 OFFICE O/P EST LOW 20 MIN: CPT | Mod: S$GLB,,, | Performed by: FAMILY MEDICINE

## 2019-12-17 PROCEDURE — 99999 PR PBB SHADOW E&M-EST. PATIENT-LVL III: ICD-10-PCS | Mod: PBBFAC,,, | Performed by: FAMILY MEDICINE

## 2019-12-17 RX ORDER — FLUTICASONE PROPIONATE 50 MCG
1 SPRAY, SUSPENSION (ML) NASAL DAILY
Qty: 16 G | Refills: 11 | Status: SHIPPED | OUTPATIENT
Start: 2019-12-17
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
## 2019-12-18 ENCOUNTER — HOSPITAL ENCOUNTER (OUTPATIENT)
Dept: RADIOLOGY | Facility: HOSPITAL | Age: 44
Discharge: HOME | End: 2019-12-18
Attending: FAMILY MEDICINE
Payer: COMMERCIAL

## 2019-12-18 DIAGNOSIS — R92.8 ABNORMAL MAMMOGRAM: ICD-10-CM

## 2019-12-18 PROCEDURE — 77065 DX MAMMO INCL CAD UNI: CPT | Mod: TC,PO,LT

## 2019-12-18 PROCEDURE — 77061 BREAST TOMOSYNTHESIS UNI: CPT | Mod: TC,PO,LT

## 2019-12-18 PROCEDURE — 76642 ULTRASOUND BREAST LIMITED: CPT | Mod: 26,LT,, | Performed by: RADIOLOGY

## 2019-12-18 PROCEDURE — 77065 DX MAMMO INCL CAD UNI: CPT | Mod: 26,LT,, | Performed by: RADIOLOGY

## 2019-12-18 PROCEDURE — 76642 US BREAST LEFT LIMITED: ICD-10-PCS | Mod: 26,LT,, | Performed by: RADIOLOGY

## 2019-12-18 PROCEDURE — 77061 BREAST TOMOSYNTHESIS UNI: CPT | Mod: 26,LT,, | Performed by: RADIOLOGY

## 2019-12-18 PROCEDURE — 77061 MAMMO DIGITAL DIAGNOSTIC LEFT WITH TOMOSYNTHESIS_CAD: ICD-10-PCS | Mod: 26,LT,, | Performed by: RADIOLOGY

## 2019-12-18 PROCEDURE — 76642 ULTRASOUND BREAST LIMITED: CPT | Mod: TC,PO,LT

## 2019-12-18 PROCEDURE — 77065 MAMMO DIGITAL DIAGNOSTIC LEFT WITH TOMOSYNTHESIS_CAD: ICD-10-PCS | Mod: 26,LT,, | Performed by: RADIOLOGY

## 2019-12-23 ENCOUNTER — HOSPITAL ENCOUNTER (OUTPATIENT)
Dept: RADIOLOGY | Facility: HOSPITAL | Age: 44
Discharge: HOME | End: 2019-12-23
Attending: NEUROLOGICAL SURGERY
Payer: COMMERCIAL

## 2019-12-23 ENCOUNTER — OFFICE VISIT (OUTPATIENT)
Dept: NEUROSURGERY | Facility: CLINIC | Age: 44
End: 2019-12-23
Payer: COMMERCIAL

## 2019-12-23 VITALS
HEART RATE: 103 BPM | WEIGHT: 289 LBS | HEIGHT: 68 IN | SYSTOLIC BLOOD PRESSURE: 137 MMHG | BODY MASS INDEX: 43.8 KG/M2 | DIASTOLIC BLOOD PRESSURE: 83 MMHG | TEMPERATURE: 99 F

## 2019-12-23 DIAGNOSIS — Z98.1 S/P LUMBAR FUSION: Primary | ICD-10-CM

## 2019-12-23 DIAGNOSIS — M79.2 NEUROPATHIC PAIN OF ANKLE, LEFT: ICD-10-CM

## 2019-12-23 DIAGNOSIS — Z98.1 S/P LUMBAR FUSION: ICD-10-CM

## 2019-12-23 PROCEDURE — 99999 PR PBB SHADOW E&M-EST. PATIENT-LVL V: CPT | Mod: PBBFAC,,, | Performed by: NEUROLOGICAL SURGERY

## 2019-12-23 PROCEDURE — 99024 POSTOP FOLLOW-UP VISIT: CPT | Mod: S$GLB,,, | Performed by: NEUROLOGICAL SURGERY

## 2019-12-23 PROCEDURE — 99999 PR PBB SHADOW E&M-EST. PATIENT-LVL V: ICD-10-PCS | Mod: PBBFAC,,, | Performed by: NEUROLOGICAL SURGERY

## 2019-12-23 PROCEDURE — 72100 XR LUMBAR SPINE AP AND LATERAL: ICD-10-PCS | Mod: 26,,, | Performed by: RADIOLOGY

## 2019-12-23 PROCEDURE — 72100 X-RAY EXAM L-S SPINE 2/3 VWS: CPT | Mod: TC,PN

## 2019-12-23 PROCEDURE — 99024 PR POST-OP FOLLOW-UP VISIT: ICD-10-PCS | Mod: S$GLB,,, | Performed by: NEUROLOGICAL SURGERY

## 2019-12-23 PROCEDURE — 72100 X-RAY EXAM L-S SPINE 2/3 VWS: CPT | Mod: 26,,, | Performed by: RADIOLOGY

## 2019-12-23 RX ORDER — AMITRIPTYLINE HYDROCHLORIDE 25 MG/1
50 TABLET, FILM COATED ORAL NIGHTLY
Qty: 60 TABLET | Refills: 6 | Status: SHIPPED | OUTPATIENT
Start: 2019-12-23 | End: 2020-04-28 | Stop reason: SDUPTHER

## 2019-12-23 RX ORDER — DEXTROMETHORPHAN HYDROBROMIDE, GUAIFENESIN 5; 100 MG/5ML; MG/5ML
650 LIQUID ORAL
COMMUNITY

## 2019-12-23 RX ORDER — GABAPENTIN 400 MG/1
1200 CAPSULE ORAL 3 TIMES DAILY
Qty: 270 CAPSULE | Refills: 6 | Status: SHIPPED | OUTPATIENT
Start: 2019-12-23 | End: 2020-04-24

## 2019-12-23 NOTE — PROGRESS NOTES
NEUROSURGICAL POST-OPERATIVE PROGRESS NOTE    DATE OF SERVICE:  12/23/2019      ATTENDING PHYSICIAN:  Corona Bazan MD    SUBJECTIVE:    INTERIM HISTORY:    This is a very pleasant 44 y.o. y.o. female, who is status 6 weeks L5-S1 anterior interbody fusion for isthmic spondylolisthesis grade 2 with severe bilateral foraminal stenosis.  After the surgery her bilateral leg pain and back pain improved but she started developing a new onset of distal leg pain in the L5 distribution on the left side.  She has mild dorsiflexion weakness on the left side.  Her pain in the last 3 days has progressively improved.  The pain is burning in nature.  She is taking gabapentin 1200 mg 3 times daily and amitriptyline 25 mg at night.  She is taking tramadol as needed.  She has been doing for home health physical therapy.  She is ready to start doing outpatient physical therapy.  She has been compliant with wearing her back brace.      Low Back Pain Scale  R Low Back-Pain Score: 6  R Low Back-Pain Intensity: Pain killers give moderate relief from pain  R Low Back-Pain Score: I need help every day in most aspects of self care  Low Back-Lifting: I can only lift very light weights   Low Back-Walking: I can only walk using a cane or crutches   Low Back-Sitting: Pain prevents me from sitting more than 30 minutes   Low Back-Standing: I cannot stand for longer than 30 mins without increasing pain   Low Back-Sleeping: Because of pain my normal nights sleep is reduced by less than one half   Low Back-Social Life: I have hardly any social life because of the pain   Low Back-Traveling: I have extra pain while traveling but it does not compel me to seek alternate forms of travel   Low Back-Changing Degree of Pain: (N/A)         OBJECTIVE:    PHYSICAL EXAMINATION:   Vitals:    12/23/19 1518   BP: 137/83   Pulse: 103   Temp: 98.5 °F (36.9 °C)       Neurosurgery Physical Exam    Back Exam     Muscle Strength   Right Quadriceps:  5/5   Left  Quadriceps:  5/5             Neurologic Exam     Motor Exam     Strength   Right iliopsoas: 5/5  Right quadriceps: 5/5  Left quadriceps: 5/5  Right anterior tibial: 5/5  Left anterior tibial: 4/5  Right gastroc: 5/5  Left gastroc: 5/5      Wound has healed.    DIAGNOSTIC DATA:    X-ray of the lumbar spine, AP and lateral views reveals the fusion hardware is intact. No loosening of screws or migration of hardware.    ASSESMENT:    This is a 44 y.o. female who is s/p 6 weeks L5-S1 anterior lumbar interbody fusion    Problem List Items Addressed This Visit     None      Visit Diagnoses     S/P lumbar fusion    -  Primary    Relevant Orders    Ambulatory consult to Physical Therapy    Neuropathic pain of ankle, left        Relevant Orders    Ambulatory consult to Physical Therapy          PLAN:    Compound cream  Increased amitriptyline to 50 mg hs  Continue gabapentin 1200 mg 3 times daily  Outpatient physical therapy for 6 weeks  Follow-up in 6 weeks with repeat lumbar x-rays  Cannot return to work at this time        Corona Bazan MD  Pager: [phone]

## 2019-12-31 ENCOUNTER — PROCEDURE VISIT (OUTPATIENT)
Dept: UROLOGY | Facility: CLINIC | Age: 44
End: 2019-12-31
Payer: COMMERCIAL

## 2019-12-31 VITALS
TEMPERATURE: 98 F | HEART RATE: 85 BPM | SYSTOLIC BLOOD PRESSURE: 118 MMHG | DIASTOLIC BLOOD PRESSURE: 84 MMHG | BODY MASS INDEX: 43.94 KG/M2 | HEIGHT: 68 IN

## 2019-12-31 DIAGNOSIS — R31.21 ASYMPTOMATIC MICROSCOPIC HEMATURIA: ICD-10-CM

## 2019-12-31 PROCEDURE — 52000 PR CYSTOURETHROSCOPY: ICD-10-PCS | Mod: S$GLB,,, | Performed by: STUDENT IN AN ORGANIZED HEALTH CARE EDUCATION/TRAINING PROGRAM

## 2019-12-31 PROCEDURE — 52000 CYSTOURETHROSCOPY: CPT | Mod: S$GLB,,, | Performed by: STUDENT IN AN ORGANIZED HEALTH CARE EDUCATION/TRAINING PROGRAM

## 2019-12-31 NOTE — PROCEDURES
Procedures   Procedure:   1. Flexible cysto-uretheroscopy    Pre Procedure Diagnosis:  1. Microscopic hematuria    Post Procedure Diagnosis:  1. Same    Surgeon: Delaney Naidu MD    Anesthesia: 2% uro-jet lidocaine jelly for local analgesia    Procedure note:  A flexible cysto-urethroscopy was performed after consent was obtained.  The risks and benefits were explained. 2% lidocaine urojet was used for local analgesia. The genitalia was prepped and draped in the sterile fashion.     The flexible scope was advanced into the urethra and into the bladder. The bilateral ureteral orifices were identified in their normal orthotopic locations. Clear bilateral ureteral efflux was identified. The bladder was completely surveyed in a systematic fashion. No bladder tumors or lesions were seen.     As the flexible cystoscope was being removed, the urethra was evaluated and noted to be normal.     The patient tolerated the procedure well without complication.       Findings in summary:  No abnormalities in bladder mucosa.  Clear efflux of urine from bilateral ureteral orifices      Assessment: 44 y.o. female with microscopic hematuria     Plan:  1. Cystoscopy - findings above benign.  2. CT urogram - noncontrasted phase reviewed on 12/27/19 -  The kidneys are unremarkable.  There is no hydronephrosis or nephrolithiasis identified. Patient had to go back for repeat delayed phase imaging on 12/30/19 since CT scanner malfunctioned during delayed phase on 12/30/19 -  The kidneys have a normal appearance.  There is no hydronephrosis or nephrolithiasis.  The bladder is unremarkable.   3. The patient had a negative microscopic hematuria workup, that is to say no etiology for the microscopic hematuria was identified. The American U[employer] Association guidelines recommend that if a patient with a history of persistent asymptomatic microscopic hematuria has 2 consecutive negative annual urinalyses (1/year for 2 years from the time of  initial evaluation or beyond), then no further urinalyses for the purpose of evaluation of microscopic hematuria is necessary. For persistent asymptomatic microscopic hematuria after negative urologic workup, yearly urinalyses should be conducted. For persistent or recurrent asymptomatic microscopic hematuria after initial negative workup, repeat evaluation in 3-5 years should be considered.  4. Will CC PCP Dr. Burton about incidental fatty liver findings.   5. 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

## 2019-12-31 NOTE — Clinical Note
Rico Burton, met this patient and performed a microscopic hematuria workup. This included a cystoscopy which was benign and a CT urogram. She had to have two done because the first time, the Ct malfunctioned. Incidentally noted fatty liver and she wanted me to let you know that.Sincerely,Delaney Naidu

## 2020-01-02 ENCOUNTER — TELEPHONE (OUTPATIENT)
Dept: RADIOLOGY | Facility: HOSPITAL | Age: 45
End: 2020-01-02

## 2020-01-02 NOTE — TELEPHONE ENCOUNTER
Patient canceled breast biopsy scheduled for today 1/2/2020, called patient and rescheduled breast biopsy at the breast center to 1/14/2020 per patient request.

## 2020-01-03 ENCOUNTER — TELEPHONE (OUTPATIENT)
Dept: NEUROSURGERY | Facility: CLINIC | Age: 45
End: 2020-01-03

## 2020-01-03 NOTE — TELEPHONE ENCOUNTER
----- Message from Shannen Roldan sent at 1/3/2020 11:48 AM CST -----  Contact: Kita from Dr. Eric East called because the doctor needs last MRI reports and last office notes .    Please call [phone] to discuss today.

## 2020-01-06 ENCOUNTER — PATIENT MESSAGE (OUTPATIENT)
Dept: NEUROSURGERY | Facility: CLINIC | Age: 45
End: 2020-01-06

## 2020-01-07 RX ORDER — TRAMADOL HYDROCHLORIDE 50 MG/1
50-100 TABLET ORAL EVERY 8 HOURS PRN
Qty: 90 TABLET | Refills: 0 | Status: SHIPPED | OUTPATIENT
Start: 2020-01-07 | End: 2020-03-02 | Stop reason: SDUPTHER

## 2020-01-08 ENCOUNTER — TELEPHONE (OUTPATIENT)
Dept: NEUROSURGERY | Facility: CLINIC | Age: 45
End: 2020-01-08

## 2020-01-08 NOTE — TELEPHONE ENCOUNTER
----- Message from Anam Jensen sent at 1/8/2020 12:47 PM CST -----  Contact: Hung with [facility]  Hung with [facility] called and wants to know the diagnosis of the pain medication for the pt    Rodrigue can be reached at [phone]

## 2020-01-14 ENCOUNTER — HOSPITAL ENCOUNTER (OUTPATIENT)
Dept: RADIOLOGY | Facility: HOSPITAL | Age: 45
Discharge: HOME | End: 2020-01-14
Attending: FAMILY MEDICINE
Payer: COMMERCIAL

## 2020-01-14 ENCOUNTER — RESEARCH ENCOUNTER (OUTPATIENT)
Dept: RESEARCH | Facility: HOSPITAL | Age: 45
End: 2020-01-14

## 2020-01-14 ENCOUNTER — HOSPITAL ENCOUNTER (EMERGENCY)
Facility: HOSPITAL | Age: 45
Discharge: HOME | End: 2020-01-14
Attending: EMERGENCY MEDICINE
Payer: COMMERCIAL

## 2020-01-14 VITALS
WEIGHT: 293 LBS | OXYGEN SATURATION: 100 % | HEIGHT: 68 IN | RESPIRATION RATE: 18 BRPM | SYSTOLIC BLOOD PRESSURE: 105 MMHG | BODY MASS INDEX: 44.41 KG/M2 | HEART RATE: 98 BPM | TEMPERATURE: 99 F | DIASTOLIC BLOOD PRESSURE: 61 MMHG

## 2020-01-14 DIAGNOSIS — R92.8 ABNORMAL MAMMOGRAM OF LEFT BREAST: ICD-10-CM

## 2020-01-14 DIAGNOSIS — R40.20 LOSS OF CONSCIOUSNESS: Primary | ICD-10-CM

## 2020-01-14 DIAGNOSIS — R11.2 NON-INTRACTABLE VOMITING WITH NAUSEA, UNSPECIFIED VOMITING TYPE: ICD-10-CM

## 2020-01-14 LAB
ANION GAP SERPL CALC-SCNC: 10 MMOL/L (ref 8–16)
BASOPHILS # BLD AUTO: 0.07 K/UL (ref 0–0.2)
BASOPHILS NFR BLD: 0.5 % (ref 0–1.9)
BUN SERPL-MCNC: 12 MG/DL (ref 6–20)
CALCIUM SERPL-MCNC: 9.2 MG/DL (ref 8.7–10.5)
CHLORIDE SERPL-SCNC: 102 MMOL/L (ref 95–110)
CO2 SERPL-SCNC: 28 MMOL/L (ref 23–29)
CREAT SERPL-MCNC: 0.8 MG/DL (ref 0.5–1.4)
DIFFERENTIAL METHOD: ABNORMAL
EOSINOPHIL # BLD AUTO: 0.2 K/UL (ref 0–0.5)
EOSINOPHIL NFR BLD: 1 % (ref 0–8)
ERYTHROCYTE [DISTWIDTH] IN BLOOD BY AUTOMATED COUNT: 12.8 % (ref 11.5–14.5)
EST. GFR  (AFRICAN AMERICAN): >60 ML/MIN/1.73 M^2
EST. GFR  (NON AFRICAN AMERICAN): >60 ML/MIN/1.73 M^2
GLUCOSE SERPL-MCNC: 96 MG/DL (ref 70–110)
HCT VFR BLD AUTO: 42.7 % (ref 37–48.5)
HGB BLD-MCNC: 13.5 G/DL (ref 12–16)
IMM GRANULOCYTES # BLD AUTO: 0.07 K/UL (ref 0–0.04)
IMM GRANULOCYTES NFR BLD AUTO: 0.5 % (ref 0–0.5)
LYMPHOCYTES # BLD AUTO: 1.8 K/UL (ref 1–4.8)
LYMPHOCYTES NFR BLD: 11.8 % (ref 18–48)
MAGNESIUM SERPL-MCNC: 1.9 MG/DL (ref 1.6–2.6)
MCH RBC QN AUTO: 29.3 PG (ref 27–31)
MCHC RBC AUTO-ENTMCNC: 31.6 G/DL (ref 32–36)
MCV RBC AUTO: 93 FL (ref 82–98)
MONOCYTES # BLD AUTO: 0.7 K/UL (ref 0.3–1)
MONOCYTES NFR BLD: 4.8 % (ref 4–15)
NEUTROPHILS # BLD AUTO: 12.2 K/UL (ref 1.8–7.7)
NEUTROPHILS NFR BLD: 81.4 % (ref 38–73)
NRBC BLD-RTO: 0 /100 WBC
PLATELET # BLD AUTO: 449 K/UL (ref 150–350)
PMV BLD AUTO: 9.9 FL (ref 9.2–12.9)
POTASSIUM SERPL-SCNC: 3.8 MMOL/L (ref 3.5–5.1)
RBC # BLD AUTO: 4.6 M/UL (ref 4–5.4)
SODIUM SERPL-SCNC: 140 MMOL/L (ref 136–145)
TROPONIN I SERPL DL<=0.01 NG/ML-MCNC: <0.006 NG/ML (ref 0–0.03)
WBC # BLD AUTO: 15.01 K/UL (ref 3.9–12.7)

## 2020-01-14 PROCEDURE — 63600175 PHARM REV CODE 636 W HCPCS: Performed by: STUDENT IN AN ORGANIZED HEALTH CARE EDUCATION/TRAINING PROGRAM

## 2020-01-14 PROCEDURE — 99284 EMERGENCY DEPT VISIT MOD MDM: CPT | Mod: 25

## 2020-01-14 PROCEDURE — 93010 ELECTROCARDIOGRAM REPORT: CPT | Mod: ,,, | Performed by: INTERNAL MEDICINE

## 2020-01-14 PROCEDURE — 99285 PR EMERGENCY DEPT VISIT,LEVEL V: ICD-10-PCS | Mod: ,,, | Performed by: EMERGENCY MEDICINE

## 2020-01-14 PROCEDURE — 25000003 PHARM REV CODE 250: Mod: PO | Performed by: FAMILY MEDICINE

## 2020-01-14 PROCEDURE — 93005 ELECTROCARDIOGRAM TRACING: CPT

## 2020-01-14 PROCEDURE — 83735 ASSAY OF MAGNESIUM: CPT

## 2020-01-14 PROCEDURE — 88305 TISSUE EXAM BY PATHOLOGIST: ICD-10-PCS | Mod: 26,,, | Performed by: PATHOLOGY

## 2020-01-14 PROCEDURE — 96361 HYDRATE IV INFUSION ADD-ON: CPT

## 2020-01-14 PROCEDURE — 19081 BX BREAST 1ST LESION STRTCTC: CPT | Mod: PO,LT

## 2020-01-14 PROCEDURE — 88305 TISSUE EXAM BY PATHOLOGIST: CPT | Performed by: PATHOLOGY

## 2020-01-14 PROCEDURE — 88342 IMHCHEM/IMCYTCHM 1ST ANTB: CPT | Mod: 26,,, | Performed by: PATHOLOGY

## 2020-01-14 PROCEDURE — 85025 COMPLETE CBC W/AUTO DIFF WBC: CPT

## 2020-01-14 PROCEDURE — 19081 BX BREAST 1ST LESION STRTCTC: CPT | Mod: LT,,, | Performed by: RADIOLOGY

## 2020-01-14 PROCEDURE — 88341 PR IHC OR ICC EACH ADD'L SINGLE ANTIBODY  STAINPR: ICD-10-PCS | Mod: 26,,, | Performed by: PATHOLOGY

## 2020-01-14 PROCEDURE — 19081 MAMMO BREAST STEREOTACTIC BREAST BIOPSY LEFT: ICD-10-PCS | Mod: LT,,, | Performed by: RADIOLOGY

## 2020-01-14 PROCEDURE — 99285 EMERGENCY DEPT VISIT HI MDM: CPT | Mod: ,,, | Performed by: EMERGENCY MEDICINE

## 2020-01-14 PROCEDURE — 80048 BASIC METABOLIC PNL TOTAL CA: CPT

## 2020-01-14 PROCEDURE — 93010 EKG 12-LEAD: ICD-10-PCS | Mod: ,,, | Performed by: INTERNAL MEDICINE

## 2020-01-14 PROCEDURE — 88305 TISSUE EXAM BY PATHOLOGIST: CPT | Mod: 26,,, | Performed by: PATHOLOGY

## 2020-01-14 PROCEDURE — 88342 CHG IMMUNOCYTOCHEMISTRY: ICD-10-PCS | Mod: 26,,, | Performed by: PATHOLOGY

## 2020-01-14 PROCEDURE — 88360 TUMOR IMMUNOHISTOCHEM/MANUAL: CPT | Performed by: PATHOLOGY

## 2020-01-14 PROCEDURE — 84484 ASSAY OF TROPONIN QUANT: CPT

## 2020-01-14 PROCEDURE — 88341 IMHCHEM/IMCYTCHM EA ADD ANTB: CPT | Mod: 26,,, | Performed by: PATHOLOGY

## 2020-01-14 PROCEDURE — 96360 HYDRATION IV INFUSION INIT: CPT

## 2020-01-14 RX ORDER — LIDOCAINE HYDROCHLORIDE AND EPINEPHRINE 20; 10 MG/ML; UG/ML
15 INJECTION, SOLUTION INFILTRATION; PERINEURAL ONCE
Status: COMPLETED | OUTPATIENT
Start: 2020-01-14 | End: 2020-01-14

## 2020-01-14 RX ORDER — LIDOCAINE HYDROCHLORIDE 10 MG/ML
6 INJECTION INFILTRATION; PERINEURAL ONCE
Status: COMPLETED | OUTPATIENT
Start: 2020-01-14 | End: 2020-01-14

## 2020-01-14 RX ADMIN — LIDOCAINE HYDROCHLORIDE 6 ML: 10 INJECTION, SOLUTION INFILTRATION; PERINEURAL at 04:01

## 2020-01-14 RX ADMIN — LIDOCAINE HYDROCHLORIDE,EPINEPHRINE BITARTRATE 15 ML: 20; .01 INJECTION, SOLUTION INFILTRATION; PERINEURAL at 04:01

## 2020-01-14 RX ADMIN — SODIUM CHLORIDE 1000 ML: 0.9 INJECTION, SOLUTION INTRAVENOUS at 05:01

## 2020-01-14 NOTE — ED PROVIDER NOTES
Encounter Date: 1/14/2020       History     Chief Complaint   Patient presents with    Loss of Consciousness     during breast biospy, now has nausea and vomiting     Patient is a 43 yo F presenting to the ED after she had a syncopal episode during a breast Bx. Patient was seated at the time of the fall. She did not sustain head trauma. She had LOC for 2 seconds before smelling salts were administered and she promptly awoke. Denied a history of seizures. Prior to the event she remembers a feeling of tingling in her face and described it as the feeling one gets before they vomit. When she awoke she promptly began vomiting. Patient did urinate on herself. Denied CP, heart palpitations, and SOB before or after the event. No tongue laceration. She has never had LOC like this before. SBP ranges in the 110/60s at home. Reports that she reacts to general anesthetics and imaging contrast with N/V. She received Zofran in the ambulance. Not sick recently. No fever, chills, congestion, constipation or diarrhea. Patient has a breast mass that has recently increased in size warranting the biopsy. Most recently started taking baclofen but no other medication changes recently. She is taking 3600mg Gabapentin daily for neuropathic leg pain which makes her tired daily.         Review of patient's allergies indicates:   Allergen Reactions    Gluten protein Nausea And Vomiting    Milk containing products Hives    Codeine Nausea And Vomiting    Demerol [meperidine] Nausea And Vomiting    Erythropoietin analogues     Imitrex [sumatriptan succinate]      Hysterics      Morphine Nausea And Vomiting    Tetracyclines Nausea And Vomiting    Azithromycin Rash    Ciprofloxacin hcl Rash    Erythromycin Rash     Past Medical History:   Diagnosis Date    GERD (gastroesophageal reflux disease)     History of migraine headaches     Obesity     Pollen allergies     Smoker      Past Surgical History:   Procedure Laterality Date     CHOLECYSTECTOMY      ESOPHAGOGASTRODUODENOSCOPY      EXPLORATORY LAPAROTOMY      HYSTERECTOMY      fibroids    LUMBAR FUSION N/A 2019    Procedure: FUSION, SPINE, LUMBAR Procedure: STG 1: L5-S1 anterior Lumbar interbody fusion / STG 2:L5-S1 Posterior instrumentation;  Surgeon: Corona Bazan MD;  Location: [facility];  Service: Neurosurgery;  Laterality: N/A;  FUSION, SPINE, LUMBAR  Procedure: STG 1: L5-S1 anterior Lumbar interbody fusion / STG 2:L5-S1 Posterior instrumentation  SURGERY TIME: 2.5hrs  LOS:  ANESTHESIA: General  Blood:    TONSILLECTOMY       Family History   Problem Relation Age of Onset    No Known Problems Mother     No Known Problems Father     Cancer Neg Hx     Diabetes Neg Hx     Heart disease Neg Hx     Stroke Neg Hx      Social History     Tobacco Use    Smoking status: Former Smoker     Packs/day: 0.50     Years: 15.00     Pack years: 7.50     Types: Vaping with nicotine     Last attempt to quit: 2019     Years since quittin.3    Smokeless tobacco: Former User   Substance Use Topics    Alcohol use: Yes     Comment: 2-3 drinks per year    Drug use: No     Review of Systems   Constitutional: Negative for activity change, appetite change, chills and fever.   HENT: Negative for congestion and sore throat.    Eyes: Negative for photophobia and visual disturbance.   Respiratory: Negative for apnea, chest tightness and shortness of breath.    Cardiovascular: Negative for chest pain and palpitations.   Gastrointestinal: Negative for abdominal distention, abdominal pain, constipation, diarrhea, nausea and vomiting.   Genitourinary: Negative for difficulty urinating and frequency.   Musculoskeletal: Positive for back pain. Negative for arthralgias.   Neurological: Positive for syncope and headaches. Negative for dizziness and facial asymmetry.   Hematological: Negative for adenopathy. Does not bruise/bleed easily.   Psychiatric/Behavioral: Negative for agitation and behavioral  problems.       Physical Exam     Initial Vitals [01/14/20 1556]   BP Pulse Resp Temp SpO2   111/73 (!) 113 -- 99.1 °F (37.3 °C) 99 %      MAP       --         Physical Exam    Nursing note and vitals reviewed.  Constitutional: She appears well-developed and well-nourished. She is not diaphoretic. No distress.   HENT:   Head: Normocephalic and atraumatic.   Mouth/Throat: Oropharynx is clear and moist. No oropharyngeal exudate.   Eyes: EOM are normal. Pupils are equal, round, and reactive to light. Right eye exhibits no discharge. Left eye exhibits no discharge. No scleral icterus.   Neck: Normal range of motion. Neck supple.   Cardiovascular: Normal rate, regular rhythm and normal heart sounds.   No murmur heard.  Pulmonary/Chest: Breath sounds normal. No respiratory distress. She has no wheezes.   Abdominal: Soft. Bowel sounds are normal. She exhibits no distension.   Musculoskeletal: Normal range of motion. She exhibits no edema or tenderness.   Neurological: She is alert and oriented to person, place, and time. No cranial nerve deficit.   Skin: Skin is warm and dry. No erythema.         ED Course   Procedures  Labs Reviewed   CBC W/ AUTO DIFFERENTIAL - Abnormal; Notable for the following components:       Result Value    WBC 15.01 (*)     Mean Corpuscular Hemoglobin Conc 31.6 (*)     Platelets 449 (*)     Gran # (ANC) 12.2 (*)     Immature Grans (Abs) 0.07 (*)     Gran% 81.4 (*)     Lymph% 11.8 (*)     All other components within normal limits   BASIC METABOLIC PANEL   MAGNESIUM   TROPONIN I     EKG Readings: (Independently Interpreted)   Initial Reading: No STEMI. Rhythm: Normal Sinus Rhythm.       Imaging Results    None          Medical Decision Making:   History:   I obtained history from: someone other than patient.       <> Summary of History: Spoke with physician performing Bx.  Old Medical Records: I decided to obtain old medical records.  Initial Assessment:   43 yo F presenting from breast clinic after  a syncopal episode. No history of seizures and denied cardiac history. No CP or SOB.   Differential Diagnosis:   Vasovagal Syncope   NSTEMI  Seizure   PE   Anemia   Electrolyte Disturbance   Polypharmacy   Clinical Tests:   Lab Tests: Ordered and Reviewed  The following lab test(s) were unremarkable: BMP, CBC and Troponin  Medical Tests: Ordered and Reviewed  ED Management:  CBC for hematologic evaluation   BMP for electrolyte disturbance   Troponin and EKG for MI   1L NS bolus     6:20 PM  Reevaluated patient: Nausea is well controlled. Patient wanted to try water for a PO challenge     7:15 PM  Trop not elevated. Do not suspect MI with normal EKG.   Leukocytosis on CBC     7:43 PM  BMP wnl. Patient passed PO challenge with water and has had no more N/V. Suspect vasovagal syncope. Sterr strips reapplied to  Left breast biopsy region with surgical glue. Strips had fallen off prior to arrival in the ED when the patient was being loaded into ambulance. Patient will be discharged to home with  in stable condition.              Attending Attestation:   Physician Attestation Statement for Resident:  As the supervising MD   Physician Attestation Statement: I have personally seen and examined this patient.   I agree with the above history. -:   As the supervising MD I agree with the above PE.    As the supervising MD I agree with the above treatment, course, plan, and disposition.   -: 34-year-old female here status post syncopal episode.  She was having breast biopsy performed when she felt tingling lightheaded and lost consciousness she had loss of consciousness for a couple of minutes, no confusion when she recovered, however she was vomiting afterwards.  Patient denies any chest pain or shortness of.  She had an episode of urine incontinence while she was vomiting, no body shaking with loss of consciousness.    Patient denies any chest pain or shortness of breath, no chest pain or shortness of breath recently.   She reports she has noticed her heart rate has been slightly elevated for the last few weeks approximately 3 weeks in total but not associated with chest pain or shortness of breath, she believes is due her to her medication she is currently on baclofen, amitriptyline, Doxilamine, gabapentin    Patient comfortable in the ED asymptomatic  High suspicion for vasovagal syncope episode.  Will check troponin electrolyte.  Low suspicion for PE  I have reviewed and agree with the residents interpretation of the following: lab data and EKG.                                  Clinical Impression:       ICD-10-CM ICD-9-CM   1. Loss of consciousness R40.20 780.09   2. Non-intractable vomiting with nausea, unspecified vomiting type R11.2 787.01         Disposition:   Disposition: Discharged  Condition: Stable                     Cricket Cornejo MD  Resident  01/14/20 9555       Jossie Correa MD  01/16/20 7125

## 2020-01-14 NOTE — ED TRIAGE NOTES
Dianne Hemphill, a 44 y.o. female presents to the ED w/ complaint of LOC and vomiting during breast biopsy     Triage note:  Chief Complaint   Patient presents with    Loss of Consciousness     during breast biospy, now has nausea and vomiting     Review of patient's allergies indicates:   Allergen Reactions    Gluten protein Nausea And Vomiting    Milk containing products Hives    Codeine Nausea And Vomiting    Demerol [meperidine] Nausea And Vomiting    Erythropoietin analogues     Imitrex [sumatriptan succinate]      Hysterics      Morphine Nausea And Vomiting    Tetracyclines Nausea And Vomiting    Azithromycin Rash    Ciprofloxacin hcl Rash    Erythromycin Rash     Past Medical History:   Diagnosis Date    GERD (gastroesophageal reflux disease)     History of migraine headaches     Obesity     Pollen allergies     Smoker

## 2020-01-15 NOTE — DISCHARGE INSTRUCTIONS
Please follow up with your primary doctor within 10 days of leaving this ED. If continued vomiting, please rerun to ED for further evaluation.

## 2020-01-15 NOTE — PROGRESS NOTES
Pt approached in Mammography clinic regarding participation in IRB protocol #2018.314, [facility] Breast Biopsy study. Pt was agreeable.      The Informed Consent Form (ICF) was reviewed with pt. The discussion included:   - participation is voluntary  - pt can change her mind about participating  - pt was informed that participation in this study would not preclude her from participating in any other research if offered  - specimens may be used by [facility] researchers or community researchers  - specimens collected include only those discussed with the patient at the time of consent and are indicated on the ICF   - specimens may be used for DNA, RNA or protein studies investigating biomarkers for diagnostic, prognostic or treatment purposes  - breast biopsy will be collected from [facility] after their approval  - all specimens released to researchers will be stripped of identifiers  - no personal medical information will be released to any parties outside of this research study  - there will be no other physical risks outside of those involved in standard of care procedure      Dr. Rondon approved of patient's participation in the study.  Pt did not have any questions. Pt willingly and independently signed ICF.     A copy of signed ICF was given to pt with instructions to call with any questions that may arise or if she should change her mind regarding participation in study

## 2020-01-16 ENCOUNTER — DOCUMENTATION ONLY (OUTPATIENT)
Dept: SURGERY | Facility: CLINIC | Age: 45
End: 2020-01-16

## 2020-01-16 ENCOUNTER — TELEPHONE (OUTPATIENT)
Dept: INTERNAL MEDICINE | Facility: CLINIC | Age: 45
End: 2020-01-16

## 2020-01-16 NOTE — TELEPHONE ENCOUNTER
Called and discussed breast cancer diagnosis.    Please call and set her up for ER follow up.    Find out about disability paperwork.

## 2020-01-16 NOTE — PROGRESS NOTES
Called Patient with results of breast biopsy from 1/14/2020.  Explained that the biopsy showed DCIS . Discussed what this means and that the next step is to meet with a breast surgeon. An appt was made for 1/23/2020 with Dr. Munoz.  Reviewed location of breast center. Patient verbalized understanding.

## 2020-01-22 DIAGNOSIS — D05.12 DUCTAL CARCINOMA IN SITU (DCIS) OF LEFT BREAST: Primary | ICD-10-CM

## 2020-01-23 ENCOUNTER — HOSPITAL ENCOUNTER (OUTPATIENT)
Dept: RADIOLOGY | Facility: HOSPITAL | Age: 45
Discharge: HOME | End: 2020-01-23
Attending: SURGERY
Payer: COMMERCIAL

## 2020-01-23 ENCOUNTER — OFFICE VISIT (OUTPATIENT)
Dept: SURGERY | Facility: CLINIC | Age: 45
End: 2020-01-23
Payer: COMMERCIAL

## 2020-01-23 ENCOUNTER — PATIENT MESSAGE (OUTPATIENT)
Dept: GASTROENTEROLOGY | Facility: CLINIC | Age: 45
End: 2020-01-23

## 2020-01-23 VITALS
WEIGHT: 293 LBS | TEMPERATURE: 98 F | DIASTOLIC BLOOD PRESSURE: 92 MMHG | HEIGHT: 69 IN | BODY MASS INDEX: 43.4 KG/M2 | HEART RATE: 114 BPM | SYSTOLIC BLOOD PRESSURE: 127 MMHG

## 2020-01-23 DIAGNOSIS — D05.12 DUCTAL CARCINOMA IN SITU (DCIS) OF LEFT BREAST: ICD-10-CM

## 2020-01-23 DIAGNOSIS — Z01.818 PRE-OP TESTING: ICD-10-CM

## 2020-01-23 DIAGNOSIS — D05.12 DUCTAL CARCINOMA IN SITU (DCIS) OF LEFT BREAST: Primary | ICD-10-CM

## 2020-01-23 LAB
COMMENT: NORMAL
FINAL PATHOLOGIC DIAGNOSIS: NORMAL
GROSS: NORMAL
Lab: NORMAL
SUPPLEMENTAL DIAGNOSIS: NORMAL

## 2020-01-23 PROCEDURE — 77062 BREAST TOMOSYNTHESIS BI: CPT | Mod: TC,PO

## 2020-01-23 PROCEDURE — 77062 MAMMO DIGITAL DIAGNOSTIC BILAT WITH TOMOSYNTHESIS_CAD: ICD-10-PCS | Mod: 26,,, | Performed by: RADIOLOGY

## 2020-01-23 PROCEDURE — 3008F BODY MASS INDEX DOCD: CPT | Mod: CPTII,S$GLB,, | Performed by: SURGERY

## 2020-01-23 PROCEDURE — 99205 OFFICE O/P NEW HI 60 MIN: CPT | Mod: S$GLB,,, | Performed by: SURGERY

## 2020-01-23 PROCEDURE — 99999 PR PBB SHADOW E&M-EST. PATIENT-LVL III: CPT | Mod: PBBFAC,,, | Performed by: SURGERY

## 2020-01-23 PROCEDURE — 77062 BREAST TOMOSYNTHESIS BI: CPT | Mod: 26,,, | Performed by: RADIOLOGY

## 2020-01-23 PROCEDURE — 99205 PR OFFICE/OUTPT VISIT, NEW, LEVL V, 60-74 MIN: ICD-10-PCS | Mod: S$GLB,,, | Performed by: SURGERY

## 2020-01-23 PROCEDURE — 77066 DX MAMMO INCL CAD BI: CPT | Mod: 26,,, | Performed by: RADIOLOGY

## 2020-01-23 PROCEDURE — 3008F PR BODY MASS INDEX (BMI) DOCUMENTED: ICD-10-PCS | Mod: CPTII,S$GLB,, | Performed by: SURGERY

## 2020-01-23 PROCEDURE — 99999 PR PBB SHADOW E&M-EST. PATIENT-LVL III: ICD-10-PCS | Mod: PBBFAC,,, | Performed by: SURGERY

## 2020-01-23 PROCEDURE — 77066 MAMMO DIGITAL DIAGNOSTIC BILAT WITH TOMOSYNTHESIS_CAD: ICD-10-PCS | Mod: 26,,, | Performed by: RADIOLOGY

## 2020-01-23 NOTE — H&P (VIEW-ONLY)
New Breast Cancer  History and Physical  [facility]  Department of Surgery    REFERRING PROVIDER: Annette Burton MD  [address]  [address]  [place] [address]    CHIEF COMPLAINT: left breast DCIS    Subjective:      Dianne Hemphill is a 44 y.o. premenopausal female referred for evaluation of recently diagnosed carcinoma of the left breast. The patient was initially referred for surgical evaluation of an abnormal mammogram first noted 2019. Follow-up mammogram (2019) showed asymmetry measuring 2.7 cm. 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     Patient does routinely do self breast exams.  Patient has not noted a change on breast exam.  Patient denies nipple discharge. Patient denies to previous breast biopsy. Patient denies a personal history of breast cancer.    Findings at that time were the following:   Tumor size: 2.7 cm asymmetry  Tumor grade: ?   Estrogen Receptor: +   Progesterone Receptor: +      Lymph node status: clinically negative     GYN History:  Age of menarche was 11. Age of menopause was n/a.  Hysterectomy . Patient denies hormonal therapy. Patient is . Age of first live birth was 20. Patient did breast feed x 9 months each.    FAMILY History:  Mother melanoma  PF gastric cancer    Past Medical History:   Diagnosis Date    GERD (gastroesophageal reflux disease)     History of migraine headaches     Obesity     Pollen allergies     Smoker      Past Surgical History:   Procedure Laterality Date    CHOLECYSTECTOMY      ESOPHAGOGASTRODUODENOSCOPY      EXPLORATORY LAPAROTOMY      HYSTERECTOMY      fibroids    LUMBAR FUSION N/A 2019    Procedure: FUSION, SPINE, LUMBAR Procedure: STG 1: L5-S1 anterior Lumbar interbody fusion / STG 2:L5-S1 Posterior instrumentation;  Surgeon: Corona Bazan MD;  Location: [facility] OR;  Service: Neurosurgery;  Laterality: N/A;  FUSION, SPINE, LUMBAR  Procedure:  STG 1: L5-S1 anterior Lumbar interbody fusion / STG 2:L5-S1 Posterior instrumentation  SURGERY TIME: 2.5hrs  LOS:  ANESTHESIA: General  Blood:    SPINAL FUSION      TONSILLECTOMY       Current Outpatient Medications on File Prior to Visit   Medication Sig Dispense Refill    acetaminophen (TYLENOL 8 HOUR) 650 MG TbSR Take 650 mg by mouth every 12 (twelve) hours.      amitriptyline (ELAVIL) 25 MG tablet Take 2 tablets (50 mg total) by mouth every evening. 60 tablet 6    baclofen (LIORESAL) 10 MG tablet Take 1 tablet (10 mg total) by mouth 3 (three) times daily. 90 tablet 11    cholecalciferol, vitamin D3, (VITAMIN D3) 2,000 unit Tab Take by mouth once daily.      diclofenac sodium (VOLTAREN) 1 % Gel APPLY 2.5 GRAMS TO THE AFFECTED AREA 3-4 TIMES DAILY STARTING 2 WEEKS POST SURGERY.  3    DULoxetine (CYMBALTA) 30 MG capsule Take 1 capsule (30 mg total) by mouth once daily. 90 capsule 1    fexofenadine (ALLEGRA) 180 MG tablet Take 180 mg by mouth once daily.      fluticasone propionate (FLONASE) 50 mcg/actuation nasal spray 1 spray (50 mcg total) by Each Nostril route once daily. 16 g 11    gabapentin (NEURONTIN) 400 MG capsule Take 3 capsules (1,200 mg total) by mouth 3 (three) times daily. 270 capsule 6    HYDROmorphone (DILAUDID) 2 MG tablet Take 1 tablet (2 mg total) by mouth every 4 to 6 hours as needed for Pain. 40 tablet 0    levocetirizine (XYZAL) 5 MG tablet Take 5 mg by mouth every evening.      ondansetron (ZOFRAN) 4 MG tablet Take 2 tablets (8 mg total) by mouth every 6 (six) hours as needed for Nausea. 20 tablet 1    pantoprazole (PROTONIX) 40 MG tablet Take 1 tablet (40 mg total) by mouth once daily. 90 tablet 1    SANARE ADV SCAR THERAPY BASE Gel as needed.   3    traMADol (ULTRAM) 50 mg tablet Take 1-2 tablets ( mg total) by mouth every 8 (eight) hours as needed for Pain. 90 tablet 0    lidocaine-tetracaine 7-7 % Crea APPLY 1-2 GRAMS TO THE AFFECTED AREAS 3-4 TIMES DAILY STARTING  2 WEEKS POST SURGERY.  3    methocarbamol (ROBAXIN) 500 MG Tab Take 2 tablets (1,000 mg total) by mouth 3 (three) times daily. (Patient not taking: Reported on 2020) 90 tablet 0    mupirocin calcium 2% (BACTROBAN) 2 % cream APPLY 1/2 GRAM TO EACH NOSTRIL TWICE A DAY FOR UP TO 5 DAYS PRIOR TO SURGERY OR AS DIRECTED BY YOUR PHYSICIAN.  0    nystatin (MYCOSTATIN) 100,000 unit/mL suspension Take 5 ml by mouth (swish and spit out medication) four times daily for 7 days 280 mL 0    scopolamine (TRANSDERM-SCOP) 1.3-1.5 mg (1 mg over 3 days) Place 1 patch onto the skin every 72 hours. (Patient not taking: Reported on 2020) 1 patch 0     No current facility-administered medications on file prior to visit.      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 [employer]   Social Needs    Financial resource strain: Not on file    Food insecurity:     Worry: Not on file     Inability: Not on file    Transportation needs:     Medical: Not on file     Non-medical: Not on file   Tobacco Use    Smoking status: Former Smoker     Packs/day: 0.50     Years: 15.00     Pack years: 7.50     Types: Vaping with nicotine     Last attempt to quit: 2019     Years since quittin.3    Smokeless tobacco: Former User   Substance and Sexual Activity    Alcohol use: Yes     Comment: 2-3 drinks per year    Drug use: No    Sexual activity: Yes     Partners: Male     Birth control/protection: See Surgical Hx     Comment: Hysterectomy   Lifestyle    Physical activity:     Days per week: Not on file     Minutes per session: Not on file    Stress: Not on file   Relationships    Social connections:     Talks on phone: Not on file     Gets together: Not on file     Attends [employer] service: Not on file     Active  "member of club or organization: Not on file     Attends meetings of clubs or organizations: Not on file     Relationship status: Not on file   Other Topics Concern    Not on file   Social History Narrative    Not on file     Family History   Problem Relation Age of Onset    No Known Problems Mother     No Known Problems Father     Cancer Neg Hx     Diabetes Neg Hx     Heart disease Neg Hx     Stroke Neg Hx         Review of Systems  Review of Systems   Constitutional: Negative for fatigue and fever.   HENT: Negative for sore throat and trouble swallowing.    Eyes: Negative for visual disturbance.   Respiratory: Negative for cough and shortness of breath.    Cardiovascular: Negative for chest pain and palpitations.   Gastrointestinal: Negative for abdominal pain, constipation, diarrhea and nausea.   Genitourinary: Negative for difficulty urinating and dysuria.   Musculoskeletal: Positive for back pain and gait problem. Negative for arthralgias.   Neurological: Positive for dizziness. Negative for weakness and headaches.   Hematological: Negative for adenopathy.        Objective:   PHYSICAL EXAM:  BP (!) 127/92 (BP Location: Right arm, Patient Position: Sitting, BP Method: Medium (Automatic))   Pulse (!) 114   Temp 98.3 °F (36.8 °C) (Oral)   Ht 5' 9" (1.753 m)   Wt 134.3 kg (296 lb 1.2 oz)   BMI 43.72 kg/m²     Physical Exam   Pulmonary/Chest: She exhibits no tenderness and no deformity. Right breast exhibits no inverted nipple, no mass, no nipple discharge, no skin change and no tenderness. Left breast exhibits no inverted nipple, no mass, no nipple discharge, no skin change and no tenderness.       Lymphadenopathy:     She has no cervical adenopathy.     She has no axillary adenopathy.        Right: No supraclavicular adenopathy present.        Left: No supraclavicular adenopathy present.         Radiology review: Images personally reviewed by me in the clinic.         Assessment:      Diannenohemy Hemphill" is a 44 y.o. 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      Plan:    Options for management were discussed with the patient and her family. We reviewed the existing data noting the equivalency of breast conserving surgery with radiation therapy and mastectomy. We also reviewed the guidelines of the National Comprehensive Cancer Network for DCIS.  We discussed the need for lumpectomy margins to be negative for carcinoma and the necessity for postoperative radiation therapy after breast conservation in most cases. In the setting of mastectomy, delayed or immediate reconstruction options are available and were discussed.  We discussed the chance of upstaging to an invasive carcinoma at the time of surgery, potentially requiring more surgery (such as SLNB) if this occurs.    The need for SLNB depends on tumor biology as well as size and location of the DCIS.  If in the upper outer quadrant, it is difficult to map to the axillary nodes so SLNB is usually performed. The possibility of a failed or false negative sentinel lymph node biopsy and the potential need for complete lymphadenectomy for a failed or positive sentinel lymph node biopsy were fully discussed.    In the setting of lumpectomy, radiation therapy would be recommended majority of the time.  The duration and treatment side effects were discussed with the patient.  This will coordinated with the radiation oncologist pending final pathology.    We also discussed the role of systemic therapy in the treatment of DCIS with endocrine therapy if the DCIS is ER and/or HI positive.  We discussed that this is based on tumor biology and sophia status and will be determined based on final pathology.  We discussed that if the DCIS is hormone positive, endocrine therapy may be recommended and its use can reduce the risk of recurrence. Side effects of treatment were briefly discussed. We also discussed that chemotherapy is not  recommended in the setting of DCIS.     Discussed MRI.  She would not like to have the MRI.    Discussed genetics.  She will proceed with genetics.    Discussed COMET trial.  Unsure of her grade, so await f/u from pathology.  She is leaning toward left seed lumpectomy.    Patient was educated on DCIS, receptors, wire localization lumpectomy, mastectomy, sentinel lymph node mapping and biopsy, axillary lymph node dissection, reconstruction, breast prosthesis with post-mastectomy bra and radiation therapy. Patient was given patient information binder including [facility] breast cancer treatment brochure.  All her questions were answered.    Total time spent with the patient: 60 minutes.  45 minutes of face to face consultation and 15 minutes of chart review and coordination of care.

## 2020-01-23 NOTE — LETTER
January 23, 2020      Annette Burton MD  [address]  [address]  [place] [address]           [facility]  [address]  [facility] [address]  Phone: [phone]  Fax: [phone]          Patient: Dianne Hemphill   MR Number: [id number]   YOB: 1975   Date of Visit: 1/23/2020       Dear Dr. Annette Burton:    Thank you for referring Dianne Hemphill to me for evaluation. Attached you will find relevant portions of my assessment and plan of care.    If you have questions, please do not hesitate to call me. I look forward to following Dianne Hemphill along with you.    Sincerely,    Mara Munoz MD    Enclosure  CC:  No Recipients    If you would like to receive this communication electronically, please contact [email or web address] or [phone] to request more information on [facility] Link access.    For providers and/or their staff who would like to refer a patient to [facility], please contact us through our one-stop-shop provider referral line, [facility], at [phone].    If you feel you have received this communication in error or would no longer like to receive these types of communications, please e-mail [email or web address]

## 2020-01-23 NOTE — PROGRESS NOTES
New Breast Cancer  History and Physical  [facility]  Department of Surgery    REFERRING PROVIDER: Annette Burton MD  [address]  [address]  [place] [address]    CHIEF COMPLAINT: left breast DCIS    Subjective:      Dianne Hemphill is a 44 y.o. premenopausal female referred for evaluation of recently diagnosed carcinoma of the left breast. The patient was initially referred for surgical evaluation of an abnormal mammogram first noted 2019. Follow-up mammogram (2019) showed asymmetry measuring 2.7 cm. 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     Patient does routinely do self breast exams.  Patient has not noted a change on breast exam.  Patient denies nipple discharge. Patient denies to previous breast biopsy. Patient denies a personal history of breast cancer.    Findings at that time were the following:   Tumor size: 2.7 cm asymmetry  Tumor grade: ?   Estrogen Receptor: +   Progesterone Receptor: +      Lymph node status: clinically negative     GYN History:  Age of menarche was 11. Age of menopause was n/a.  Hysterectomy . Patient denies hormonal therapy. Patient is . Age of first live birth was 20. Patient did breast feed x 9 months each.    FAMILY History:  Mother melanoma  PF gastric cancer    Past Medical History:   Diagnosis Date    GERD (gastroesophageal reflux disease)     History of migraine headaches     Obesity     Pollen allergies     Smoker      Past Surgical History:   Procedure Laterality Date    CHOLECYSTECTOMY      ESOPHAGOGASTRODUODENOSCOPY      EXPLORATORY LAPAROTOMY      HYSTERECTOMY      fibroids    LUMBAR FUSION N/A 2019    Procedure: FUSION, SPINE, LUMBAR Procedure: STG 1: L5-S1 anterior Lumbar interbody fusion / STG 2:L5-S1 Posterior instrumentation;  Surgeon: Corona Bazan MD;  Location: [facility] OR;  Service: Neurosurgery;  Laterality: N/A;  FUSION, SPINE, LUMBAR  Procedure:  STG 1: L5-S1 anterior Lumbar interbody fusion / STG 2:L5-S1 Posterior instrumentation  SURGERY TIME: 2.5hrs  LOS:  ANESTHESIA: General  Blood:    SPINAL FUSION      TONSILLECTOMY       Current Outpatient Medications on File Prior to Visit   Medication Sig Dispense Refill    acetaminophen (TYLENOL 8 HOUR) 650 MG TbSR Take 650 mg by mouth every 12 (twelve) hours.      amitriptyline (ELAVIL) 25 MG tablet Take 2 tablets (50 mg total) by mouth every evening. 60 tablet 6    baclofen (LIORESAL) 10 MG tablet Take 1 tablet (10 mg total) by mouth 3 (three) times daily. 90 tablet 11    cholecalciferol, vitamin D3, (VITAMIN D3) 2,000 unit Tab Take by mouth once daily.      diclofenac sodium (VOLTAREN) 1 % Gel APPLY 2.5 GRAMS TO THE AFFECTED AREA 3-4 TIMES DAILY STARTING 2 WEEKS POST SURGERY.  3    DULoxetine (CYMBALTA) 30 MG capsule Take 1 capsule (30 mg total) by mouth once daily. 90 capsule 1    fexofenadine (ALLEGRA) 180 MG tablet Take 180 mg by mouth once daily.      fluticasone propionate (FLONASE) 50 mcg/actuation nasal spray 1 spray (50 mcg total) by Each Nostril route once daily. 16 g 11    gabapentin (NEURONTIN) 400 MG capsule Take 3 capsules (1,200 mg total) by mouth 3 (three) times daily. 270 capsule 6    HYDROmorphone (DILAUDID) 2 MG tablet Take 1 tablet (2 mg total) by mouth every 4 to 6 hours as needed for Pain. 40 tablet 0    levocetirizine (XYZAL) 5 MG tablet Take 5 mg by mouth every evening.      ondansetron (ZOFRAN) 4 MG tablet Take 2 tablets (8 mg total) by mouth every 6 (six) hours as needed for Nausea. 20 tablet 1    pantoprazole (PROTONIX) 40 MG tablet Take 1 tablet (40 mg total) by mouth once daily. 90 tablet 1    SANARE ADV SCAR THERAPY BASE Gel as needed.   3    traMADol (ULTRAM) 50 mg tablet Take 1-2 tablets ( mg total) by mouth every 8 (eight) hours as needed for Pain. 90 tablet 0    lidocaine-tetracaine 7-7 % Crea APPLY 1-2 GRAMS TO THE AFFECTED AREAS 3-4 TIMES DAILY STARTING  2 WEEKS POST SURGERY.  3    methocarbamol (ROBAXIN) 500 MG Tab Take 2 tablets (1,000 mg total) by mouth 3 (three) times daily. (Patient not taking: Reported on 2020) 90 tablet 0    mupirocin calcium 2% (BACTROBAN) 2 % cream APPLY 1/2 GRAM TO EACH NOSTRIL TWICE A DAY FOR UP TO 5 DAYS PRIOR TO SURGERY OR AS DIRECTED BY YOUR PHYSICIAN.  0    nystatin (MYCOSTATIN) 100,000 unit/mL suspension Take 5 ml by mouth (swish and spit out medication) four times daily for 7 days 280 mL 0    scopolamine (TRANSDERM-SCOP) 1.3-1.5 mg (1 mg over 3 days) Place 1 patch onto the skin every 72 hours. (Patient not taking: Reported on 2020) 1 patch 0     No current facility-administered medications on file prior to visit.      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 [employer]   Social Needs    Financial resource strain: Not on file    Food insecurity:     Worry: Not on file     Inability: Not on file    Transportation needs:     Medical: Not on file     Non-medical: Not on file   Tobacco Use    Smoking status: Former Smoker     Packs/day: 0.50     Years: 15.00     Pack years: 7.50     Types: Vaping with nicotine     Last attempt to quit: 2019     Years since quittin.3    Smokeless tobacco: Former User   Substance and Sexual Activity    Alcohol use: Yes     Comment: 2-3 drinks per year    Drug use: No    Sexual activity: Yes     Partners: Male     Birth control/protection: See Surgical Hx     Comment: Hysterectomy   Lifestyle    Physical activity:     Days per week: Not on file     Minutes per session: Not on file    Stress: Not on file   Relationships    Social connections:     Talks on phone: Not on file     Gets together: Not on file     Attends [employer]arsani service: Not on file     Active  "member of club or organization: Not on file     Attends meetings of clubs or organizations: Not on file     Relationship status: Not on file   Other Topics Concern    Not on file   Social History Narrative    Not on file     Family History   Problem Relation Age of Onset    No Known Problems Mother     No Known Problems Father     Cancer Neg Hx     Diabetes Neg Hx     Heart disease Neg Hx     Stroke Neg Hx         Review of Systems  Review of Systems   Constitutional: Negative for fatigue and fever.   HENT: Negative for sore throat and trouble swallowing.    Eyes: Negative for visual disturbance.   Respiratory: Negative for cough and shortness of breath.    Cardiovascular: Negative for chest pain and palpitations.   Gastrointestinal: Negative for abdominal pain, constipation, diarrhea and nausea.   Genitourinary: Negative for difficulty urinating and dysuria.   Musculoskeletal: Positive for back pain and gait problem. Negative for arthralgias.   Neurological: Positive for dizziness. Negative for weakness and headaches.   Hematological: Negative for adenopathy.        Objective:   PHYSICAL EXAM:  BP (!) 127/92 (BP Location: Right arm, Patient Position: Sitting, BP Method: Medium (Automatic))   Pulse (!) 114   Temp 98.3 °F (36.8 °C) (Oral)   Ht 5' 9" (1.753 m)   Wt 134.3 kg (296 lb 1.2 oz)   BMI 43.72 kg/m²     Physical Exam   Pulmonary/Chest: She exhibits no tenderness and no deformity. Right breast exhibits no inverted nipple, no mass, no nipple discharge, no skin change and no tenderness. Left breast exhibits no inverted nipple, no mass, no nipple discharge, no skin change and no tenderness.       Lymphadenopathy:     She has no cervical adenopathy.     She has no axillary adenopathy.        Right: No supraclavicular adenopathy present.        Left: No supraclavicular adenopathy present.         Radiology review: Images personally reviewed by me in the clinic.         Assessment:      Diannenohemy Hemphill" is a 44 y.o. 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      Plan:    Options for management were discussed with the patient and her family. We reviewed the existing data noting the equivalency of breast conserving surgery with radiation therapy and mastectomy. We also reviewed the guidelines of the National Comprehensive Cancer Network for DCIS.  We discussed the need for lumpectomy margins to be negative for carcinoma and the necessity for postoperative radiation therapy after breast conservation in most cases. In the setting of mastectomy, delayed or immediate reconstruction options are available and were discussed.  We discussed the chance of upstaging to an invasive carcinoma at the time of surgery, potentially requiring more surgery (such as SLNB) if this occurs.    The need for SLNB depends on tumor biology as well as size and location of the DCIS.  If in the upper outer quadrant, it is difficult to map to the axillary nodes so SLNB is usually performed. The possibility of a failed or false negative sentinel lymph node biopsy and the potential need for complete lymphadenectomy for a failed or positive sentinel lymph node biopsy were fully discussed.    In the setting of lumpectomy, radiation therapy would be recommended majority of the time.  The duration and treatment side effects were discussed with the patient.  This will coordinated with the radiation oncologist pending final pathology.    We also discussed the role of systemic therapy in the treatment of DCIS with endocrine therapy if the DCIS is ER and/or MO positive.  We discussed that this is based on tumor biology and sophia status and will be determined based on final pathology.  We discussed that if the DCIS is hormone positive, endocrine therapy may be recommended and its use can reduce the risk of recurrence. Side effects of treatment were briefly discussed. We also discussed that chemotherapy is not  recommended in the setting of DCIS.     Discussed MRI.  She would not like to have the MRI.    Discussed genetics.  She will proceed with genetics.    Discussed COMET trial.  Unsure of her grade, so await f/u from pathology.  She is leaning toward left seed lumpectomy.    Patient was educated on DCIS, receptors, wire localization lumpectomy, mastectomy, sentinel lymph node mapping and biopsy, axillary lymph node dissection, reconstruction, breast prosthesis with post-mastectomy bra and radiation therapy. Patient was given patient information binder including [facility] breast cancer treatment brochure.  All her questions were answered.    Total time spent with the patient: 60 minutes.  45 minutes of face to face consultation and 15 minutes of chart review and coordination of care.

## 2020-01-24 ENCOUNTER — PATIENT MESSAGE (OUTPATIENT)
Dept: GASTROENTEROLOGY | Facility: CLINIC | Age: 45
End: 2020-01-24

## 2020-01-24 RX ORDER — ONDANSETRON 4 MG/1
8 TABLET, ORALLY DISINTEGRATING ORAL EVERY 6 HOURS PRN
Qty: 60 TABLET | Refills: 1 | Status: SHIPPED | OUTPATIENT
Start: 2020-01-24 | End: 2022-08-01 | Stop reason: SDUPTHER

## 2020-01-24 RX ORDER — SCOLOPAMINE TRANSDERMAL SYSTEM 1 MG/1
1 PATCH, EXTENDED RELEASE TRANSDERMAL
Qty: 4 PATCH | Refills: 3 | Status: SHIPPED | OUTPATIENT
Start: 2020-01-24 | End: 2020-12-22 | Stop reason: SDUPTHER

## 2020-01-27 DIAGNOSIS — D05.12 DUCTAL CARCINOMA IN SITU (DCIS) OF LEFT BREAST: Primary | ICD-10-CM

## 2020-01-30 ENCOUNTER — TELEPHONE (OUTPATIENT)
Dept: NEUROSURGERY | Facility: CLINIC | Age: 45
End: 2020-01-30

## 2020-01-30 ENCOUNTER — OFFICE VISIT (OUTPATIENT)
Dept: INTERNAL MEDICINE | Facility: CLINIC | Age: 45
End: 2020-01-30
Payer: COMMERCIAL

## 2020-01-30 VITALS
TEMPERATURE: 98 F | DIASTOLIC BLOOD PRESSURE: 76 MMHG | SYSTOLIC BLOOD PRESSURE: 120 MMHG | HEIGHT: 69 IN | HEART RATE: 92 BPM | WEIGHT: 293 LBS | BODY MASS INDEX: 43.4 KG/M2 | OXYGEN SATURATION: 98 %

## 2020-01-30 DIAGNOSIS — M54.10 RADICULAR PAIN OF LEFT LOWER EXTREMITY: ICD-10-CM

## 2020-01-30 DIAGNOSIS — M54.17 LUMBOSACRAL RADICULOPATHY AT L5: ICD-10-CM

## 2020-01-30 DIAGNOSIS — Z98.890 S/P SPINAL SURGERY: ICD-10-CM

## 2020-01-30 DIAGNOSIS — K76.0 FATTY LIVER: ICD-10-CM

## 2020-01-30 DIAGNOSIS — M43.16 SPONDYLOLISTHESIS OF LUMBAR REGION: ICD-10-CM

## 2020-01-30 DIAGNOSIS — Z09 HOSPITAL DISCHARGE FOLLOW-UP: Primary | ICD-10-CM

## 2020-01-30 DIAGNOSIS — R11.0 NAUSEA: ICD-10-CM

## 2020-01-30 DIAGNOSIS — R19.8 ALTERNATING CONSTIPATION AND DIARRHEA: ICD-10-CM

## 2020-01-30 DIAGNOSIS — K21.9 GASTROESOPHAGEAL REFLUX DISEASE, ESOPHAGITIS PRESENCE NOT SPECIFIED: ICD-10-CM

## 2020-01-30 PROCEDURE — 3008F BODY MASS INDEX DOCD: CPT | Mod: CPTII,S$GLB,, | Performed by: FAMILY MEDICINE

## 2020-01-30 PROCEDURE — 99999 PR PBB SHADOW E&M-EST. PATIENT-LVL V: ICD-10-PCS | Mod: PBBFAC,,, | Performed by: FAMILY MEDICINE

## 2020-01-30 PROCEDURE — 99999 PR PBB SHADOW E&M-EST. PATIENT-LVL V: CPT | Mod: PBBFAC,,, | Performed by: FAMILY MEDICINE

## 2020-01-30 PROCEDURE — 99214 PR OFFICE/OUTPT VISIT, EST, LEVL IV, 30-39 MIN: ICD-10-PCS | Mod: S$GLB,,, | Performed by: FAMILY MEDICINE

## 2020-01-30 PROCEDURE — 99214 OFFICE O/P EST MOD 30 MIN: CPT | Mod: S$GLB,,, | Performed by: FAMILY MEDICINE

## 2020-01-30 PROCEDURE — 3008F PR BODY MASS INDEX (BMI) DOCUMENTED: ICD-10-PCS | Mod: CPTII,S$GLB,, | Performed by: FAMILY MEDICINE
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
## 2020-01-30 NOTE — LETTER
"January 30, 2020    Dianne Hemphill  [address] [facility] [address]             [facility] - [facility]/ Internal Medicine  [address] [place] [id number]  Phone: [phone]  Fax: [phone] To whom it may concern:    Ms. Dianne Hemphill is followed by me as her primary care provider. Her last clinic visit with me was on 1/30/2020. Ms. Hemphill had a FUSION, SPINE, LUMBAR on 11/7/2019 done by Corona Bazan MD. Dr. Bazan office visit note on 12/23/19 stated "Outpatient physical therapy for 6 weeks, follow-up in 6 weeks with repeat lumbar x-rays, Cannot return to work at this time." She has a follow up set with Dr. Corona Bazan on 3/2/2020.       If you have any questions or concerns, please don't hesitate to call.    Sincerely,        Annette Burton MD     "

## 2020-01-30 NOTE — TELEPHONE ENCOUNTER
----- Message from Lupis Everett LPN sent at 1/30/2020 12:28 PM CST -----  Patient is schedule for 3/2/2020 with Dr. Bazan and is in a lot of pain. Dr. Burton would like to know if this patient can be seen sooner than 3/2/20 by Dr. Bazan or another provider in neurosurgery. Pt had an outside EMG done and they are recommending a left sympathetic block +/- TFESI at L5 and spinal cord stimulation trail. Pt would like to discuss these procedures.     Thanks,  Lupis Everett LPN

## 2020-02-06 NOTE — PROGRESS NOTES
Established Patient    SUBJECTIVE:     Note dictated with voice recognition software, please excuse any grammatical errors.    History of Present Illness:  Dianne Hemphill is a 44 y.o. female who presents for neurosurgical re-evaluation.  Patient is 3 months status post L5-S1 anterior lumbar interbody fusion for isthmic spondylolisthesis grade 2 with severe bilateral foraminal stenosis.  after surgery, her bilateral leg pain and back pain improved.  However, she did develop left foot pain (pen/needle/shooting/numbness) that radiates to her left calf.  Ft is very tender to touch.  Occasionally, she will have changes in color or temperature.  She is unable to stand for long period of time secondary to pain.  Uses a rolling walker.  She is taking gabapentin 1200 mg 3 times a day and using compound pain cream with some improvements.  She attempted to start physical therapy but had to stop secondary to being leg go from her job, undergo breast biopsy, and recent diagnosis of breast cancer.  She reports that she is scheduled to have breast mastectomy next Friday.    She had EMG done outside facility recently and brought in reports.         Low Back Pain Scale  R Low Back-Pain Score: 7  R Low Back-Pain Intensity: Pain killers give very little relief from pain  Personal Care : I need help every day in most aspects of self-care.  Lifting: I can only lift very light weights.  Walking: I can only walk using a stick or crutches.  Sitting: Pain prevents me from sitting more than than ten minutes.   Low Back-Standing: I cannot stand for longer than 10 minutes with increasing pain   Low Back-Sleeping: Because of pain my normal nights sleep is reduced by less than one quarter  Social Life: Pain has restricted my social life to home.   Low Back-Traveling: Pain restricts me to short necessary journeys under 30 minutes   Low Back-Changing Degree of Pain: My pain seems to be getting better but improvement is slow             Review of  patient's allergies indicates:   Allergen Reactions    Gluten protein Nausea And Vomiting    Milk containing products Hives    Codeine Nausea And Vomiting    Demerol [meperidine] Nausea And Vomiting    Erythropoietin analogues     Imitrex [sumatriptan succinate]      Hysterics      Morphine Nausea And Vomiting    Tetracyclines Nausea And Vomiting    Azithromycin Rash    Ciprofloxacin hcl Rash    Erythromycin Rash       Current Outpatient Medications   Medication Sig Dispense Refill    acetaminophen (TYLENOL 8 HOUR) 650 MG TbSR Take 650 mg by mouth every evening.       amitriptyline (ELAVIL) 25 MG tablet Take 2 tablets (50 mg total) by mouth every evening. 60 tablet 6    baclofen (LIORESAL) 10 MG tablet Take 1 tablet (10 mg total) by mouth 3 (three) times daily. 90 tablet 11    cholecalciferol, vitamin D3, (VITAMIN D3) 2,000 unit Tab Take by mouth once daily.      diclofenac sodium (VOLTAREN) 1 % Gel as needed.   3    DULoxetine (CYMBALTA) 30 MG capsule Take 1 capsule (30 mg total) by mouth once daily. 90 capsule 1    fexofenadine (ALLEGRA) 180 MG tablet Take 180 mg by mouth once daily.      fluticasone propionate (FLONASE) 50 mcg/actuation nasal spray 1 spray (50 mcg total) by Each Nostril route once daily. 16 g 11    gabapentin (NEURONTIN) 400 MG capsule Take 3 capsules (1,200 mg total) by mouth 3 (three) times daily. 270 capsule 6    HYDROmorphone (DILAUDID) 2 MG tablet Take 1 tablet (2 mg total) by mouth every 4 to 6 hours as needed for Pain. 40 tablet 0    levocetirizine (XYZAL) 5 MG tablet Take 5 mg by mouth every evening.      lidocaine-tetracaine 7-7 % Crea as needed.   3    ondansetron (ZOFRAN-ODT) 4 MG TbDL Take 2 tablets (8 mg total) by mouth every 6 (six) hours as needed. 60 tablet 1    SANARE ADV SCAR THERAPY BASE Gel as needed.   3    scopolamine (TRANSDERM-SCOP) 1.3-1.5 mg (1 mg over 3 days) Place 1 patch onto the skin every 72 hours. 4 patch 3    traMADol (ULTRAM) 50 mg  tablet Take 1-2 tablets ( mg total) by mouth every 8 (eight) hours as needed for Pain. (Patient taking differently: Take  mg by mouth once daily. ) 90 tablet 0     No current facility-administered medications for this visit.        Past Medical History:   Diagnosis Date    GERD (gastroesophageal reflux disease)     History of migraine headaches     Obesity     Pollen allergies     Smoker      Past Surgical History:   Procedure Laterality Date    CHOLECYSTECTOMY      ESOPHAGOGASTRODUODENOSCOPY      EXPLORATORY LAPAROTOMY      HYSTERECTOMY      fibroids    LUMBAR FUSION N/A 2019    Procedure: FUSION, SPINE, LUMBAR Procedure: STG 1: L5-S1 anterior Lumbar interbody fusion / STG 2:L5-S1 Posterior instrumentation;  Surgeon: Corona Bazan MD;  Location: [facility];  Service: Neurosurgery;  Laterality: N/A;  FUSION, SPINE, LUMBAR  Procedure: STG 1: L5-S1 anterior Lumbar interbody fusion / STG 2:L5-S1 Posterior instrumentation  SURGERY TIME: 2.5hrs  LOS:  ANESTHESIA: General  Blood:    SPINAL FUSION      TONSILLECTOMY       Family History     Problem Relation (Age of Onset)    No Known Problems Mother, Father        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 [employer]   Social Needs    Financial resource strain: Not on file    Food insecurity:     Worry: Not on file     Inability: Not on file    Transportation needs:     Medical: Not on file     Non-medical: Not on file   Tobacco Use    Smoking status: Former Smoker     Packs/day: 0.50     Years: 15.00     Pack years: 7.50     Types: Vaping with nicotine     Last attempt to quit: 2019     Years since quittin.4    Smokeless tobacco: Former User   Substance and Sexual Activity    Alcohol use: Yes     Comment: 2-3  "drinks per year    Drug use: No    Sexual activity: Yes     Partners: Male     Birth control/protection: See Surgical Hx     Comment: Hysterectomy   Lifestyle    Physical activity:     Days per week: Not on file     Minutes per session: Not on file    Stress: Not on file   Relationships    Social connections:     Talks on phone: Not on file     Gets together: Not on file     Attends Yazidi service: Not on file     Active member of club or organization: Not on file     Attends meetings of clubs or organizations: Not on file     Relationship status: Not on file   Other Topics Concern    Not on file   Social History Narrative    Not on file       Review of Systems:  Review of Systems    Constitutional: no fever, chills or night sweats. No changes in weight   Eyes: no visual changes   ENT: no nasal congestion or sore throat   Respiratory: no cough or shortness of breath   Cardiovascular: no chest pain or palpitations   Gastrointestinal: no nausea or vomiting   Genitourinary: no hematuria or dysuria   Integument/Breast: no rash or pruritis   Hematologic/Lymphatic: no easy bruising or lymphadenopathy   Musculoskeletal: no arthralgias or myalgias.  Neurological: no seizures or tremors.  Positive for left foot weakness.  Positive for left foot pain paresthesia.   Behavioral/Psych: no auditory or visual hallucinations   Endocrine: no heat or cold intolerance     OBJECTIVE:     Vital Signs  Temp: 98.2 °F (36.8 °C)  Pulse: 92  BP: (!) 127/90  Pain Score:   7  Height: 5' 9" (175.3 cm)  Weight: 135.6 kg (299 lb)  Body mass index is 44.15 kg/m².    Physical Exam:  Neurosurgery Physical Exam    General: well developed, well nourished, no distress.  Obese.  Neurologic: Awake, alert and oriented x3. Thought content appropriate.  GCS: Motor: 6/Verbal: 5/Eyes: 4 GCS Total: 15  Cranial nerves: II-XII grossly intact. PERRLA. EOMI without nystagmus. Face symmetric and sensation intact to light touch, tongue midline, shoulder " shrug symmetric bilaterally.  Hearing equal bilaterally to finger rub. Palate and uvula rise and fall normally in midline.    Language: no aphasia  Speech: no dysarthria   Neck: supple, without obvious masses    Sensory: intact to light touch B/L UE and LE; hyper allergies to left foot  Motor Strength: Moves all extremities spontaneously with good tone. Full strength upper and lower extremities. No abnormal movements seen.    Strength  Deltoids Triceps Biceps Wrist Extension Wrist Flexion Hand    Upper: R 5/5 5/5 5/5 5/5 5/5 5/5    L 5/5 5/5 5/5 5/5 5/5 5/5     Iliopsoas Quadriceps Knee  Flexion Tibialis  anterior Gastro- cnemius EHL   Lower: R 5/5 5/5 5/5 5/5 5/5 5/5    L 5/5 5/5 5/5 4/5 4/5 5/5     SLR - Negative   Gait  slow with rolling walker  ENT: normal hearing with finger rub  Heart: RRR, no cyanosis, pallor, or edema.   Lungs- normal respiratory effort  Abdomen-  soft, symmetric and nontender  Skin: grossly intact in all 4 extremities without obvious rashes or lesions  Extremities: warm with no cyanosis or edema, or clubbing  Pulses: palpable distal pulses      Posture-  No obvious kyphosis with standing posture.  With bending posture, no obvious scapula wing    Previous incision is well healed    Diagnostic Results:  All imaging personally reviewed    Lumbar x-rays 02/07/2020  Appropriate hardware fusion L5-S1 anteriorly with posterior instrumentation.  No migration fracture hardware screws.    Outside EMG report  L5 nerve root irritation consistent with CRPS    ASSESSMENT/PLAN:     44-year-old female who is 3 months status post L5-S1 anterior lumbar interbody fusion for grade 2 spondylolisthesis and bilateral foraminal stenosis    -patient with left foot CRPS postop.  X-rays are stable today.  -external referral for physical therapy given for mirror therapy for CRPS  -continue gabapentin 1200 mg t.i.d. and compound pain cream for pain  -patient will follow-up Dr. Bazan on 03/02/2020.  She is unable to  return to work at this time.        All imaging were reviewed with patient. All questions were answered.  Patient verbalized understanding and agreed with the above plan of care.  Patient was encouraged to call clinic with any future concerns prior to follow up appt.    Please call with any questions.    JUSTO Bazzi [facility]surgery          Note dictated with voice recognition software, please excuse any grammatical errors.

## 2020-02-07 ENCOUNTER — PATIENT MESSAGE (OUTPATIENT)
Dept: UROLOGY | Facility: CLINIC | Age: 45
End: 2020-02-07

## 2020-02-07 ENCOUNTER — PATIENT MESSAGE (OUTPATIENT)
Dept: NEUROSURGERY | Facility: CLINIC | Age: 45
End: 2020-02-07

## 2020-02-07 ENCOUNTER — PATIENT MESSAGE (OUTPATIENT)
Dept: SURGERY | Facility: OTHER | Age: 45
End: 2020-02-07

## 2020-02-07 ENCOUNTER — TELEPHONE (OUTPATIENT)
Dept: NEUROSURGERY | Facility: CLINIC | Age: 45
End: 2020-02-07

## 2020-02-07 ENCOUNTER — HOSPITAL ENCOUNTER (OUTPATIENT)
Dept: RADIOLOGY | Facility: HOSPITAL | Age: 45
Discharge: HOME | End: 2020-02-07
Attending: NEUROLOGICAL SURGERY
Payer: COMMERCIAL

## 2020-02-07 ENCOUNTER — OFFICE VISIT (OUTPATIENT)
Dept: NEUROSURGERY | Facility: CLINIC | Age: 45
End: 2020-02-07
Payer: COMMERCIAL

## 2020-02-07 VITALS
BODY MASS INDEX: 43.4 KG/M2 | HEART RATE: 92 BPM | TEMPERATURE: 98 F | SYSTOLIC BLOOD PRESSURE: 127 MMHG | WEIGHT: 293 LBS | DIASTOLIC BLOOD PRESSURE: 90 MMHG | HEIGHT: 69 IN

## 2020-02-07 DIAGNOSIS — M43.16 SPONDYLOLISTHESIS OF LUMBAR REGION: ICD-10-CM

## 2020-02-07 DIAGNOSIS — Z98.890 S/P SPINAL SURGERY: ICD-10-CM

## 2020-02-07 DIAGNOSIS — G90.529 COMPLEX REGIONAL PAIN SYNDROME TYPE 1 OF LOWER EXTREMITY, UNSPECIFIED LATERALITY: Primary | ICD-10-CM

## 2020-02-07 DIAGNOSIS — Z98.1 S/P LUMBAR FUSION: ICD-10-CM

## 2020-02-07 PROCEDURE — 72100 X-RAY EXAM L-S SPINE 2/3 VWS: CPT | Mod: TC,PN

## 2020-02-07 PROCEDURE — 99024 POSTOP FOLLOW-UP VISIT: CPT | Mod: S$GLB,,, | Performed by: PHYSICIAN ASSISTANT

## 2020-02-07 PROCEDURE — 99024 PR POST-OP FOLLOW-UP VISIT: ICD-10-PCS | Mod: S$GLB,,, | Performed by: PHYSICIAN ASSISTANT

## 2020-02-07 PROCEDURE — 99999 PR PBB SHADOW E&M-EST. PATIENT-LVL V: ICD-10-PCS | Mod: PBBFAC,,, | Performed by: PHYSICIAN ASSISTANT

## 2020-02-07 PROCEDURE — 72100 XR LUMBAR SPINE AP AND LATERAL: ICD-10-PCS | Mod: 26,,, | Performed by: RADIOLOGY

## 2020-02-07 PROCEDURE — 99999 PR PBB SHADOW E&M-EST. PATIENT-LVL V: CPT | Mod: PBBFAC,,, | Performed by: PHYSICIAN ASSISTANT

## 2020-02-07 PROCEDURE — 72100 X-RAY EXAM L-S SPINE 2/3 VWS: CPT | Mod: 26,,, | Performed by: RADIOLOGY

## 2020-02-08 ENCOUNTER — PATIENT MESSAGE (OUTPATIENT)
Dept: SURGERY | Facility: OTHER | Age: 45
End: 2020-02-08

## 2020-02-08 ENCOUNTER — PATIENT MESSAGE (OUTPATIENT)
Dept: INTERNAL MEDICINE | Facility: CLINIC | Age: 45
End: 2020-02-08

## 2020-02-12 ENCOUNTER — HOSPITAL ENCOUNTER (OUTPATIENT)
Dept: RADIOLOGY | Facility: HOSPITAL | Age: 45
Discharge: HOME | End: 2020-02-12
Attending: SURGERY
Payer: COMMERCIAL

## 2020-02-12 DIAGNOSIS — D05.12 DUCTAL CARCINOMA IN SITU (DCIS) OF LEFT BREAST: ICD-10-CM

## 2020-02-12 PROCEDURE — 25000003 PHARM REV CODE 250: Mod: PO | Performed by: SURGERY

## 2020-02-12 PROCEDURE — A4648 IMPLANTABLE TISSUE MARKER: HCPCS | Mod: PO

## 2020-02-12 PROCEDURE — 19281 MAMMO BREAST RADAR REFLECTOR LOC W/MAMMO GUIDANCE, 1ST LESION, LEFT: ICD-10-PCS | Mod: LT,,, | Performed by: RADIOLOGY

## 2020-02-12 PROCEDURE — 19281 PERQ DEVICE BREAST 1ST IMAG: CPT | Mod: LT,,, | Performed by: RADIOLOGY

## 2020-02-12 RX ORDER — LIDOCAINE HYDROCHLORIDE 20 MG/ML
20 INJECTION, SOLUTION INFILTRATION; PERINEURAL ONCE
Status: COMPLETED | OUTPATIENT
Start: 2020-02-12 | End: 2020-02-12

## 2020-02-12 RX ADMIN — LIDOCAINE HYDROCHLORIDE 10 ML: 20 INJECTION, SOLUTION INFILTRATION; PERINEURAL at 12:02

## 2020-02-13 ENCOUNTER — TELEPHONE (OUTPATIENT)
Dept: SURGERY | Facility: CLINIC | Age: 45
End: 2020-02-13

## 2020-02-13 ENCOUNTER — ANESTHESIA EVENT (OUTPATIENT)
Dept: SURGERY | Facility: OTHER | Age: 45
End: 2020-02-13
Payer: COMMERCIAL

## 2020-02-13 ENCOUNTER — HOSPITAL ENCOUNTER (OUTPATIENT)
Dept: PREADMISSION TESTING | Facility: OTHER | Age: 45
Discharge: HOME | End: 2020-02-13
Attending: SURGERY
Payer: COMMERCIAL

## 2020-02-13 VITALS
BODY MASS INDEX: 43.4 KG/M2 | SYSTOLIC BLOOD PRESSURE: 132 MMHG | DIASTOLIC BLOOD PRESSURE: 71 MMHG | HEART RATE: 95 BPM | WEIGHT: 293 LBS | OXYGEN SATURATION: 99 % | TEMPERATURE: 98 F | HEIGHT: 69 IN

## 2020-02-13 RX ORDER — LIDOCAINE HYDROCHLORIDE 10 MG/ML
0.5 INJECTION, SOLUTION EPIDURAL; INFILTRATION; INTRACAUDAL; PERINEURAL ONCE
Status: CANCELLED | OUTPATIENT
Start: 2020-02-13 | End: 2020-02-13

## 2020-02-13 RX ORDER — PREGABALIN 50 MG/1
50 CAPSULE ORAL
Status: CANCELLED | OUTPATIENT
Start: 2020-02-13 | End: 2020-02-13

## 2020-02-13 RX ORDER — MIDAZOLAM HYDROCHLORIDE 1 MG/ML
2 INJECTION INTRAMUSCULAR; INTRAVENOUS
Status: CANCELLED | OUTPATIENT
Start: 2020-02-13 | End: 2020-02-13

## 2020-02-13 RX ORDER — SCOLOPAMINE TRANSDERMAL SYSTEM 1 MG/1
1 PATCH, EXTENDED RELEASE TRANSDERMAL ONCE
Status: CANCELLED | OUTPATIENT
Start: 2020-02-13 | End: 2020-02-13

## 2020-02-13 RX ORDER — ACETAMINOPHEN 500 MG
1000 TABLET ORAL
Status: CANCELLED | OUTPATIENT
Start: 2020-02-13 | End: 2020-02-13

## 2020-02-13 RX ORDER — SODIUM CHLORIDE, SODIUM LACTATE, POTASSIUM CHLORIDE, CALCIUM CHLORIDE 600; 310; 30; 20 MG/100ML; MG/100ML; MG/100ML; MG/100ML
INJECTION, SOLUTION INTRAVENOUS CONTINUOUS
Status: CANCELLED | OUTPATIENT
Start: 2020-02-13

## 2020-02-13 NOTE — DISCHARGE INSTRUCTIONS
PRE-ADMIT TESTING -  [phone]    [address]  [place] [facility]          Your surgery has been scheduled at [facility]. We are pleased to have the opportunity to serve you. For Further Information please call [phone].    On the day of surgery please report to the Information Desk on the 1st floor.    · CONTACT YOUR PHYSICIAN'S OFFICE THE DAY PRIOR TO YOUR SURGERY TO OBTAIN YOUR ARRIVAL TIME.     · The evening before surgery do not eat anything after 9 p.m. ( this includes hard candy, chewing gum and mints).  You may only have GATORADE, POWERADE AND WATER  from 9 p.m. until you leave your home.   DO NOT DRINK ANY LIQUIDS ON THE WAY TO THE HOSPITAL.      SPECIAL MEDICATION INSTRUCTIONS: TAKE medications checked off by the Anesthesiologist on your Medication List.    Angiogram Patients: Take medications as instructed by your physician, including aspirin.     Surgery Patients:    If you take ASPIRIN - Your PHYSICIAN/SURGEON will need to inform you IF/OR when you need to stop taking aspirin prior to your surgery.     Do Not take any medications containing IBUPROFEN.  Do Not Wear any make-up or dark nail polish   (especially eye make-up) to surgery. If you come to surgery with makeup on you will be required to remove the makeup or nail polish.    Do not shave your surgical area at least 5 days prior to your surgery. The surgical prep will be performed at the hospital according to Infection Control regulations.    Leave all valuables at home.   Do Not wear any jewelry or watches, including any metal in body piercings. Jewelry must be removed prior to coming to the hospital.  There is a possibility that rings that are unable to be removed may be cut off if they are on the surgical extremity.    Contact Lens must be removed before surgery. Either do not wear the contact lens or bring a case and solution for storage.  Please bring a container for eyeglasses or dentures as required.  Bring  any paperwork your physician has provided, such as consent forms,  history and physicals, doctor's orders, etc.   Bring comfortable clothes that are loose fitting to wear upon discharge. Take into consideration the type of surgery being performed.  Maintain your diet as advised per your physician the day prior to surgery.      Adequate rest the night before surgery is advised.   Park in the Parking lot behind the hospital or in the [facility] across the street from the parking lot. Parking is complimentary.  If you will be discharged the same day as your procedure, please arrange for a responsible adult to drive you home or to accompany you if traveling by taxi.   YOU WILL NOT BE PERMITTED TO DRIVE OR TO LEAVE THE HOSPITAL ALONE AFTER SURGERY.   It is strongly recommended that you arrange for someone to remain with you for the first 24 hrs following your surgery.    The Surgeon will speak to your family/visitor after your surgery regarding the outcome of your surgery and post op care.  The Surgeon may speak to you after your surgery, but there is a possibility you may not remember the details.  Please check with your family members regarding the conversation with the Surgeon.    We strongly recommend whoever is bringing you home be present for discharge instructions.  This will ensure a thorough understanding for your post op home care.    EACH PATIENT IS ALLOWED TWO FAMILY MEMBERS OR VISITORS IN THE ROOM AND IN THE WAITING ROOMS WHILE YOU ARE IN SURGERY. ALL CHILDREN MUST ALWAYS BE ACCOMPANIED BY AN ADULT.    Thank you for your cooperation.  The Staff of [facility].                Bathing Instructions with Hibiclens     Shower the evening before and morning of your procedure with Hibiclens:   Wash your face with water and your regular face wash/soap   Apply Hibiclens directly on your skin or on a wet washcloth and wash gently. When showering: Move away from the shower stream when  applying Hibiclens to avoid rinsing off too soon.   Rinse thoroughly with warm water   Do not dilute Hibiclens         Dry off as usual, do not use any deodorant, powder, body lotions, perfume, after shave or cologne.

## 2020-02-13 NOTE — TELEPHONE ENCOUNTER
Spoke with pt regarding surgery, pt advised to arrive to [facility] at 0730 for 1000 surgery, pt verbalized understanding, pre-op education reinforced, all questions answered at this time, pt given reassurance, Spoke with patient regarding [facility] genetic test results, results negative showing variant of uncertain significance APC, all individuals carry DNA changes (I.e. Variants) and most variants do not increase an individuals risk of cancer or other diseases.  When identified, variants of uncertain significance (VUS) are reported, present evidence does not suggest that non-clinically significant variant findings be used to modify patient medical management beyond what is indicated by the personal history and family history and any clinical significant findings, patient advised to call [facility] Genetic counselor at [phone] for further discussion of results

## 2020-02-13 NOTE — ANESTHESIA PREPROCEDURE EVALUATION
02/13/2020  Dianne Hemphill is a 44 y.o., female.    Anesthesia Evaluation    I have reviewed the Patient Summary Reports.    I have reviewed the Nursing Notes.   I have reviewed the Medications.     Review of Systems  Anesthesia Hx:  PONV History of prior surgery of interest to airway management or planning: Previous anesthesia: General Nov 2019 Brenda Navarro Romero with general anesthesia.  Procedure performed at an [facility] Facility. Denies Family Hx of Anesthesia complications.  Personal Hx of Anesthesia complications, Post-Operative Nausea/Vomiting, in the past, but not with recent anesthetics / prophylaxis   Social:  Non-Smoker    Hematology/Oncology:  Hematology Normal   Oncology Normal     EENT/Dental:EENT/Dental Normal   Cardiovascular:   Runs low BP   Pulmonary:   Asthma asymptomatic    Renal/:  Renal/ Normal     Hepatic/GI:   GERD, well controlled    Musculoskeletal:   Arthritis  Recent lumlam Spine Disorders: lumbar Disc disease    Neurological:   Neuromuscular Disease,   Chronic Pain Syndrome   Dermatological:  Skin Normal        Physical Exam  General:  Morbid Obesity    Airway/Jaw/Neck:  Airway Findings: Mallampati: II     Dental:  Dental Findings: Molar caps        Mental Status:  Mental Status Findings:  Cooperative, Alert and Oriented         Anesthesia Plan  Type of Anesthesia, risks & benefits discussed:  Anesthesia Type:  general  Patient's Preference:   Intra-op Monitoring Plan: standard ASA monitors  Intra-op Monitoring Plan Comments:   Post Op Pain Control Plan: multimodal analgesia and per primary service following discharge from PACU  Post Op Pain Control Plan Comments:   Induction:   IV  Beta Blocker:         Informed Consent: Patient understands risks and agrees with Anesthesia plan.  Questions answered. Anesthesia consent signed with patient.  ASA Score: 3     Day of Surgery  Review of History & Physical:    H&P update referred to the surgeon.     Anesthesia Plan Notes: Prefers GA,PONV,side effects discussed        Ready For Surgery From Anesthesia Perspective.

## 2020-02-14 ENCOUNTER — ANESTHESIA (OUTPATIENT)
Dept: SURGERY | Facility: OTHER | Age: 45
End: 2020-02-14
Payer: COMMERCIAL

## 2020-02-14 ENCOUNTER — HOSPITAL ENCOUNTER (OUTPATIENT)
Facility: OTHER | Age: 45
Discharge: HOME | End: 2020-02-14
Attending: SURGERY | Admitting: SURGERY
Payer: COMMERCIAL

## 2020-02-14 ENCOUNTER — HOSPITAL ENCOUNTER (OUTPATIENT)
Dept: RADIOLOGY | Facility: OTHER | Age: 45
Discharge: HOME | End: 2020-02-14
Attending: SURGERY
Payer: COMMERCIAL

## 2020-02-14 VITALS
WEIGHT: 293 LBS | HEART RATE: 84 BPM | DIASTOLIC BLOOD PRESSURE: 61 MMHG | RESPIRATION RATE: 16 BRPM | TEMPERATURE: 98 F | OXYGEN SATURATION: 97 % | HEIGHT: 69 IN | BODY MASS INDEX: 43.4 KG/M2 | SYSTOLIC BLOOD PRESSURE: 121 MMHG

## 2020-02-14 DIAGNOSIS — D05.12 BREAST NEOPLASM, TIS (DCIS), LEFT: Primary | ICD-10-CM

## 2020-02-14 DIAGNOSIS — D05.12 DUCTAL CARCINOMA IN SITU (DCIS) OF LEFT BREAST: ICD-10-CM

## 2020-02-14 PROCEDURE — 27201423 OPTIME MED/SURG SUP & DEVICES STERILE SUPPLY: Performed by: SURGERY

## 2020-02-14 PROCEDURE — 71000033 HC RECOVERY, INTIAL HOUR: Performed by: SURGERY

## 2020-02-14 PROCEDURE — 88300 SURGICAL PATH GROSS: CPT | Mod: 26,59,, | Performed by: PATHOLOGY

## 2020-02-14 PROCEDURE — 36000706: Performed by: SURGERY

## 2020-02-14 PROCEDURE — 63600175 PHARM REV CODE 636 W HCPCS: Performed by: ANESTHESIOLOGY

## 2020-02-14 PROCEDURE — 76098 MAMMO BREAST SPECIMEN: ICD-10-PCS | Mod: 26,,, | Performed by: RADIOLOGY

## 2020-02-14 PROCEDURE — 63600175 PHARM REV CODE 636 W HCPCS: Performed by: NURSE ANESTHETIST, CERTIFIED REGISTERED

## 2020-02-14 PROCEDURE — 71000015 HC POSTOP RECOV 1ST HR: Performed by: SURGERY

## 2020-02-14 PROCEDURE — C9290 INJ, BUPIVACAINE LIPOSOME: HCPCS | Performed by: SURGERY

## 2020-02-14 PROCEDURE — 37000008 HC ANESTHESIA 1ST 15 MINUTES: Performed by: SURGERY

## 2020-02-14 PROCEDURE — 19301 PR MASTECTOMY, PARTIAL: ICD-10-PCS | Mod: LT,,, | Performed by: SURGERY

## 2020-02-14 PROCEDURE — 88341 PR IHC OR ICC EACH ADD'L SINGLE ANTIBODY  STAINPR: ICD-10-PCS | Mod: 26,,, | Performed by: PATHOLOGY

## 2020-02-14 PROCEDURE — 88300 PR  SURG PATH,GROSS,LEVEL I: ICD-10-PCS | Mod: 26,59,, | Performed by: PATHOLOGY

## 2020-02-14 PROCEDURE — 88300 SURGICAL PATH GROSS: CPT | Performed by: PATHOLOGY

## 2020-02-14 PROCEDURE — 63600175 PHARM REV CODE 636 W HCPCS: Performed by: SURGERY

## 2020-02-14 PROCEDURE — 37000009 HC ANESTHESIA EA ADD 15 MINS: Performed by: SURGERY

## 2020-02-14 PROCEDURE — 19301 PARTIAL MASTECTOMY: CPT | Mod: LT,,, | Performed by: SURGERY

## 2020-02-14 PROCEDURE — 76098 X-RAY EXAM SURGICAL SPECIMEN: CPT | Mod: TC

## 2020-02-14 PROCEDURE — 76098 X-RAY EXAM SURGICAL SPECIMEN: CPT | Mod: 26,,, | Performed by: RADIOLOGY

## 2020-02-14 PROCEDURE — 25000003 PHARM REV CODE 250: Performed by: SURGERY

## 2020-02-14 PROCEDURE — 88307 PR  SURG PATH,LEVEL V: ICD-10-PCS | Mod: 26,,, | Performed by: PATHOLOGY

## 2020-02-14 PROCEDURE — 25000003 PHARM REV CODE 250: Performed by: NURSE ANESTHETIST, CERTIFIED REGISTERED

## 2020-02-14 PROCEDURE — 88341 IMHCHEM/IMCYTCHM EA ADD ANTB: CPT | Performed by: PATHOLOGY

## 2020-02-14 PROCEDURE — 36000707: Performed by: SURGERY

## 2020-02-14 PROCEDURE — 88342 IMHCHEM/IMCYTCHM 1ST ANTB: CPT | Performed by: PATHOLOGY

## 2020-02-14 PROCEDURE — 88307 TISSUE EXAM BY PATHOLOGIST: CPT | Mod: 26,,, | Performed by: PATHOLOGY

## 2020-02-14 PROCEDURE — 88341 IMHCHEM/IMCYTCHM EA ADD ANTB: CPT | Mod: 26,,, | Performed by: PATHOLOGY

## 2020-02-14 PROCEDURE — 88307 TISSUE EXAM BY PATHOLOGIST: CPT | Performed by: PATHOLOGY

## 2020-02-14 PROCEDURE — 71000039 HC RECOVERY, EACH ADD'L HOUR: Performed by: SURGERY

## 2020-02-14 PROCEDURE — 25000003 PHARM REV CODE 250: Performed by: ANESTHESIOLOGY

## 2020-02-14 PROCEDURE — 88342 IMHCHEM/IMCYTCHM 1ST ANTB: CPT | Mod: 26,,, | Performed by: PATHOLOGY

## 2020-02-14 PROCEDURE — 88342 CHG IMMUNOCYTOCHEMISTRY: ICD-10-PCS | Mod: 26,,, | Performed by: PATHOLOGY

## 2020-02-14 RX ORDER — MEPERIDINE HYDROCHLORIDE 25 MG/ML
12.5 INJECTION INTRAMUSCULAR; INTRAVENOUS; SUBCUTANEOUS ONCE AS NEEDED
Status: DISCONTINUED | OUTPATIENT
Start: 2020-02-14 | End: 2020-02-14 | Stop reason: HOSPADM

## 2020-02-14 RX ORDER — ONDANSETRON 8 MG/1
8 TABLET, ORALLY DISINTEGRATING ORAL EVERY 8 HOURS PRN
Status: DISCONTINUED | OUTPATIENT
Start: 2020-02-14 | End: 2020-02-14 | Stop reason: HOSPADM

## 2020-02-14 RX ORDER — FENTANYL CITRATE 50 UG/ML
INJECTION, SOLUTION INTRAMUSCULAR; INTRAVENOUS
Status: DISCONTINUED | OUTPATIENT
Start: 2020-02-14 | End: 2020-02-14

## 2020-02-14 RX ORDER — MIDAZOLAM HYDROCHLORIDE 1 MG/ML
2 INJECTION INTRAMUSCULAR; INTRAVENOUS
Status: DISCONTINUED | OUTPATIENT
Start: 2020-02-14 | End: 2020-02-14 | Stop reason: HOSPADM

## 2020-02-14 RX ORDER — LIDOCAINE HCL/PF 100 MG/5ML
SYRINGE (ML) INTRAVENOUS
Status: DISCONTINUED | OUTPATIENT
Start: 2020-02-14 | End: 2020-02-14

## 2020-02-14 RX ORDER — SODIUM CHLORIDE, SODIUM LACTATE, POTASSIUM CHLORIDE, CALCIUM CHLORIDE 600; 310; 30; 20 MG/100ML; MG/100ML; MG/100ML; MG/100ML
INJECTION, SOLUTION INTRAVENOUS CONTINUOUS
Status: DISCONTINUED | OUTPATIENT
Start: 2020-02-14 | End: 2020-02-14 | Stop reason: HOSPADM

## 2020-02-14 RX ORDER — ONDANSETRON 2 MG/ML
INJECTION INTRAMUSCULAR; INTRAVENOUS
Status: DISCONTINUED | OUTPATIENT
Start: 2020-02-14 | End: 2020-02-14

## 2020-02-14 RX ORDER — BUPIVACAINE HYDROCHLORIDE 2.5 MG/ML
INJECTION, SOLUTION EPIDURAL; INFILTRATION; INTRACAUDAL
Status: DISCONTINUED | OUTPATIENT
Start: 2020-02-14 | End: 2020-02-14 | Stop reason: HOSPADM

## 2020-02-14 RX ORDER — PROPOFOL 10 MG/ML
VIAL (ML) INTRAVENOUS CONTINUOUS PRN
Status: DISCONTINUED | OUTPATIENT
Start: 2020-02-14 | End: 2020-02-14

## 2020-02-14 RX ORDER — SODIUM CHLORIDE 9 MG/ML
INJECTION, SOLUTION INTRAVENOUS CONTINUOUS
Status: DISCONTINUED | OUTPATIENT
Start: 2020-02-14 | End: 2020-02-14 | Stop reason: HOSPADM

## 2020-02-14 RX ORDER — PREGABALIN 50 MG/1
50 CAPSULE ORAL
Status: COMPLETED | OUTPATIENT
Start: 2020-02-14 | End: 2020-02-14

## 2020-02-14 RX ORDER — ACETAMINOPHEN 500 MG
1000 TABLET ORAL
Status: COMPLETED | OUTPATIENT
Start: 2020-02-14 | End: 2020-02-14

## 2020-02-14 RX ORDER — DEXAMETHASONE SODIUM PHOSPHATE 4 MG/ML
INJECTION, SOLUTION INTRA-ARTICULAR; INTRALESIONAL; INTRAMUSCULAR; INTRAVENOUS; SOFT TISSUE
Status: DISCONTINUED | OUTPATIENT
Start: 2020-02-14 | End: 2020-02-14

## 2020-02-14 RX ORDER — HYDROCODONE BITARTRATE AND ACETAMINOPHEN 5; 325 MG/1; MG/1
1 TABLET ORAL EVERY 6 HOURS PRN
Qty: 10 TABLET | Refills: 0 | Status: SHIPPED | OUTPATIENT
Start: 2020-02-14 | End: 2020-04-27

## 2020-02-14 RX ORDER — CEFAZOLIN SODIUM 1 G/3ML
2 INJECTION, POWDER, FOR SOLUTION INTRAMUSCULAR; INTRAVENOUS
Status: COMPLETED | OUTPATIENT
Start: 2020-02-14 | End: 2020-02-14

## 2020-02-14 RX ORDER — OXYCODONE HYDROCHLORIDE 5 MG/1
5 TABLET ORAL
Status: DISCONTINUED | OUTPATIENT
Start: 2020-02-14 | End: 2020-02-14 | Stop reason: HOSPADM

## 2020-02-14 RX ORDER — ONDANSETRON 2 MG/ML
4 INJECTION INTRAMUSCULAR; INTRAVENOUS DAILY PRN
Status: DISCONTINUED | OUTPATIENT
Start: 2020-02-14 | End: 2020-02-14 | Stop reason: HOSPADM

## 2020-02-14 RX ORDER — SODIUM CHLORIDE 0.9 % (FLUSH) 0.9 %
3 SYRINGE (ML) INJECTION
Status: DISCONTINUED | OUTPATIENT
Start: 2020-02-14 | End: 2020-02-14 | Stop reason: HOSPADM

## 2020-02-14 RX ORDER — PANTOPRAZOLE SODIUM 40 MG/1
40 TABLET, DELAYED RELEASE ORAL DAILY
COMMUNITY
End: 2020-04-20

## 2020-02-14 RX ORDER — PHENYLEPHRINE HYDROCHLORIDE 10 MG/ML
INJECTION INTRAVENOUS
Status: DISCONTINUED | OUTPATIENT
Start: 2020-02-14 | End: 2020-02-14

## 2020-02-14 RX ORDER — LIDOCAINE HYDROCHLORIDE 10 MG/ML
0.5 INJECTION, SOLUTION EPIDURAL; INFILTRATION; INTRACAUDAL; PERINEURAL ONCE
Status: DISCONTINUED | OUTPATIENT
Start: 2020-02-14 | End: 2020-02-14 | Stop reason: HOSPADM

## 2020-02-14 RX ORDER — DIPHENHYDRAMINE HYDROCHLORIDE 50 MG/ML
INJECTION INTRAMUSCULAR; INTRAVENOUS
Status: DISCONTINUED | OUTPATIENT
Start: 2020-02-14 | End: 2020-02-14

## 2020-02-14 RX ORDER — SCOLOPAMINE TRANSDERMAL SYSTEM 1 MG/1
1 PATCH, EXTENDED RELEASE TRANSDERMAL ONCE
Status: COMPLETED | OUTPATIENT
Start: 2020-02-14 | End: 2020-02-14

## 2020-02-14 RX ORDER — MIDAZOLAM HYDROCHLORIDE 1 MG/ML
INJECTION INTRAMUSCULAR; INTRAVENOUS
Status: DISCONTINUED | OUTPATIENT
Start: 2020-02-14 | End: 2020-02-14

## 2020-02-14 RX ORDER — GLYCOPYRROLATE 0.2 MG/ML
INJECTION INTRAMUSCULAR; INTRAVENOUS
Status: DISCONTINUED | OUTPATIENT
Start: 2020-02-14 | End: 2020-02-14

## 2020-02-14 RX ORDER — HYDROCODONE BITARTRATE AND ACETAMINOPHEN 5; 325 MG/1; MG/1
1 TABLET ORAL EVERY 4 HOURS PRN
Status: DISCONTINUED | OUTPATIENT
Start: 2020-02-14 | End: 2020-02-14 | Stop reason: HOSPADM

## 2020-02-14 RX ORDER — PROPOFOL 10 MG/ML
VIAL (ML) INTRAVENOUS
Status: DISCONTINUED | OUTPATIENT
Start: 2020-02-14 | End: 2020-02-14

## 2020-02-14 RX ORDER — LIDOCAINE HYDROCHLORIDE 10 MG/ML
1 INJECTION, SOLUTION EPIDURAL; INFILTRATION; INTRACAUDAL; PERINEURAL ONCE
Status: DISCONTINUED | OUTPATIENT
Start: 2020-02-14 | End: 2020-02-14 | Stop reason: HOSPADM

## 2020-02-14 RX ORDER — HYDROMORPHONE HYDROCHLORIDE 2 MG/ML
0.4 INJECTION, SOLUTION INTRAMUSCULAR; INTRAVENOUS; SUBCUTANEOUS EVERY 5 MIN PRN
Status: DISCONTINUED | OUTPATIENT
Start: 2020-02-14 | End: 2020-02-14 | Stop reason: HOSPADM

## 2020-02-14 RX ADMIN — GLYCOPYRROLATE 0.2 MG: 0.2 INJECTION, SOLUTION INTRAMUSCULAR; INTRAVENOUS at 10:02

## 2020-02-14 RX ADMIN — SODIUM CHLORIDE, SODIUM LACTATE, POTASSIUM CHLORIDE, AND CALCIUM CHLORIDE: 600; 310; 30; 20 INJECTION, SOLUTION INTRAVENOUS at 09:02

## 2020-02-14 RX ADMIN — PROPOFOL 200 MG: 10 INJECTION, EMULSION INTRAVENOUS at 09:02

## 2020-02-14 RX ADMIN — HYDROMORPHONE HYDROCHLORIDE 0.4 MG: 2 INJECTION, SOLUTION INTRAMUSCULAR; INTRAVENOUS; SUBCUTANEOUS at 12:02

## 2020-02-14 RX ADMIN — PROPOFOL 150 MCG/KG/MIN: 10 INJECTION, EMULSION INTRAVENOUS at 10:02

## 2020-02-14 RX ADMIN — PROPOFOL 30 MG: 10 INJECTION, EMULSION INTRAVENOUS at 11:02

## 2020-02-14 RX ADMIN — CEFAZOLIN 3 G: 330 INJECTION, POWDER, FOR SOLUTION INTRAMUSCULAR; INTRAVENOUS at 10:02

## 2020-02-14 RX ADMIN — MIDAZOLAM HYDROCHLORIDE 2 MG: 1 INJECTION, SOLUTION INTRAMUSCULAR; INTRAVENOUS at 09:02

## 2020-02-14 RX ADMIN — ACETAMINOPHEN 1000 MG: 500 TABLET, FILM COATED ORAL at 08:02

## 2020-02-14 RX ADMIN — DIPHENHYDRAMINE HYDROCHLORIDE 6.25 MG: 50 INJECTION, SOLUTION INTRAMUSCULAR; INTRAVENOUS at 10:02

## 2020-02-14 RX ADMIN — FENTANYL CITRATE 25 MCG: 50 INJECTION, SOLUTION INTRAMUSCULAR; INTRAVENOUS at 11:02

## 2020-02-14 RX ADMIN — FENTANYL CITRATE 25 MCG: 50 INJECTION, SOLUTION INTRAMUSCULAR; INTRAVENOUS at 10:02

## 2020-02-14 RX ADMIN — PHENYLEPHRINE HYDROCHLORIDE 100 MCG: 10 INJECTION INTRAVENOUS at 10:02

## 2020-02-14 RX ADMIN — SCOPALAMINE 1 PATCH: 1 PATCH, EXTENDED RELEASE TRANSDERMAL at 08:02

## 2020-02-14 RX ADMIN — DEXAMETHASONE SODIUM PHOSPHATE 8 MG: 4 INJECTION, SOLUTION INTRAMUSCULAR; INTRAVENOUS at 10:02

## 2020-02-14 RX ADMIN — ONDANSETRON 4 MG: 2 INJECTION INTRAMUSCULAR; INTRAVENOUS at 10:02

## 2020-02-14 RX ADMIN — PREGABALIN 50 MG: 50 CAPSULE ORAL at 08:02

## 2020-02-14 RX ADMIN — PROMETHAZINE HYDROCHLORIDE 6.25 MG: 25 INJECTION INTRAMUSCULAR; INTRAVENOUS at 01:02

## 2020-02-14 RX ADMIN — FENTANYL CITRATE 100 MCG: 50 INJECTION, SOLUTION INTRAMUSCULAR; INTRAVENOUS at 10:02

## 2020-02-14 RX ADMIN — LIDOCAINE HYDROCHLORIDE 100 MG: 20 INJECTION, SOLUTION INTRAVENOUS at 09:02

## 2020-02-14 NOTE — DISCHARGE INSTRUCTIONS
POSTOPERATIVE INSTRUCTIONS FOLLOWING BIOPSY OR LUMPECTOMY    The following are post-operative instructions that will help you to recover from your surgery.  Please read over these instructions carefully and contact us if we can answer any of your questions or concerns.    Dressing/breast binder (surgi-bra)  A surgical bra may be placed around your chest after your surgery.  If you are given the bra, please wear it as close to 24 hours a day as possible until your post-operative clinic appointment.  If the elastic around the bra irritates your skin, you may wear a soft t-shirt underneath the bra.  You may go without wearing the bra long enough to bath, to launder and dry the bra.  If the bra is extremely uncomfortable, you may wear a supportive sports bra instead after 2 days.  You may shower after 2 days.  Do not take a tub bath and do not soak the surgical site. Please do not remove the white strips of tape (steri-strips) that cover your incision.  They will be removed at your clinic visit.    Activity   You should be able to return to your regular activities 2 days after your surgery.  However, do not engage in strenuous activities in which you use your upper body such as:  golf, tennis, aerobics, washing windows, raking the yard, mopping, vacuuming, heavy lifting (e.g children) until you are seen for your follow-up appointment in clinic.    Medication for pain  You may find that over the counter pain medications may be sufficient for your pain.  You will be given a prescription for pain medication for more severe pain.  You should not drive or operate machinery while taking these.  Please take narcotics with food.  Narcotics can cause, or worse, constipation.  You will need to increase your fluid intake, eat high fiber foods (such as fruits and bran) and make sure that you are up and walking. You may need to take an over the counter stool softener for constipation.    Please report the following:   Temperature  greater than 101 degrees   Discharge or bad odor from the wound   Excessive bleeding, such as bloody dressing or extreme bruising   Redness at incision and/or drain sites   Swelling or buildup of fluid around incision     Additional information  I will see you approximately 2 weeks following your surgery.  If this follow-up appointment has not been made, please call the office.    If you have any questions or problems, please call my office or my nurse.    Dr. Mara Gan RN  [phone]    After hours and on weekends, you may call the main [facility] line at [phone] and ask to have the general surgery resident paged or have me paged.

## 2020-02-14 NOTE — ANESTHESIA POSTPROCEDURE EVALUATION
Anesthesia Post Evaluation    Patient: Dianne Hemphill    Procedure(s) Performed: Procedure(s) (LRB):  MASTECTOMY, PARTIAL LEFT with RADIOLOGIC MARKER (Left)    Final Anesthesia Type: general    Patient location during evaluation: PACU  Patient participation: Yes- Able to Participate  Level of consciousness: awake and alert  Post-procedure vital signs: reviewed and stable  Pain management: adequate  Airway patency: patent  ESPINOZA mitigation strategies: Extubation while patient is awake  PONV status at discharge: No PONV  Anesthetic complications: no      Cardiovascular status: hemodynamically stable  Respiratory status: unassisted  Hydration status: euvolemic  Follow-up not needed.          Vitals Value Taken Time   /88 2/14/2020  1:30 PM   Temp 36.7 °C (98 °F) 2/14/2020  1:30 PM   Pulse 78 2/14/2020  1:33 PM   Resp 18 2/14/2020  1:30 PM   SpO2 100 % 2/14/2020  1:33 PM   Vitals shown include unvalidated device data.      Event Time     Out of Recovery 13:42:25          Pain/Laureen Score: Pain Rating Prior to Med Admin: 7 (2/14/2020 12:50 PM)  Pain Rating Post Med Admin: 4 (2/14/2020  1:33 PM)  Laureen Score: 9 (2/14/2020  1:33 PM)

## 2020-02-14 NOTE — PLAN OF CARE
Dianne Aguilar Hemphill has met all discharge criteria from Phase II. Vital Signs are stable, ambulating  without difficulty. Discharge instructions given, patient verbalized understanding. Discharged from facility via wheelchair in stable condition.

## 2020-02-14 NOTE — TRANSFER OF CARE
"Anesthesia Transfer of Care Note    Patient: Dianne Hemphill    Procedure(s) Performed: Procedure(s) (LRB):  MASTECTOMY, PARTIAL LEFT with RADIOLOGIC MARKER (Left)    Patient location: PACU    Anesthesia Type: general    Transport from OR: Transported from OR on 2-3 L/min O2 by NC with adequate spontaneous ventilation    Post pain: adequate analgesia    Post assessment: no apparent anesthetic complications and tolerated procedure well    Post vital signs: stable    Level of consciousness: awake, alert and oriented    Nausea/Vomiting: no nausea/vomiting    Complications: none    Transfer of care protocol was followed      Last vitals:   Visit Vitals  /86 (BP Location: Right arm, Patient Position: Lying)   Pulse 84   Temp 36.6 °C (97.8 °F) (Oral)   Resp 16   Ht 5' 9" (1.753 m)   Wt 135.6 kg (299 lb)   SpO2 98%   Breastfeeding? No   BMI 44.15 kg/m²     "

## 2020-02-14 NOTE — OP NOTE
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  The seed was placed in the lower outer quadrant of the left breast. The localization films were reviewed.    She was then brought to the Operating Room and placed in the supine position. Anesthesia with local/monitored anesthesia care with sedation was administered.  The left breast, anterior chest, arm and axilla were then prepped and draped in a sterile fashion.     Next, we turned our attention to the left breast. Local anesthetic was injected into the area.  An incision was made in the UOQ of the left breast. Of note, the seed was noted to be displaced about 4 cm from the saturn clip. Therefore, we elected to use c-arm intraoperatively to help guiThe specimen was dissected circumferentially around the cancer using the  Ultrasound and c-arm as a guide.  We did dissect all the way down to, but not including the underlying pectoralis fascia. The wire localization lumpectomy specimen was inked on the back table using green ink inferiorly, blue ink superiorly, orange ink laterally, yellow ink anteriorly, black ink posteriorly, and the red ink medially.  It was then fixed with acetic acid and submitted for specimen radiograph, which confirmed the seed, clip and area of interest within the specimen. Given the appearance and location of the lesion, additional margins were taken including superior and medial.    We also removed the reflector at the same time and sent for permanent pathology.     Within the lumpectomy cavity, hemostasis was achieved with cautery. The wound was irrigated  until clear. There was no evidence of bleeding. It was closed in multiple layers with deep dermal and subcutaneous interrupted Vicryl sutures and a running 4-0 Monocryl subcuticular skin closure.    Dermabond was applied. Sterile fluff gauze was placed and a post-procedure bra was placed. She tolerated the procedure well without complication and was turned over to Anesthesia for transport to the recovery area in a satisfactory condition. All specimens were sent to Pathology for permanent sectioning.    ESTIMATED BLOOD LOSS: 5 ml    COMPLICATIONS: none    DISPOSITION:  PACU--hemodynamically stable    ATTESTATION:  I performed the procedure.

## 2020-02-14 NOTE — INTERVAL H&P NOTE
The patient has been examined and the H&P has been reviewed:    I concur with the findings and no changes have occurred since H&P was written.    Anesthesia/Surgery risks, benefits and alternative options discussed and understood by patient/family.          Active Hospital Problems    Diagnosis  POA    Breast neoplasm, Tis (DCIS), left [D05.12]  Yes      Resolved Hospital Problems   No resolved problems to display.

## 2020-02-14 NOTE — DISCHARGE SUMMARY
[facility]  Discharge Summary  General Surgery      Admit Date: 2/14/2020    Discharge Date and Time:  02/14/2020 12:07 PM    Attending Physician: Mara Munoz MD     Discharge Provider: Mara Munoz    Reason for Admission: surgery    Procedures Performed: Procedure(s) (LRB):  MASTECTOMY, PARTIAL LEFT with RADIOLOGIC MARKER (Left)    Final Diagnoses:   Principal Problem: left DCIS       Discharged Condition: stable    Disposition: Home or Self Care    Follow Up/Patient Instructions: 7-14 days    Medications:  Reconciled Home Medications:      Medication List      START taking these medications    HYDROcodone-acetaminophen 5-325 mg per tablet  Commonly known as:  NORCO  Take 1 tablet by mouth every 6 (six) hours as needed for Pain.        CHANGE how you take these medications    traMADol 50 mg tablet  Commonly known as:  ULTRAM  Take 1-2 tablets ( mg total) by mouth every 8 (eight) hours as needed for Pain.  What changed:  when to take this        CONTINUE taking these medications    amitriptyline 25 MG tablet  Commonly known as:  ELAVIL  Take 2 tablets (50 mg total) by mouth every evening.     baclofen 10 MG tablet  Commonly known as:  LIORESAL  Take 1 tablet (10 mg total) by mouth 3 (three) times daily.     diclofenac sodium 1 % Gel  Commonly known as:  VOLTAREN  as needed.     DULoxetine 30 MG capsule  Commonly known as:  CYMBALTA  Take 1 capsule (30 mg total) by mouth once daily.     fexofenadine 180 MG tablet  Commonly known as:  ALLEGRA  Take 180 mg by mouth once daily.     fluticasone propionate 50 mcg/actuation nasal spray  Commonly known as:  FLONASE  1 spray (50 mcg total) by Each Nostril route once daily.     gabapentin 400 MG capsule  Commonly known as:  NEURONTIN  Take 3 capsules (1,200 mg total) by mouth 3 (three) times daily.     HYDROmorphone 2 MG tablet  Commonly known as:  DILAUDID  Take 1 tablet (2 mg total) by mouth every 4 to 6 hours as needed for Pain.      levocetirizine 5 MG tablet  Commonly known as:  XYZAL  Take 5 mg by mouth every evening.     lidocaine-tetracaine 7-7 % Crea  as needed.     ondansetron 4 MG Tbdl  Commonly known as:  ZOFRAN-ODT  Take 2 tablets (8 mg total) by mouth every 6 (six) hours as needed.     pantoprazole 40 MG tablet  Commonly known as:  PROTONIX  Take 40 mg by mouth once daily.     Sanare Adv Scar Therapy Base Gel  Generic drug:  gel base no.184 (bulk)  as needed.     scopolamine 1.3-1.5 mg (1 mg over 3 days)  Commonly known as:  Transderm-Scop  Place 1 patch onto the skin every 72 hours.     Tylenol 8 Hour 650 MG Tbsr  Generic drug:  acetaminophen  Take 650 mg by mouth every evening.     Vitamin D3 2,000 unit Tab  Generic drug:  cholecalciferol (vitamin D3)  Take by mouth once daily.          Discharge Procedure Orders   Diet general     Lifting restrictions     Call MD for:  temperature >100.4     Call MD for:  persistent nausea and vomiting     Call MD for:  severe uncontrolled pain     Call MD for:  difficulty breathing, headache or visual disturbances     Call MD for:  redness, tenderness, or signs of infection (pain, swelling, redness, odor or green/yellow discharge around incision site)     Call MD for:  hives     Call MD for:  persistent dizziness or light-headedness     Call MD for:  extreme fatigue     No dressing needed         Diet: regular    Activity: as tolerated

## 2020-02-26 ENCOUNTER — TELEPHONE (OUTPATIENT)
Dept: SURGERY | Facility: CLINIC | Age: 45
End: 2020-02-26

## 2020-02-26 ENCOUNTER — TELEPHONE (OUTPATIENT)
Dept: NEUROSURGERY | Facility: CLINIC | Age: 45
End: 2020-02-26

## 2020-02-26 DIAGNOSIS — Z98.1 S/P LUMBAR SPINAL FUSION: ICD-10-CM

## 2020-02-26 NOTE — TELEPHONE ENCOUNTER
Patient saw Tom on 2/7/2020 she had a X ray then. Tom wants her to follow up with you on 3/2/2020 this will have her at 4 months post op does she need an x ray then?

## 2020-02-27 ENCOUNTER — PATIENT MESSAGE (OUTPATIENT)
Dept: NEUROSURGERY | Facility: CLINIC | Age: 45
End: 2020-02-27

## 2020-02-27 ENCOUNTER — PATIENT MESSAGE (OUTPATIENT)
Dept: UROLOGY | Facility: CLINIC | Age: 45
End: 2020-02-27

## 2020-02-28 LAB
COMMENT: NORMAL
FINAL PATHOLOGIC DIAGNOSIS: NORMAL
GROSS: NORMAL

## 2020-03-02 ENCOUNTER — OFFICE VISIT (OUTPATIENT)
Dept: SURGERY | Facility: CLINIC | Age: 45
End: 2020-03-02
Payer: COMMERCIAL

## 2020-03-02 ENCOUNTER — OFFICE VISIT (OUTPATIENT)
Dept: NEUROSURGERY | Facility: CLINIC | Age: 45
End: 2020-03-02
Payer: COMMERCIAL

## 2020-03-02 ENCOUNTER — HOSPITAL ENCOUNTER (OUTPATIENT)
Dept: RADIOLOGY | Facility: HOSPITAL | Age: 45
Discharge: HOME | End: 2020-03-02
Attending: NEUROLOGICAL SURGERY
Payer: COMMERCIAL

## 2020-03-02 VITALS
SYSTOLIC BLOOD PRESSURE: 141 MMHG | WEIGHT: 293 LBS | DIASTOLIC BLOOD PRESSURE: 83 MMHG | BODY MASS INDEX: 43.4 KG/M2 | HEART RATE: 103 BPM | HEIGHT: 69 IN

## 2020-03-02 VITALS — SYSTOLIC BLOOD PRESSURE: 117 MMHG | HEART RATE: 100 BPM | DIASTOLIC BLOOD PRESSURE: 80 MMHG

## 2020-03-02 DIAGNOSIS — Z98.1 STATUS POST LUMBAR AND LUMBOSACRAL FUSION BY ANTERIOR TECHNIQUE: Primary | ICD-10-CM

## 2020-03-02 DIAGNOSIS — D05.12 DUCTAL CARCINOMA IN SITU (DCIS) OF LEFT BREAST: Primary | ICD-10-CM

## 2020-03-02 DIAGNOSIS — G90.522 COMPLEX REGIONAL PAIN SYNDROME TYPE 1 OF LEFT LOWER EXTREMITY: ICD-10-CM

## 2020-03-02 DIAGNOSIS — Z98.1 S/P LUMBAR SPINAL FUSION: ICD-10-CM

## 2020-03-02 PROCEDURE — 99213 PR OFFICE/OUTPT VISIT, EST, LEVL III, 20-29 MIN: ICD-10-PCS | Mod: S$GLB,,, | Performed by: NEUROLOGICAL SURGERY

## 2020-03-02 PROCEDURE — 99024 PR POST-OP FOLLOW-UP VISIT: ICD-10-PCS | Mod: S$GLB,,, | Performed by: SURGERY

## 2020-03-02 PROCEDURE — 72100 X-RAY EXAM L-S SPINE 2/3 VWS: CPT | Mod: TC,PN

## 2020-03-02 PROCEDURE — 99999 PR PBB SHADOW E&M-EST. PATIENT-LVL III: CPT | Mod: PBBFAC,,, | Performed by: NEUROLOGICAL SURGERY

## 2020-03-02 PROCEDURE — 99213 OFFICE O/P EST LOW 20 MIN: CPT | Mod: S$GLB,,, | Performed by: NEUROLOGICAL SURGERY

## 2020-03-02 PROCEDURE — 72100 X-RAY EXAM L-S SPINE 2/3 VWS: CPT | Mod: 26,,, | Performed by: RADIOLOGY

## 2020-03-02 PROCEDURE — 99024 POSTOP FOLLOW-UP VISIT: CPT | Mod: S$GLB,,, | Performed by: SURGERY

## 2020-03-02 PROCEDURE — 99999 PR PBB SHADOW E&M-EST. PATIENT-LVL III: ICD-10-PCS | Mod: PBBFAC,,, | Performed by: NEUROLOGICAL SURGERY

## 2020-03-02 PROCEDURE — 99999 PR PBB SHADOW E&M-EST. PATIENT-LVL III: CPT | Mod: PBBFAC,,, | Performed by: SURGERY

## 2020-03-02 PROCEDURE — 72100 XR LUMBAR SPINE AP AND LATERAL: ICD-10-PCS | Mod: 26,,, | Performed by: RADIOLOGY

## 2020-03-02 PROCEDURE — 99999 PR PBB SHADOW E&M-EST. PATIENT-LVL III: ICD-10-PCS | Mod: PBBFAC,,, | Performed by: SURGERY

## 2020-03-02 RX ORDER — TRAMADOL HYDROCHLORIDE 50 MG/1
50-100 TABLET ORAL EVERY 8 HOURS PRN
Qty: 90 TABLET | Refills: 0 | Status: SHIPPED | OUTPATIENT
Start: 2020-03-02 | End: 2020-04-28 | Stop reason: SDUPTHER

## 2020-03-02 NOTE — PROGRESS NOTES
REFERRING PHYSICIAN:  No referring provider defined for this encounter.       Annette Burton MD    MEDICAL ONCOLOGIST:    STEVE  RADIATION ONCOLOGIST:   [id number]    DIAGNOSIS:    This is a 44 y.o. female with a stage 0 (pTis) grade 2 ER + CO + DCIS of the left breast.    TREATMENT SUMMARY:  The patient is status post left breast seed localization lumpectomy with excision for clear margins 2/14/20.  Final pathology showed DCIS of the left breast.    INTERVAL HISTORY:   Dianne Hemphill comes in for a post-op check.  She denies fever, chills, chest pain or shortness of breath.  Her pain is well controlled.      MEDICATIONS:  Current Outpatient Medications   Medication Sig Dispense Refill    acetaminophen (TYLENOL 8 HOUR) 650 MG TbSR Take 650 mg by mouth every evening.       amitriptyline (ELAVIL) 25 MG tablet Take 2 tablets (50 mg total) by mouth every evening. 60 tablet 6    baclofen (LIORESAL) 10 MG tablet Take 1 tablet (10 mg total) by mouth 3 (three) times daily. 90 tablet 11    cholecalciferol, vitamin D3, (VITAMIN D3) 2,000 unit Tab Take by mouth once daily.      diclofenac sodium (VOLTAREN) 1 % Gel as needed.   3    DULoxetine (CYMBALTA) 30 MG capsule Take 1 capsule (30 mg total) by mouth once daily. 90 capsule 1    fexofenadine (ALLEGRA) 180 MG tablet Take 180 mg by mouth once daily.      fluticasone propionate (FLONASE) 50 mcg/actuation nasal spray 1 spray (50 mcg total) by Each Nostril route once daily. 16 g 11    gabapentin (NEURONTIN) 400 MG capsule Take 3 capsules (1,200 mg total) by mouth 3 (three) times daily. 270 capsule 6    HYDROcodone-acetaminophen (NORCO) 5-325 mg per tablet Take 1 tablet by mouth every 6 (six) hours as needed for Pain. 10 tablet 0    HYDROmorphone (DILAUDID) 2 MG tablet Take 1 tablet (2 mg total) by mouth every 4 to 6 hours as needed for Pain. 40 tablet 0    levocetirizine (XYZAL) 5 MG tablet Take 5 mg by mouth every evening.      lidocaine-tetracaine 7-7 % Crea as  needed.   3    ondansetron (ZOFRAN-ODT) 4 MG TbDL Take 2 tablets (8 mg total) by mouth every 6 (six) hours as needed. 60 tablet 1    pantoprazole (PROTONIX) 40 MG tablet Take 40 mg by mouth once daily.      SANARE ADV SCAR THERAPY BASE Gel as needed.   3    scopolamine (TRANSDERM-SCOP) 1.3-1.5 mg (1 mg over 3 days) Place 1 patch onto the skin every 72 hours. 4 patch 3    traMADol (ULTRAM) 50 mg tablet Take 1-2 tablets ( mg total) by mouth every 8 (eight) hours as needed for Pain. (Patient taking differently: Take  mg by mouth once daily. ) 90 tablet 0     No current facility-administered medications for this visit.        ALLERGIES:   Review of patient's allergies indicates:   Allergen Reactions    Gluten protein Nausea And Vomiting    Milk containing products Hives    Demerol [meperidine] Nausea And Vomiting    Imitrex [sumatriptan succinate]      Hysterics      Morphine Nausea And Vomiting    Tetracyclines Nausea And Vomiting    Ciprofloxacin hcl Rash    Codeine Nausea And Vomiting     Can take Dilaudid, oxycontin, oxycodone & tramadol    Erythromycin Rash       PHYSICAL EXAMINATION:   General:  This is a well appearing female with appropriate speech, affect and gait.      Breast:  Incision clean, dry, and intact with dermabond peeling off.    IMPRESSION:   The patient has had an uneventful postoperative course.    PLAN:   1. return in 6 months for a follow up office visit and breast exam  2. No mammogram needed at the next visit  3. The patient is advised in continued exam of the breast chest wall and to report to this office sooner should she note any areas of abnormality or concern.   4.  She has been instructed to meet with med onc and rad onc for discussion of adjuvant treatment recommendations  5. She will likely start radiation in 3-4 weeks from now  6. We will consider tamoxifen pending discussion with Medical Oncology  7. Recommended setting up an appointment with an OBGYN for  evaluation of her history of an ovarian cyst

## 2020-03-02 NOTE — PROGRESS NOTES
NEUROSURGICAL PROGRESS NOTE    DATE OF SERVICE:  03/02/2020    ATTENDING PHYSICIAN:  Corona Bazan MD    SUBJECTIVE:    INTERIM HISTORY:    This is a very pleasant 44 y.o. female, who is status post L5-S1 anterior lumbar interbody fusion for isthmic spondylolisthesis with severe foraminal stenosis 4 months ago.  Prior to the surgery she had severe left foot pain.  After the surgery the pain travels from the lateral aspect of the foot to the L5 distribution of the foot.  She also developed a slight dorsiflexion weakness.  She has been having L of the knee a, hyperalgesia, neuropathic pain.  She is doing physical therapy to improve range of motion.  She admits that she has better range-of-motion.  She is using a walker when she walks.  She was recently diagnosed with left breast mass and she had a breast biopsy.  She is supposed to start radiation therapy soon.  She is taking gabapentin 1200 mg 3 times daily, baclofen 10 mg 3 times daily, amitriptyline 50 mg hs, Cymbalta, tramadol  mg q.8 hours p.r.n. as needed.    Low Back Pain Scale  R Low Back-Pain Score: 7  R Low Back-Pain Intensity: Pain killers have no effect on the pain and I do not use them  Personal Care : I need help every day in most aspects of self-care.  Lifting: I can only lift very light weights.  Walking: I can only walk using a stick or crutches.  Sitting: Pain prevents me from sitting more than thirty minutes.   Low Back-Standing: I cannot stand for longer than 10 minutes with increasing pain   Low Back-Sleeping: Because of pain my normal nights sleep is reduced by less than one quarter  Social Life: I have no social life because of pain.   Low Back-Traveling: Pain restricts me to short necessary journeys under 30 minutes   Low Back-Changing Degree of Pain: My pain is gradually worsening         PAST MEDICAL HISTORY:  Active Ambulatory Problems     Diagnosis Date Noted    Nausea 01/31/2017    Left upper quadrant pain 01/31/2017     Hepatomegaly 01/31/2017    GERD (gastroesophageal reflux disease) 01/31/2017    Gastritis 02/04/2017    Former smokeless tobacco use 02/16/2017    Cyst of left ovary 03/27/2019    Lumbar spondylosis 03/28/2019    Neural foraminal stenosis of lumbar spine 03/28/2019    Anterolisthesis 03/28/2019    Spondylolisthesis of lumbar region 11/07/2019    S/P spinal surgery 12/17/2019    Fatty liver 01/30/2020    Radicular pain of left lower extremity 01/30/2020    Lumbosacral radiculopathy at L5 01/30/2020    Breast neoplasm, Tis (DCIS), left 02/14/2020     Resolved Ambulatory Problems     Diagnosis Date Noted    On esomeprazole prophylaxis 01/31/2017    Elevated liver enzymes 01/31/2017    Acute hepatitis 01/31/2017    RUQ abdominal pain 02/01/2017     Past Medical History:   Diagnosis Date    History of migraine headaches     Obesity     Pollen allergies     PONV (postoperative nausea and vomiting)     Smoker        PAST SURGICAL HISTORY:  Past Surgical History:   Procedure Laterality Date    CHOLECYSTECTOMY      ESOPHAGOGASTRODUODENOSCOPY      EXPLORATORY LAPAROTOMY      HYSTERECTOMY      fibroids    LUMBAR FUSION N/A 11/7/2019    Procedure: FUSION, SPINE, LUMBAR Procedure: STG 1: L5-S1 anterior Lumbar interbody fusion / STG 2:L5-S1 Posterior instrumentation;  Surgeon: Corona Bazan MD;  Location: [facility] OR;  Service: Neurosurgery;  Laterality: N/A;  FUSION, SPINE, LUMBARProcedure: STG 1: L5-S1 anterior Lumbar interbody fusion / STG 2:L5-S1 Posterior instrumentationSURGERY TIME: 2.5hrsLOS:ANESTHESIA: GeneralBlood:    MASTECTOMY, PARTIAL Left 2/14/2020    Procedure: MASTECTOMY, PARTIAL LEFT with RADIOLOGIC MARKER;  Surgeon: Mara Munoz MD;  Location: [facility] OR;  Service: General;  Laterality: Left;    SPINAL FUSION      TONSILLECTOMY         SOCIAL HISTORY:   Social History     Socioeconomic History    Marital status:      Spouse name: Not on file    Number of children: 2    Years  of education: Not on file    Highest education level: Not on file   Occupational History    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 [employer]   Social Needs    Financial resource strain: Not on file    Food insecurity:     Worry: Not on file     Inability: Not on file    Transportation needs:     Medical: Not on file     Non-medical: Not on file   Tobacco Use    Smoking status: Former Smoker     Packs/day: 0.50     Years: 15.00     Pack years: 7.50     Types: Vaping with nicotine     Last attempt to quit: 2019     Years since quittin.4    Smokeless tobacco: Former User   Substance and Sexual Activity    Alcohol use: Yes     Comment: 2-3 drinks per year    Drug use: No    Sexual activity: Yes     Partners: Male     Birth control/protection: See Surgical Hx     Comment: Hysterectomy   Lifestyle    Physical activity:     Days per week: Not on file     Minutes per session: Not on file    Stress: Not on file   Relationships    Social connections:     Talks on phone: Not on file     Gets together: Not on file     Attends [employer] service: Not on file     Active member of club or organization: Not on file     Attends meetings of clubs or organizations: Not on file     Relationship status: Not on file   Other Topics Concern    Not on file   Social History Narrative    Not on file       FAMILY HISTORY:  Family History   Problem Relation Age of Onset    No Known Problems Mother     No Known Problems Father     Cancer Neg Hx     Diabetes Neg Hx     Heart disease Neg Hx     Stroke Neg Hx        CURRENTS MEDICATIONS:  Current Outpatient Medications on File Prior to Visit   Medication Sig Dispense Refill    acetaminophen (TYLENOL 8 HOUR) 650 MG TbSR Take 650 mg by mouth every evening.       amitriptyline (ELAVIL) 25 MG tablet Take 2 tablets (50 mg total) by mouth every evening. 60 tablet 6    baclofen (LIORESAL) 10 MG tablet Take 1 tablet (10 mg total) by mouth 3 (three) times daily. 90 tablet 11     cholecalciferol, vitamin D3, (VITAMIN D3) 2,000 unit Tab Take by mouth once daily.      diclofenac sodium (VOLTAREN) 1 % Gel as needed.   3    DULoxetine (CYMBALTA) 30 MG capsule Take 1 capsule (30 mg total) by mouth once daily. 90 capsule 1    fexofenadine (ALLEGRA) 180 MG tablet Take 180 mg by mouth once daily.      fluticasone propionate (FLONASE) 50 mcg/actuation nasal spray 1 spray (50 mcg total) by Each Nostril route once daily. 16 g 11    gabapentin (NEURONTIN) 400 MG capsule Take 3 capsules (1,200 mg total) by mouth 3 (three) times daily. 270 capsule 6    HYDROcodone-acetaminophen (NORCO) 5-325 mg per tablet Take 1 tablet by mouth every 6 (six) hours as needed for Pain. 10 tablet 0    HYDROmorphone (DILAUDID) 2 MG tablet Take 1 tablet (2 mg total) by mouth every 4 to 6 hours as needed for Pain. 40 tablet 0    levocetirizine (XYZAL) 5 MG tablet Take 5 mg by mouth every evening.      lidocaine-tetracaine 7-7 % Crea as needed.   3    ondansetron (ZOFRAN-ODT) 4 MG TbDL Take 2 tablets (8 mg total) by mouth every 6 (six) hours as needed. 60 tablet 1    pantoprazole (PROTONIX) 40 MG tablet Take 40 mg by mouth once daily.      SANARE ADV SCAR THERAPY BASE Gel as needed.   3    scopolamine (TRANSDERM-SCOP) 1.3-1.5 mg (1 mg over 3 days) Place 1 patch onto the skin every 72 hours. 4 patch 3    traMADol (ULTRAM) 50 mg tablet Take 1-2 tablets ( mg total) by mouth every 8 (eight) hours as needed for Pain. (Patient taking differently: Take  mg by mouth once daily. ) 90 tablet 0     No current facility-administered medications on file prior to visit.        ALLERGIES:  Review of patient's allergies indicates:   Allergen Reactions    Gluten protein Nausea And Vomiting    Milk containing products Hives    Demerol [meperidine] Nausea And Vomiting    Imitrex [sumatriptan succinate]      Hysterics      Morphine Nausea And Vomiting    Tetracyclines Nausea And Vomiting    Ciprofloxacin hcl Rash     Codeine Nausea And Vomiting     Can take Dilaudid, oxycontin, oxycodone & tramadol    Erythromycin Rash       REVIEW OF SYSTEMS:  Review of Systems   Constitutional: Negative for diaphoresis, fever and weight loss.   Respiratory: Negative for shortness of breath.    Cardiovascular: Negative for chest pain.   Gastrointestinal: Negative for blood in stool.   Genitourinary: Negative for hematuria.   Endo/Heme/Allergies: Does not bruise/bleed easily.   All other systems reviewed and are negative.        OBJECTIVE:    PHYSICAL EXAMINATION:   Vitals:    03/02/20 1038   BP: 117/80   Pulse: 100       Physical Exam:  Vitals reviewed.    Constitutional: She appears well-developed and well-nourished.     Eyes: Pupils are equal, round, and reactive to light. Conjunctivae and EOM are normal.     Cardiovascular: Normal distal pulses and no edema.     Abdominal: Soft.     Skin: Skin displays no rash on trunk and no rash on extremities. Skin displays no lesions on trunk and no lesions on extremities.     Psych/Behavior: She is alert. She is oriented to person, place, and time. She has a normal mood and affect.     Musculoskeletal:        Neck: Range of motion is full.     Neurological:        Sensory:   Pain with palpation of the left big toe and dorsum of the left foot       DTRs: Tricep reflexes are 2+ on the right side and 2+ on the left side. Bicep reflexes are 2+ on the right side and 2+ on the left side. Brachioradialis reflexes are 2+ on the right side and 2+ on the left side. Patellar reflexes are 2+ on the right side and 2+ on the left side. Achilles reflexes are 2+ on the right side and 2+ on the left side.       Back Exam     Muscle Strength   Right Quadriceps:  5/5   Left Quadriceps:  5/5   Right Hamstrings:  5/5   Left Hamstrings:  5/5             SI joint:   Palpation at the right and left SI joints not painful  RALPH test is negative bilaterally  Gaenslen test is negative bilaterally  Thigh thrust test is negative  bilaterally    Neurologic Exam     Mental Status   Oriented to person, place, and time.   Speech: speech is normal   Level of consciousness: alert    Cranial Nerves   Cranial nerves II through XII intact.     CN III, IV, VI   Pupils are equal, round, and reactive to light.  Extraocular motions are normal.     Motor Exam   Muscle bulk: normal  Overall muscle tone: normal    Strength   Right deltoid: 5/5  Left deltoid: 5/5  Right biceps: 5/5  Left biceps: 5/5  Right triceps: 5/5  Left triceps: 5/5  Right wrist flexion: 5/5  Left wrist flexion: 5/5  Right wrist extension: 5/5  Left wrist extension: 5/5  Right interossei: 5/5  Left interossei: 5/5  Right iliopsoas: 5/5  Left iliopsoas: 5/5  Right quadriceps: 5/5  Left quadriceps: 5/5  Right hamstrin/5  Left hamstrin/5  Right anterior tibial: 5/5  Left anterior tibial: 5/5  Right posterior tibial: 5/5  Left posterior tibial: 5/5  Right peroneal: 5/5  Left peroneal: 5/5  Right gastroc: 5/5  Left gastroc: 5/5    Sensory Exam   Light touch normal.   Pinprick normal.   Pain with palpation of the left big toe and dorsum of the left foot     Gait, Coordination, and Reflexes     Gait  Gait: normal    Coordination   Finger to nose coordination: normal  Tandem walking coordination: normal    Reflexes   Right brachioradialis: 2+  Left brachioradialis: 2+  Right biceps: 2+  Left biceps: 2+  Right triceps: 2+  Left triceps: 2+  Right patellar: 2+  Left patellar: 2+  Right achilles: 2+  Left achilles: 2+  Right plantar: normal  Left plantar: normal  Right Betancourt: absent  Left Betancourt: absent  Right ankle clonus: absent  Left ankle clonus: absent        DIAGNOSTIC DATA:  I personally interpreted the following imaging:   Lumbar x-ray today shows correction of the spondylolisthesis at L5-S1,correct positioning of hardware    ASSESMENT:  This is a 44 y.o. female with     Problem List Items Addressed This Visit     None      Visit Diagnoses     Status post lumbar and lumbosacral  fusion by anterior technique    -  Primary    Complex regional pain syndrome type 1 of left lower extremity                PLAN:  Continue physical therapy  Continue same medication  All questions answered  He she will verify with her radio oncologist if she can get spinal injection.  I would recommend a left L1-2 sympathetic block to help diagnose her CRPS and to provide pain relief.  Follow-up in 2 months         Corona Bazan MD  Cell:[phone]

## 2020-03-05 ENCOUNTER — OFFICE VISIT (OUTPATIENT)
Dept: GASTROENTEROLOGY | Facility: CLINIC | Age: 45
End: 2020-03-05
Payer: COMMERCIAL

## 2020-03-05 VITALS — BODY MASS INDEX: 43.95 KG/M2 | WEIGHT: 293 LBS

## 2020-03-05 DIAGNOSIS — R19.7 DIARRHEA, UNSPECIFIED TYPE: Primary | ICD-10-CM

## 2020-03-05 DIAGNOSIS — R11.0 NAUSEA: ICD-10-CM

## 2020-03-05 PROCEDURE — 99999 PR PBB SHADOW E&M-EST. PATIENT-LVL II: CPT | Mod: PBBFAC,,, | Performed by: INTERNAL MEDICINE

## 2020-03-05 PROCEDURE — 99214 PR OFFICE/OUTPT VISIT, EST, LEVL IV, 30-39 MIN: ICD-10-PCS | Mod: S$GLB,,, | Performed by: INTERNAL MEDICINE

## 2020-03-05 PROCEDURE — 3008F PR BODY MASS INDEX (BMI) DOCUMENTED: ICD-10-PCS | Mod: CPTII,S$GLB,, | Performed by: INTERNAL MEDICINE

## 2020-03-05 PROCEDURE — 99214 OFFICE O/P EST MOD 30 MIN: CPT | Mod: S$GLB,,, | Performed by: INTERNAL MEDICINE

## 2020-03-05 PROCEDURE — 99999 PR PBB SHADOW E&M-EST. PATIENT-LVL II: ICD-10-PCS | Mod: PBBFAC,,, | Performed by: INTERNAL MEDICINE

## 2020-03-05 PROCEDURE — 3008F BODY MASS INDEX DOCD: CPT | Mod: CPTII,S$GLB,, | Performed by: INTERNAL MEDICINE

## 2020-03-05 NOTE — LETTER
March 5, 2020      Annette Burton MD  [address]  [address]  [place] [address]           [facility]  [address], [facility] [address]  [facility] [address]  Phone: [phone]          Patient: Dianne Hemphill   MR Number: [id number]   YOB: 1975   Date of Visit: 3/5/2020       Dear Dr. Annette Burton:    Thank you for referring Dianne Hemphill to me for evaluation. Attached you will find relevant portions of my assessment and plan of care.    If you have questions, please do not hesitate to call me. I look forward to following Dianne Hemphill along with you.    Sincerely,    Mikel Rizvi MD    Enclosure  CC:  No Recipients    If you would like to receive this communication electronically, please contact [email or web address] or [phone] to request more information on [employer] access.    For providers and/or their staff who would like to refer a patient to [facility], please contact us through our one-stop-shop provider referral line, [facility] , at [phone].    If you feel you have received this communication in error or would no longer like to receive these types of communications, please e-mail [email or web address]

## 2020-03-12 ENCOUNTER — PATIENT MESSAGE (OUTPATIENT)
Dept: RADIATION ONCOLOGY | Facility: CLINIC | Age: 45
End: 2020-03-12
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
## 2020-03-18 DIAGNOSIS — M43.10 ANTEROLISTHESIS: ICD-10-CM

## 2020-03-18 DIAGNOSIS — M48.061 NEURAL FORAMINAL STENOSIS OF LUMBAR SPINE: ICD-10-CM

## 2020-03-18 DIAGNOSIS — M47.816 LUMBAR SPONDYLOSIS: ICD-10-CM

## 2020-03-18 DIAGNOSIS — F43.21 SITUATIONAL DEPRESSION: ICD-10-CM

## 2020-03-18 RX ORDER — DULOXETIN HYDROCHLORIDE 30 MG/1
30 CAPSULE, DELAYED RELEASE ORAL DAILY
Qty: 90 CAPSULE | Refills: 1 | Status: SHIPPED | OUTPATIENT
Start: 2020-03-18 | End: 2020-08-05

## 2020-03-19 ENCOUNTER — PATIENT MESSAGE (OUTPATIENT)
Dept: HEMATOLOGY/ONCOLOGY | Facility: CLINIC | Age: 45
End: 2020-03-19

## 2020-03-19 ENCOUNTER — OFFICE VISIT (OUTPATIENT)
Dept: SURGERY | Facility: CLINIC | Age: 45
End: 2020-03-19
Payer: COMMERCIAL

## 2020-03-19 VITALS
HEIGHT: 69 IN | RESPIRATION RATE: 16 BRPM | HEIGHT: 69 IN | HEART RATE: 81 BPM | TEMPERATURE: 96 F | DIASTOLIC BLOOD PRESSURE: 80 MMHG | SYSTOLIC BLOOD PRESSURE: 119 MMHG | BODY MASS INDEX: 43.1 KG/M2 | WEIGHT: 291 LBS | RESPIRATION RATE: 16 BRPM | DIASTOLIC BLOOD PRESSURE: 80 MMHG | WEIGHT: 291 LBS | BODY MASS INDEX: 43.1 KG/M2 | TEMPERATURE: 96 F | SYSTOLIC BLOOD PRESSURE: 119 MMHG | HEART RATE: 81 BPM

## 2020-03-19 DIAGNOSIS — D05.12 BREAST NEOPLASM, TIS (DCIS), LEFT: Primary | ICD-10-CM

## 2020-03-19 DIAGNOSIS — D05.12 DUCTAL CARCINOMA IN SITU (DCIS) OF LEFT BREAST: ICD-10-CM

## 2020-03-19 PROCEDURE — 99204 PR OFFICE/OUTPT VISIT, NEW, LEVL IV, 45-59 MIN: ICD-10-PCS | Mod: S$GLB,,, | Performed by: RADIOLOGY

## 2020-03-19 PROCEDURE — 99999 PR PBB SHADOW E&M-EST. PATIENT-LVL III: ICD-10-PCS | Mod: PBBFAC,,, | Performed by: INTERNAL MEDICINE

## 2020-03-19 PROCEDURE — 3008F PR BODY MASS INDEX (BMI) DOCUMENTED: ICD-10-PCS | Mod: CPTII,S$GLB,, | Performed by: RADIOLOGY

## 2020-03-19 PROCEDURE — 99999 PR PBB SHADOW E&M-EST. PATIENT-LVL III: ICD-10-PCS | Mod: PBBFAC,,, | Performed by: RADIOLOGY

## 2020-03-19 PROCEDURE — 99999 PR PBB SHADOW E&M-EST. PATIENT-LVL III: CPT | Mod: PBBFAC,,, | Performed by: RADIOLOGY

## 2020-03-19 PROCEDURE — 3008F BODY MASS INDEX DOCD: CPT | Mod: CPTII,S$GLB,, | Performed by: RADIOLOGY

## 2020-03-19 PROCEDURE — 3008F PR BODY MASS INDEX (BMI) DOCUMENTED: ICD-10-PCS | Mod: CPTII,S$GLB,, | Performed by: INTERNAL MEDICINE

## 2020-03-19 PROCEDURE — 99999 PR PBB SHADOW E&M-EST. PATIENT-LVL III: CPT | Mod: PBBFAC,,, | Performed by: INTERNAL MEDICINE

## 2020-03-19 PROCEDURE — 99204 OFFICE O/P NEW MOD 45 MIN: CPT | Mod: S$GLB,,, | Performed by: INTERNAL MEDICINE

## 2020-03-19 PROCEDURE — 99204 PR OFFICE/OUTPT VISIT, NEW, LEVL IV, 45-59 MIN: ICD-10-PCS | Mod: S$GLB,,, | Performed by: INTERNAL MEDICINE

## 2020-03-19 PROCEDURE — 99204 OFFICE O/P NEW MOD 45 MIN: CPT | Mod: S$GLB,,, | Performed by: RADIOLOGY

## 2020-03-19 PROCEDURE — 3008F BODY MASS INDEX DOCD: CPT | Mod: CPTII,S$GLB,, | Performed by: INTERNAL MEDICINE

## 2020-03-19 NOTE — LETTER
March 19, 2020      Mara Munoz MD  [address] [facility]  [facility] [address]           [facility]  [address]  [facility] [address]  Phone: [phone]  Fax: [phone]          Patient: Dianne Hemphill   MR Number: [id number]   YOB: 1975   Date of Visit: 3/19/2020       Dear Dr. Mara Munoz:    Thank you for referring Dianne Hemphill to me for evaluation. Attached you will find relevant portions of my assessment and plan of care.    If you have questions, please do not hesitate to call me. I look forward to following Dianne Hemphill along with you.    Sincerely,    Vivian Marr MD    Enclosure  CC:  No Recipients    If you would like to receive this communication electronically, please contact [email or web address] or [phone] to request more information on [employer] Link access.    For providers and/or their staff who would like to refer a patient to [facility], please contact us through our one-stop-shop provider referral line, [facility], at [phone].    If you feel you have received this communication in error or would no longer like to receive these types of communications, please e-mail [email or web address]

## 2020-03-19 NOTE — PATIENT INSTRUCTIONS
"    Return for ct/sim as scheduled.  Discharge Instructions for Radiation Therapy  Radiation therapy uses high-energy X-rays to kill cancer cells and help you in your fight against cancer. Radiation destroys cancer cells gradually, over time. The goal of therapy is to focus on and kill as many cancer cells as possible. Radiation can also damage or kill some of the normal cells that are closest to the tumor. Damaged normal cells can repair themselves, often within a few days.  Caring for your skin  You should ask your healthcare provider for specific products that he or she recommends for washing and bathing. In general, use a mild nondetergent soap and warm (not hot) water to clean the area receiving radiation. Pat the region dry rather than rubbing.  Your healthcare provider may give you products to moisturize the skin and prevent infection. The goal is to prevent cracks or breaks in the skin that may be sensitive from treatment:   · Dont be surprised if your treatment causes skin redness, and a type of "sunburn" over time. Some radiation treatments can cause this.   · Ask your therapy team what lotion to use. Also ask for directions about when and how to apply it.  · Avoid prolonged or direct sunlight on the treated area. Ask your therapy team about using a sunscreen. You do not have to avoid going outside altogether, but must take appropriate precautions.  · Dont remove ink marks unless your radiation therapist says its OK. Dont scrub or use soap on the marks when you wash. Let water run over them and pat them dry.  · Protect your skin from heat or cold. Avoid hot tubs, saunas, heating pads, or ice packs.  · Avoid clothing that causes friction or rubbing on the skin.  Fighting fatigue  Radiation therapy may cause you to feel tired. Your body is working hard to heal and repair itself. To feel better, try these things:  · Do light exercise each day. Take short walks.  · Plan tasks for the times when you tend to " have the most energy. Ask for help when you need it.  · Relax before you go to bed. This will help you sleep better. Try reading or listening to soothing music.  Coping with appetite changes  Here are ways to cope:  · Tell your therapy team if you find it hard to eat or you have no appetite. You may be referred to a nutritionist to help you with meal planning.  · Radiation to certain internal sites can cause nausea, depending on the location of treatment. This can affect your appetite. Think of healthy eating as part of your treatment. Try these tips:  ¨ Eat slowly.  ¨ Eat small meals several times a day.  ¨ Eat more food when youre feeling better.  ¨ Ask others to keep you company when you eat.  ¨ Stock up on easy-to-prepare foods.  ¨ Eat foods high in protein and calories. Your healthcare provider may recommend liquid meal supplements.  ¨ Drink plenty of water and other fluids.  ¨ Ask your healthcare provider before taking any vitamins or over-the-counter supplements. Such products are not regulated by the FDA and can sometimes interfere with your treatments.   Dealing with other side effects  Here are suggestions to deal with other side effects:   · Be prepared for hair loss in the area being treated. The hair loss may be permanent. Be sure to discuss this with your healthcare provider.  · Sip cool water if your mouth or throat becomes dry or sore. Ice chips may also help.  · Tell your healthcare provider if you have diarrhea or constipation. You may be given a special diet.  · If you have trouble swallowing liquids, tell your healthcare provider.  Follow-up  Make a follow-up appointment as directed by your healthcare provider.     When to call your healthcare provider  Call your healthcare provider right away if you have any of the following:  · Unexpected headaches  · Trouble concentrating  · Ongoing fatigue  · Wheezing, shortness of breath, or trouble breathing  · Pain that doesnt go away, especially if its  always in the same place  · New or unusual lumps, bumps, or swelling  · Dizziness or lightheadedness  · Unusual rashes, bruises, or bleeding  · Fever of 100.4°F (38°C) or higher, or chills  · Nausea and vomiting  · Diarrhea that doesnt improve with time  · Skin breakdown; significant pain due to skin irritation   Date Last Reviewed: 1/13/2016  © 8253-9361 [employer]. [address] [place] [address], [place] [address]. All rights reserved. This information is not intended as a substitute for professional medical care. Always follow your healthcare professional's instructions.

## 2020-03-29 ENCOUNTER — PATIENT MESSAGE (OUTPATIENT)
Dept: SURGERY | Facility: CLINIC | Age: 45
End: 2020-03-29

## 2020-03-30 DIAGNOSIS — D05.12 BREAST NEOPLASM, TIS (DCIS), LEFT: Primary | ICD-10-CM

## 2020-03-30 RX ORDER — TAMOXIFEN CITRATE 20 MG/1
20 TABLET ORAL DAILY
Qty: 30 TABLET | Refills: 11 | Status: SHIPPED | OUTPATIENT
Start: 2020-03-30 | End: 2020-04-02 | Stop reason: SDUPTHER

## 2020-04-02 DIAGNOSIS — D05.12 BREAST NEOPLASM, TIS (DCIS), LEFT: ICD-10-CM

## 2020-04-02 RX ORDER — TAMOXIFEN CITRATE 20 MG/1
20 TABLET ORAL DAILY
Qty: 30 TABLET | Refills: 11 | Status: SHIPPED | OUTPATIENT
Start: 2020-04-02 | End: 2020-05-19

## 2020-04-20 RX ORDER — PANTOPRAZOLE SODIUM 40 MG/1
TABLET, DELAYED RELEASE ORAL
Qty: 90 TABLET | Refills: 1 | Status: SHIPPED | OUTPATIENT
Start: 2020-04-20 | End: 2020-04-27 | Stop reason: SDUPTHER

## 2020-04-23 ENCOUNTER — TELEPHONE (OUTPATIENT)
Dept: NEUROSURGERY | Facility: CLINIC | Age: 45
End: 2020-04-23

## 2020-04-23 ENCOUNTER — PATIENT MESSAGE (OUTPATIENT)
Dept: NEUROSURGERY | Facility: CLINIC | Age: 45
End: 2020-04-23

## 2020-04-24 ENCOUNTER — PATIENT MESSAGE (OUTPATIENT)
Dept: NEUROSURGERY | Facility: CLINIC | Age: 45
End: 2020-04-24

## 2020-04-24 RX ORDER — GABAPENTIN 600 MG/1
1200 TABLET ORAL 3 TIMES DAILY
Qty: 540 TABLET | Refills: 3 | Status: SHIPPED | OUTPATIENT
Start: 2020-04-24 | End: 2020-04-28 | Stop reason: SDUPTHER

## 2020-04-24 RX ORDER — BACLOFEN 10 MG/1
10 TABLET ORAL 3 TIMES DAILY
Qty: 270 TABLET | Refills: 3 | Status: SHIPPED | OUTPATIENT
Start: 2020-04-24 | End: 2020-04-28 | Stop reason: SDUPTHER

## 2020-04-24 NOTE — TELEPHONE ENCOUNTER
Patient would like to have the her prescription called into [facility] and not [facility] she needs the amitriptyline, tramadol, baclofen, and gabapentin called in for 90 day supply.

## 2020-04-27 ENCOUNTER — TELEPHONE (OUTPATIENT)
Dept: NEUROSURGERY | Facility: CLINIC | Age: 45
End: 2020-04-27

## 2020-04-27 ENCOUNTER — TELEPHONE (OUTPATIENT)
Dept: INTERNAL MEDICINE | Facility: CLINIC | Age: 45
End: 2020-04-27

## 2020-04-27 DIAGNOSIS — K21.9 GASTROESOPHAGEAL REFLUX DISEASE, ESOPHAGITIS PRESENCE NOT SPECIFIED: Primary | ICD-10-CM

## 2020-04-27 RX ORDER — PANTOPRAZOLE SODIUM 40 MG/1
40 TABLET, DELAYED RELEASE ORAL DAILY
Qty: 90 TABLET | Refills: 1 | Status: SHIPPED | OUTPATIENT
Start: 2020-04-27 | End: 2020-09-16 | Stop reason: SDUPTHER

## 2020-04-27 NOTE — TELEPHONE ENCOUNTER
----- Message from Nataliia Perales sent at 4/27/2020  3:05 PM CDT -----  Contact: WALE GODWIN [[id number]]  Name of Who is Calling : WALE GODWIN [[id number]]    Patient is returning call from staff to schedule virtual visit next available not till 5/6/20 patient would like to see about getting a virtual visit for tomorrow same time as her original appointment    .....Please contact to further discuss and advise.    Can the clinic reply by [facility] : No    What Number to Call Back :  [phone]

## 2020-04-28 ENCOUNTER — OFFICE VISIT (OUTPATIENT)
Dept: INTERNAL MEDICINE | Facility: CLINIC | Age: 45
End: 2020-04-28
Payer: COMMERCIAL

## 2020-04-28 DIAGNOSIS — L21.9 SEBORRHEIC DERMATITIS OF SCALP: ICD-10-CM

## 2020-04-28 DIAGNOSIS — Z98.890 S/P SPINAL SURGERY: ICD-10-CM

## 2020-04-28 DIAGNOSIS — D05.12 BREAST NEOPLASM, TIS (DCIS), LEFT: ICD-10-CM

## 2020-04-28 DIAGNOSIS — Z72.820 POOR SLEEP: ICD-10-CM

## 2020-04-28 DIAGNOSIS — M48.061 NEURAL FORAMINAL STENOSIS OF LUMBAR SPINE: Primary | ICD-10-CM

## 2020-04-28 DIAGNOSIS — M54.10 RADICULAR PAIN OF LEFT LOWER EXTREMITY: ICD-10-CM

## 2020-04-28 PROCEDURE — 99213 OFFICE O/P EST LOW 20 MIN: CPT | Mod: 95,,, | Performed by: FAMILY MEDICINE

## 2020-04-28 PROCEDURE — 99213 PR OFFICE/OUTPT VISIT, EST, LEVL III, 20-29 MIN: ICD-10-PCS | Mod: 95,,, | Performed by: FAMILY MEDICINE

## 2020-04-28 RX ORDER — GABAPENTIN 600 MG/1
1200 TABLET ORAL 3 TIMES DAILY
Qty: 540 TABLET | Refills: 3 | Status: SHIPPED | OUTPATIENT
Start: 2020-04-28 | End: 2020-12-21 | Stop reason: SDUPTHER

## 2020-04-28 RX ORDER — AMITRIPTYLINE HYDROCHLORIDE 25 MG/1
50 TABLET, FILM COATED ORAL NIGHTLY
Qty: 60 TABLET | Refills: 6 | Status: SHIPPED | OUTPATIENT
Start: 2020-04-28 | End: 2020-04-28 | Stop reason: SDUPTHER

## 2020-04-28 RX ORDER — BACLOFEN 10 MG/1
10 TABLET ORAL 3 TIMES DAILY
Qty: 270 TABLET | Refills: 3 | Status: SHIPPED | OUTPATIENT
Start: 2020-04-28 | End: 2020-12-21 | Stop reason: SDUPTHER

## 2020-04-28 RX ORDER — TRAMADOL HYDROCHLORIDE 50 MG/1
50-100 TABLET ORAL EVERY 8 HOURS PRN
Qty: 90 TABLET | Refills: 0 | Status: SHIPPED | OUTPATIENT
Start: 2020-04-28 | End: 2020-05-19

## 2020-04-28 RX ORDER — KETOCONAZOLE 20 MG/ML
SHAMPOO, SUSPENSION TOPICAL
Qty: 120 ML | Refills: 0 | Status: SHIPPED | OUTPATIENT
Start: 2020-04-30 | End: 2020-04-30

## 2020-04-28 RX ORDER — AMITRIPTYLINE HYDROCHLORIDE 50 MG/1
TABLET, FILM COATED ORAL
Qty: 90 TABLET | Refills: 0 | Status: SHIPPED | OUTPATIENT
Start: 2020-04-28 | End: 2020-04-30
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
## 2020-04-29 ENCOUNTER — TELEPHONE (OUTPATIENT)
Dept: NEUROSURGERY | Facility: CLINIC | Age: 45
End: 2020-04-29

## 2020-04-29 DIAGNOSIS — Z98.1 S/P LUMBAR SPINAL FUSION: ICD-10-CM

## 2020-04-29 DIAGNOSIS — M54.10 RADICULAR PAIN OF LEFT LOWER EXTREMITY: ICD-10-CM

## 2020-04-29 DIAGNOSIS — L21.9 SEBORRHEIC DERMATITIS OF SCALP: ICD-10-CM

## 2020-04-29 DIAGNOSIS — Z72.820 POOR SLEEP: ICD-10-CM

## 2020-04-29 DIAGNOSIS — Z98.890 S/P SPINAL SURGERY: ICD-10-CM

## 2020-04-29 DIAGNOSIS — M48.061 NEURAL FORAMINAL STENOSIS OF LUMBAR SPINE: ICD-10-CM

## 2020-04-30 RX ORDER — AMITRIPTYLINE HYDROCHLORIDE 50 MG/1
TABLET, FILM COATED ORAL
Qty: 90 TABLET | Refills: 0 | Status: SHIPPED | OUTPATIENT
Start: 2020-04-30 | End: 2020-06-12

## 2020-04-30 RX ORDER — KETOCONAZOLE 20 MG/ML
SHAMPOO, SUSPENSION TOPICAL
Qty: 120 ML | Refills: 0 | Status: SHIPPED | OUTPATIENT
Start: 2020-04-30 | End: 2020-06-12

## 2020-05-01 ENCOUNTER — HOSPITAL ENCOUNTER (OUTPATIENT)
Dept: RADIATION THERAPY | Facility: HOSPITAL | Age: 45
Discharge: HOME | End: 2020-05-01
Attending: RADIOLOGY
Payer: COMMERCIAL

## 2020-05-04 ENCOUNTER — TELEPHONE (OUTPATIENT)
Dept: GASTROENTEROLOGY | Facility: CLINIC | Age: 45
End: 2020-05-04

## 2020-05-04 NOTE — TELEPHONE ENCOUNTER
----- Message from Kirstin Sood sent at 5/4/2020  3:15 PM CDT -----  Contact: Nery villanueva/[facility]  [phone]  Nery is calling to speak with you regarding canceling a test  Please advise

## 2020-05-12 ENCOUNTER — DOCUMENTATION ONLY (OUTPATIENT)
Dept: RADIATION ONCOLOGY | Facility: CLINIC | Age: 45
End: 2020-05-12

## 2020-05-12 ENCOUNTER — HOSPITAL ENCOUNTER (OUTPATIENT)
Dept: RADIATION THERAPY | Facility: HOSPITAL | Age: 45
Discharge: HOME | End: 2020-05-12
Attending: FAMILY MEDICINE
Payer: COMMERCIAL

## 2020-05-12 ENCOUNTER — PATIENT MESSAGE (OUTPATIENT)
Dept: RADIATION ONCOLOGY | Facility: CLINIC | Age: 45
End: 2020-05-12

## 2020-05-12 DIAGNOSIS — D05.12 BREAST NEOPLASM, TIS (DCIS), LEFT: Primary | ICD-10-CM

## 2020-05-12 PROCEDURE — 77014 HC CT GUIDANCE RADIATION THERAPY FLDS PLACEMENT: CPT | Mod: TC | Performed by: RADIOLOGY

## 2020-05-12 PROCEDURE — 77263 THER RADIOLOGY TX PLNG CPLX: CPT | Mod: ,,, | Performed by: RADIOLOGY

## 2020-05-12 PROCEDURE — 77333 RADIATION TREATMENT AID(S): CPT | Mod: TC | Performed by: RADIOLOGY

## 2020-05-12 PROCEDURE — 77263 PR  RADIATION THERAPY PLAN COMPLEX: ICD-10-PCS | Mod: ,,, | Performed by: RADIOLOGY

## 2020-05-12 PROCEDURE — 77290 PR  SET RADN THERAPY FIELD COMPLEX: ICD-10-PCS | Mod: 26,,, | Performed by: RADIOLOGY

## 2020-05-12 PROCEDURE — 77290 THER RAD SIMULAJ FIELD CPLX: CPT | Mod: TC | Performed by: RADIOLOGY

## 2020-05-12 PROCEDURE — 77333 RADIATION TREATMENT AID(S): CPT | Mod: 26,,, | Performed by: RADIOLOGY

## 2020-05-12 PROCEDURE — 77333 PR  RADN TREATMENT AID(S) INTERM: ICD-10-PCS | Mod: 26,,, | Performed by: RADIOLOGY

## 2020-05-12 PROCEDURE — 77290 THER RAD SIMULAJ FIELD CPLX: CPT | Mod: 26,,, | Performed by: RADIOLOGY

## 2020-05-12 NOTE — PROGRESS NOTES
05/12/2020         Discussed the following with the patient:  In an effort to protect our immunocompromised patients from potential exposure to COVID-19, [facility] will now require all patients receiving an infusion, an injection, and/or radiation therapy to be tested for COVID-19 prior to their appointment.  All patients currently under treatment will be tested immediately, and patients initiating new treatment cycles or with one-time appointments (injections, transfusions, etc.) must be tested within 72 hours of their appointment.      Symptom Patient's Response   Fever YES/NO: no   Cough YES/NO: no   Shortness of breath  YES/NO: no   Difficulty breathing YES/NO: no   GI symptoms such as diarrhea or nausea YES/NO: no   Loss of taste YES/NO: no   Loss of smell YES/NO: no     Other Screening Patient's Response   Has the patient previously undergone COVID-19 testing? YES/NO: no     If yes to the question directly above, what was the result? not applicable   Has the patient been in close contact with someone who has undergone COVID-19 testing? YES/NO: no     If yes to the question directly above, what was the result? not applicable      The patient's 24-hour COVID-19 test was ordered and scheduled.  Reviewed the appointment date, time, and location with the patient.      Advised the patient that she can expect the results to take approximately 24 hours.  Advised the patient that someone will reach out to her regarding her results if she tests positive, as her treatment appointment will be rescheduled if she tests positive for COVID-19.  Also advised the patient that if she does not hear back from our office or if she is told by our office she has tested negative for COVID-19, she can proceed with treatment as originally planned.     Questions were answered to the patient's satisfaction, and the patient verbalized understanding of information and agreement with the plan.       The above was completed in accordance with  instructions and guidelines set forth by [facility].     Signed,        Treva Mcmahon MA     Date:  05/12/2020

## 2020-05-13 ENCOUNTER — PATIENT MESSAGE (OUTPATIENT)
Dept: SURGERY | Facility: CLINIC | Age: 45
End: 2020-05-13

## 2020-05-16 ENCOUNTER — LAB VISIT (OUTPATIENT)
Dept: INTERNAL MEDICINE | Facility: CLINIC | Age: 45
End: 2020-05-16
Payer: COMMERCIAL

## 2020-05-16 DIAGNOSIS — D05.12 BREAST NEOPLASM, TIS (DCIS), LEFT: ICD-10-CM

## 2020-05-16 PROCEDURE — U0003 INFECTIOUS AGENT DETECTION BY NUCLEIC ACID (DNA OR RNA); SEVERE ACUTE RESPIRATORY SYNDROME CORONAVIRUS 2 (SARS-COV-2) (CORONAVIRUS DISEASE [COVID-19]), AMPLIFIED PROBE TECHNIQUE, MAKING USE OF HIGH THROUGHPUT TECHNOLOGIES AS DESCRIBED BY CMS-2020-01-R: HCPCS

## 2020-05-17 LAB — SARS-COV-2 RNA RESP QL NAA+PROBE: NOT DETECTED

## 2020-05-18 ENCOUNTER — TELEPHONE (OUTPATIENT)
Dept: GASTROENTEROLOGY | Facility: CLINIC | Age: 45
End: 2020-05-18

## 2020-05-18 ENCOUNTER — OFFICE VISIT (OUTPATIENT)
Dept: NEUROSURGERY | Facility: CLINIC | Age: 45
End: 2020-05-18
Payer: COMMERCIAL

## 2020-05-18 ENCOUNTER — TELEPHONE (OUTPATIENT)
Dept: PAIN MEDICINE | Facility: CLINIC | Age: 45
End: 2020-05-18

## 2020-05-18 ENCOUNTER — HOSPITAL ENCOUNTER (OUTPATIENT)
Dept: RADIOLOGY | Facility: HOSPITAL | Age: 45
Discharge: HOME | End: 2020-05-18
Attending: NEUROLOGICAL SURGERY
Payer: COMMERCIAL

## 2020-05-18 VITALS
HEIGHT: 69 IN | DIASTOLIC BLOOD PRESSURE: 83 MMHG | WEIGHT: 291 LBS | HEART RATE: 81 BPM | BODY MASS INDEX: 43.1 KG/M2 | SYSTOLIC BLOOD PRESSURE: 139 MMHG

## 2020-05-18 DIAGNOSIS — Z98.1 STATUS POST LUMBAR AND LUMBOSACRAL FUSION BY ANTERIOR TECHNIQUE: ICD-10-CM

## 2020-05-18 DIAGNOSIS — G90.522 COMPLEX REGIONAL PAIN SYNDROME TYPE 1 OF LEFT LOWER EXTREMITY: Primary | ICD-10-CM

## 2020-05-18 DIAGNOSIS — Z98.1 S/P LUMBAR SPINAL FUSION: ICD-10-CM

## 2020-05-18 PROCEDURE — 99999 PR PBB SHADOW E&M-EST. PATIENT-LVL IV: CPT | Mod: PBBFAC,,, | Performed by: NEUROLOGICAL SURGERY

## 2020-05-18 PROCEDURE — 3008F BODY MASS INDEX DOCD: CPT | Mod: CPTII,S$GLB,, | Performed by: NEUROLOGICAL SURGERY

## 2020-05-18 PROCEDURE — 3008F PR BODY MASS INDEX (BMI) DOCUMENTED: ICD-10-PCS | Mod: CPTII,S$GLB,, | Performed by: NEUROLOGICAL SURGERY

## 2020-05-18 PROCEDURE — 99999 PR PBB SHADOW E&M-EST. PATIENT-LVL IV: ICD-10-PCS | Mod: PBBFAC,,, | Performed by: NEUROLOGICAL SURGERY

## 2020-05-18 PROCEDURE — 77295 PR 3D RADIOTHERAPY PLAN: ICD-10-PCS | Mod: 26,,, | Performed by: RADIOLOGY

## 2020-05-18 PROCEDURE — 99213 OFFICE O/P EST LOW 20 MIN: CPT | Mod: S$GLB,,, | Performed by: NEUROLOGICAL SURGERY

## 2020-05-18 PROCEDURE — 77295 3-D RADIOTHERAPY PLAN: CPT | Mod: TC | Performed by: RADIOLOGY

## 2020-05-18 PROCEDURE — 72100 XR LUMBAR SPINE AP AND LATERAL: ICD-10-PCS | Mod: 26,,, | Performed by: RADIOLOGY

## 2020-05-18 PROCEDURE — 77307 TELETHX ISODOSE PLAN CPLX: CPT | Mod: TC | Performed by: RADIOLOGY

## 2020-05-18 PROCEDURE — 99213 PR OFFICE/OUTPT VISIT, EST, LEVL III, 20-29 MIN: ICD-10-PCS | Mod: S$GLB,,, | Performed by: NEUROLOGICAL SURGERY

## 2020-05-18 PROCEDURE — 77300 PR RADIATION THERAPY,DOSIMETRY PLAN: ICD-10-PCS | Mod: 26,,, | Performed by: RADIOLOGY

## 2020-05-18 PROCEDURE — 77295 3-D RADIOTHERAPY PLAN: CPT | Mod: 26,,, | Performed by: RADIOLOGY

## 2020-05-18 PROCEDURE — 77293 PR RESPIRATORY MOTION MGMT SIMULATION: ICD-10-PCS | Mod: 26,,, | Performed by: RADIOLOGY

## 2020-05-18 PROCEDURE — 77300 RADIATION THERAPY DOSE PLAN: CPT | Mod: TC | Performed by: RADIOLOGY

## 2020-05-18 PROCEDURE — 72100 X-RAY EXAM L-S SPINE 2/3 VWS: CPT | Mod: TC,PN

## 2020-05-18 PROCEDURE — 77293 RESPIRATOR MOTION MGMT SIMUL: CPT | Mod: 26,,, | Performed by: RADIOLOGY

## 2020-05-18 PROCEDURE — 72100 X-RAY EXAM L-S SPINE 2/3 VWS: CPT | Mod: 26,,, | Performed by: RADIOLOGY

## 2020-05-18 PROCEDURE — 77300 RADIATION THERAPY DOSE PLAN: CPT | Mod: 26,,, | Performed by: RADIOLOGY

## 2020-05-18 PROCEDURE — 77293 RESPIRATOR MOTION MGMT SIMUL: CPT | Mod: TC | Performed by: RADIOLOGY

## 2020-05-18 NOTE — PROGRESS NOTES
NEUROSURGICAL PROGRESS NOTE    DATE OF SERVICE:  05/18/2020    ATTENDING PHYSICIAN:  Corona Bazan MD    SUBJECTIVE:    INTERIM HISTORY:    This is a very pleasant 44 y.o. female, who is status post L5-S1 anterior interbody fusion with posterior instrumentation 6 months ago for grade 2 spondylolisthesis.  Her severe back and bilateral leg pain has improved after surgery however she developed after the surgery a new onset of left foot numbness and pain in the left L5 distribution.  The pain is not constant but can be severe.  She has been treated with neuropathic pain medication and physical therapy but the pain has been persistent.  Her walking has slightly improved compared to her prior visit.  She now does not need to use the walker anymore but only used a cane.  She is unable to return to work because of the difficulty walking and the pain.    Low Back Pain Scale  R Low Back-Pain Score: 7  R Low Back-Pain Intensity: Pain killers give very little relief from pain  Personal Care : I need some help, but manage most of my personal care.  Lifting: I can only lift very light weights.  Walking: I can only walk using a stick or crutches.  Sitting: Pain prevents me from sitting more than thirty minutes.   Low Back-Standing: I cannot stand for longer than 10 minutes with increasing pain   Low Back-Sleeping: Because of pain my normal nights sleep is reduced by less than one quarter  Social Life: Pain has restricted my social life to home.   Low Back-Traveling: Pain restricts me to short necessary journeys under 30 minutes   Low Back-Changing Degree of Pain: My pain seems to be getting better but improvement is slow         PAST MEDICAL HISTORY:  Active Ambulatory Problems     Diagnosis Date Noted    Nausea 01/31/2017    Left upper quadrant pain 01/31/2017    Hepatomegaly 01/31/2017    GERD (gastroesophageal reflux disease) 01/31/2017    Gastritis 02/04/2017    Former smokeless tobacco use 02/16/2017    Cyst of left  ovary 03/27/2019    Lumbar spondylosis 03/28/2019    Neural foraminal stenosis of lumbar spine 03/28/2019    Anterolisthesis 03/28/2019    Spondylolisthesis of lumbar region 11/07/2019    S/P spinal surgery 12/17/2019    Fatty liver 01/30/2020    Radicular pain of left lower extremity 01/30/2020    Lumbosacral radiculopathy at L5 01/30/2020    Breast neoplasm, Tis (DCIS), left 02/14/2020     Resolved Ambulatory Problems     Diagnosis Date Noted    On esomeprazole prophylaxis 01/31/2017    Elevated liver enzymes 01/31/2017    Acute hepatitis 01/31/2017    RUQ abdominal pain 02/01/2017     Past Medical History:   Diagnosis Date    History of migraine headaches     Obesity     Pollen allergies     PONV (postoperative nausea and vomiting)     Smoker        PAST SURGICAL HISTORY:  Past Surgical History:   Procedure Laterality Date    CHOLECYSTECTOMY      ESOPHAGOGASTRODUODENOSCOPY      EXPLORATORY LAPAROTOMY      HYSTERECTOMY      fibroids    LUMBAR FUSION N/A 11/7/2019    Procedure: FUSION, SPINE, LUMBAR Procedure: STG 1: L5-S1 anterior Lumbar interbody fusion / STG 2:L5-S1 Posterior instrumentation;  Surgeon: Corona Bazan MD;  Location: [facility] OR;  Service: Neurosurgery;  Laterality: N/A;  FUSION, SPINE, LUMBARProcedure: STG 1: L5-S1 anterior Lumbar interbody fusion / STG 2:L5-S1 Posterior instrumentationSURGERY TIME: 2.5hrsLOS:ANESTHESIA: GeneralBlood:    MASTECTOMY, PARTIAL Left 2/14/2020    Procedure: MASTECTOMY, PARTIAL LEFT with RADIOLOGIC MARKER;  Surgeon: Mara Munoz MD;  Location: [facility] OR;  Service: General;  Laterality: Left;    SPINAL FUSION      TONSILLECTOMY         SOCIAL HISTORY:   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 [employer]   Social Needs     Financial resource strain: Not on file    Food insecurity:     Worry: Not on file     Inability: Not on file    Transportation needs:     Medical: Not on file     Non-medical: Not on file   Tobacco Use    Smoking status: Former Smoker     Packs/day: 0.50     Years: 15.00     Pack years: 7.50     Types: Vaping with nicotine     Last attempt to quit: 2019     Years since quittin.7    Smokeless tobacco: Former User   Substance and Sexual Activity    Alcohol use: Yes     Comment: 2-3 drinks per year    Drug use: No    Sexual activity: Yes     Partners: Male     Birth control/protection: See Surgical Hx     Comment: Hysterectomy   Lifestyle    Physical activity:     Days per week: Not on file     Minutes per session: Not on file    Stress: Not on file   Relationships    Social connections:     Talks on phone: Not on file     Gets together: Not on file     Attends Tenriism service: Not on file     Active member of club or organization: Not on file     Attends meetings of clubs or organizations: Not on file     Relationship status: Not on file   Other Topics Concern    Not on file   Social History Narrative    Not on file       FAMILY HISTORY:  Family History   Problem Relation Age of Onset    Cancer Mother     No Known Problems Father     Cancer Maternal Grandfather     Diabetes Neg Hx     Heart disease Neg Hx     Stroke Neg Hx        CURRENTS MEDICATIONS:  Current Outpatient Medications on File Prior to Visit   Medication Sig Dispense Refill    acetaminophen (TYLENOL 8 HOUR) 650 MG TbSR Take 650 mg by mouth every evening.       amitriptyline (ELAVIL) 50 MG tablet TAKE 1 TABLET BY MOUTH AT  NIGHT IN ADDITION TO THE  25MG TABLET FOR A TOTAL OF  75MG AT NIGHT 90 tablet 0    baclofen (LIORESAL) 10 MG tablet Take 1 tablet (10 mg total) by mouth 3 (three) times daily. 270 tablet 3    cholecalciferol, vitamin D3, (VITAMIN D3) 2,000 unit Tab Take by mouth once daily.      diclofenac sodium  (VOLTAREN) 1 % Gel as needed.   3    DULoxetine (CYMBALTA) 30 MG capsule Take 1 capsule (30 mg total) by mouth once daily. 90 capsule 1    fexofenadine (ALLEGRA) 180 MG tablet Take 180 mg by mouth once daily.      gabapentin (NEURONTIN) 600 MG tablet Take 2 tablets (1,200 mg total) by mouth 3 (three) times daily. 540 tablet 3    levocetirizine (XYZAL) 5 MG tablet Take 5 mg by mouth every evening.      lidocaine-tetracaine 7-7 % Crea as needed.   3    pantoprazole (PROTONIX) 40 MG tablet Take 1 tablet (40 mg total) by mouth once daily. 90 tablet 1    scopolamine (TRANSDERM-SCOP) 1.3-1.5 mg (1 mg over 3 days) Place 1 patch onto the skin every 72 hours. 4 patch 3    traMADoL (ULTRAM) 50 mg tablet Take 1-2 tablets ( mg total) by mouth every 8 (eight) hours as needed for Pain. 90 tablet 0    fluticasone propionate (FLONASE) 50 mcg/actuation nasal spray 1 spray (50 mcg total) by Each Nostril route once daily. (Patient not taking: Reported on 5/18/2020) 16 g 11    ketoconazole (NIZORAL) 2 % shampoo APPLY TOPICALLY TWICE A  WEEK. (Patient not taking: Reported on 5/18/2020) 120 mL 0    ondansetron (ZOFRAN-ODT) 4 MG TbDL Take 2 tablets (8 mg total) by mouth every 6 (six) hours as needed. (Patient not taking: Reported on 5/18/2020) 60 tablet 1    SANARE ADV SCAR THERAPY BASE Gel as needed.   3    tamoxifen (NOLVADEX) 20 MG Tab Take 1 tablet (20 mg total) by mouth once daily. 30 tablet 11     No current facility-administered medications on file prior to visit.        ALLERGIES:  Review of patient's allergies indicates:   Allergen Reactions    Gluten protein Nausea And Vomiting    Milk containing products Hives    Demerol [meperidine] Nausea And Vomiting    Imitrex [sumatriptan succinate]      Hysterics      Morphine Nausea And Vomiting    Tetracyclines Nausea And Vomiting    Ciprofloxacin hcl Rash    Codeine Nausea And Vomiting     Can take Dilaudid, oxycontin, oxycodone & tramadol    Erythromycin  Rash       REVIEW OF SYSTEMS:  Review of Systems   Constitutional: Negative for diaphoresis, fever and weight loss.   Respiratory: Negative for shortness of breath.    Cardiovascular: Negative for chest pain.   Gastrointestinal: Negative for blood in stool.   Genitourinary: Negative for hematuria.   Endo/Heme/Allergies: Does not bruise/bleed easily.   All other systems reviewed and are negative.        OBJECTIVE:    PHYSICAL EXAMINATION:   Vitals:    05/18/20 0924   BP: 139/83   Pulse: 81       Physical Exam:  Vitals reviewed.    Constitutional: She appears well-developed and well-nourished.     Eyes: Pupils are equal, round, and reactive to light. Conjunctivae and EOM are normal.     Cardiovascular: Normal distal pulses and no edema.     Abdominal: Soft.     Skin: Skin displays no rash on trunk and no rash on extremities. Skin displays no lesions on trunk and no lesions on extremities.     Psych/Behavior: She is alert. She is oriented to person, place, and time. She has a normal mood and affect.     Musculoskeletal:        Neck: Range of motion is full.     Neurological:        Sensory:   Hypoesthesia 1/2 and allodynia in the left L5 distribution.  Left foot shows mild erythema and swelling.       DTRs: Tricep reflexes are 2+ on the right side and 2+ on the left side. Bicep reflexes are 2+ on the right side and 2+ on the left side. Brachioradialis reflexes are 2+ on the right side and 2+ on the left side. Patellar reflexes are 2+ on the right side and 2+ on the left side. Achilles reflexes are 2+ on the right side and 2+ on the left side.       Back Exam     Muscle Strength   Right Quadriceps:  5/5   Left Quadriceps:  5/5   Right Hamstrings:  5/5   Left Hamstrings:  5/5             SI joint:   Palpation at the right and left SI joints not painful  RALPH test is negative bilaterally  Gaenslen test is negative bilaterally  Thigh thrust test is negative bilaterally    Neurologic Exam     Mental Status   Oriented to  person, place, and time.   Speech: speech is normal   Level of consciousness: alert    Cranial Nerves   Cranial nerves II through XII intact.     CN III, IV, VI   Pupils are equal, round, and reactive to light.  Extraocular motions are normal.     Motor Exam   Muscle bulk: normal  Overall muscle tone: normal    Strength   Right deltoid: 5/5  Left deltoid: 5/5  Right biceps: 5/5  Left biceps: 5/5  Right triceps: 5/5  Left triceps: 5/5  Right wrist flexion: 5/5  Left wrist flexion: 5/5  Right wrist extension: 5/5  Left wrist extension: 5/5  Right interossei: 5/5  Left interossei: 5/5  Right iliopsoas: 5/5  Left iliopsoas: 5/5  Right quadriceps: 5/5  Left quadriceps: 5/5  Right hamstrin/5  Left hamstrin/5  Right anterior tibial: 5/5  Left anterior tibial: 4/5  Right posterior tibial: 5/5  Left posterior tibial: 5/5  Right peroneal: 5/5  Left peroneal: 5/5  Right gastroc: 5/5  Left gastroc: 5/5    Sensory Exam   Light touch normal.   Pinprick normal.   Hypoesthesia 1/2 and allodynia in the left L5 distribution.  Left foot shows mild erythema and swelling.     Gait, Coordination, and Reflexes     Gait  Gait: (Limping, use a cane)    Coordination   Finger to nose coordination: normal  Tandem walking coordination: normal    Reflexes   Right brachioradialis: 2+  Left brachioradialis: 2+  Right biceps: 2+  Left biceps: 2+  Right triceps: 2+  Left triceps: 2+  Right patellar: 2+  Left patellar: 2+  Right achilles: 2+  Left achilles: 2+  Right plantar: normal  Left plantar: normal  Right Betancourt: absent  Left Betancourt: absent  Right ankle clonus: absent  Left ankle clonus: absent        DIAGNOSTIC DATA:  I personally interpreted the following imaging:   Lumbar x-ray today shows consolidation of the L5-S1 fusion    ASSESMENT:  This is a 44 y.o. female with     Problem List Items Addressed This Visit     None      Visit Diagnoses     Complex regional pain syndrome type 1 of left lower extremity    -  Primary    Relevant  Orders    Ambulatory referral/consult to Pain Clinic    Status post lumbar and lumbosacral fusion by anterior technique                PLAN:  Were for in pain management for possible left dorsal root ganglion stimulation trial  All questions answered  Will follow-up as needed in Neurosurgery  Letter sent for disability insurance purposes        Corona Bazan MD  Cell:[phone]

## 2020-05-18 NOTE — TELEPHONE ENCOUNTER
----- Message from Clemencia Watts Jr., MD sent at 5/18/2020  4:34 PM CDT -----  Please get this patient in for an appointment ASAP even if by telemedicine on Friday since I have openings at that time.    Clemencia Watts Jr, MD  Interventional Pain Medicine / Anesthesiology    ----- Message -----  From: Corona Bazan MD  Sent: 5/18/2020  10:00 AM CDT  To: MD Rico Baptiste Jr., I am referring you this patient for left foot chronic regional pain syndrome following L5-S1 anterior interbody fusion for a grade 2 spondylolisthesis that was reduced 6 months ago.  She most likely has sustained some kind of left L5 nerve root traction injury due to the reduction of the spondylolisthesis and has developed chronic left foot pain and mild dorsiflexion weakness/numbness.     Thanks

## 2020-05-19 ENCOUNTER — OFFICE VISIT (OUTPATIENT)
Dept: INTERNAL MEDICINE | Facility: CLINIC | Age: 45
End: 2020-05-19
Payer: COMMERCIAL

## 2020-05-19 DIAGNOSIS — Z72.820 POOR SLEEP: Primary | ICD-10-CM

## 2020-05-19 DIAGNOSIS — K21.9 GASTROESOPHAGEAL REFLUX DISEASE, ESOPHAGITIS PRESENCE NOT SPECIFIED: ICD-10-CM

## 2020-05-19 DIAGNOSIS — M48.061 NEURAL FORAMINAL STENOSIS OF LUMBAR SPINE: ICD-10-CM

## 2020-05-19 DIAGNOSIS — D05.12 BREAST NEOPLASM, TIS (DCIS), LEFT: ICD-10-CM

## 2020-05-19 PROCEDURE — 77280 PR  SET RADN THERAPY FIELD SIMPLE: ICD-10-PCS | Mod: 26,,, | Performed by: RADIOLOGY

## 2020-05-19 PROCEDURE — 99213 OFFICE O/P EST LOW 20 MIN: CPT | Mod: 95,,, | Performed by: FAMILY MEDICINE

## 2020-05-19 PROCEDURE — 99213 PR OFFICE/OUTPT VISIT, EST, LEVL III, 20-29 MIN: ICD-10-PCS | Mod: 95,,, | Performed by: FAMILY MEDICINE

## 2020-05-19 PROCEDURE — 77280 THER RAD SIMULAJ FIELD SMPL: CPT | Mod: TC | Performed by: RADIOLOGY

## 2020-05-19 PROCEDURE — 77280 THER RAD SIMULAJ FIELD SMPL: CPT | Mod: 26,,, | Performed by: RADIOLOGY
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
## 2020-05-20 PROCEDURE — 77412 RADIATION TX DELIVERY LVL 3: CPT | Performed by: RADIOLOGY

## 2020-05-20 PROCEDURE — 77387 GUIDANCE FOR RADJ TX DLVR: CPT | Mod: TC | Performed by: RADIOLOGY

## 2020-05-20 PROCEDURE — 77387 GUIDANCE FOR RADJ TX DLVR: CPT | Mod: 26,,, | Performed by: RADIOLOGY

## 2020-05-20 PROCEDURE — 77387 PR GUIDANCE FOR RADIATION TREATMENT DELIVERY: ICD-10-PCS | Mod: 26,,, | Performed by: RADIOLOGY

## 2020-05-20 PROCEDURE — 77417 THER RADIOLOGY PORT IMAGE(S): CPT | Performed by: RADIOLOGY

## 2020-05-21 PROCEDURE — 77387 GUIDANCE FOR RADJ TX DLVR: CPT | Mod: 26,,, | Performed by: RADIOLOGY

## 2020-05-21 PROCEDURE — 77387 PR GUIDANCE FOR RADIATION TREATMENT DELIVERY: ICD-10-PCS | Mod: 26,,, | Performed by: RADIOLOGY

## 2020-05-21 PROCEDURE — 77387 GUIDANCE FOR RADJ TX DLVR: CPT | Mod: TC | Performed by: RADIOLOGY

## 2020-05-21 PROCEDURE — 77412 RADIATION TX DELIVERY LVL 3: CPT | Performed by: RADIOLOGY

## 2020-05-22 ENCOUNTER — PATIENT MESSAGE (OUTPATIENT)
Dept: PAIN MEDICINE | Facility: CLINIC | Age: 45
End: 2020-05-22

## 2020-05-22 PROCEDURE — 77387 GUIDANCE FOR RADJ TX DLVR: CPT | Mod: TC | Performed by: RADIOLOGY

## 2020-05-22 PROCEDURE — 77412 RADIATION TX DELIVERY LVL 3: CPT | Performed by: RADIOLOGY

## 2020-05-22 PROCEDURE — 77387 GUIDANCE FOR RADJ TX DLVR: CPT | Mod: 26,,, | Performed by: RADIOLOGY

## 2020-05-22 PROCEDURE — 77387 PR GUIDANCE FOR RADIATION TREATMENT DELIVERY: ICD-10-PCS | Mod: 26,,, | Performed by: RADIOLOGY

## 2020-05-25 PROCEDURE — 77387 PR GUIDANCE FOR RADIATION TREATMENT DELIVERY: ICD-10-PCS | Mod: 26,,, | Performed by: RADIOLOGY

## 2020-05-25 PROCEDURE — 77336 RADIATION PHYSICS CONSULT: CPT | Performed by: RADIOLOGY

## 2020-05-25 PROCEDURE — 77412 RADIATION TX DELIVERY LVL 3: CPT | Performed by: RADIOLOGY

## 2020-05-25 PROCEDURE — 77387 GUIDANCE FOR RADJ TX DLVR: CPT | Mod: TC | Performed by: RADIOLOGY

## 2020-05-25 PROCEDURE — 77387 GUIDANCE FOR RADJ TX DLVR: CPT | Mod: 26,,, | Performed by: RADIOLOGY

## 2020-05-26 ENCOUNTER — DOCUMENTATION ONLY (OUTPATIENT)
Dept: RADIATION ONCOLOGY | Facility: CLINIC | Age: 45
End: 2020-05-26

## 2020-05-26 PROCEDURE — 77387 GUIDANCE FOR RADJ TX DLVR: CPT | Mod: 26,,, | Performed by: RADIOLOGY

## 2020-05-26 PROCEDURE — 77412 RADIATION TX DELIVERY LVL 3: CPT | Performed by: RADIOLOGY

## 2020-05-26 PROCEDURE — 77387 PR GUIDANCE FOR RADIATION TREATMENT DELIVERY: ICD-10-PCS | Mod: 26,,, | Performed by: RADIOLOGY

## 2020-05-26 PROCEDURE — 77387 GUIDANCE FOR RADJ TX DLVR: CPT | Mod: TC | Performed by: RADIOLOGY

## 2020-05-27 PROCEDURE — 77387 PR GUIDANCE FOR RADIATION TREATMENT DELIVERY: ICD-10-PCS | Mod: 26,,, | Performed by: RADIOLOGY

## 2020-05-27 PROCEDURE — 77387 GUIDANCE FOR RADJ TX DLVR: CPT | Mod: TC | Performed by: RADIOLOGY

## 2020-05-27 PROCEDURE — 77387 GUIDANCE FOR RADJ TX DLVR: CPT | Mod: 26,,, | Performed by: RADIOLOGY

## 2020-05-27 PROCEDURE — 77412 RADIATION TX DELIVERY LVL 3: CPT | Performed by: RADIOLOGY

## 2020-05-28 PROCEDURE — 77412 RADIATION TX DELIVERY LVL 3: CPT | Performed by: RADIOLOGY

## 2020-05-28 PROCEDURE — 77387 PR GUIDANCE FOR RADIATION TREATMENT DELIVERY: ICD-10-PCS | Mod: 26,,, | Performed by: RADIOLOGY

## 2020-05-28 PROCEDURE — 77387 GUIDANCE FOR RADJ TX DLVR: CPT | Mod: 26,,, | Performed by: RADIOLOGY

## 2020-05-28 PROCEDURE — 77417 THER RADIOLOGY PORT IMAGE(S): CPT | Performed by: RADIOLOGY

## 2020-05-28 PROCEDURE — 77387 GUIDANCE FOR RADJ TX DLVR: CPT | Mod: TC | Performed by: RADIOLOGY

## 2020-05-29 ENCOUNTER — OFFICE VISIT (OUTPATIENT)
Dept: PAIN MEDICINE | Facility: CLINIC | Age: 45
End: 2020-05-29
Payer: COMMERCIAL

## 2020-05-29 ENCOUNTER — TELEPHONE (OUTPATIENT)
Dept: PAIN MEDICINE | Facility: CLINIC | Age: 45
End: 2020-05-29

## 2020-05-29 DIAGNOSIS — M43.27 FUSION OF LUMBOSACRAL SPINE: ICD-10-CM

## 2020-05-29 DIAGNOSIS — G89.4 CHRONIC PAIN SYNDROME: ICD-10-CM

## 2020-05-29 DIAGNOSIS — G90.522 COMPLEX REGIONAL PAIN SYNDROME TYPE 1 OF LEFT LOWER EXTREMITY: Primary | ICD-10-CM

## 2020-05-29 PROCEDURE — 77387 GUIDANCE FOR RADJ TX DLVR: CPT | Mod: TC | Performed by: RADIOLOGY

## 2020-05-29 PROCEDURE — 99244 OFF/OP CNSLTJ NEW/EST MOD 40: CPT | Mod: 95,,, | Performed by: PAIN MEDICINE

## 2020-05-29 PROCEDURE — 99244 PR OFFICE CONSULTATION,LEVEL IV: ICD-10-PCS | Mod: 95,,, | Performed by: PAIN MEDICINE

## 2020-05-29 PROCEDURE — 77387 GUIDANCE FOR RADJ TX DLVR: CPT | Mod: 26,,, | Performed by: RADIOLOGY

## 2020-05-29 PROCEDURE — 77387 PR GUIDANCE FOR RADIATION TREATMENT DELIVERY: ICD-10-PCS | Mod: 26,,, | Performed by: RADIOLOGY

## 2020-05-29 PROCEDURE — 77412 RADIATION TX DELIVERY LVL 3: CPT | Performed by: RADIOLOGY

## 2020-05-29 NOTE — LETTER
May 29, 2020      Corona Bazan MD  [address]  [address]  [facility] [address]           [facility]  [address] [facility] [address]  Phone: [phone]  Fax: [phone]          Patient: Dianne Hemphill   MR Number: [id number]   YOB: 1975   Date of Visit: 5/29/2020       Dear Dr. Corona Bazan:    Thank you for referring Dianne Hemphill to me for evaluation. Attached you will find relevant portions of my assessment and plan of care.    If you have questions, please do not hesitate to call me. I look forward to following Dianne Hemphill along with you.    Sincerely,    Clemencia Watts Jr., MD    Enclosure  CC:  No Recipients    If you would like to receive this communication electronically, please contact [email or web address] or [phone] to request more information on [employer] Link access.    For providers and/or their staff who would like to refer a patient to [facility], please contact us through our one-stop-shop provider referral line, [facility], at [phone].    If you feel you have received this communication in error or would no longer like to receive these types of communications, please e-mail [email or web address]

## 2020-05-29 NOTE — TELEPHONE ENCOUNTER
----- Message from Clemencia Watts Jr., MD sent at 5/29/2020 12:08 PM CDT -----  Colby.  I saw this patient vie Telemed.  Can you call her on Monday and set her up for a Lumbar Sympathetic Block, LEFT.  IV Sedation.    Thank you,  Clemencia Watts Jr, MD  Interventional Pain Medicine / Anesthesiology

## 2020-05-29 NOTE — PROGRESS NOTES
"[facility] New Patient Evaluation  Telemedicine Encounter    Telemedicine Bundle:  This visit was completed during the Coronavirus Crisis to enhance patient safety.  The patient location is: patient's home  The chief complaint leading to consultation is: Leg Pain and Foot Pain  Visit type: Virtual visit with synchronous audio and video  Total time spent with patient: 39 minutes  Each patient to whom he or she provides medical services by telemedicine is:    (1) informed of the relationship between the physician and patient and the respective role of any other health care provider with respect to management of the patient  (2) notified that he or she may decline to receive medical services by telemedicine and may withdraw from such care at any time.    Referred by: Dr. Bazan  Reason for referral: CRPS Type 1    CC:   Chief Complaint   Patient presents with    Leg Pain    Foot Pain     No flowsheet data found.    HPI:   Dianne Hemphill is a 44 y.o. female who complains of severe foot pain starting in Nov 2019.  Patient reports that pain is in the arch of the foot and great toe.  The pain "travels" at times and feels like the "skin is being ripped" off of her foot.  The skin on the left foot is glossy now (sometimes completely white and sometimes purple) and the pain is now traveling up the calf and shin.  The shin is now very painful with intermittent pains like a person is drilling a spike through the foot.  Weather sets of the pain as does standing or walking for more than 10-15 minutes.  It feels like she's walking on a "clubbed foot."  Light touch from towels or bed sheets as well as air movement can make the pain "horrific."  Patient also states that she's on so many medication that she barely stays awake.    Location: left lower extremity  Onset: Nov 7 - she woke up from lumbar surgery with this pain  Current Pain Score: 6-7/10  Daily Pain of Range: 6-10/10  Quality: Burning, Throbbing, Numb " and Stabbing  Radiation: see HPI above  Worsened by: standing for more than 10 minutes and walking for more than 10 minutes  Improved by: medications    Previous Therapies:  PT/OT: Denies OT for the foot.  Endorses PT.  HEP: Endorses daily performance of PT exercises at home.    TENS: Yes, along with PT exercises  Interventions:   Surgery:  Medications:   - NSAIDS:   - MSK Relaxants:   - TCAs:   - SNRIs:   - Topicals:   - Anticonvulsants:  - Opioids: Tramadol - too little relief; Dilaudid and Norco never provided any relief    Current Pain Medications:  1. Gabapentin 1200 TID  2. Duloxetine 30 mg daily  3. Elavil 50 mg QHS  4. Baclofen 10 TID  5. Tylenol 650 QHS    Review of Systems:  Review of Systems   Constitutional: Negative for chills and fever.   HENT: Negative for nosebleeds.    Eyes: Negative for blurred vision and pain.   Respiratory: Negative for hemoptysis.    Cardiovascular: Negative for chest pain and palpitations.   Gastrointestinal: Negative for heartburn, nausea and vomiting.   Genitourinary: Negative for dysuria and hematuria.   Musculoskeletal: Positive for joint pain. Negative for falls and myalgias.   Skin: Negative for rash.   Neurological: Positive for tingling and focal weakness. Negative for seizures and loss of consciousness.   Endo/Heme/Allergies: Does not bruise/bleed easily.   Psychiatric/Behavioral: Negative for hallucinations. The patient has insomnia.        History:    Current Outpatient Medications:     acetaminophen (TYLENOL 8 HOUR) 650 MG TbSR, Take 650 mg by mouth every evening. , Disp: , Rfl:     amitriptyline (ELAVIL) 50 MG tablet, TAKE 1 TABLET BY MOUTH AT  NIGHT IN ADDITION TO THE  25MG TABLET FOR A TOTAL OF  75MG AT NIGHT, Disp: 90 tablet, Rfl: 0    baclofen (LIORESAL) 10 MG tablet, Take 1 tablet (10 mg total) by mouth 3 (three) times daily., Disp: 270 tablet, Rfl: 3    cholecalciferol, vitamin D3, (VITAMIN D3) 2,000 unit Tab, Take by mouth once daily., Disp: , Rfl:      diclofenac sodium (VOLTAREN) 1 % Gel, as needed. , Disp: , Rfl: 3    DULoxetine (CYMBALTA) 30 MG capsule, Take 1 capsule (30 mg total) by mouth once daily., Disp: 90 capsule, Rfl: 1    fexofenadine (ALLEGRA) 180 MG tablet, Take 180 mg by mouth once daily., Disp: , Rfl:     fluticasone propionate (FLONASE) 50 mcg/actuation nasal spray, 1 spray (50 mcg total) by Each Nostril route once daily. (Patient not taking: Reported on 5/18/2020), Disp: 16 g, Rfl: 11    gabapentin (NEURONTIN) 600 MG tablet, Take 2 tablets (1,200 mg total) by mouth 3 (three) times daily., Disp: 540 tablet, Rfl: 3    ketoconazole (NIZORAL) 2 % shampoo, APPLY TOPICALLY TWICE A  WEEK. (Patient not taking: Reported on 5/18/2020), Disp: 120 mL, Rfl: 0    levocetirizine (XYZAL) 5 MG tablet, Take 5 mg by mouth every evening., Disp: , Rfl:     lidocaine-tetracaine 7-7 % Crea, as needed. , Disp: , Rfl: 3    ondansetron (ZOFRAN-ODT) 4 MG TbDL, Take 2 tablets (8 mg total) by mouth every 6 (six) hours as needed. (Patient not taking: Reported on 5/18/2020), Disp: 60 tablet, Rfl: 1    pantoprazole (PROTONIX) 40 MG tablet, Take 1 tablet (40 mg total) by mouth once daily., Disp: 90 tablet, Rfl: 1    SANARE ADV SCAR THERAPY BASE Gel, as needed. , Disp: , Rfl: 3    scopolamine (TRANSDERM-SCOP) 1.3-1.5 mg (1 mg over 3 days), Place 1 patch onto the skin every 72 hours., Disp: 4 patch, Rfl: 3    Past Medical History:   Diagnosis Date    GERD (gastroesophageal reflux disease)     History of migraine headaches     Obesity     Pollen allergies     PONV (postoperative nausea and vomiting)     Smoker        Past Surgical History:   Procedure Laterality Date    CHOLECYSTECTOMY      ESOPHAGOGASTRODUODENOSCOPY      EXPLORATORY LAPAROTOMY      HYSTERECTOMY      fibroids    LUMBAR FUSION N/A 11/7/2019    Procedure: FUSION, SPINE, LUMBAR Procedure: STG 1: L5-S1 anterior Lumbar interbody fusion / STG 2:L5-S1 Posterior instrumentation;  Surgeon: Corona REYES  MD Beni;  Location: [facility] OR;  Service: Neurosurgery;  Laterality: N/A;  FUSION, SPINE, LUMBARProcedure: STG 1: L5-S1 anterior Lumbar interbody fusion / STG 2:L5-S1 Posterior instrumentationSURGERY TIME: 2.5hrsLOS:ANESTHESIA: GeneralBlood:    MASTECTOMY, PARTIAL Left 2020    Procedure: MASTECTOMY, PARTIAL LEFT with RADIOLOGIC MARKER;  Surgeon: Mara Munoz MD;  Location: [facility] OR;  Service: General;  Laterality: Left;    SPINAL FUSION      TONSILLECTOMY         Family History   Problem Relation Age of Onset    Cancer Mother     No Known Problems Father     Cancer Maternal Grandfather     Diabetes Neg Hx     Heart disease Neg Hx     Stroke Neg Hx        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 [employer]   Social Needs    Financial resource strain: Not on file    Food insecurity:     Worry: Not on file     Inability: Not on file    Transportation needs:     Medical: Not on file     Non-medical: Not on file   Tobacco Use    Smoking status: Former Smoker     Packs/day: 0.50     Years: 15.00     Pack years: 7.50     Types: Vaping with nicotine     Last attempt to quit: 2019     Years since quittin.7    Smokeless tobacco: Former User   Substance and Sexual Activity    Alcohol use: Yes     Comment: 2-3 drinks per year    Drug use: No    Sexual activity: Yes     Partners: Male     Birth control/protection: See Surgical Hx     Comment: Hysterectomy   Lifestyle    Physical activity:     Days per week: Not on file     Minutes per session: Not on file    Stress: Not on file   Relationships    Social connections:     Talks on phone: Not on file     Gets together: Not on file     Attends Sikhism service: Not on file     Active member of club or organization: Not on file      "Attends meetings of clubs or organizations: Not on file     Relationship status: Not on file   Other Topics Concern    Not on file   Social History Narrative    Not on file       Review of patient's allergies indicates:   Allergen Reactions    Gluten protein Nausea And Vomiting    Milk containing products Hives    Demerol [meperidine] Nausea And Vomiting    Imitrex [sumatriptan succinate]      Hysterics      Morphine Nausea And Vomiting    Tetracyclines Nausea And Vomiting    Ciprofloxacin hcl Rash    Codeine Nausea And Vomiting     Can take Dilaudid, oxycontin, oxycodone & tramadol    Erythromycin Rash       Physical Exam:  There were no vitals filed for this visit.  General    Nursing note and vitals reviewed.  Constitutional: She is oriented to person, place, and time. She appears well-developed and well-nourished. No distress.   HENT:   Head: Normocephalic and atraumatic.   Nose: Nose normal.   Eyes: Conjunctivae and EOM are normal. Pupils are equal, round, and reactive to light. Right eye exhibits no discharge. Left eye exhibits no discharge. No scleral icterus.   Neck: No JVD present.   Cardiovascular: Intact distal pulses.    Pulmonary/Chest: Effort normal. No respiratory distress.   Abdominal: She exhibits no distension.   Neurological: She is alert and oriented to person, place, and time. Coordination normal.   Psychiatric: She has a normal mood and affect. Her behavior is normal. Judgment and thought content normal.         Left Ankle/Foot Exam     Inspection  Bruising: Foot - absent  Effusion: Foot - absent  Atrophy: Foot - absent  Scars: absent    Comments:  Left foot appears atraumatic but is erythematous compared to the right.  Patient demonstrates a "glossy" texture to the skin by adjusting the angle of her phone.        Imaging:  X-Ray Lumbar Spine AP And Lateral 05/18/2020   Postoperative fusion changes at L5-S1.  Hardware projects in similar positioning without evidence for loosening or " failure.  Slight levocurvature.  Remaining lumbar vertebral body heights and alignment appear maintained with similar discogenic endplate change elsewhere.  SI joints are symmetric.  No new abnormality.       Labs:  BMP  Lab Results   Component Value Date     01/23/2020    K 4.0 01/23/2020     01/23/2020    CO2 25 01/23/2020    BUN 11 01/23/2020    CREATININE 0.8 01/23/2020    CALCIUM 9.5 01/23/2020    ANIONGAP 9 01/23/2020    ESTGFRAFRICA >60 01/23/2020    EGFRNONAA >60 01/23/2020     Lab Results   Component Value Date    ALT 14 01/23/2020    AST 13 01/23/2020    ALKPHOS 73 01/23/2020    BILITOT 0.3 01/23/2020       Assessment:  Problem List Items Addressed This Visit     Complex regional pain syndrome type 1 of left lower extremity - Primary    Chronic pain syndrome    Fusion of lumbosacral spine          5/29/20 - Dianne Hemphill is a 44 y.o. female with chronic left lower extremity pain associated with lumbosacral fusion L5-S1 in November 2019.  Patient states that she awoke from surgery with a severe pain in the left foot and felt like someone was peeling of her skin.  Since that time the pain has continued to be present, but his migrating proximally and now affecting the foot, shin, and calf.  She reports a constellation of neuropathic symptoms including simultaneous numbness along with dysesthesias (burning, tingling, stabbing).  She also endorses allodynia to light touch from bed sheets and air movement along with proximal spread of her symptoms.  She believes that her hips may not be affected as well.  Currently, she is on a robust regiment of neuropathic medications and experiencing very little relief as her pain range is 6-10/10 and severely impacts her ability to walk for more than 10 min.  Given the lack of response to a robust regimen such as hers, I have recommended changing course and pursuing interventional procedures as the primary treatment modality at this point.  This is  especially important given the length of time she has been experiencing the symptoms.  Failure to improve or resolve over the past 6 months indicates a poor likelihood of spontaneous resolution.  We will start with a left lumbar sympathetic block for diagnostic and therapeutic purposes.  We also briefly discussed the role of spinal cord stimulation therapy for the treatment of CRPS type 1, which may be needed if interventional injections like the LSB fail to provide significant and sustained relief.  I spent a significant amount of time explaining what is known about the underlying pathophysiology and characteristics of CRPS.    : Reviewed and consistent with medication use as prescribed.    Treatment Plan:   Procedures: s/f for LEFT Lumbar Sympathetic Block  PT/OT/HEP: Patient may need dedicated OT but she appears a little resistant at the moment due to cost of PT and receipt of exercises that she performing daily.  I will approach this topic at subsequent visits.  Medications: No changes recommended at this time.  As stated above, she is on a robust regimen of medications and I do not think incremental changes to that regimen will provide the significant and sustained relief she is seeking.  Labs: reviewed and medications are appropriately dosed for current hepatorenal function.  Imaging: No additional recommended at this time.    Follow Up: RTC 2 weeks    Clemencia Watts Jr, MD  Interventional Pain Medicine / Anesthesiology    Disclaimer: This note was partly generated using dictation software which may occasionally result in transcription errors.

## 2020-05-29 NOTE — H&P (VIEW-ONLY)
"[facility] New Patient Evaluation  Telemedicine Encounter    Telemedicine Bundle:  This visit was completed during the Coronavirus Crisis to enhance patient safety.  The patient location is: patient's home  The chief complaint leading to consultation is: Leg Pain and Foot Pain  Visit type: Virtual visit with synchronous audio and video  Total time spent with patient: 39 minutes  Each patient to whom he or she provides medical services by telemedicine is:    (1) informed of the relationship between the physician and patient and the respective role of any other health care provider with respect to management of the patient  (2) notified that he or she may decline to receive medical services by telemedicine and may withdraw from such care at any time.    Referred by: Dr. Bazan  Reason for referral: CRPS Type 1    CC:   Chief Complaint   Patient presents with    Leg Pain    Foot Pain     No flowsheet data found.    HPI:   Dianne Hemphill is a 44 y.o. female who complains of severe foot pain starting in Nov 2019.  Patient reports that pain is in the arch of the foot and great toe.  The pain "travels" at times and feels like the "skin is being ripped" off of her foot.  The skin on the left foot is glossy now (sometimes completely white and sometimes purple) and the pain is now traveling up the calf and shin.  The shin is now very painful with intermittent pains like a person is drilling a spike through the foot.  Weather sets of the pain as does standing or walking for more than 10-15 minutes.  It feels like she's walking on a "clubbed foot."  Light touch from towels or bed sheets as well as air movement can make the pain "horrific."  Patient also states that she's on so many medication that she barely stays awake.    Location: left lower extremity  Onset: Nov 7 - she woke up from lumbar surgery with this pain  Current Pain Score: 6-7/10  Daily Pain of Range: 6-10/10  Quality: Burning, Throbbing, Numb " and Stabbing  Radiation: see HPI above  Worsened by: standing for more than 10 minutes and walking for more than 10 minutes  Improved by: medications    Previous Therapies:  PT/OT: Denies OT for the foot.  Endorses PT.  HEP: Endorses daily performance of PT exercises at home.    TENS: Yes, along with PT exercises  Interventions:   Surgery:  Medications:   - NSAIDS:   - MSK Relaxants:   - TCAs:   - SNRIs:   - Topicals:   - Anticonvulsants:  - Opioids: Tramadol - too little relief; Dilaudid and Norco never provided any relief    Current Pain Medications:  1. Gabapentin 1200 TID  2. Duloxetine 30 mg daily  3. Elavil 50 mg QHS  4. Baclofen 10 TID  5. Tylenol 650 QHS    Review of Systems:  Review of Systems   Constitutional: Negative for chills and fever.   HENT: Negative for nosebleeds.    Eyes: Negative for blurred vision and pain.   Respiratory: Negative for hemoptysis.    Cardiovascular: Negative for chest pain and palpitations.   Gastrointestinal: Negative for heartburn, nausea and vomiting.   Genitourinary: Negative for dysuria and hematuria.   Musculoskeletal: Positive for joint pain. Negative for falls and myalgias.   Skin: Negative for rash.   Neurological: Positive for tingling and focal weakness. Negative for seizures and loss of consciousness.   Endo/Heme/Allergies: Does not bruise/bleed easily.   Psychiatric/Behavioral: Negative for hallucinations. The patient has insomnia.        History:    Current Outpatient Medications:     acetaminophen (TYLENOL 8 HOUR) 650 MG TbSR, Take 650 mg by mouth every evening. , Disp: , Rfl:     amitriptyline (ELAVIL) 50 MG tablet, TAKE 1 TABLET BY MOUTH AT  NIGHT IN ADDITION TO THE  25MG TABLET FOR A TOTAL OF  75MG AT NIGHT, Disp: 90 tablet, Rfl: 0    baclofen (LIORESAL) 10 MG tablet, Take 1 tablet (10 mg total) by mouth 3 (three) times daily., Disp: 270 tablet, Rfl: 3    cholecalciferol, vitamin D3, (VITAMIN D3) 2,000 unit Tab, Take by mouth once daily., Disp: , Rfl:      diclofenac sodium (VOLTAREN) 1 % Gel, as needed. , Disp: , Rfl: 3    DULoxetine (CYMBALTA) 30 MG capsule, Take 1 capsule (30 mg total) by mouth once daily., Disp: 90 capsule, Rfl: 1    fexofenadine (ALLEGRA) 180 MG tablet, Take 180 mg by mouth once daily., Disp: , Rfl:     fluticasone propionate (FLONASE) 50 mcg/actuation nasal spray, 1 spray (50 mcg total) by Each Nostril route once daily. (Patient not taking: Reported on 5/18/2020), Disp: 16 g, Rfl: 11    gabapentin (NEURONTIN) 600 MG tablet, Take 2 tablets (1,200 mg total) by mouth 3 (three) times daily., Disp: 540 tablet, Rfl: 3    ketoconazole (NIZORAL) 2 % shampoo, APPLY TOPICALLY TWICE A  WEEK. (Patient not taking: Reported on 5/18/2020), Disp: 120 mL, Rfl: 0    levocetirizine (XYZAL) 5 MG tablet, Take 5 mg by mouth every evening., Disp: , Rfl:     lidocaine-tetracaine 7-7 % Crea, as needed. , Disp: , Rfl: 3    ondansetron (ZOFRAN-ODT) 4 MG TbDL, Take 2 tablets (8 mg total) by mouth every 6 (six) hours as needed. (Patient not taking: Reported on 5/18/2020), Disp: 60 tablet, Rfl: 1    pantoprazole (PROTONIX) 40 MG tablet, Take 1 tablet (40 mg total) by mouth once daily., Disp: 90 tablet, Rfl: 1    SANARE ADV SCAR THERAPY BASE Gel, as needed. , Disp: , Rfl: 3    scopolamine (TRANSDERM-SCOP) 1.3-1.5 mg (1 mg over 3 days), Place 1 patch onto the skin every 72 hours., Disp: 4 patch, Rfl: 3    Past Medical History:   Diagnosis Date    GERD (gastroesophageal reflux disease)     History of migraine headaches     Obesity     Pollen allergies     PONV (postoperative nausea and vomiting)     Smoker        Past Surgical History:   Procedure Laterality Date    CHOLECYSTECTOMY      ESOPHAGOGASTRODUODENOSCOPY      EXPLORATORY LAPAROTOMY      HYSTERECTOMY      fibroids    LUMBAR FUSION N/A 11/7/2019    Procedure: FUSION, SPINE, LUMBAR Procedure: STG 1: L5-S1 anterior Lumbar interbody fusion / STG 2:L5-S1 Posterior instrumentation;  Surgeon: Corona REYES  MD Beni;  Location: [facility] OR;  Service: Neurosurgery;  Laterality: N/A;  FUSION, SPINE, LUMBARProcedure: STG 1: L5-S1 anterior Lumbar interbody fusion / STG 2:L5-S1 Posterior instrumentationSURGERY TIME: 2.5hrsLOS:ANESTHESIA: GeneralBlood:    MASTECTOMY, PARTIAL Left 2020    Procedure: MASTECTOMY, PARTIAL LEFT with RADIOLOGIC MARKER;  Surgeon: Mara Munoz MD;  Location: [facility] OR;  Service: General;  Laterality: Left;    SPINAL FUSION      TONSILLECTOMY         Family History   Problem Relation Age of Onset    Cancer Mother     No Known Problems Father     Cancer Maternal Grandfather     Diabetes Neg Hx     Heart disease Neg Hx     Stroke Neg Hx        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 [employer]   Social Needs    Financial resource strain: Not on file    Food insecurity:     Worry: Not on file     Inability: Not on file    Transportation needs:     Medical: Not on file     Non-medical: Not on file   Tobacco Use    Smoking status: Former Smoker     Packs/day: 0.50     Years: 15.00     Pack years: 7.50     Types: Vaping with nicotine     Last attempt to quit: 2019     Years since quittin.7    Smokeless tobacco: Former User   Substance and Sexual Activity    Alcohol use: Yes     Comment: 2-3 drinks per year    Drug use: No    Sexual activity: Yes     Partners: Male     Birth control/protection: See Surgical Hx     Comment: Hysterectomy   Lifestyle    Physical activity:     Days per week: Not on file     Minutes per session: Not on file    Stress: Not on file   Relationships    Social connections:     Talks on phone: Not on file     Gets together: Not on file     Attends Denominational service: Not on file     Active member of club or organization: Not on file      "Attends meetings of clubs or organizations: Not on file     Relationship status: Not on file   Other Topics Concern    Not on file   Social History Narrative    Not on file       Review of patient's allergies indicates:   Allergen Reactions    Gluten protein Nausea And Vomiting    Milk containing products Hives    Demerol [meperidine] Nausea And Vomiting    Imitrex [sumatriptan succinate]      Hysterics      Morphine Nausea And Vomiting    Tetracyclines Nausea And Vomiting    Ciprofloxacin hcl Rash    Codeine Nausea And Vomiting     Can take Dilaudid, oxycontin, oxycodone & tramadol    Erythromycin Rash       Physical Exam:  There were no vitals filed for this visit.  General    Nursing note and vitals reviewed.  Constitutional: She is oriented to person, place, and time. She appears well-developed and well-nourished. No distress.   HENT:   Head: Normocephalic and atraumatic.   Nose: Nose normal.   Eyes: Conjunctivae and EOM are normal. Pupils are equal, round, and reactive to light. Right eye exhibits no discharge. Left eye exhibits no discharge. No scleral icterus.   Neck: No JVD present.   Cardiovascular: Intact distal pulses.    Pulmonary/Chest: Effort normal. No respiratory distress.   Abdominal: She exhibits no distension.   Neurological: She is alert and oriented to person, place, and time. Coordination normal.   Psychiatric: She has a normal mood and affect. Her behavior is normal. Judgment and thought content normal.         Left Ankle/Foot Exam     Inspection  Bruising: Foot - absent  Effusion: Foot - absent  Atrophy: Foot - absent  Scars: absent    Comments:  Left foot appears atraumatic but is erythematous compared to the right.  Patient demonstrates a "glossy" texture to the skin by adjusting the angle of her phone.        Imaging:  X-Ray Lumbar Spine AP And Lateral 05/18/2020   Postoperative fusion changes at L5-S1.  Hardware projects in similar positioning without evidence for loosening or " failure.  Slight levocurvature.  Remaining lumbar vertebral body heights and alignment appear maintained with similar discogenic endplate change elsewhere.  SI joints are symmetric.  No new abnormality.       Labs:  BMP  Lab Results   Component Value Date     01/23/2020    K 4.0 01/23/2020     01/23/2020    CO2 25 01/23/2020    BUN 11 01/23/2020    CREATININE 0.8 01/23/2020    CALCIUM 9.5 01/23/2020    ANIONGAP 9 01/23/2020    ESTGFRAFRICA >60 01/23/2020    EGFRNONAA >60 01/23/2020     Lab Results   Component Value Date    ALT 14 01/23/2020    AST 13 01/23/2020    ALKPHOS 73 01/23/2020    BILITOT 0.3 01/23/2020       Assessment:  Problem List Items Addressed This Visit     Complex regional pain syndrome type 1 of left lower extremity - Primary    Chronic pain syndrome    Fusion of lumbosacral spine          5/29/20 - Dianne Hemphill is a 44 y.o. female with chronic left lower extremity pain associated with lumbosacral fusion L5-S1 in November 2019.  Patient states that she awoke from surgery with a severe pain in the left foot and felt like someone was peeling of her skin.  Since that time the pain has continued to be present, but his migrating proximally and now affecting the foot, shin, and calf.  She reports a constellation of neuropathic symptoms including simultaneous numbness along with dysesthesias (burning, tingling, stabbing).  She also endorses allodynia to light touch from bed sheets and air movement along with proximal spread of her symptoms.  She believes that her hips may not be affected as well.  Currently, she is on a robust regiment of neuropathic medications and experiencing very little relief as her pain range is 6-10/10 and severely impacts her ability to walk for more than 10 min.  Given the lack of response to a robust regimen such as hers, I have recommended changing course and pursuing interventional procedures as the primary treatment modality at this point.  This is  especially important given the length of time she has been experiencing the symptoms.  Failure to improve or resolve over the past 6 months indicates a poor likelihood of spontaneous resolution.  We will start with a left lumbar sympathetic block for diagnostic and therapeutic purposes.  We also briefly discussed the role of spinal cord stimulation therapy for the treatment of CRPS type 1, which may be needed if interventional injections like the LSB fail to provide significant and sustained relief.  I spent a significant amount of time explaining what is known about the underlying pathophysiology and characteristics of CRPS.    : Reviewed and consistent with medication use as prescribed.    Treatment Plan:   Procedures: s/f for LEFT Lumbar Sympathetic Block  PT/OT/HEP: Patient may need dedicated OT but she appears a little resistant at the moment due to cost of PT and receipt of exercises that she performing daily.  I will approach this topic at subsequent visits.  Medications: No changes recommended at this time.  As stated above, she is on a robust regimen of medications and I do not think incremental changes to that regimen will provide the significant and sustained relief she is seeking.  Labs: reviewed and medications are appropriately dosed for current hepatorenal function.  Imaging: No additional recommended at this time.    Follow Up: RTC 2 weeks    Clemencia Watts Jr, MD  Interventional Pain Medicine / Anesthesiology    Disclaimer: This note was partly generated using dictation software which may occasionally result in transcription errors.

## 2020-06-01 ENCOUNTER — HOSPITAL ENCOUNTER (OUTPATIENT)
Dept: RADIATION THERAPY | Facility: HOSPITAL | Age: 45
Discharge: HOME | End: 2020-06-01
Attending: RADIOLOGY
Payer: COMMERCIAL

## 2020-06-01 PROCEDURE — 77412 RADIATION TX DELIVERY LVL 3: CPT | Performed by: RADIOLOGY

## 2020-06-01 PROCEDURE — 77387 GUIDANCE FOR RADJ TX DLVR: CPT | Mod: TC | Performed by: RADIOLOGY

## 2020-06-01 PROCEDURE — 77387 GUIDANCE FOR RADJ TX DLVR: CPT | Mod: 26,,, | Performed by: RADIOLOGY

## 2020-06-01 PROCEDURE — 77387 PR GUIDANCE FOR RADIATION TREATMENT DELIVERY: ICD-10-PCS | Mod: 26,,, | Performed by: RADIOLOGY

## 2020-06-01 PROCEDURE — 77336 RADIATION PHYSICS CONSULT: CPT | Performed by: RADIOLOGY

## 2020-06-02 ENCOUNTER — TELEPHONE (OUTPATIENT)
Dept: PAIN MEDICINE | Facility: CLINIC | Age: 45
End: 2020-06-02

## 2020-06-02 ENCOUNTER — DOCUMENTATION ONLY (OUTPATIENT)
Dept: RADIATION ONCOLOGY | Facility: CLINIC | Age: 45
End: 2020-06-02

## 2020-06-02 DIAGNOSIS — M43.27 FUSION OF LUMBOSACRAL SPINE: Primary | ICD-10-CM

## 2020-06-02 DIAGNOSIS — Z01.818 PREOP TESTING: Primary | ICD-10-CM

## 2020-06-02 DIAGNOSIS — G89.4 CHRONIC PAIN SYNDROME: ICD-10-CM

## 2020-06-02 PROCEDURE — 77387 GUIDANCE FOR RADJ TX DLVR: CPT | Mod: TC | Performed by: RADIOLOGY

## 2020-06-02 PROCEDURE — 77387 PR GUIDANCE FOR RADIATION TREATMENT DELIVERY: ICD-10-PCS | Mod: 26,,, | Performed by: RADIOLOGY

## 2020-06-02 PROCEDURE — 77412 RADIATION TX DELIVERY LVL 3: CPT | Performed by: RADIOLOGY

## 2020-06-02 PROCEDURE — 77387 GUIDANCE FOR RADJ TX DLVR: CPT | Mod: 26,,, | Performed by: RADIOLOGY

## 2020-06-02 NOTE — TELEPHONE ENCOUNTER
Pt scheduled for 6/11/20 at 1115 am for Left Lumbar sympathetic block. Pt aware to check in at registration desk on the first floor of the hospital for 1015 am. Pt denied taking blood thinners and is not diabetic.  Pt scheduled for covid testing on 6/9/20 at [facility].

## 2020-06-03 PROCEDURE — 77387 PR GUIDANCE FOR RADIATION TREATMENT DELIVERY: ICD-10-PCS | Mod: 26,,, | Performed by: RADIOLOGY

## 2020-06-03 PROCEDURE — 77387 GUIDANCE FOR RADJ TX DLVR: CPT | Mod: TC | Performed by: RADIOLOGY

## 2020-06-03 PROCEDURE — 77412 RADIATION TX DELIVERY LVL 3: CPT | Performed by: RADIOLOGY

## 2020-06-03 PROCEDURE — 77387 GUIDANCE FOR RADJ TX DLVR: CPT | Mod: 26,,, | Performed by: RADIOLOGY

## 2020-06-03 PROCEDURE — 77334 PR  RADN TREATMENT AID(S) COMPLX: ICD-10-PCS | Mod: 26,,, | Performed by: RADIOLOGY

## 2020-06-03 PROCEDURE — 77321 SPECIAL TELETX PORT PLAN: CPT | Mod: TC | Performed by: RADIOLOGY

## 2020-06-03 PROCEDURE — 77334 RADIATION TREATMENT AID(S): CPT | Mod: 26,,, | Performed by: RADIOLOGY

## 2020-06-03 PROCEDURE — 77334 RADIATION TREATMENT AID(S): CPT | Mod: TC | Performed by: RADIOLOGY

## 2020-06-03 PROCEDURE — 77321 PR  TELETHER ISO-PORT PLAN: ICD-10-PCS | Mod: 26,,, | Performed by: RADIOLOGY

## 2020-06-03 PROCEDURE — 77321 SPECIAL TELETX PORT PLAN: CPT | Mod: 26,,, | Performed by: RADIOLOGY

## 2020-06-04 PROCEDURE — 77417 THER RADIOLOGY PORT IMAGE(S): CPT | Performed by: RADIOLOGY

## 2020-06-04 PROCEDURE — 77387 PR GUIDANCE FOR RADIATION TREATMENT DELIVERY: ICD-10-PCS | Mod: 26,,, | Performed by: RADIOLOGY

## 2020-06-04 PROCEDURE — 77412 RADIATION TX DELIVERY LVL 3: CPT | Performed by: RADIOLOGY

## 2020-06-04 PROCEDURE — 77387 GUIDANCE FOR RADJ TX DLVR: CPT | Mod: TC | Performed by: RADIOLOGY

## 2020-06-04 PROCEDURE — 77387 GUIDANCE FOR RADJ TX DLVR: CPT | Mod: 26,,, | Performed by: RADIOLOGY

## 2020-06-05 PROCEDURE — 77387 GUIDANCE FOR RADJ TX DLVR: CPT | Mod: 26,,, | Performed by: RADIOLOGY

## 2020-06-05 PROCEDURE — 77387 GUIDANCE FOR RADJ TX DLVR: CPT | Mod: TC | Performed by: RADIOLOGY

## 2020-06-05 PROCEDURE — 77387 PR GUIDANCE FOR RADIATION TREATMENT DELIVERY: ICD-10-PCS | Mod: 26,,, | Performed by: RADIOLOGY

## 2020-06-05 PROCEDURE — 77412 RADIATION TX DELIVERY LVL 3: CPT | Performed by: RADIOLOGY

## 2020-06-08 PROCEDURE — 77412 RADIATION TX DELIVERY LVL 3: CPT | Performed by: RADIOLOGY

## 2020-06-08 PROCEDURE — 77336 RADIATION PHYSICS CONSULT: CPT | Performed by: RADIOLOGY

## 2020-06-08 PROCEDURE — 77387 GUIDANCE FOR RADJ TX DLVR: CPT | Mod: 26,,, | Performed by: RADIOLOGY

## 2020-06-08 PROCEDURE — 77387 PR GUIDANCE FOR RADIATION TREATMENT DELIVERY: ICD-10-PCS | Mod: 26,,, | Performed by: RADIOLOGY

## 2020-06-08 PROCEDURE — 77387 GUIDANCE FOR RADJ TX DLVR: CPT | Mod: TC | Performed by: RADIOLOGY

## 2020-06-09 ENCOUNTER — LAB VISIT (OUTPATIENT)
Dept: FAMILY MEDICINE | Facility: CLINIC | Age: 45
End: 2020-06-09
Payer: COMMERCIAL

## 2020-06-09 DIAGNOSIS — Z01.818 PREOP TESTING: ICD-10-CM

## 2020-06-09 PROCEDURE — U0003 INFECTIOUS AGENT DETECTION BY NUCLEIC ACID (DNA OR RNA); SEVERE ACUTE RESPIRATORY SYNDROME CORONAVIRUS 2 (SARS-COV-2) (CORONAVIRUS DISEASE [COVID-19]), AMPLIFIED PROBE TECHNIQUE, MAKING USE OF HIGH THROUGHPUT TECHNOLOGIES AS DESCRIBED BY CMS-2020-01-R: HCPCS

## 2020-06-10 ENCOUNTER — DOCUMENTATION ONLY (OUTPATIENT)
Dept: RADIATION ONCOLOGY | Facility: CLINIC | Age: 45
End: 2020-06-10

## 2020-06-10 LAB — SARS-COV-2 RNA RESP QL NAA+PROBE: NOT DETECTED

## 2020-06-10 PROCEDURE — 77387 GUIDANCE FOR RADJ TX DLVR: CPT | Mod: 26,,, | Performed by: RADIOLOGY

## 2020-06-10 PROCEDURE — 77412 RADIATION TX DELIVERY LVL 3: CPT | Performed by: RADIOLOGY

## 2020-06-10 PROCEDURE — 77387 PR GUIDANCE FOR RADIATION TREATMENT DELIVERY: ICD-10-PCS | Mod: 26,,, | Performed by: RADIOLOGY

## 2020-06-10 PROCEDURE — 77387 GUIDANCE FOR RADJ TX DLVR: CPT | Mod: TC | Performed by: RADIOLOGY

## 2020-06-10 NOTE — DISCHARGE INSTRUCTIONS
Home Care Instructions Pain Management:    1.  DIET:    You may resume your normal diet today.    2.  BATHING:    You may shower with luke warm water.    3.  DRESSING:    You may remove your bandage today.    4.  ACTIVITY LEVEL:      You may resume your normal activities 24 hours after your procedure.    5.  MEDICATIONS:    You may resume your normal medications today.    6.  SPECIAL INSTRUCTIONS:    No heat to the injection site for 24 hours including bath or shower, heating pad, moist heat or hot tubs.    Use an ice pack to the injection site for any pain or discomfort.  Apply ice packs for 20 minute intervals as needed.    If you have received any sedatives by mouth today, you can not drive for 12 hours.    If you have received sedation through an IV, you can not drive for 24 hours.    PLEASE CALL YOUR DOCTOR FOR THE FOLLOWIN.  Redness or swelling around the injection site.  2.  Fever of 101 degrees.  3.  Drainage (pus) from the injection site.  4.  For any continuous bleeding (some dried blood over the incision is normal.)    FOR EMERGENCIES:    If any unusual problems or difficulties occur during clinic hours, call [phone] or dial [phone].    Follow up with with your physician in 2-3 weeks.

## 2020-06-10 NOTE — PLAN OF CARE
Pt. On day 15 of outpt. Xrt to breast.  Faint erythema.  To have procedure for back pain tomorrow.  Rtc 6-8 weeks.

## 2020-06-11 ENCOUNTER — HOSPITAL ENCOUNTER (OUTPATIENT)
Facility: HOSPITAL | Age: 45
Discharge: HOME | End: 2020-06-11
Attending: PAIN MEDICINE | Admitting: PAIN MEDICINE
Payer: COMMERCIAL

## 2020-06-11 VITALS
SYSTOLIC BLOOD PRESSURE: 120 MMHG | BODY MASS INDEX: 43.4 KG/M2 | OXYGEN SATURATION: 95 % | HEIGHT: 69 IN | DIASTOLIC BLOOD PRESSURE: 80 MMHG | RESPIRATION RATE: 16 BRPM | TEMPERATURE: 98 F | HEART RATE: 71 BPM | WEIGHT: 293 LBS

## 2020-06-11 DIAGNOSIS — G89.29 CHRONIC PAIN: ICD-10-CM

## 2020-06-11 DIAGNOSIS — G89.4 CHRONIC PAIN SYNDROME: ICD-10-CM

## 2020-06-11 DIAGNOSIS — M43.27 FUSION OF LUMBOSACRAL SPINE: Primary | ICD-10-CM

## 2020-06-11 PROCEDURE — 77003 FLUOROGUIDE FOR SPINE INJECT: CPT | Mod: 26,,, | Performed by: PAIN MEDICINE

## 2020-06-11 PROCEDURE — 99153 MOD SED SAME PHYS/QHP EA: CPT | Performed by: PAIN MEDICINE

## 2020-06-11 PROCEDURE — 77003 PR FLUOROSCOPIC GUIDANCE SPINAL INJECTION: ICD-10-PCS | Mod: 26,,, | Performed by: PAIN MEDICINE

## 2020-06-11 PROCEDURE — 64520 N BLOCK LUMBAR/THORACIC: CPT | Mod: LT,,, | Performed by: PAIN MEDICINE

## 2020-06-11 PROCEDURE — 64520 PR INJECT NERV BLCK,PARAVERT SYMPATH: ICD-10-PCS | Mod: LT,,, | Performed by: PAIN MEDICINE

## 2020-06-11 PROCEDURE — 64520 N BLOCK LUMBAR/THORACIC: CPT | Performed by: PAIN MEDICINE

## 2020-06-11 PROCEDURE — 25000003 PHARM REV CODE 250: Performed by: PAIN MEDICINE

## 2020-06-11 PROCEDURE — 99152 MOD SED SAME PHYS/QHP 5/>YRS: CPT | Performed by: PAIN MEDICINE

## 2020-06-11 PROCEDURE — 25500020 PHARM REV CODE 255: Performed by: PAIN MEDICINE

## 2020-06-11 PROCEDURE — 63600175 PHARM REV CODE 636 W HCPCS: Performed by: PAIN MEDICINE

## 2020-06-11 RX ORDER — FENTANYL CITRATE 50 UG/ML
INJECTION, SOLUTION INTRAMUSCULAR; INTRAVENOUS
Status: DISCONTINUED | OUTPATIENT
Start: 2020-06-11 | End: 2020-06-11 | Stop reason: HOSPADM

## 2020-06-11 RX ORDER — SODIUM BICARBONATE 1 MEQ/ML
SYRINGE (ML) INTRAVENOUS
Status: DISCONTINUED | OUTPATIENT
Start: 2020-06-11 | End: 2020-06-11 | Stop reason: HOSPADM

## 2020-06-11 RX ORDER — DEXAMETHASONE SODIUM PHOSPHATE 10 MG/ML
INJECTION INTRAMUSCULAR; INTRAVENOUS
Status: DISCONTINUED | OUTPATIENT
Start: 2020-06-11 | End: 2020-06-11 | Stop reason: HOSPADM

## 2020-06-11 RX ORDER — ONDANSETRON 2 MG/ML
4 INJECTION INTRAMUSCULAR; INTRAVENOUS ONCE
Status: COMPLETED | OUTPATIENT
Start: 2020-06-11 | End: 2020-06-11

## 2020-06-11 RX ORDER — LIDOCAINE HYDROCHLORIDE 10 MG/ML
1 INJECTION, SOLUTION EPIDURAL; INFILTRATION; INTRACAUDAL; PERINEURAL ONCE
Status: ACTIVE | OUTPATIENT
Start: 2020-06-11

## 2020-06-11 RX ORDER — LIDOCAINE HYDROCHLORIDE 10 MG/ML
INJECTION INFILTRATION; PERINEURAL
Status: DISCONTINUED | OUTPATIENT
Start: 2020-06-11 | End: 2020-06-11 | Stop reason: HOSPADM

## 2020-06-11 RX ORDER — BUPIVACAINE HYDROCHLORIDE 2.5 MG/ML
INJECTION, SOLUTION EPIDURAL; INFILTRATION; INTRACAUDAL
Status: DISCONTINUED | OUTPATIENT
Start: 2020-06-11 | End: 2020-06-11 | Stop reason: HOSPADM

## 2020-06-11 RX ORDER — SODIUM CHLORIDE 9 MG/ML
500 INJECTION, SOLUTION INTRAVENOUS CONTINUOUS
Status: ACTIVE | OUTPATIENT
Start: 2020-06-11 | End: 2020-06-12

## 2020-06-11 RX ORDER — MIDAZOLAM HYDROCHLORIDE 1 MG/ML
INJECTION, SOLUTION INTRAMUSCULAR; INTRAVENOUS
Status: DISCONTINUED | OUTPATIENT
Start: 2020-06-11 | End: 2020-06-11 | Stop reason: HOSPADM

## 2020-06-11 RX ADMIN — SODIUM CHLORIDE 500 ML: 0.9 INJECTION, SOLUTION INTRAVENOUS at 10:06

## 2020-06-11 RX ADMIN — ONDANSETRON 4 MG: 2 INJECTION INTRAMUSCULAR; INTRAVENOUS at 10:06

## 2020-06-11 NOTE — OP NOTE
"Procedure Note    Pre-operative Diagnosis: Chronic Regional Pain Syndrome Type 1  Post-operative Diagnosis: Chronic Regional Pain Syndrome Type 1  Procedure:  (1) Lumbar Sympathetic Block     (2) Intraoperative fluoroscopy    (3) IV Moderate Sedation (Midazolam 2 mg, Fentanyl 50 mcg)    Procedure Date: 06/11/2020    Indications: To alleviate pain and suffering, and reduce functional impairment associated with Chronic Regional Pain Syndrome, Type 1 of the lower extremity.    The patients history and physical exam were reviewed. The risks (bleeding, intravascular injection, intrathecal or epidural injection, perforation of viscera, groin pain, genitofemoral nerve injury), benefits and alternatives to the procedure were discussed, and all questions were answered to the patients satisfaction. The patient agreed to proceed, and written informed consent was verified.    Procedure in Detail: Prior to transporting the patient to the procedure room, an IV fluid bolus of saline 300-500 mL was administered.  The patient was brought into the procedure room and placed in the prone position on the fluoroscopy table. The area of the lumbar spine was prepped with Chloraprep and draped in a sterile manner.  The L3 vertebral body was identified and the intensifier angled to square off the vertebral endplates at that level.  A 45 degree oblique view was obtained.  A point just below the left transverse process overlying the anterior margin of the vertebral body was marked on the skin.  This point and the tissues beneath it were anesthetized by infiltration with Lidocaine 1% 3-5 mL with a  1.25" 25G needle.  Next, a 22G 7" spine needle was inserted through the anesthetized skin and advanced toward the anterior edge of the L3 vertebral body under intermittent fluoroscopy.  Following contact with the vertebral body, lateral fluoroscopy images were used to incrementally advance the needle to the anterior margin.  Iodonated contrast was " injected demonstrating local accumulation.  A mixture of Bupivacaine 0.25% 9 mL + Dexamethasone 10 mg (1 mL) was administered in 2 mL increments over the next 20 minutes.  The patient denied an adverse symptoms or sensations; blood pressure was stable.      The needle was removed and a bandage applied to puncture site.    Disposition: The patient tolerated the procedure well, and there were no apparent complications. Vital signs remained stable throughout the procedure. The patient was taken to the recovery area where written discharge instructions for the procedure were given.     Follow-up: RTC as scheduled      Clemencia Watts Jr, MD  Interventional Pain Medicine / Anesthesiology

## 2020-06-11 NOTE — DISCHARGE SUMMARY
[facility]  Discharge Note  Short Stay     Admit Date: 6/11/2020    Discharge Date: 6/11/2020     Attending Physician: Clemencia Watts Jr, MD    Diagnoses:  Active Hospital Problems    Diagnosis  POA    Chronic pain [G89.29]  Yes      Resolved Hospital Problems   No resolved problems to display.     Discharged Condition: Good     Hospital Course: Patient was admitted for an outpatient interventional pain management procedure and tolerated the procedure well with no complications.     Final Diagnoses: Same as principal problem.     Disposition: Home or Self Care     Follow up/Patient Instructions:    Follow-up Information     Clemencia Watts Jr, MD. Go in 2 weeks.    Specialty:  Pain Medicine  Why:  Post-procedural Follow Up As Scheduled, Call to make an appointment if you do not have one  Contact information:  [address]  [address]  [place] [id number] [phone]                   Reconciled Medications:     Medication List      CONTINUE taking these medications    amitriptyline 50 MG tablet  Commonly known as:  ELAVIL  TAKE 1 TABLET BY MOUTH AT  NIGHT IN ADDITION TO THE  25MG TABLET FOR A TOTAL OF  75MG AT NIGHT     baclofen 10 MG tablet  Commonly known as:  LIORESAL  Take 1 tablet (10 mg total) by mouth 3 (three) times daily.     diclofenac sodium 1 % Gel  Commonly known as:  VOLTAREN  as needed.     DULoxetine 30 MG capsule  Commonly known as:  CYMBALTA  Take 1 capsule (30 mg total) by mouth once daily.     fexofenadine 180 MG tablet  Commonly known as:  ALLEGRA  Take 180 mg by mouth once daily.     fluticasone propionate 50 mcg/actuation nasal spray  Commonly known as:  FLONASE  1 spray (50 mcg total) by Each Nostril route once daily.     gabapentin 600 MG tablet  Commonly known as:  NEURONTIN  Take 2 tablets (1,200 mg total) by mouth 3 (three) times daily.     ketoconazole 2 % shampoo  Commonly known as:  NIZORAL  APPLY TOPICALLY TWICE A  WEEK.     levocetirizine 5 MG tablet  Commonly  known as:  XYZAL  Take 5 mg by mouth every evening.     lidocaine-tetracaine 7-7 % Crea  as needed.     ondansetron 4 MG Tbdl  Commonly known as:  ZOFRAN-ODT  Take 2 tablets (8 mg total) by mouth every 6 (six) hours as needed.     pantoprazole 40 MG tablet  Commonly known as:  PROTONIX  Take 1 tablet (40 mg total) by mouth once daily.     SANARE ADV SCAR THERAPY BASE Gel  Generic drug:  gel base no.184 (bulk)  as needed.     scopolamine 1.3-1.5 mg (1 mg over 3 days)  Commonly known as:  TRANSDERM-SCOP  Place 1 patch onto the skin every 72 hours.     TYLENOL 8 HOUR 650 MG Tbsr  Generic drug:  acetaminophen  Take 650 mg by mouth every evening.     VITAMIN D3 50 mcg (2,000 unit) Tab  Generic drug:  cholecalciferol (vitamin D3)  Take by mouth once daily.           Discharge Procedure Orders (must include Diet, Follow-up, Activity)   Call MD for:  temperature >100.4     Call MD for:  severe uncontrolled pain     Call MD for:  redness, tenderness, or signs of infection (pain, swelling, redness, odor or green/yellow discharge around incision site)     Call MD for:  difficulty breathing or increased cough     Call MD for:  severe persistent headache     Call MD for:  worsening rash     Remove dressing in 24 hours       Clemencia Watts Jr, MD  Interventional Pain Medicine / Anesthesiology

## 2020-06-11 NOTE — PROGRESS NOTES
MD Arrival Time:1127  Sedation Start Time:1127  Sedation End Time:1151  MD Departure Time:1151

## 2020-06-11 NOTE — PLAN OF CARE
Discharge instructions with pain diary given and explained, pt voiced understanding. Denies n/v.  Transported to car via w/c with spouse safety maintained.

## 2020-06-12 PROCEDURE — 77387 PR GUIDANCE FOR RADIATION TREATMENT DELIVERY: ICD-10-PCS | Mod: 26,,, | Performed by: RADIOLOGY

## 2020-06-12 PROCEDURE — 77387 GUIDANCE FOR RADJ TX DLVR: CPT | Mod: 26,,, | Performed by: RADIOLOGY

## 2020-06-12 PROCEDURE — 77412 RADIATION TX DELIVERY LVL 3: CPT | Performed by: RADIOLOGY

## 2020-06-12 PROCEDURE — 77387 GUIDANCE FOR RADJ TX DLVR: CPT | Mod: TC | Performed by: RADIOLOGY

## 2020-06-15 PROCEDURE — 77412 RADIATION TX DELIVERY LVL 3: CPT | Performed by: RADIOLOGY

## 2020-06-16 ENCOUNTER — DOCUMENTATION ONLY (OUTPATIENT)
Dept: RADIATION ONCOLOGY | Facility: CLINIC | Age: 45
End: 2020-06-16

## 2020-06-16 PROCEDURE — 77412 RADIATION TX DELIVERY LVL 3: CPT | Performed by: RADIOLOGY

## 2020-06-17 PROCEDURE — 77336 RADIATION PHYSICS CONSULT: CPT | Performed by: RADIOLOGY

## 2020-06-17 PROCEDURE — 77412 RADIATION TX DELIVERY LVL 3: CPT | Performed by: RADIOLOGY

## 2020-06-18 ENCOUNTER — TELEPHONE (OUTPATIENT)
Dept: PAIN MEDICINE | Facility: CLINIC | Age: 45
End: 2020-06-18

## 2020-06-18 ENCOUNTER — OFFICE VISIT (OUTPATIENT)
Dept: PAIN MEDICINE | Facility: CLINIC | Age: 45
End: 2020-06-18
Payer: COMMERCIAL

## 2020-06-18 DIAGNOSIS — M43.27 FUSION OF LUMBOSACRAL SPINE: ICD-10-CM

## 2020-06-18 DIAGNOSIS — M54.15 RADICULOPATHY, THORACOLUMBAR REGION: ICD-10-CM

## 2020-06-18 DIAGNOSIS — G90.522 COMPLEX REGIONAL PAIN SYNDROME TYPE 1 OF LEFT LOWER EXTREMITY: Primary | ICD-10-CM

## 2020-06-18 DIAGNOSIS — G89.4 CHRONIC PAIN SYNDROME: ICD-10-CM

## 2020-06-18 PROCEDURE — 77412 RADIATION TX DELIVERY LVL 3: CPT | Performed by: RADIOLOGY

## 2020-06-18 PROCEDURE — 99213 PR OFFICE/OUTPT VISIT, EST, LEVL III, 20-29 MIN: ICD-10-PCS | Mod: 95,,, | Performed by: NURSE PRACTITIONER

## 2020-06-18 PROCEDURE — 99213 OFFICE O/P EST LOW 20 MIN: CPT | Mod: 95,,, | Performed by: NURSE PRACTITIONER

## 2020-06-18 NOTE — PROGRESS NOTES
"[facility] New Patient Evaluation  Telemedicine Encounter    Telemedicine Bundle:  This visit was completed during the Coronavirus Crisis to enhance patient safety.  The patient location is: patient's home  The chief complaint leading to consultation is: Left leg and right hip   Visit type: Virtual visit with synchronous audio and video  Total time spent with patient: 39 minutes  Each patient to whom he or she provides medical services by telemedicine is:    (1) informed of the relationship between the physician and patient and the respective role of any other health care provider with respect to management of the patient  (2) notified that he or she may decline to receive medical services by telemedicine and may withdraw from such care at any time.    Referred by: Dr. Bazan  Reason for referral: CRPS Type 1    CC:   No chief complaint on file.    No flowsheet data found.     6/18/2020- 44-year-old female presents status post LEFT Lumbar Sympathetic Block with 0% relief patient continues to complain of pain in the left leg and foot. She is also c/o right hip pain.  She states she continues on gabapentin 3600 mg daily she states that the meds take the edge off which she misses a dose she definitely feels an increase in her pain.  Pain scores afternoon was 5/6/10.    HPI:   Dianne Hemphill is a 44 y.o. female who complains of severe foot pain starting in Nov 2019.  Patient reports that pain is in the arch of the foot and great toe.  The pain "travels" at times and feels like the "skin is being ripped" off of her foot.  The skin on the left foot is glossy now (sometimes completely white and sometimes purple) and the pain is now traveling up the calf and shin.  The shin is now very painful with intermittent pains like a person is drilling a spike through the foot.  Weather sets of the pain as does standing or walking for more than 10-15 minutes.  It feels like she's walking on a "clubbed foot."  Light " "touch from towels or bed sheets as well as air movement can make the pain "horrific."  Patient also states that she's on so many medication that she barely stays awake.    Location: left lower extremity  Onset: Nov 7 - she woke up from lumbar surgery with this pain  Current Pain Score: 6-7/10  Daily Pain of Range: 6-10/10  Quality: Burning, Throbbing, Numb and Stabbing  Radiation: see HPI above  Worsened by: standing for more than 10 minutes and walking for more than 10 minutes  Improved by: medications    Previous Therapies:  PT/OT: Denies OT for the foot.  Endorses PT.  HEP: Endorses daily performance of PT exercises at home.    TENS: Yes, along with PT exercises  Interventions:   Surgery:  Medications:   - NSAIDS:   - MSK Relaxants:   - TCAs:   - SNRIs:   - Topicals:   - Anticonvulsants:  - Opioids: Tramadol - too little relief; Dilaudid and Norco never provided any relief    Current Pain Medications:  1. Gabapentin 1200 TID  2. Duloxetine 30 mg daily  3. Elavil 50 mg QHS  4. Baclofen 10 TID  5. Tylenol 650 QHS    Review of Systems:  Review of Systems   Constitutional: Negative for chills and fever.   HENT: Negative for nosebleeds.    Eyes: Negative for blurred vision and pain.   Respiratory: Negative for hemoptysis.    Cardiovascular: Negative for chest pain and palpitations.   Gastrointestinal: Negative for heartburn, nausea and vomiting.   Genitourinary: Negative for dysuria and hematuria.   Musculoskeletal: Positive for joint pain. Negative for falls and myalgias.   Skin: Negative for rash.   Neurological: Positive for tingling and focal weakness. Negative for seizures and loss of consciousness.   Endo/Heme/Allergies: Does not bruise/bleed easily.   Psychiatric/Behavioral: Negative for hallucinations. The patient has insomnia.        History:    Current Outpatient Medications:     acetaminophen (TYLENOL 8 HOUR) 650 MG TbSR, Take 650 mg by mouth every evening. , Disp: , Rfl:     amitriptyline (ELAVIL) 50 MG " tablet, TAKE 1 TABLET BY MOUTH AT  NIGHT IN ADDITION TO THE  25MG TABLET FOR A TOTAL OF  75MG AT NIGHT, Disp: 90 tablet, Rfl: 0    baclofen (LIORESAL) 10 MG tablet, Take 1 tablet (10 mg total) by mouth 3 (three) times daily., Disp: 270 tablet, Rfl: 3    cholecalciferol, vitamin D3, (VITAMIN D3) 2,000 unit Tab, Take by mouth once daily., Disp: , Rfl:     diclofenac sodium (VOLTAREN) 1 % Gel, as needed. , Disp: , Rfl: 3    DULoxetine (CYMBALTA) 30 MG capsule, Take 1 capsule (30 mg total) by mouth once daily., Disp: 90 capsule, Rfl: 1    fexofenadine (ALLEGRA) 180 MG tablet, Take 180 mg by mouth once daily., Disp: , Rfl:     fluticasone propionate (FLONASE) 50 mcg/actuation nasal spray, 1 spray (50 mcg total) by Each Nostril route once daily. (Patient not taking: Reported on 5/18/2020), Disp: 16 g, Rfl: 11    gabapentin (NEURONTIN) 600 MG tablet, Take 2 tablets (1,200 mg total) by mouth 3 (three) times daily., Disp: 540 tablet, Rfl: 3    ketoconazole (NIZORAL) 2 % shampoo, APPLY TOPICALLY TWICE A  WEEK., Disp: 120 mL, Rfl: 0    levocetirizine (XYZAL) 5 MG tablet, Take 5 mg by mouth every evening., Disp: , Rfl:     lidocaine-tetracaine 7-7 % Crea, as needed. , Disp: , Rfl: 3    ondansetron (ZOFRAN-ODT) 4 MG TbDL, Take 2 tablets (8 mg total) by mouth every 6 (six) hours as needed., Disp: 60 tablet, Rfl: 1    pantoprazole (PROTONIX) 40 MG tablet, Take 1 tablet (40 mg total) by mouth once daily., Disp: 90 tablet, Rfl: 1    SANARE ADV SCAR THERAPY BASE Gel, as needed. , Disp: , Rfl: 3    scopolamine (TRANSDERM-SCOP) 1.3-1.5 mg (1 mg over 3 days), Place 1 patch onto the skin every 72 hours., Disp: 4 patch, Rfl: 3  No current facility-administered medications for this visit.     Facility-Administered Medications Ordered in Other Visits:     lidocaine (PF) 10 mg/ml (1%) injection 10 mg, 1 mL, Intradermal, Once, Clemencia Watts Jr., MD    Past Medical History:   Diagnosis Date    GERD (gastroesophageal reflux  disease)     History of migraine headaches     Obesity     Pollen allergies     PONV (postoperative nausea and vomiting)     Smoker        Past Surgical History:   Procedure Laterality Date    CHOLECYSTECTOMY      ESOPHAGOGASTRODUODENOSCOPY      EXPLORATORY LAPAROTOMY      HYSTERECTOMY      fibroids    LUMBAR FUSION N/A 11/7/2019    Procedure: FUSION, SPINE, LUMBAR Procedure: STG 1: L5-S1 anterior Lumbar interbody fusion / STG 2:L5-S1 Posterior instrumentation;  Surgeon: Corona Bazan MD;  Location: [facility] OR;  Service: Neurosurgery;  Laterality: N/A;  FUSION, SPINE, LUMBARProcedure: STG 1: L5-S1 anterior Lumbar interbody fusion / STG 2:L5-S1 Posterior instrumentationSURGERY TIME: 2.5hrsLOS:ANESTHESIA: GeneralBlood:    LUMBAR SYMPATHETIC NERVE BLOCK Left 6/11/2020    Procedure: BLOCK, NERVE, SYMPATHETIC, LUMBAR--Left;  Surgeon: Clemencia Watts Jr., MD;  Location: [facility] PAIN MGT;  Service: Pain Management;  Laterality: Left;    MASTECTOMY, PARTIAL Left 2/14/2020    Procedure: MASTECTOMY, PARTIAL LEFT with RADIOLOGIC MARKER;  Surgeon: Mara Munoz MD;  Location: [facility] OR;  Service: General;  Laterality: Left;    SPINAL FUSION      TONSILLECTOMY         Family History   Problem Relation Age of Onset    Cancer Mother     No Known Problems Father     Cancer Maternal Grandfather     Diabetes Neg Hx     Heart disease Neg Hx     Stroke Neg Hx        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 [employer]   Social Needs    Financial resource strain: Not on file    Food insecurity     Worry: Not on file     Inability: Not on file    Transportation needs     Medical: Not on file     Non-medical: Not on file   Tobacco Use    Smoking status: Former Smoker     Packs/day: 0.50     Years:  "15.00     Pack years: 7.50     Types: Vaping with nicotine     Quit date: 2019     Years since quittin.7    Smokeless tobacco: Former User   Substance and Sexual Activity    Alcohol use: Yes     Comment: 2-3 drinks per year    Drug use: No    Sexual activity: Yes     Partners: Male     Birth control/protection: See Surgical Hx     Comment: Hysterectomy   Lifestyle    Physical activity     Days per week: Not on file     Minutes per session: Not on file    Stress: Not on file   Relationships    Social connections     Talks on phone: Not on file     Gets together: Not on file     Attends [employer]ars[employer] service: Not on file     Active member of club or organization: Not on file     Attends meetings of clubs or organizations: Not on file     Relationship status: Not on file   Other Topics Concern    Not on file   Social History Narrative    Not on file       Review of patient's allergies indicates:   Allergen Reactions    Ativan [lorazepam] Other (See Comments)     Pt states she cannot "wake up or sleeps for three days."    Gluten protein Nausea And Vomiting    Milk containing products Hives    Demerol [meperidine] Nausea And Vomiting    Imitrex [sumatriptan succinate]      Hysterics      Morphine Nausea And Vomiting    Tetracyclines Nausea And Vomiting    Ciprofloxacin hcl Rash    Codeine Nausea And Vomiting     Can take Dilaudid, oxycontin, oxycodone & tramadol    Erythromycin Rash       Physical Exam:  There were no vitals filed for this visit.  General    Nursing note and vitals reviewed.  Constitutional: She is oriented to person, place, and time. She appears well-developed and well-nourished. No distress.   HENT:   Head: Normocephalic and atraumatic.   Nose: Nose normal.   Eyes: Conjunctivae and EOM are normal. Pupils are equal, round, and reactive to light. Right eye exhibits no discharge. Left eye exhibits no discharge. No scleral icterus.   Neck: No JVD present.   Cardiovascular: Intact " "distal pulses.    Pulmonary/Chest: Effort normal. No respiratory distress.   Abdominal: She exhibits no distension.   Neurological: She is alert and oriented to person, place, and time. Coordination normal.   Psychiatric: She has a normal mood and affect. Her behavior is normal. Judgment and thought content normal.         Left Ankle/Foot Exam     Inspection  Bruising: Foot - absent  Effusion: Foot - absent  Atrophy: Foot - absent  Scars: absent    Comments:  Left foot appears atraumatic but is erythematous compared to the right.  Patient demonstrates a "glossy" texture to the skin by adjusting the angle of her phone.        Imaging:  X-Ray Lumbar Spine AP And Lateral 05/18/2020   Postoperative fusion changes at L5-S1.  Hardware projects in similar positioning without evidence for loosening or failure.  Slight levocurvature.  Remaining lumbar vertebral body heights and alignment appear maintained with similar discogenic endplate change elsewhere.  SI joints are symmetric.  No new abnormality.       Labs:  BMP  Lab Results   Component Value Date     01/23/2020    K 4.0 01/23/2020     01/23/2020    CO2 25 01/23/2020    BUN 11 01/23/2020    CREATININE 0.8 01/23/2020    CALCIUM 9.5 01/23/2020    ANIONGAP 9 01/23/2020    ESTGFRAFRICA >60 01/23/2020    EGFRNONAA >60 01/23/2020     Lab Results   Component Value Date    ALT 14 01/23/2020    AST 13 01/23/2020    ALKPHOS 73 01/23/2020    BILITOT 0.3 01/23/2020       Assessment:  Problem List Items Addressed This Visit        Neuro    Complex regional pain syndrome type 1 of left lower extremity - Primary    Chronic pain syndrome       Orthopedic    Fusion of lumbosacral spine      Other Visit Diagnoses     Radiculopathy, thoracolumbar region              5/29/20 - Dianne Hemphill is a 44 y.o. female with chronic left lower extremity pain associated with lumbosacral fusion L5-S1 in November 2019.  Patient states that she awoke from surgery with a severe pain " in the left foot and felt like someone was peeling of her skin.  Since that time the pain has continued to be present, but his migrating proximally and now affecting the foot, shin, and calf.  She reports a constellation of neuropathic symptoms including simultaneous numbness along with dysesthesias (burning, tingling, stabbing).  She also endorses allodynia to light touch from bed sheets and air movement along with proximal spread of her symptoms.  She believes that her hips may not be affected as well.  Currently, she is on a robust regiment of neuropathic medications and experiencing very little relief as her pain range is 6-10/10 and severely impacts her ability to walk for more than 10 min.  Given the lack of response to a robust regimen such as hers, I have recommended changing course and pursuing interventional procedures as the primary treatment modality at this point.  This is especially important given the length of time she has been experiencing the symptoms.  Failure to improve or resolve over the past 6 months indicates a poor likelihood of spontaneous resolution.  We will start with a left lumbar sympathetic block for diagnostic and therapeutic purposes.  We also briefly discussed the role of spinal cord stimulation therapy for the treatment of CRPS type 1, which may be needed if interventional injections like the LSB fail to provide significant and sustained relief.  I spent a significant amount of time explaining what is known about the underlying pathophysiology and characteristics of CRPS.    06/[id number] female status post left lumbar sympathetic block 0% relief patient has a history of left lower extremity pain associated with lumbar sacral fusion at L5-S1 in 2019.  She continues to endorse allodynia to light touch from bed sheets and air movement along with proximal spread of her symptoms.  Last clinic visit she discussed with Dr. Watts SCS therapy I discussed with her today that I  think it is important for her to come in to clinic for a physical visit and discussed SCS therapy with myself and Dr. Watts present.  Gated patient on SCS trial and process to implant.  I do not have any recommendations for injections at the moment.    : Reviewed and consistent with medication use as prescribed.    Treatment Plan:   Procedures:  None at this time consider SCS trial in the future.  PT/OT/HEP: Patient may need dedicated OT but she appears a little resistant at the moment due to cost of PT and receipt of exercises that she performing daily.  I will approach this topic at subsequent visits.  Medications:  Continue gabapentin 3600 mg daily  Labs: reviewed and medications are appropriately dosed for current hepatorenal function.  Imaging: No additional recommended at this time.    Follow Up: RTC 1-2 weeks discussed SCS therapy with myself and Dr. Stefanie Valdes, NP-C  [facility]      Disclaimer: This note was partly generated using dictation software which may occasionally result in transcription errors.

## 2020-06-18 NOTE — TELEPHONE ENCOUNTER
----- Message from SAFIA Monsivais sent at 6/18/2020 10:43 AM CDT -----  Regarding: set up CV  Can you set her up to discuss SCS therapy with me on a day Dr. Watts is in office.     ks

## 2020-06-19 ENCOUNTER — DOCUMENTATION ONLY (OUTPATIENT)
Dept: RADIATION ONCOLOGY | Facility: CLINIC | Age: 45
End: 2020-06-19

## 2020-06-19 PROCEDURE — 77412 RADIATION TX DELIVERY LVL 3: CPT | Performed by: RADIOLOGY

## 2020-06-22 ENCOUNTER — OFFICE VISIT (OUTPATIENT)
Dept: PAIN MEDICINE | Facility: CLINIC | Age: 45
End: 2020-06-22
Payer: COMMERCIAL

## 2020-06-22 VITALS — BODY MASS INDEX: 44.3 KG/M2 | HEIGHT: 69 IN

## 2020-06-22 DIAGNOSIS — G90.522 COMPLEX REGIONAL PAIN SYNDROME TYPE 1 OF LEFT LOWER EXTREMITY: ICD-10-CM

## 2020-06-22 DIAGNOSIS — G89.4 CHRONIC PAIN SYNDROME: ICD-10-CM

## 2020-06-22 DIAGNOSIS — M43.27 FUSION OF LUMBOSACRAL SPINE: Primary | ICD-10-CM

## 2020-06-22 PROCEDURE — 99999 PR PBB SHADOW E&M-EST. PATIENT-LVL IV: CPT | Mod: PBBFAC,,, | Performed by: NURSE PRACTITIONER

## 2020-06-22 PROCEDURE — 99999 PR PBB SHADOW E&M-EST. PATIENT-LVL IV: ICD-10-PCS | Mod: PBBFAC,,, | Performed by: NURSE PRACTITIONER

## 2020-06-22 PROCEDURE — 3008F BODY MASS INDEX DOCD: CPT | Mod: CPTII,S$GLB,, | Performed by: NURSE PRACTITIONER

## 2020-06-22 PROCEDURE — 3008F PR BODY MASS INDEX (BMI) DOCUMENTED: ICD-10-PCS | Mod: CPTII,S$GLB,, | Performed by: NURSE PRACTITIONER

## 2020-06-22 PROCEDURE — 99213 OFFICE O/P EST LOW 20 MIN: CPT | Mod: S$GLB,,, | Performed by: NURSE PRACTITIONER

## 2020-06-22 PROCEDURE — 99213 PR OFFICE/OUTPT VISIT, EST, LEVL III, 20-29 MIN: ICD-10-PCS | Mod: S$GLB,,, | Performed by: NURSE PRACTITIONER

## 2020-06-22 NOTE — PROGRESS NOTES
"  [facility] Established Follow up Visit    Referred by: Dr. Bazan  Reason for referral: CRPS Type 1    CC:   Chief Complaint   Patient presents with    Low-back Pain    Foot Pain     Last 3 PDI Scores 6/22/2020   Pain Disability Index (PDI) 49      Interval Updates:    6/22/20 - Mrs. Hemphill returns to clinic for follow up visit reporting continued severe  left leg and foot pain.  Pain intensity is currently 7/10.  She is here to discuss SCS therapy.     6/18/2020- 44-year-old female presents status post LEFT Lumbar Sympathetic Block with 0% relief patient continues to complain of pain in the left leg and foot. She is also c/o right hip pain.  She states she continues on gabapentin 3600 mg daily she states that the meds take the edge off which she misses a dose she definitely feels an increase in her pain.  Pain scores afternoon was 5/6/10.    HPI:   Dianne Hemphill is a 44 y.o. female who complains of severe foot pain starting in Nov 2019.  Patient reports that pain is in the arch of the foot and great toe.  The pain "travels" at times and feels like the "skin is being ripped" off of her foot.  The skin on the left foot is glossy now (sometimes completely white and sometimes purple) and the pain is now traveling up the calf and shin.  The shin is now very painful with intermittent pains like a person is drilling a spike through the foot.  Weather sets of the pain as does standing or walking for more than 10-15 minutes.  It feels like she's walking on a "clubbed foot."  Light touch from towels or bed sheets as well as air movement can make the pain "horrific."  Patient also states that she's on so many medication that she barely stays awake.    Location: left lower extremity  Onset: Nov 7 - she woke up from lumbar surgery with this pain  Current Pain Score: 6-7/10  Daily Pain of Range: 6-10/10  Quality: Burning, Throbbing, Numb and Stabbing  Radiation: see HPI above  Worsened by: standing for more " than 10 minutes and walking for more than 10 minutes  Improved by: medications    Previous Therapies:  PT/OT: Denies OT for the foot.  Endorses PT.  HEP: Endorses daily performance of PT exercises at home.    TENS: Yes, along with PT exercises  Interventions:   Surgery:  Medications:   - NSAIDS:   - MSK Relaxants:   - TCAs:   - SNRIs:   - Topicals:   - Anticonvulsants:  - Opioids: Tramadol - too little relief; Dilaudid and Norco never provided any relief    Current Pain Medications:  1. Gabapentin 1200 TID  2. Duloxetine 30 mg daily  3. Elavil 50 mg QHS  4. Baclofen 10 TID  5. Tylenol 650 QHS    Review of Systems:  Review of Systems   Constitutional: Negative for chills and fever.   HENT: Negative for nosebleeds.    Eyes: Negative for blurred vision and pain.   Respiratory: Negative for hemoptysis.    Cardiovascular: Negative for chest pain and palpitations.   Gastrointestinal: Negative for heartburn, nausea and vomiting.   Genitourinary: Negative for dysuria and hematuria.   Musculoskeletal: Positive for joint pain. Negative for falls and myalgias.   Skin: Negative for rash.   Neurological: Positive for tingling and focal weakness. Negative for seizures and loss of consciousness.   Endo/Heme/Allergies: Does not bruise/bleed easily.   Psychiatric/Behavioral: Negative for hallucinations. The patient has insomnia.        History:    Current Outpatient Medications:     acetaminophen (TYLENOL 8 HOUR) 650 MG TbSR, Take 650 mg by mouth every evening. , Disp: , Rfl:     amitriptyline (ELAVIL) 50 MG tablet, TAKE 1 TABLET BY MOUTH AT  NIGHT IN ADDITION TO THE  25MG TABLET FOR A TOTAL OF  75MG AT NIGHT, Disp: 90 tablet, Rfl: 0    baclofen (LIORESAL) 10 MG tablet, Take 1 tablet (10 mg total) by mouth 3 (three) times daily., Disp: 270 tablet, Rfl: 3    cholecalciferol, vitamin D3, (VITAMIN D3) 2,000 unit Tab, Take by mouth once daily., Disp: , Rfl:     diclofenac sodium (VOLTAREN) 1 % Gel, as needed. , Disp: , Rfl: 3     DULoxetine (CYMBALTA) 30 MG capsule, Take 1 capsule (30 mg total) by mouth once daily., Disp: 90 capsule, Rfl: 1    fexofenadine (ALLEGRA) 180 MG tablet, Take 180 mg by mouth once daily., Disp: , Rfl:     fluticasone propionate (FLONASE) 50 mcg/actuation nasal spray, 1 spray (50 mcg total) by Each Nostril route once daily., Disp: 16 g, Rfl: 11    gabapentin (NEURONTIN) 600 MG tablet, Take 2 tablets (1,200 mg total) by mouth 3 (three) times daily., Disp: 540 tablet, Rfl: 3    ketoconazole (NIZORAL) 2 % shampoo, APPLY TOPICALLY TWICE A  WEEK., Disp: 120 mL, Rfl: 0    levocetirizine (XYZAL) 5 MG tablet, Take 5 mg by mouth every evening., Disp: , Rfl:     lidocaine-tetracaine 7-7 % Crea, as needed. , Disp: , Rfl: 3    ondansetron (ZOFRAN-ODT) 4 MG TbDL, Take 2 tablets (8 mg total) by mouth every 6 (six) hours as needed., Disp: 60 tablet, Rfl: 1    pantoprazole (PROTONIX) 40 MG tablet, Take 1 tablet (40 mg total) by mouth once daily., Disp: 90 tablet, Rfl: 1    SANARE ADV SCAR THERAPY BASE Gel, as needed. , Disp: , Rfl: 3    scopolamine (TRANSDERM-SCOP) 1.3-1.5 mg (1 mg over 3 days), Place 1 patch onto the skin every 72 hours., Disp: 4 patch, Rfl: 3  No current facility-administered medications for this visit.     Facility-Administered Medications Ordered in Other Visits:     lidocaine (PF) 10 mg/ml (1%) injection 10 mg, 1 mL, Intradermal, Once, Clemencia Watts Jr., MD    Past Medical History:   Diagnosis Date    GERD (gastroesophageal reflux disease)     History of migraine headaches     Obesity     Pollen allergies     PONV (postoperative nausea and vomiting)     Smoker        Past Surgical History:   Procedure Laterality Date    CHOLECYSTECTOMY      ESOPHAGOGASTRODUODENOSCOPY      EXPLORATORY LAPAROTOMY      HYSTERECTOMY      fibroids    LUMBAR FUSION N/A 11/7/2019    Procedure: FUSION, SPINE, LUMBAR Procedure: STG 1: L5-S1 anterior Lumbar interbody fusion / STG 2:L5-S1 Posterior  instrumentation;  Surgeon: Corona Bazan MD;  Location: [facility] [place] [facility];  Service: Neurosurgery;  Laterality: N/A;  FUSION, SPINE, LUMBARProcedure: STG 1: L5-S1 anterior Lumbar interbody fusion / STG 2:L5-S1 Posterior instrumentationSURGERY TIME: 2.5hrsLOS:ANESTHESIA: GeneralBlood:    LUMBAR SYMPATHETIC NERVE BLOCK Left 2020    Procedure: BLOCK, NERVE, SYMPATHETIC, LUMBAR--Left;  Surgeon: Clemencia Watts Jr., MD;  Location: [facility] [place] [facility]IN [facility]GT;  Service: Pain Management;  Laterality: Left;    MASTECTOMY, PARTIAL Left 2020    Procedure: MASTECTOMY, PARTIAL LEFT with RADIOLOGIC MARKER;  Surgeon: Mara Munoz MD;  Location: [facility];  Service: General;  Laterality: Left;    SPINAL FUSION      TONSILLECTOMY         Family History   Problem Relation Age of Onset    Cancer Mother     No Known Problems Father     Cancer Maternal Grandfather     Diabetes Neg Hx     Heart disease Neg Hx     Stroke Neg Hx        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 [employer]   Social Needs    Financial resource strain: Not on file    Food insecurity     Worry: Not on file     Inability: Not on file    Transportation needs     Medical: Not on file     Non-medical: Not on file   Tobacco Use    Smoking status: Former Smoker     Packs/day: 0.50     Years: 15.00     Pack years: 7.50     Types: Vaping with nicotine     Quit date: 2019     Years since quittin.8    Smokeless tobacco: Former User   Substance and Sexual Activity    Alcohol use: Yes     Comment: 2-3 drinks per year    Drug use: No    Sexual activity: Yes     Partners: Male     Birth control/protection: See Surgical Hx     Comment: Hysterectomy   Lifestyle    Physical activity     Days per week: Not on file     Minutes per session: Not  "on file    Stress: Not on file   Relationships    Social connections     Talks on phone: Not on file     Gets together: Not on file     Attends Mu-ism service: Not on file     Active member of club or organization: Not on file     Attends meetings of clubs or organizations: Not on file     Relationship status: Not on file   Other Topics Concern    Not on file   Social History Narrative    Not on file       Review of patient's allergies indicates:   Allergen Reactions    Ativan [lorazepam] Other (See Comments)     Pt states she cannot "wake up or sleeps for three days."    Gluten protein Nausea And Vomiting    Milk containing products Hives    Demerol [meperidine] Nausea And Vomiting    Imitrex [sumatriptan succinate]      Hysterics      Morphine Nausea And Vomiting    Tetracyclines Nausea And Vomiting    Ciprofloxacin hcl Rash    Codeine Nausea And Vomiting     Can take Dilaudid, oxycontin, oxycodone & tramadol    Erythromycin Rash       Physical Exam:  Vitals:    06/22/20 1314   Height: 5' 9" (1.753 m)   PainSc:   7     General    Nursing note and vitals reviewed.  Constitutional: She is oriented to person, place, and time. She appears well-developed and well-nourished. No distress.   HENT:   Head: Normocephalic and atraumatic.   Nose: Nose normal.   Eyes: Conjunctivae and EOM are normal. Pupils are equal, round, and reactive to light. Right eye exhibits no discharge. Left eye exhibits no discharge. No scleral icterus.   Neck: No JVD present.   Cardiovascular: Intact distal pulses.    Pulmonary/Chest: Effort normal. No respiratory distress.   Abdominal: She exhibits no distension.   Neurological: She is alert and oriented to person, place, and time. Coordination normal.   Psychiatric: She has a normal mood and affect. Her behavior is normal. Judgment and thought content normal.         Left Ankle/Foot Exam     Inspection  Bruising: Foot - absent  Effusion: Foot - absent  Atrophy: Foot - " "absent  Scars: absent    Comments:  Left foot appears atraumatic but is erythematous compared to the right.  Patient demonstrates a "glossy" texture to the skin by adjusting the angle of her phone.        Imaging:  X-Ray Lumbar Spine AP And Lateral 05/18/2020   Postoperative fusion changes at L5-S1.  Hardware projects in similar positioning without evidence for loosening or failure.  Slight levocurvature.  Remaining lumbar vertebral body heights and alignment appear maintained with similar discogenic endplate change elsewhere.  SI joints are symmetric.  No new abnormality.       Labs:  BMP  Lab Results   Component Value Date     01/23/2020    K 4.0 01/23/2020     01/23/2020    CO2 25 01/23/2020    BUN 11 01/23/2020    CREATININE 0.8 01/23/2020    CALCIUM 9.5 01/23/2020    ANIONGAP 9 01/23/2020    ESTGFRAFRICA >60 01/23/2020    EGFRNONAA >60 01/23/2020     Lab Results   Component Value Date    ALT 14 01/23/2020    AST 13 01/23/2020    ALKPHOS 73 01/23/2020    BILITOT 0.3 01/23/2020       Assessment:  Problem List Items Addressed This Visit        Neuro    Complex regional pain syndrome type 1 of left lower extremity    Chronic pain syndrome       Orthopedic    Fusion of lumbosacral spine - Primary    Relevant Orders    Ambulatory referral/consult to Psychiatry          5/29/20 - Dianne Hemphill is a 44 y.o. female with chronic left lower extremity pain associated with lumbosacral fusion L5-S1 in November 2019.  Patient states that she awoke from surgery with a severe pain in the left foot and felt like someone was peeling of her skin.  Since that time the pain has continued to be present, but his migrating proximally and now affecting the foot, shin, and calf.  She reports a constellation of neuropathic symptoms including simultaneous numbness along with dysesthesias (burning, tingling, stabbing).  She also endorses allodynia to light touch from bed sheets and air movement along with proximal spread " of her symptoms.  She believes that her hips may not be affected as well.  Currently, she is on a robust regiment of neuropathic medications and experiencing very little relief as her pain range is 6-10/10 and severely impacts her ability to walk for more than 10 min.  Given the lack of response to a robust regimen such as hers, I have recommended changing course and pursuing interventional procedures as the primary treatment modality at this point.  This is especially important given the length of time she has been experiencing the symptoms.  Failure to improve or resolve over the past 6 months indicates a poor likelihood of spontaneous resolution.  We will start with a left lumbar sympathetic block for diagnostic and therapeutic purposes.  We also briefly discussed the role of spinal cord stimulation therapy for the treatment of CRPS type 1, which may be needed if interventional injections like the LSB fail to provide significant and sustained relief.  I spent a significant amount of time explaining what is known about the underlying pathophysiology and characteristics of CRPS.    06/18/[id number]-[id number]-old female status post left lumbar sympathetic block 0% relief patient has a history of left lower extremity pain associated with lumbar sacral fusion at L5-S1 in 2019.  She continues to endorse allodynia to light touch from bed sheets and air movement along with proximal spread of her symptoms.  Last clinic visit she discussed with Dr. Watts SCS therapy I discussed with her today that I think it is important for her to come in to clinic for a physical visit and discussed SCS therapy with myself and Dr. Watts present.  Gated patient on SCS trial and process to implant.  I do not have any recommendations for injections at the moment.    06/22/2020- 44-year-old female presents with chronic left lower extremity pain associated with lumbar sacral fusion at L5-S1 in November 2019.  She is status post a left lumbar  sympathetic block on 06/18/2020 with 0% relief she presents today to discuss SCS therapy Dr. Watts joint me for the clinic visit to discuss this patient was educated on the [facility] system, a pamphlet was given further Education.  I discussed with patient that I will get her scheduled for a psychological evaluation and we will schedule the SCS trial accordingly.  All questions were answered patient agreed with plan.    : Reviewed and consistent with medication use as prescribed.    Treatment Plan:   Procedures:  None at this time will consider SCS trial once psych eval has been completed.  PT/OT/HEP: Patient may need dedicated OT but she appears a little resistant at the moment due to cost of PT and receipt of exercises that she performing daily.  I will approach this topic at subsequent visits.  Medications:  Continue gabapentin 3600 mg daily  Labs: reviewed and medications are appropriately dosed for current hepatorenal function.  Imaging: No additional recommended at this time.  Referral:  Psychological evaluation for SCS trial-   Follow Up: RTC to s/f SCS trial or we can schedule over the phone once psych eval has been completed.     Rudy Valdes NP-C  [facility]      Disclaimer: This note was partly generated using dictation software which may occasionally result in transcription errors.

## 2020-06-23 PROCEDURE — 77336 RADIATION PHYSICS CONSULT: CPT | Performed by: RADIOLOGY

## 2020-06-29 ENCOUNTER — PATIENT MESSAGE (OUTPATIENT)
Dept: PAIN MEDICINE | Facility: CLINIC | Age: 45
End: 2020-06-29

## 2020-06-30 ENCOUNTER — TELEPHONE (OUTPATIENT)
Dept: RADIATION ONCOLOGY | Facility: CLINIC | Age: 45
End: 2020-06-30

## 2020-07-07 ENCOUNTER — TELEPHONE (OUTPATIENT)
Dept: INTERNAL MEDICINE | Facility: CLINIC | Age: 45
End: 2020-07-07

## 2020-07-07 DIAGNOSIS — Z98.890 S/P SPINAL SURGERY: ICD-10-CM

## 2020-07-07 DIAGNOSIS — M54.10 RADICULAR PAIN OF LEFT LOWER EXTREMITY: ICD-10-CM

## 2020-07-07 DIAGNOSIS — M48.061 NEURAL FORAMINAL STENOSIS OF LUMBAR SPINE: ICD-10-CM

## 2020-07-07 DIAGNOSIS — Z72.820 POOR SLEEP: ICD-10-CM

## 2020-07-08 RX ORDER — AMITRIPTYLINE HYDROCHLORIDE 25 MG/1
25 TABLET, FILM COATED ORAL NIGHTLY PRN
Qty: 90 TABLET | Refills: 0 | Status: SHIPPED | OUTPATIENT
Start: 2020-07-08 | End: 2020-09-30 | Stop reason: SDUPTHER

## 2020-07-08 RX ORDER — AMITRIPTYLINE HYDROCHLORIDE 50 MG/1
50 TABLET, FILM COATED ORAL NIGHTLY PRN
Qty: 90 TABLET | Refills: 0 | Status: SHIPPED | OUTPATIENT
Start: 2020-07-08 | End: 2020-09-30 | Stop reason: SDUPTHER

## 2020-07-08 RX ORDER — AMITRIPTYLINE HYDROCHLORIDE 75 MG/1
75 TABLET ORAL NIGHTLY
Status: CANCELLED | OUTPATIENT
Start: 2020-07-08 | End: 2021-07-08

## 2020-07-08 NOTE — TELEPHONE ENCOUNTER
They are asking for a prescription for both amitriptyline 50 mg and amitriptyline 25 mg.  Can you please clarify with the patient what dose of this medication she is currently taking?  Thanks.

## 2020-07-16 ENCOUNTER — TELEPHONE (OUTPATIENT)
Dept: PSYCHIATRY | Facility: CLINIC | Age: 45
End: 2020-07-16

## 2020-07-16 NOTE — TELEPHONE ENCOUNTER
"I received a fax from [facility] that psychological testing was denied for Ms. Hemphill.  Some of the information may have been listed inaccurately; for instance, a substance use disorder in the DSM-5 of Tobacco Use Disorder is not necessarily "drugs and alcohol" contraindicating testing.  I left a voicemail for the appeals process to speak to them about this case.  "

## 2020-07-27 ENCOUNTER — TELEPHONE (OUTPATIENT)
Dept: PAIN MEDICINE | Facility: CLINIC | Age: 45
End: 2020-07-27

## 2020-07-30 ENCOUNTER — PATIENT MESSAGE (OUTPATIENT)
Dept: INTERNAL MEDICINE | Facility: CLINIC | Age: 45
End: 2020-07-30

## 2020-08-05 ENCOUNTER — OFFICE VISIT (OUTPATIENT)
Dept: RADIATION ONCOLOGY | Facility: CLINIC | Age: 45
End: 2020-08-05
Payer: COMMERCIAL

## 2020-08-05 DIAGNOSIS — D05.12 BREAST NEOPLASM, TIS (DCIS), LEFT: Primary | ICD-10-CM

## 2020-08-05 PROCEDURE — 99213 OFFICE O/P EST LOW 20 MIN: CPT | Mod: 95,,, | Performed by: RADIOLOGY

## 2020-08-05 PROCEDURE — 99213 PR OFFICE/OUTPT VISIT, EST, LEVL III, 20-29 MIN: ICD-10-PCS | Mod: 95,,, | Performed by: RADIOLOGY

## 2020-08-05 NOTE — PROGRESS NOTES
The patient location is:  home  The chief complaint leading to consultation is:  DCIS of the left breast    Visit type: audiovisual    Face to Face time with patient:  15 min  Thirty minutes of total time spent on the encounter, which includes face to face time and non-face to face time preparing to see the patient (eg, review of tests), Obtaining and/or reviewing separately obtained history, Documenting clinical information in the electronic or other health record, Independently interpreting results (not separately reported) and communicating results to the patient/family/caregiver, or Care coordination (not separately reported).     Each patient to whom he or she provides medical services by telemedicine is:  (1) informed of the relationship between the physician and patient and the respective role of any other health care provider with respect to management of the patient; and (2) notified that he or she may decline to receive medical services by telemedicine and may withdraw from such care at any time.    REFERRING PHYSICIAN: Mara Munoz MD    DIAGNOSIS: pTis Nx N0, Stage 0, DCIS of the left breast    Radiation Treatment Summary  Course: C1 L_Breast 2020  Treatment Site Energy Dose/Fx (Gy) #Fx Total Dose (Gy) Start Date End Date Elapsed Days   LT BOOST E- 20E 2 5 / 5 10 6/15/2020 6/19/2020 4   LT BREAST 18X/6X 2.65 16 / 16 42.4 5/20/2020 6/12/2020 23     INTERVAL HISTORY:   Ms. Hemphill is a 44 year old female who completed radiation to the left breast as above who returns for follow-up.    She was diagnosed with left breast cancer after an abnormal mammogram in January 2020. This revealed an asymetry in the posterior depth. A core needle biopsy of this lesion on January 14, 2020 revealed DCIS, micropapillary and cribiform, which was ER positive (95%) and ID positive (100%). She underwent lumpectomy and sentinel node biopsy on 2/14/2020. The pathology revealed left breast with grade 2, DCIS measuring 25 mm, with  the closest margin at 1mm anteriorly. No lymph node sampling was done. To reduce her chance of local recurrence, she received postoperative radiation to the left breast as above.  She is here today for routine follow-up.    She has currently stopped endocrine therapy due to side effects she was experiencing.  She has not seen Dr. Benson since completion of radiation.  At present, she denies left breast pain, edema, erythema, or nipple discharge.  She also denies fever, night sweats, or weight loss.  She is scheduled to follow up with Dr. Munoz on September 14, 2020.    PHYSICAL EXAMINATION:  VITAL SINGS: There were no vitals taken for this visit.  GENERAL: Patient is alert and oriented, in no acute distress.  HEENT: Extraocular muscles are intact.    BREAST EXAM:  Deferred    ASSESSMENT:   This is a 44 y.o. female with grade 2, pTis Nx M0, stage 0 of the left breast who underwent lumpectomy and sentinel node biopsy on 2/14/2020 followed by postoperative radiation, with pathology revealing 25 mm lesion with the closest margin at 1 mm anteriorly, ER/NM positive.    PLAN:   Ms. Hemphill is recovering from the radiation without any unexpected side effects.  She will follow up with Dr. Munoz as planned on December 14, 2020.  She will repeat yearly mammogram in January 2021.  She is also recommended to follow up with Dr. Benson regarding endocrine therapy.  I plan to see her back in approximately 6 months, unless symptoms warrant otherwise.

## 2020-08-05 NOTE — PATIENT INSTRUCTIONS
Follow-up with Dr. Benson regarding endocrine therapy  Repeat mammogram in January 2021  Return to see me in approximately 6 months

## 2020-08-13 ENCOUNTER — PATIENT MESSAGE (OUTPATIENT)
Dept: PAIN MEDICINE | Facility: CLINIC | Age: 45
End: 2020-08-13

## 2020-08-19 ENCOUNTER — TELEPHONE (OUTPATIENT)
Dept: INTERNAL MEDICINE | Facility: CLINIC | Age: 45
End: 2020-08-19

## 2020-08-19 ENCOUNTER — OFFICE VISIT (OUTPATIENT)
Dept: INTERNAL MEDICINE | Facility: CLINIC | Age: 45
End: 2020-08-19
Payer: COMMERCIAL

## 2020-08-19 VITALS — BODY MASS INDEX: 47.26 KG/M2 | WEIGHT: 293 LBS

## 2020-08-19 DIAGNOSIS — M48.061 NEURAL FORAMINAL STENOSIS OF LUMBAR SPINE: ICD-10-CM

## 2020-08-19 DIAGNOSIS — Z00.00 ANNUAL PHYSICAL EXAM: ICD-10-CM

## 2020-08-19 DIAGNOSIS — M43.10 ANTEROLISTHESIS: ICD-10-CM

## 2020-08-19 DIAGNOSIS — F43.23 SITUATIONAL MIXED ANXIETY AND DEPRESSIVE DISORDER: ICD-10-CM

## 2020-08-19 DIAGNOSIS — K76.0 FATTY LIVER: ICD-10-CM

## 2020-08-19 DIAGNOSIS — F43.21 SITUATIONAL DEPRESSION: ICD-10-CM

## 2020-08-19 DIAGNOSIS — K21.9 GASTROESOPHAGEAL REFLUX DISEASE, ESOPHAGITIS PRESENCE NOT SPECIFIED: ICD-10-CM

## 2020-08-19 DIAGNOSIS — Z72.820 POOR SLEEP: ICD-10-CM

## 2020-08-19 DIAGNOSIS — Z79.899 ENCOUNTER FOR MONITORING LONG-TERM PROTON PUMP INHIBITOR THERAPY: ICD-10-CM

## 2020-08-19 DIAGNOSIS — G89.4 CHRONIC PAIN SYNDROME: Primary | ICD-10-CM

## 2020-08-19 DIAGNOSIS — Z13.6 ENCOUNTER FOR LIPID SCREENING FOR CARDIOVASCULAR DISEASE: ICD-10-CM

## 2020-08-19 DIAGNOSIS — Z13.220 ENCOUNTER FOR LIPID SCREENING FOR CARDIOVASCULAR DISEASE: ICD-10-CM

## 2020-08-19 DIAGNOSIS — E55.9 VITAMIN D DEFICIENCY: ICD-10-CM

## 2020-08-19 DIAGNOSIS — M47.816 LUMBAR SPONDYLOSIS: ICD-10-CM

## 2020-08-19 DIAGNOSIS — Z51.81 ENCOUNTER FOR MONITORING LONG-TERM PROTON PUMP INHIBITOR THERAPY: ICD-10-CM

## 2020-08-19 PROCEDURE — 99213 OFFICE O/P EST LOW 20 MIN: CPT | Mod: 95,,, | Performed by: FAMILY MEDICINE

## 2020-08-19 PROCEDURE — 99213 PR OFFICE/OUTPT VISIT, EST, LEVL III, 20-29 MIN: ICD-10-PCS | Mod: 95,,, | Performed by: FAMILY MEDICINE

## 2020-08-19 PROCEDURE — 3008F BODY MASS INDEX DOCD: CPT | Mod: CPTII,,, | Performed by: FAMILY MEDICINE

## 2020-08-19 PROCEDURE — 3008F PR BODY MASS INDEX (BMI) DOCUMENTED: ICD-10-PCS | Mod: CPTII,,, | Performed by: FAMILY MEDICINE

## 2020-08-19 RX ORDER — DULOXETIN HYDROCHLORIDE 60 MG/1
60 CAPSULE, DELAYED RELEASE ORAL DAILY
Qty: 90 CAPSULE | Refills: 1 | Status: SHIPPED | OUTPATIENT
Start: 2020-08-19 | End: 2020-12-02

## 2020-08-19 NOTE — PATIENT INSTRUCTIONS
Call back psychiatry department and at least do the virtual visit with Dr. Silveira, find out what the out of pocket for virtual visit is without insurance
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
## 2020-08-21 ENCOUNTER — PATIENT MESSAGE (OUTPATIENT)
Dept: INTERNAL MEDICINE | Facility: CLINIC | Age: 45
End: 2020-08-21

## 2020-08-21 NOTE — TELEPHONE ENCOUNTER
Spoke to patient, advised her to [facility] to the [facility] for the records. Not released on the portal.

## 2020-08-25 ENCOUNTER — TELEPHONE (OUTPATIENT)
Dept: OBSTETRICS AND GYNECOLOGY | Facility: CLINIC | Age: 45
End: 2020-08-25

## 2020-09-11 ENCOUNTER — PATIENT OUTREACH (OUTPATIENT)
Dept: ADMINISTRATIVE | Facility: OTHER | Age: 45
End: 2020-09-11

## 2020-09-14 ENCOUNTER — TELEPHONE (OUTPATIENT)
Dept: SURGERY | Facility: CLINIC | Age: 45
End: 2020-09-14

## 2020-09-16 ENCOUNTER — PATIENT MESSAGE (OUTPATIENT)
Dept: OBSTETRICS AND GYNECOLOGY | Facility: CLINIC | Age: 45
End: 2020-09-16

## 2020-09-16 ENCOUNTER — OFFICE VISIT (OUTPATIENT)
Dept: INTERNAL MEDICINE | Facility: CLINIC | Age: 45
End: 2020-09-16
Payer: COMMERCIAL

## 2020-09-16 ENCOUNTER — TELEPHONE (OUTPATIENT)
Dept: OBSTETRICS AND GYNECOLOGY | Facility: CLINIC | Age: 45
End: 2020-09-16

## 2020-09-16 VITALS — WEIGHT: 293 LBS | BODY MASS INDEX: 47.99 KG/M2

## 2020-09-16 DIAGNOSIS — G90.522 COMPLEX REGIONAL PAIN SYNDROME TYPE 1 OF LEFT LOWER EXTREMITY: ICD-10-CM

## 2020-09-16 DIAGNOSIS — M54.17 LUMBOSACRAL RADICULOPATHY AT L5: ICD-10-CM

## 2020-09-16 DIAGNOSIS — R13.10 DYSPHAGIA, UNSPECIFIED TYPE: Primary | ICD-10-CM

## 2020-09-16 DIAGNOSIS — K21.9 GASTROESOPHAGEAL REFLUX DISEASE, ESOPHAGITIS PRESENCE NOT SPECIFIED: ICD-10-CM

## 2020-09-16 DIAGNOSIS — G47.30 SLEEP APNEA IN ADULT: Primary | ICD-10-CM

## 2020-09-16 DIAGNOSIS — M48.061 NEURAL FORAMINAL STENOSIS OF LUMBAR SPINE: ICD-10-CM

## 2020-09-16 PROCEDURE — 3008F PR BODY MASS INDEX (BMI) DOCUMENTED: ICD-10-PCS | Mod: CPTII,,, | Performed by: FAMILY MEDICINE

## 2020-09-16 PROCEDURE — 99213 OFFICE O/P EST LOW 20 MIN: CPT | Mod: 95,,, | Performed by: FAMILY MEDICINE

## 2020-09-16 PROCEDURE — 3008F BODY MASS INDEX DOCD: CPT | Mod: CPTII,,, | Performed by: FAMILY MEDICINE

## 2020-09-16 PROCEDURE — 99213 PR OFFICE/OUTPT VISIT, EST, LEVL III, 20-29 MIN: ICD-10-PCS | Mod: 95,,, | Performed by: FAMILY MEDICINE

## 2020-09-16 RX ORDER — PANTOPRAZOLE SODIUM 40 MG/1
40 TABLET, DELAYED RELEASE ORAL 2 TIMES DAILY
Qty: 180 TABLET | Refills: 1 | Status: SHIPPED | OUTPATIENT
Start: 2020-09-16 | End: 2020-12-22 | Stop reason: SDUPTHER
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
## 2020-09-28 ENCOUNTER — PATIENT MESSAGE (OUTPATIENT)
Dept: HEMATOLOGY/ONCOLOGY | Facility: CLINIC | Age: 45
End: 2020-09-28

## 2020-09-28 ENCOUNTER — OFFICE VISIT (OUTPATIENT)
Dept: HEMATOLOGY/ONCOLOGY | Facility: CLINIC | Age: 45
End: 2020-09-28
Payer: COMMERCIAL

## 2020-09-28 DIAGNOSIS — D05.12 BREAST NEOPLASM, TIS (DCIS), LEFT: Primary | ICD-10-CM

## 2020-09-28 DIAGNOSIS — M54.50 CHRONIC BILATERAL LOW BACK PAIN, UNSPECIFIED WHETHER SCIATICA PRESENT: ICD-10-CM

## 2020-09-28 DIAGNOSIS — F32.A DEPRESSION, UNSPECIFIED DEPRESSION TYPE: ICD-10-CM

## 2020-09-28 DIAGNOSIS — G89.29 CHRONIC BILATERAL LOW BACK PAIN, UNSPECIFIED WHETHER SCIATICA PRESENT: ICD-10-CM

## 2020-09-28 PROCEDURE — 99204 OFFICE O/P NEW MOD 45 MIN: CPT | Mod: S$GLB,,, | Performed by: PHYSICIAN ASSISTANT

## 2020-09-28 PROCEDURE — 99999 PR PBB SHADOW E&M-EST. PATIENT-LVL III: ICD-10-PCS | Mod: PBBFAC,,, | Performed by: PHYSICIAN ASSISTANT

## 2020-09-28 PROCEDURE — 99204 PR OFFICE/OUTPT VISIT, NEW, LEVL IV, 45-59 MIN: ICD-10-PCS | Mod: S$GLB,,, | Performed by: PHYSICIAN ASSISTANT

## 2020-09-28 PROCEDURE — 99999 PR PBB SHADOW E&M-EST. PATIENT-LVL III: CPT | Mod: PBBFAC,,, | Performed by: PHYSICIAN ASSISTANT
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
## 2020-09-29 ENCOUNTER — TELEPHONE (OUTPATIENT)
Dept: OBSTETRICS AND GYNECOLOGY | Facility: CLINIC | Age: 45
End: 2020-09-29

## 2020-09-29 NOTE — TELEPHONE ENCOUNTER
----- Message from Lilian Villagran PA-C sent at 9/28/2020 11:26 AM CDT -----  Please schedule for [employer]. I can do it at [facility]. Thank you!

## 2020-09-30 DIAGNOSIS — Z98.890 S/P SPINAL SURGERY: ICD-10-CM

## 2020-09-30 DIAGNOSIS — Z72.820 POOR SLEEP: ICD-10-CM

## 2020-09-30 DIAGNOSIS — M54.10 RADICULAR PAIN OF LEFT LOWER EXTREMITY: ICD-10-CM

## 2020-09-30 DIAGNOSIS — M48.061 NEURAL FORAMINAL STENOSIS OF LUMBAR SPINE: ICD-10-CM

## 2020-09-30 RX ORDER — AMITRIPTYLINE HYDROCHLORIDE 50 MG/1
50 TABLET, FILM COATED ORAL NIGHTLY PRN
Qty: 90 TABLET | Refills: 0 | Status: SHIPPED | OUTPATIENT
Start: 2020-09-30 | End: 2020-11-11

## 2020-09-30 RX ORDER — AMITRIPTYLINE HYDROCHLORIDE 25 MG/1
25 TABLET, FILM COATED ORAL NIGHTLY PRN
Qty: 90 TABLET | Refills: 0 | Status: SHIPPED | OUTPATIENT
Start: 2020-09-30 | End: 2020-11-11

## 2020-10-01 ENCOUNTER — OFFICE VISIT (OUTPATIENT)
Dept: SURGERY | Facility: CLINIC | Age: 45
End: 2020-10-01
Payer: COMMERCIAL

## 2020-10-01 VITALS
BODY MASS INDEX: 43.4 KG/M2 | DIASTOLIC BLOOD PRESSURE: 93 MMHG | HEART RATE: 119 BPM | WEIGHT: 293 LBS | SYSTOLIC BLOOD PRESSURE: 130 MMHG | HEIGHT: 69 IN

## 2020-10-01 DIAGNOSIS — Z85.3 PERSONAL HISTORY OF BREAST CANCER: Primary | ICD-10-CM

## 2020-10-01 DIAGNOSIS — Z12.31 SCREENING MAMMOGRAM, ENCOUNTER FOR: ICD-10-CM

## 2020-10-01 PROCEDURE — 99999 PR PBB SHADOW E&M-EST. PATIENT-LVL IV: CPT | Mod: PBBFAC,,, | Performed by: SURGERY

## 2020-10-01 PROCEDURE — 99999 PR PBB SHADOW E&M-EST. PATIENT-LVL IV: ICD-10-PCS | Mod: PBBFAC,,, | Performed by: SURGERY

## 2020-10-01 PROCEDURE — 99213 OFFICE O/P EST LOW 20 MIN: CPT | Mod: S$GLB,,, | Performed by: SURGERY

## 2020-10-01 PROCEDURE — 3008F PR BODY MASS INDEX (BMI) DOCUMENTED: ICD-10-PCS | Mod: CPTII,S$GLB,, | Performed by: SURGERY

## 2020-10-01 PROCEDURE — 99213 PR OFFICE/OUTPT VISIT, EST, LEVL III, 20-29 MIN: ICD-10-PCS | Mod: S$GLB,,, | Performed by: SURGERY

## 2020-10-01 PROCEDURE — 3008F BODY MASS INDEX DOCD: CPT | Mod: CPTII,S$GLB,, | Performed by: SURGERY

## 2020-10-01 NOTE — PROGRESS NOTES
"Date of Service:10/1/2020    DIAGNOSIS:    This is a 45 y.o. female with a stage 0 (pTis) grade 2 ER + PA + DCIS of the left breast.      TREATMENT:   1. left partial mastectomy without sentinel node biopsy on 2/14/2020. Jeremiah Munoz M.D. Surgical Oncology  2. Radiation therapy from 5/20/2020 To 6/19/2020. Vivian Marr M.D. Radiation Oncology   4. Adjuvant endocrine therapy started on started, but stopped due to side effects. Aldo Benson M.D. Medical Oncology     HISTORY OF PRESENT ILLNESS:   Dianne Hemphill is a 45 y.o. female comes in for oncological follow up.  She denies change in her breast self-exam specifically denying new masses, skin or nipple changes, or nipple discharge. Past medical and surgical history is updated with new changes. There have been no changes to family history. The patient denies constitutional symptoms of night sweats, weight loss, new headaches, visual changes, new back or bony pain, chest pain, or shortness of breath.      IMAGING:   Due in January 2021       PHYSICAL EXAM:   BP (!) 130/93 (BP Location: Right arm, Patient Position: Sitting, BP Method: Large (Automatic))   Pulse (!) 119   Ht 5' 9" (1.753 m)   Wt (!) 149.7 kg (330 lb 0.5 oz)   BMI 48.74 kg/m²   General: The patient appears well and is in no acute distress.   Neuro: Alert & oriented x3.   Breasts: The exam was done with the patient seated and supine. Left breast - within normal limits. No palpable masses and no abnormal skin or nipple findings. No supraclavicular or axillary lymphadenopathy on the left side.   Right breast - within normal limits. No palpable masses and no abnormal skin or nipple findings. No supraclavicular or axillary lymphadenopathy on the right side.  Extremities: No clubbing or cyanosis. Bilateral full arm range of motion without  lymphedema.     ASSESSMENT:   This is a 45 y.o. female without evidence of recurrence by exam, history or imaging.       PLAN:   1. Continue monthly self breast exams " and call the clinic with any changes or problems.  2. Mammogram in January 2021.  3. Return to clinic in 6 months. The patient is in agreement with the plan. Questions were encouraged and answered to patient's satisfaction. Dianne will call our office with any questions or concerns.

## 2020-10-02 ENCOUNTER — DOCUMENTATION ONLY (OUTPATIENT)
Dept: PSYCHIATRY | Facility: CLINIC | Age: 45
End: 2020-10-02

## 2020-10-02 ENCOUNTER — OFFICE VISIT (OUTPATIENT)
Dept: PSYCHIATRY | Facility: CLINIC | Age: 45
End: 2020-10-02
Payer: COMMERCIAL

## 2020-10-02 DIAGNOSIS — Z01.818 PREOP EXAMINATION: ICD-10-CM

## 2020-10-02 DIAGNOSIS — G90.522 COMPLEX REGIONAL PAIN SYNDROME TYPE 1 OF LEFT LOWER EXTREMITY: ICD-10-CM

## 2020-10-02 DIAGNOSIS — F32.4 MAJOR DEPRESSIVE DISORDER, SINGLE EPISODE, IN PARTIAL REMISSION: ICD-10-CM

## 2020-10-02 DIAGNOSIS — Z01.818 PREOPERATIVE EVALUATION TO RULE OUT SURGICAL CONTRAINDICATION: Primary | ICD-10-CM

## 2020-10-02 DIAGNOSIS — M43.27 FUSION OF LUMBOSACRAL SPINE: ICD-10-CM

## 2020-10-02 DIAGNOSIS — G89.4 CHRONIC PAIN SYNDROME: ICD-10-CM

## 2020-10-02 PROCEDURE — 96139 PSYCL/NRPSYC TST TECH EA: CPT | Mod: 95,,, | Performed by: PSYCHOLOGIST

## 2020-10-02 PROCEDURE — 96131 PSYCL TST EVAL PHYS/QHP EA: CPT | Mod: 95,,, | Performed by: PSYCHOLOGIST

## 2020-10-02 PROCEDURE — 96130 PSYCL TST EVAL PHYS/QHP 1ST: CPT | Mod: 95,,, | Performed by: PSYCHOLOGIST

## 2020-10-02 PROCEDURE — 96138 PR PSYCH/NEUROPSYCH TEST ADMIN/SCORING, BY TECH, 2+ TESTS, 1ST 30 MIN: ICD-10-PCS | Mod: 95,,, | Performed by: PSYCHOLOGIST

## 2020-10-02 PROCEDURE — 96130 PR PSYCHOLOGIC TEST EVAL SVCS, 1ST HR: ICD-10-PCS | Mod: 95,,, | Performed by: PSYCHOLOGIST

## 2020-10-02 PROCEDURE — 96131 PR PSYCHOLOGIC TEST EVAL SVCS, EA ADDTL HR: ICD-10-PCS | Mod: 95,,, | Performed by: PSYCHOLOGIST

## 2020-10-02 PROCEDURE — 90791 PSYCH DIAGNOSTIC EVALUATION: CPT | Mod: 95,,, | Performed by: PSYCHOLOGIST

## 2020-10-02 PROCEDURE — 90791 PR PSYCHIATRIC DIAGNOSTIC EVALUATION: ICD-10-PCS | Mod: 95,,, | Performed by: PSYCHOLOGIST

## 2020-10-02 PROCEDURE — 96139 PR PSYCH/NEUROPSYCH TEST ADMIN/SCORING, BY TECH, 2+ TESTS, EA ADDTL 30 MIN: ICD-10-PCS | Mod: 95,,, | Performed by: PSYCHOLOGIST

## 2020-10-02 PROCEDURE — 96138 PSYCL/NRPSYC TECH 1ST: CPT | Mod: 95,,, | Performed by: PSYCHOLOGIST

## 2020-10-02 NOTE — PSYCH TESTING
DTC - Direct to [facility]  The patient location is: Home ([place])  The chief complaint leading to consultation is: Presurgical psychological evaluation prior to SCS    Visit type: audiovisual    Face to Face time with patient: 40  120 minutes of total time spent on the encounter, which includes face to face time and non-face to face time preparing to see the patient (eg, review of tests), Obtaining and/or reviewing separately obtained history, Documenting clinical information in the electronic or other health record, Independently interpreting results (not separately reported) and communicating results to the patient/family/caregiver, or Care coordination (not separately reported).     Each patient to whom he or she provides medical services by telemedicine is:  (1) informed of the relationship between the physician and patient and the respective role of any other health care provider with respect to management of the patient; and (2) notified that he or she may decline to receive medical services by telemedicine and may withdraw from such care at any time.    Notes: N/A      [facility] [address] [place]  [address]  [phone]    REPORT OF PSYCHOLOGICAL TESTING    NAME: Dianne Hemphill  MRN: [id number]  : 1975    REFERRED BY:  Clemencia Watts M.D.    REASON FOR REFERRAL:  Psychological Evaluation prior to surgical implantation of a spinal cord stimulator (SCS) to address chronic pain    EVALUATED BY:  Brittany Silveira, Ph.D., Clinical Psychologist  WILMER High, Psychometrician    DATES OF EVALUATION:  2020, 10/02/2020    EVALUATION PROCEDURES AND TIMES:  Conducted by Psychologist (2 hours):  Integration of patient data, interpretation of standardized test results and clinical data, clinical decision-making, treatment planning and report, and interactive feedback to the patient  CPT Codes:  07276 - 1 hour; 41120 - 1 hour  Conducted by Technician (1  hour):  Psychological test administration and scoring by technician, two or more tests, any method:  [place] Multiphasic Personality Inventory - 2 - Restructured Form (MMPI-2-RF); Pain and Impairment Relationship Scale (PAIRS); [place] Pain Catastrophizing Scale (PCS)  CPT Codes:  24916 - 30 minutes; 79112 - 30 minutes    EVALUATION FINDINGS:  The diagnostic interview revealed that Ms. Dianne Hemphill is a 45-year-old female referred for Psychological Evaluation prior to surgical implantation of a spinal cord stimulator (SCS) to address chronic pain in her left leg and foot.  Her pain has been present since November 2019 without sufficient relief despite multiple pain management therapies.  Her activities are greatly limited.  Ms. Hemphill is now interested in a trial of SCS.  She understood risks of surgery and indicated no significant concerns.  She has reasonable expectations for SCS and will consider other treatment options in the future if it is ineffective.    Ms. Hemphill reports a history of psychotherapy and pharmacotherapy for trauma and depression.  Her symptoms of trauma are in full remission and depression is well-managed with psychotropic medication.  She does not report significant distress or impairment warranting additional intervention.  She denied problems with substance abuse, suicidal or homicidal ideation, psychotic symptoms, and cognitive functioning.    The medical record also revealed the following diagnoses:  Fusion of lumbosacral spine; Complex regional pain syndrome type 1 of left lower extremity; Chronic pain syndrome; GERD; S/P spinal surgery.    TEST DATA:  Psychological Testing data revealed that she was fully cooperative and engaged in the assessment process.  Effort on all tests was satisfactory to produce valid results.    MMPI-2-RF.  The MMPI-2-RF provides an assessment of personality and psychopathology with specific evaluation of psychosocial risk factors associated with outcomes  of spinal cord stimulation.  Ms. Hemphill produced a valid and interpretable MMPI-2-RF profile, answering items relevantly based on content. Therefore, her responses should be considered a relatively accurate reflection of her current psychological functioning.   TEST RESULTS.  Ms. Hemphill reports somatic complaints, but no emotional problems, disordered thinking, behavioral problems, or interpersonal issues.   Somatic.  She reports a number of gastrointestinal complaints and is likely to have gastrointestinal problems in her history.  GROUP COMPARISONS.  Compared to other SCS candidates, Ms. Hemphill reports greater gastrointestinal complaints.    RISK ASSESSMENTS.  The following likelihoods are based on test results and research, and are correlational rather than causational.  Pre-surgery, Ms. Hemphill is more likely to have a history of multiple somatic complaints.  Her profile was associated with no significant risks for adverse postsurgical outcomes.  RECOMMENDATIONS.  There are no treatment recommendations indicated by her profile.    PAIRS and PCS.  The PAIRS and PCS reveal beliefs and attitudes related to pain that may impact outcomes of spinal cord stimulation.  The PAIRS indicated non-significant complications related to perceptions of impairment with a total score of 73 (a score over 75 is clinically significant).  The PCS indicated minimal catastrophizing of pain with a total score of 19, which is in the 48th percentile (a score over 30 is in the 75th percentile and is clinically significant).  Her subscale scores indicated minimal Rumination (6th percentile), minimal Magnification (63rd percentile), and minimal Helplessness (50th percentile).    FEEDBACK.  Ms. Hemphill was provided with test results, and offered the opportunity to respond to feedback and clarify results if needed.  She acknowledged the test results as being largely consistent with her overall functioning and well-being.    DIAGNOSTIC  IMPRESSIONS:    ICD-10-CM ICD-9-CM   1. Preoperative evaluation to rule out surgical contraindication  Z01.818 V72.83   2. Preop examination  Z01.818 V72.84   3. Fusion of lumbosacral spine  M43.27 724.6   4. Complex regional pain syndrome type 1 of left lower extremity  G90.522 337.22   5. Chronic pain syndrome  G89.4 338.4   6. Major depressive disorder, single episode, in partial remission  F32.4 296.25       SUMMARY AND RECOMMENDATIONS:  Ms. Hemphill has a long history of severe pain and is pursuing the spinal cord stimulator (SCS) in an effort to improve pain and quality of life.  Test results should be considered valid, and indicate that she reports no significant psychiatric distress or maladaptive pain coping.  Her profile was not associated with any significant risks for adverse postsurgical outcomes.  Based on research and recommendations regarding presurgical psychological screening for pain control procedures, her test results and reports are within the expected range to predict adequate outcomes and patient satisfaction.      Ms. Jimenez testing profile was largely consistent with her reports in the clinical interview.  In the clinical interview, Ms. Hemphill acknowledged a history of psychiatric treatment for trauma and depression, but her symptoms are in full remission or well-managed with pharmacotherapy.  There are no recommendations for psychological intervention at this time.  This evaluation revealed NO contraindications to SCS from a psychological perspective.        Report and interpretation and coding were completed on 10/02/2020
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
## 2020-10-02 NOTE — PROGRESS NOTES
This evaluation revealed NO contraindications to SCS from a psychological perspective.  Test results indicate no significant psychiatric distress or maladaptive pain coping.  Her profile was not associated with any significant risks for adverse postsurgical outcomes.  In the clinical interview, Ms. Hemphill acknowledged a history of psychiatric treatment for trauma and depression, but her symptoms are in full remission or well-managed with pharmacotherapy.  There are no recommendations for psychological intervention at this time.

## 2020-10-02 NOTE — LETTER
October 2, 2020        [facility] GONZALE[facility] STAFF             [facility] [address]  [address]  [facility] [address]  Phone: [phone]   Patient: Dianne Hemphill   MR Number: [id number]   YOB: 1975   Date of Visit: 10/2/2020       Dear  Staff:    Thank you for referring Dianne Hemphill to me for evaluation. Below are the relevant portions of my assessment and plan of care.    This evaluation revealed NO contraindications to SCS from a psychological perspective.  Test results indicate no significant psychiatric distress or maladaptive pain coping.  Her profile was not associated with any significant risks for adverse postsurgical outcomes.  In the clinical interview, Ms. Hemphill acknowledged a history of psychiatric treatment for trauma and depression, but her symptoms are in full remission or well-managed with pharmacotherapy.  There are no recommendations for psychological intervention at this time.         If you have questions, please do not hesitate to call me. I look forward to following Dianne along with you.    Sincerely,      Brittany Silveira, PhD           CC  No Recipients

## 2020-10-05 ENCOUNTER — PATIENT MESSAGE (OUTPATIENT)
Dept: RESEARCH | Facility: OTHER | Age: 45
End: 2020-10-05

## 2020-10-06 ENCOUNTER — PATIENT MESSAGE (OUTPATIENT)
Dept: PAIN MEDICINE | Facility: CLINIC | Age: 45
End: 2020-10-06

## 2020-10-06 ENCOUNTER — TELEPHONE (OUTPATIENT)
Dept: PAIN MEDICINE | Facility: CLINIC | Age: 45
End: 2020-10-06

## 2020-10-06 NOTE — TELEPHONE ENCOUNTER
Left message for pt to return call to scheduled virtual audio visit with Dr. Watts to discuss SCS stim trial.

## 2020-10-07 ENCOUNTER — TELEPHONE (OUTPATIENT)
Dept: PAIN MEDICINE | Facility: CLINIC | Age: 45
End: 2020-10-07

## 2020-10-07 ENCOUNTER — OFFICE VISIT (OUTPATIENT)
Dept: PAIN MEDICINE | Facility: CLINIC | Age: 45
End: 2020-10-07
Payer: COMMERCIAL

## 2020-10-07 DIAGNOSIS — G89.4 CHRONIC PAIN SYNDROME: ICD-10-CM

## 2020-10-07 DIAGNOSIS — G90.522 COMPLEX REGIONAL PAIN SYNDROME TYPE 1 OF LEFT LOWER EXTREMITY: Primary | ICD-10-CM

## 2020-10-07 PROCEDURE — 99214 PR OFFICE/OUTPT VISIT, EST, LEVL IV, 30-39 MIN: ICD-10-PCS | Mod: 95,,, | Performed by: PAIN MEDICINE

## 2020-10-07 PROCEDURE — 99214 OFFICE O/P EST MOD 30 MIN: CPT | Mod: 95,,, | Performed by: PAIN MEDICINE

## 2020-10-07 NOTE — H&P (VIEW-ONLY)
"[facility] New Patient Evaluation  Telemedicine Encounter    Telemedicine Bundle:  This visit was completed during the Coronavirus Crisis to enhance patient safety.  The patient location is: patient's home  The chief complaint leading to consultation is: Leg Pain, Foot Pain, and Back Pain  Visit type: Virtual visit with synchronous audio and video  Total time spent with patient: 23 minutes  Each patient to whom he or she provides medical services by telemedicine is:    (1) informed of the relationship between the physician and patient and the respective role of any other health care provider with respect to management of the patient  (2) notified that he or she may decline to receive medical services by telemedicine and may withdraw from such care at any time.    Referred by: Dr. Bazan  Reason for referral: CRPS Type 1    CC:   Chief Complaint   Patient presents with    Leg Pain    Foot Pain    Back Pain     Last 3 PDI Scores 6/22/2020   Pain Disability Index (PDI) 49       Interval Update:  10/07/2020 - this is a 45-year-old female with severe left foot pain that has not improved since the initial evaluation.  She underwent a lumbar sympathetic block which did not alleviate her symptoms and is very interested in moving forward with the spinal cord stimulator trial.  This visit was created is an opportunity for us to discuss the stimulator options that are available to her.      HPI:   Dianne Hemphill is a 45 y.o. female who complains of severe foot pain starting in Nov 2019.  Patient reports that pain is in the arch of the foot and great toe.  The pain "travels" at times and feels like the "skin is being ripped" off of her foot.  The skin on the left foot is glossy now (sometimes completely white and sometimes purple) and the pain is now traveling up the calf and shin.  The shin is now very painful with intermittent pains like a person is drilling a spike through the foot.  Weather sets of the " "pain as does standing or walking for more than 10-15 minutes.  It feels like she's walking on a "clubbed foot."  Light touch from towels or bed sheets as well as air movement can make the pain "horrific."  Patient also states that she's on so many medication that she barely stays awake.    Location: left lower extremity  Onset: Nov 7 - she woke up from lumbar surgery with this pain  Current Pain Score: 6-7/10  Daily Pain of Range: 6-10/10  Quality: Burning, Throbbing, Numb and Stabbing  Radiation: see HPI above  Worsened by: standing for more than 10 minutes and walking for more than 10 minutes  Improved by: medications    Previous Therapies:  PT/OT: Denies OT for the foot.  Endorses PT.  HEP: Endorses daily performance of PT exercises at home.    TENS: Yes, along with PT exercises  Interventions:   Surgery:  Medications:   - NSAIDS:   - MSK Relaxants:   - TCAs:   - SNRIs:   - Topicals:   - Anticonvulsants:  - Opioids: Tramadol - too little relief; Dilaudid and Norco never provided any relief    Current Pain Medications:  1. Gabapentin 1200 TID  2. Duloxetine 30 mg daily  3. Elavil 50 mg QHS  4. Baclofen 10 TID  5. Tylenol 650 QHS    Review of Systems:  Review of Systems   Constitutional: Negative for chills and fever.   HENT: Negative for nosebleeds.    Eyes: Negative for blurred vision and pain.   Respiratory: Negative for hemoptysis.    Cardiovascular: Negative for chest pain and palpitations.   Gastrointestinal: Negative for heartburn, nausea and vomiting.   Genitourinary: Negative for dysuria and hematuria.   Musculoskeletal: Positive for joint pain. Negative for falls and myalgias.   Skin: Negative for rash.   Neurological: Positive for tingling and focal weakness. Negative for seizures and loss of consciousness.   Endo/Heme/Allergies: Does not bruise/bleed easily.   Psychiatric/Behavioral: Negative for hallucinations. The patient has insomnia.        History:    Current Outpatient Medications:     " acetaminophen (TYLENOL 8 HOUR) 650 MG TbSR, Take 650 mg by mouth as needed. , Disp: , Rfl:     amitriptyline (ELAVIL) 25 MG tablet, Take 1 tablet (25 mg total) by mouth nightly as needed for Insomnia. Take in addition to 50 mg tablet for total dose of 75 mg nightly, Disp: 90 tablet, Rfl: 0    amitriptyline (ELAVIL) 50 MG tablet, Take 1 tablet (50 mg total) by mouth nightly as needed for Insomnia. Take in addition to 25 mg tablet for total dose of 75 mg nightly, Disp: 90 tablet, Rfl: 0    baclofen (LIORESAL) 10 MG tablet, Take 1 tablet (10 mg total) by mouth 3 (three) times daily., Disp: 270 tablet, Rfl: 3    cholecalciferol, vitamin D3, (VITAMIN D3) 2,000 unit Tab, Take by mouth once daily., Disp: , Rfl:     diclofenac sodium (VOLTAREN) 1 % Gel, as needed. , Disp: , Rfl: 3    DULoxetine (CYMBALTA) 60 MG capsule, Take 1 capsule (60 mg total) by mouth once daily., Disp: 90 capsule, Rfl: 1    fexofenadine (ALLEGRA) 180 MG tablet, Take 180 mg by mouth once daily., Disp: , Rfl:     fluticasone propionate (FLONASE) 50 mcg/actuation nasal spray, 1 spray (50 mcg total) by Each Nostril route once daily., Disp: 16 g, Rfl: 11    gabapentin (NEURONTIN) 600 MG tablet, Take 2 tablets (1,200 mg total) by mouth 3 (three) times daily., Disp: 540 tablet, Rfl: 3    ketoconazole (NIZORAL) 2 % shampoo, APPLY TOPICALLY TWICE A  WEEK., Disp: 120 mL, Rfl: 0    levocetirizine (XYZAL) 5 MG tablet, Take 5 mg by mouth every evening., Disp: , Rfl:     lidocaine-tetracaine 7-7 % Crea, as needed. , Disp: , Rfl: 3    ondansetron (ZOFRAN-ODT) 4 MG TbDL, Take 2 tablets (8 mg total) by mouth every 6 (six) hours as needed., Disp: 60 tablet, Rfl: 1    pantoprazole (PROTONIX) 40 MG tablet, Take 1 tablet (40 mg total) by mouth 2 (two) times daily., Disp: 180 tablet, Rfl: 1    SANARE ADV SCAR THERAPY BASE Gel, as needed. , Disp: , Rfl: 3    scopolamine (TRANSDERM-SCOP) 1.3-1.5 mg (1 mg over 3 days), Place 1 patch onto the skin every 72  hours. (Patient taking differently: Place 1 patch onto the skin as needed. ), Disp: 4 patch, Rfl: 3  No current facility-administered medications for this visit.     Facility-Administered Medications Ordered in Other Visits:     lidocaine (PF) 10 mg/ml (1%) injection 10 mg, 1 mL, Intradermal, Once, Clemencia Watts Jr., MD    Past Medical History:   Diagnosis Date    GERD (gastroesophageal reflux disease)     History of migraine headaches     Obesity     Pollen allergies     PONV (postoperative nausea and vomiting)     Smoker        Past Surgical History:   Procedure Laterality Date    CHOLECYSTECTOMY      ESOPHAGOGASTRODUODENOSCOPY      EXPLORATORY LAPAROTOMY      HYSTERECTOMY      fibroids    LUMBAR FUSION N/A 11/7/2019    Procedure: FUSION, SPINE, LUMBAR Procedure: STG 1: L5-S1 anterior Lumbar interbody fusion / STG 2:L5-S1 Posterior instrumentation;  Surgeon: Corona Bazan MD;  [facility];  Service: Neurosurgery;  Laterality: N/A;  FUSION, SPINE, LUMBARProcedure: STG 1: L5-S1 anterior Lumbar interbody fusion / STG 2:L5-S1 Posterior instrumentationSURGERY TIME: 2.5hrsLOS:ANESTHESIA: GeneralBlood:    LUMBAR SYMPATHETIC NERVE BLOCK Left 6/11/2020    Procedure: BLOCK, NERVE, SYMPATHETIC, LUMBAR--Left;  Surgeon: Clemencia Watts Jr., MD;  Location[facility];  Service: Pain Management;  Laterality: Left;    MASTECTOMY, PARTIAL Left 2/14/2020    Procedure: MASTECTOMY, PARTIAL LEFT with RADIOLOGIC MARKER;  Surgeon: Mara Munoz MD;  Location[facility];  Service: General;  Laterality: Left;    SPINAL FUSION      TONSILLECTOMY         Family History   Problem Relation Age of Onset    Cancer Mother     No Known Problems Father     Cancer Maternal Grandfather     Diabetes Neg Hx     Heart disease Neg Hx     Stroke Neg Hx        Social History     Socioeconomic History    Marital status:      Spouse name: Not on file    Number of children: 2    Years of education: Not on file  "   Highest education level: Not on file   Occupational History    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 [employer]   Social Needs    Financial resource strain: Not on file    Food insecurity     Worry: Not on file     Inability: Not on file    Transportation needs     Medical: Not on file     Non-medical: Not on file   Tobacco Use    Smoking status: Former Smoker     Packs/day: 0.50     Years: 15.00     Pack years: 7.50     Types: Vaping with nicotine     Quit date: 2019     Years since quittin.0    Smokeless tobacco: Former User   Substance and Sexual Activity    Alcohol use: Yes     Comment: 2-3 drinks per year    Drug use: No    Sexual activity: Yes     Partners: Male     Birth control/protection: See Surgical Hx     Comment: Hysterectomy   Lifestyle    Physical activity     Days per week: Not on file     Minutes per session: Not on file    Stress: Not on file   Relationships    Social connections     Talks on phone: Not on file     Gets together: Not on file     Attends Advent service: Not on file     Active member of club or organization: Not on file     Attends meetings of clubs or organizations: Not on file     Relationship status: Not on file   Other Topics Concern    Not on file   Social History Narrative    Not on file       Review of patient's allergies indicates:   Allergen Reactions    Ativan [lorazepam] Other (See Comments)     Pt states she cannot "wake up or sleeps for three days."    Gluten protein Nausea And Vomiting    Milk containing products Hives    Demerol [meperidine] Nausea And Vomiting    Imitrex [sumatriptan succinate]      Hysterics      Morphine Nausea And Vomiting    Tetracyclines Nausea And Vomiting    Ciprofloxacin hcl Rash    Codeine Nausea And Vomiting     Can take Dilaudid, oxycontin, oxycodone & tramadol    Erythromycin Rash       Physical Exam:  There were no vitals filed for this visit.  General    Nursing note and vitals " "reviewed.  Constitutional: She is oriented to person, place, and time. She appears well-developed and well-nourished. No distress.   HENT:   Head: Normocephalic and atraumatic.   Nose: Nose normal.   Eyes: Conjunctivae and EOM are normal. Pupils are equal, round, and reactive to light. Right eye exhibits no discharge. Left eye exhibits no discharge. No scleral icterus.   Neck: No JVD present.   Cardiovascular: Intact distal pulses.    Pulmonary/Chest: Effort normal. No respiratory distress.   Abdominal: She exhibits no distension.   Neurological: She is alert and oriented to person, place, and time. Coordination normal.   Psychiatric: She has a normal mood and affect. Her behavior is normal. Judgment and thought content normal.         Left Ankle/Foot Exam     Inspection  Bruising: Foot - absent  Effusion: Foot - absent  Atrophy: Foot - absent  Scars: absent    Comments:  Left foot appears atraumatic but is erythematous compared to the right.  Patient demonstrates a "glossy" texture to the skin by adjusting the angle of her phone.        Imaging:  X-Ray Lumbar Spine AP And Lateral 05/18/2020   Postoperative fusion changes at L5-S1.  Hardware projects in similar positioning without evidence for loosening or failure.  Slight levocurvature.  Remaining lumbar vertebral body heights and alignment appear maintained with similar discogenic endplate change elsewhere.  SI joints are symmetric.  No new abnormality.       Labs:  BMP  Lab Results   Component Value Date     08/25/2020    K 4.3 08/25/2020     08/25/2020    CO2 27 08/25/2020    BUN 12 08/25/2020    CREATININE 0.68 08/25/2020    CALCIUM 9.1 08/25/2020    ANIONGAP 10 08/25/2020    ESTGFRAFRICA >60.0 08/25/2020    EGFRNONAA >60.0 08/25/2020     Lab Results   Component Value Date    ALT 29 08/25/2020    AST 31 08/25/2020    ALKPHOS 84 08/25/2020    BILITOT 0.4 08/25/2020       Assessment:  Problem List Items Addressed This Visit     Complex regional pain " syndrome type 1 of left lower extremity - Primary    Chronic pain syndrome          5/29/20 - Dianne Hemphill is a 45 y.o. female with chronic left lower extremity pain associated with lumbosacral fusion L5-S1 in November 2019.  Patient states that she awoke from surgery with a severe pain in the left foot and felt like someone was peeling of her skin.  Since that time the pain has continued to be present, but his migrating proximally and now affecting the foot, shin, and calf.  She reports a constellation of neuropathic symptoms including simultaneous numbness along with dysesthesias (burning, tingling, stabbing).  She also endorses allodynia to light touch from bed sheets and air movement along with proximal spread of her symptoms.  She believes that her hips may not be affected as well.  Currently, she is on a robust regiment of neuropathic medications and experiencing very little relief as her pain range is 6-10/10 and severely impacts her ability to walk for more than 10 min.  Given the lack of response to a robust regimen such as hers, I have recommended changing course and pursuing interventional procedures as the primary treatment modality at this point.  This is especially important given the length of time she has been experiencing the symptoms.  Failure to improve or resolve over the past 6 months indicates a poor likelihood of spontaneous resolution.  We will start with a left lumbar sympathetic block for diagnostic and therapeutic purposes.  We also briefly discussed the role of spinal cord stimulation therapy for the treatment of CRPS type 1, which may be needed if interventional injections like the LSB fail to provide significant and sustained relief.  I spent a significant amount of time explaining what is known about the underlying pathophysiology and characteristics of CRPS.    10/7/20 - this is a 45-year-old female with CRPS type 1 chronic pain syndrome secondary to a lumbar surgery.  She is  status post left lumbar sympathetic block which did not provide significant relief but continues to display symptoms of CRPS including vasomotor changes, hypersensitivity, and hair loss of the left lower extremity.  I have recommended that we move forward with a trial of spinal cord stimulation.  We discussed to spinal cord stimulator options today and she would like to move forward with the Nevro device which provides HF 10 therapy.    : Reviewed and consistent with medication use as prescribed.    Treatment Plan:   Procedures: s/f for Nevro spinal cord stimulator trial  PT/OT/HEP: Patient may need dedicated OT but she appears a little resistant at the moment due to cost of PT and receipt of exercises that she performing daily.  I will approach this topic at subsequent visits.  Medications: No changes recommended at this time.  As stated above, she is on a robust regimen of medications and I do not think incremental changes to that regimen will provide the significant and sustained relief she is seeking.  Labs: reviewed and medications are appropriately dosed for current hepatorenal function.  Imaging: No additional recommended at this time.    Follow Up: RTC 5-7 days after the trial    Clemencia Watts Jr, MD  Interventional Pain Medicine / Anesthesiology    Disclaimer: This note was partly generated using dictation software which may occasionally result in transcription errors.

## 2020-10-07 NOTE — PROGRESS NOTES
"[facility] New Patient Evaluation  Telemedicine Encounter    Telemedicine Bundle:  This visit was completed during the Coronavirus Crisis to enhance patient safety.  The patient location is: patient's home  The chief complaint leading to consultation is: Leg Pain, Foot Pain, and Back Pain  Visit type: Virtual visit with synchronous audio and video  Total time spent with patient: 23 minutes  Each patient to whom he or she provides medical services by telemedicine is:    (1) informed of the relationship between the physician and patient and the respective role of any other health care provider with respect to management of the patient  (2) notified that he or she may decline to receive medical services by telemedicine and may withdraw from such care at any time.    Referred by: Dr. Bazan  Reason for referral: CRPS Type 1    CC:   Chief Complaint   Patient presents with    Leg Pain    Foot Pain    Back Pain     Last 3 PDI Scores 6/22/2020   Pain Disability Index (PDI) 49       Interval Update:  10/07/2020 - this is a 45-year-old female with severe left foot pain that has not improved since the initial evaluation.  She underwent a lumbar sympathetic block which did not alleviate her symptoms and is very interested in moving forward with the spinal cord stimulator trial.  This visit was created is an opportunity for us to discuss the stimulator options that are available to her.      HPI:   Dianne Hemphill is a 45 y.o. female who complains of severe foot pain starting in Nov 2019.  Patient reports that pain is in the arch of the foot and great toe.  The pain "travels" at times and feels like the "skin is being ripped" off of her foot.  The skin on the left foot is glossy now (sometimes completely white and sometimes purple) and the pain is now traveling up the calf and shin.  The shin is now very painful with intermittent pains like a person is drilling a spike through the foot.  Weather sets of the " "pain as does standing or walking for more than 10-15 minutes.  It feels like she's walking on a "clubbed foot."  Light touch from towels or bed sheets as well as air movement can make the pain "horrific."  Patient also states that she's on so many medication that she barely stays awake.    Location: left lower extremity  Onset: Nov 7 - she woke up from lumbar surgery with this pain  Current Pain Score: 6-7/10  Daily Pain of Range: 6-10/10  Quality: Burning, Throbbing, Numb and Stabbing  Radiation: see HPI above  Worsened by: standing for more than 10 minutes and walking for more than 10 minutes  Improved by: medications    Previous Therapies:  PT/OT: Denies OT for the foot.  Endorses PT.  HEP: Endorses daily performance of PT exercises at home.    TENS: Yes, along with PT exercises  Interventions:   Surgery:  Medications:   - NSAIDS:   - MSK Relaxants:   - TCAs:   - SNRIs:   - Topicals:   - Anticonvulsants:  - Opioids: Tramadol - too little relief; Dilaudid and Norco never provided any relief    Current Pain Medications:  1. Gabapentin 1200 TID  2. Duloxetine 30 mg daily  3. Elavil 50 mg QHS  4. Baclofen 10 TID  5. Tylenol 650 QHS    Review of Systems:  Review of Systems   Constitutional: Negative for chills and fever.   HENT: Negative for nosebleeds.    Eyes: Negative for blurred vision and pain.   Respiratory: Negative for hemoptysis.    Cardiovascular: Negative for chest pain and palpitations.   Gastrointestinal: Negative for heartburn, nausea and vomiting.   Genitourinary: Negative for dysuria and hematuria.   Musculoskeletal: Positive for joint pain. Negative for falls and myalgias.   Skin: Negative for rash.   Neurological: Positive for tingling and focal weakness. Negative for seizures and loss of consciousness.   Endo/Heme/Allergies: Does not bruise/bleed easily.   Psychiatric/Behavioral: Negative for hallucinations. The patient has insomnia.        History:    Current Outpatient Medications:     " acetaminophen (TYLENOL 8 HOUR) 650 MG TbSR, Take 650 mg by mouth as needed. , Disp: , Rfl:     amitriptyline (ELAVIL) 25 MG tablet, Take 1 tablet (25 mg total) by mouth nightly as needed for Insomnia. Take in addition to 50 mg tablet for total dose of 75 mg nightly, Disp: 90 tablet, Rfl: 0    amitriptyline (ELAVIL) 50 MG tablet, Take 1 tablet (50 mg total) by mouth nightly as needed for Insomnia. Take in addition to 25 mg tablet for total dose of 75 mg nightly, Disp: 90 tablet, Rfl: 0    baclofen (LIORESAL) 10 MG tablet, Take 1 tablet (10 mg total) by mouth 3 (three) times daily., Disp: 270 tablet, Rfl: 3    cholecalciferol, vitamin D3, (VITAMIN D3) 2,000 unit Tab, Take by mouth once daily., Disp: , Rfl:     diclofenac sodium (VOLTAREN) 1 % Gel, as needed. , Disp: , Rfl: 3    DULoxetine (CYMBALTA) 60 MG capsule, Take 1 capsule (60 mg total) by mouth once daily., Disp: 90 capsule, Rfl: 1    fexofenadine (ALLEGRA) 180 MG tablet, Take 180 mg by mouth once daily., Disp: , Rfl:     fluticasone propionate (FLONASE) 50 mcg/actuation nasal spray, 1 spray (50 mcg total) by Each Nostril route once daily., Disp: 16 g, Rfl: 11    gabapentin (NEURONTIN) 600 MG tablet, Take 2 tablets (1,200 mg total) by mouth 3 (three) times daily., Disp: 540 tablet, Rfl: 3    ketoconazole (NIZORAL) 2 % shampoo, APPLY TOPICALLY TWICE A  WEEK., Disp: 120 mL, Rfl: 0    levocetirizine (XYZAL) 5 MG tablet, Take 5 mg by mouth every evening., Disp: , Rfl:     lidocaine-tetracaine 7-7 % Crea, as needed. , Disp: , Rfl: 3    ondansetron (ZOFRAN-ODT) 4 MG TbDL, Take 2 tablets (8 mg total) by mouth every 6 (six) hours as needed., Disp: 60 tablet, Rfl: 1    pantoprazole (PROTONIX) 40 MG tablet, Take 1 tablet (40 mg total) by mouth 2 (two) times daily., Disp: 180 tablet, Rfl: 1    SANARE ADV SCAR THERAPY BASE Gel, as needed. , Disp: , Rfl: 3    scopolamine (TRANSDERM-SCOP) 1.3-1.5 mg (1 mg over 3 days), Place 1 patch onto the skin every 72  hours. (Patient taking differently: Place 1 patch onto the skin as needed. ), Disp: 4 patch, Rfl: 3  No current facility-administered medications for this visit.     Facility-Administered Medications Ordered in Other Visits:     lidocaine (PF) 10 mg/ml (1%) injection 10 mg, 1 mL, Intradermal, Once, Clemencia Watts Jr., MD    Past Medical History:   Diagnosis Date    GERD (gastroesophageal reflux disease)     History of migraine headaches     Obesity     Pollen allergies     PONV (postoperative nausea and vomiting)     Smoker        Past Surgical History:   Procedure Laterality Date    CHOLECYSTECTOMY      ESOPHAGOGASTRODUODENOSCOPY      EXPLORATORY LAPAROTOMY      HYSTERECTOMY      fibroids    LUMBAR FUSION N/A 11/7/2019    Procedure: FUSION, SPINE, LUMBAR Procedure: STG 1: L5-S1 anterior Lumbar interbody fusion / STG 2:L5-S1 Posterior instrumentation;  Surgeon: Corona Bazan MD;  Location: [facility] OR;  Service: Neurosurgery;  Laterality: N/A;  FUSION, SPINE, LUMBARProcedure: STG 1: L5-S1 anterior Lumbar interbody fusion / STG 2:L5-S1 Posterior instrumentationSURGERY TIME: 2.5hrsLOS:ANESTHESIA: GeneralBlood:    LUMBAR SYMPATHETIC NERVE BLOCK Left 6/11/2020    Procedure: BLOCK, NERVE, SYMPATHETIC, LUMBAR--Left;  Surgeon: Clemencia Watts Jr., MD;  [facility];  Service: Pain Management;  Laterality: Left;    MASTECTOMY, PARTIAL Left 2/14/2020    Procedure: MASTECTOMY, PARTIAL LEFT with RADIOLOGIC MARKER;  Surgeon: Mara Munoz MD;  Location: [facility];  Service: General;  Laterality: Left;    SPINAL FUSION      TONSILLECTOMY         Family History   Problem Relation Age of Onset    Cancer Mother     No Known Problems Father     Cancer Maternal Grandfather     Diabetes Neg Hx     Heart disease Neg Hx     Stroke Neg Hx        Social History     Socioeconomic History    Marital status:      Spouse name: Not on file    Number of children: 2    Years of education: Not on file  "   Highest education level: Not on file   Occupational History    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 [employer]   Social Needs    Financial resource strain: Not on file    Food insecurity     Worry: Not on file     Inability: Not on file    Transportation needs     Medical: Not on file     Non-medical: Not on file   Tobacco Use    Smoking status: Former Smoker     Packs/day: 0.50     Years: 15.00     Pack years: 7.50     Types: Vaping with nicotine     Quit date: 2019     Years since quittin.0    Smokeless tobacco: Former User   Substance and Sexual Activity    Alcohol use: Yes     Comment: 2-3 drinks per year    Drug use: No    Sexual activity: Yes     Partners: Male     Birth control/protection: See Surgical Hx     Comment: Hysterectomy   Lifestyle    Physical activity     Days per week: Not on file     Minutes per session: Not on file    Stress: Not on file   Relationships    Social connections     Talks on phone: Not on file     Gets together: Not on file     Attends Hindu service: Not on file     Active member of club or organization: Not on file     Attends meetings of clubs or organizations: Not on file     Relationship status: Not on file   Other Topics Concern    Not on file   Social History Narrative    Not on file       Review of patient's allergies indicates:   Allergen Reactions    Ativan [lorazepam] Other (See Comments)     Pt states she cannot "wake up or sleeps for three days."    Gluten protein Nausea And Vomiting    Milk containing products Hives    Demerol [meperidine] Nausea And Vomiting    Imitrex [sumatriptan succinate]      Hysterics      Morphine Nausea And Vomiting    Tetracyclines Nausea And Vomiting    Ciprofloxacin hcl Rash    Codeine Nausea And Vomiting     Can take Dilaudid, oxycontin, oxycodone & tramadol    Erythromycin Rash       Physical Exam:  There were no vitals filed for this visit.  General    Nursing note and vitals " "reviewed.  Constitutional: She is oriented to person, place, and time. She appears well-developed and well-nourished. No distress.   HENT:   Head: Normocephalic and atraumatic.   Nose: Nose normal.   Eyes: Conjunctivae and EOM are normal. Pupils are equal, round, and reactive to light. Right eye exhibits no discharge. Left eye exhibits no discharge. No scleral icterus.   Neck: No JVD present.   Cardiovascular: Intact distal pulses.    Pulmonary/Chest: Effort normal. No respiratory distress.   Abdominal: She exhibits no distension.   Neurological: She is alert and oriented to person, place, and time. Coordination normal.   Psychiatric: She has a normal mood and affect. Her behavior is normal. Judgment and thought content normal.         Left Ankle/Foot Exam     Inspection  Bruising: Foot - absent  Effusion: Foot - absent  Atrophy: Foot - absent  Scars: absent    Comments:  Left foot appears atraumatic but is erythematous compared to the right.  Patient demonstrates a "glossy" texture to the skin by adjusting the angle of her phone.        Imaging:  X-Ray Lumbar Spine AP And Lateral 05/18/2020   Postoperative fusion changes at L5-S1.  Hardware projects in similar positioning without evidence for loosening or failure.  Slight levocurvature.  Remaining lumbar vertebral body heights and alignment appear maintained with similar discogenic endplate change elsewhere.  SI joints are symmetric.  No new abnormality.       Labs:  BMP  Lab Results   Component Value Date     08/25/2020    K 4.3 08/25/2020     08/25/2020    CO2 27 08/25/2020    BUN 12 08/25/2020    CREATININE 0.68 08/25/2020    CALCIUM 9.1 08/25/2020    ANIONGAP 10 08/25/2020    ESTGFRAFRICA >60.0 08/25/2020    EGFRNONAA >60.0 08/25/2020     Lab Results   Component Value Date    ALT 29 08/25/2020    AST 31 08/25/2020    ALKPHOS 84 08/25/2020    BILITOT 0.4 08/25/2020       Assessment:  Problem List Items Addressed This Visit     Complex regional pain " syndrome type 1 of left lower extremity - Primary    Chronic pain syndrome          5/29/20 - Dianne Hemphill is a 45 y.o. female with chronic left lower extremity pain associated with lumbosacral fusion L5-S1 in November 2019.  Patient states that she awoke from surgery with a severe pain in the left foot and felt like someone was peeling of her skin.  Since that time the pain has continued to be present, but his migrating proximally and now affecting the foot, shin, and calf.  She reports a constellation of neuropathic symptoms including simultaneous numbness along with dysesthesias (burning, tingling, stabbing).  She also endorses allodynia to light touch from bed sheets and air movement along with proximal spread of her symptoms.  She believes that her hips may not be affected as well.  Currently, she is on a robust regiment of neuropathic medications and experiencing very little relief as her pain range is 6-10/10 and severely impacts her ability to walk for more than 10 min.  Given the lack of response to a robust regimen such as hers, I have recommended changing course and pursuing interventional procedures as the primary treatment modality at this point.  This is especially important given the length of time she has been experiencing the symptoms.  Failure to improve or resolve over the past 6 months indicates a poor likelihood of spontaneous resolution.  We will start with a left lumbar sympathetic block for diagnostic and therapeutic purposes.  We also briefly discussed the role of spinal cord stimulation therapy for the treatment of CRPS type 1, which may be needed if interventional injections like the LSB fail to provide significant and sustained relief.  I spent a significant amount of time explaining what is known about the underlying pathophysiology and characteristics of CRPS.    10/7/20 - this is a 45-year-old female with CRPS type 1 chronic pain syndrome secondary to a lumbar surgery.  She is  status post left lumbar sympathetic block which did not provide significant relief but continues to display symptoms of CRPS including vasomotor changes, hypersensitivity, and hair loss of the left lower extremity.  I have recommended that we move forward with a trial of spinal cord stimulation.  We discussed to spinal cord stimulator options today and she would like to move forward with the Nevro device which provides HF 10 therapy.    : Reviewed and consistent with medication use as prescribed.    Treatment Plan:   Procedures: s/f for Nevro spinal cord stimulator trial  PT/OT/HEP: Patient may need dedicated OT but she appears a little resistant at the moment due to cost of PT and receipt of exercises that she performing daily.  I will approach this topic at subsequent visits.  Medications: No changes recommended at this time.  As stated above, she is on a robust regimen of medications and I do not think incremental changes to that regimen will provide the significant and sustained relief she is seeking.  Labs: reviewed and medications are appropriately dosed for current hepatorenal function.  Imaging: No additional recommended at this time.    Follow Up: RTC 5-7 days after the trial    Clemencia Watts Jr, MD  Interventional Pain Medicine / Anesthesiology    Disclaimer: This note was partly generated using dictation software which may occasionally result in transcription errors.

## 2020-10-07 NOTE — TELEPHONE ENCOUNTER
Pt scheduled for 10/15/20 at 1100 am for SCS Trial. Pt aware to check in at registration desk on the first floor of the hospital for 1000 am. Pt denied taking blood thinners and is not diabetic. Pt denied having a pacemaker/ICD.

## 2020-10-09 ENCOUNTER — OFFICE VISIT (OUTPATIENT)
Dept: OBSTETRICS AND GYNECOLOGY | Facility: CLINIC | Age: 45
End: 2020-10-09
Payer: COMMERCIAL

## 2020-10-09 VITALS
DIASTOLIC BLOOD PRESSURE: 92 MMHG | HEIGHT: 69 IN | BODY MASS INDEX: 43.4 KG/M2 | SYSTOLIC BLOOD PRESSURE: 116 MMHG | WEIGHT: 293 LBS

## 2020-10-09 DIAGNOSIS — Z01.419 ROUTINE GYNECOLOGICAL EXAMINATION: Primary | ICD-10-CM

## 2020-10-09 DIAGNOSIS — D05.12 BREAST NEOPLASM, TIS (DCIS), LEFT: ICD-10-CM

## 2020-10-09 PROCEDURE — 3008F BODY MASS INDEX DOCD: CPT | Mod: CPTII,S$GLB,, | Performed by: PHYSICIAN ASSISTANT

## 2020-10-09 PROCEDURE — 99396 PREV VISIT EST AGE 40-64: CPT | Mod: S$GLB,,, | Performed by: PHYSICIAN ASSISTANT

## 2020-10-09 PROCEDURE — 99396 PR PREVENTIVE VISIT,EST,40-64: ICD-10-PCS | Mod: S$GLB,,, | Performed by: PHYSICIAN ASSISTANT

## 2020-10-09 PROCEDURE — 3008F PR BODY MASS INDEX (BMI) DOCUMENTED: ICD-10-PCS | Mod: CPTII,S$GLB,, | Performed by: PHYSICIAN ASSISTANT

## 2020-10-09 NOTE — PROGRESS NOTES
CC: Well woman exam    Dianne Hemphill is a 45 y.o. female  presents for well woman exam.  LMP: No LMP recorded. Patient has had a hysterectomy. S/p partial hysterectomy due to menorrhagia.  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 S/p lumpectomy and completed radiation therapy on 20. Just had follow up with Dr. Munoz and doing well. Scheduled to have nerve stimulator placed. Continued weight gain. Interested in PT for reconditioning.     Mammogram: Scheduled 2021 with Dr. Munoz  Pap:  PCP: Dr. Annette Burton  Routine Screening Labs: 20 with PCP    Past Medical History:   Diagnosis Date    GERD (gastroesophageal reflux disease)     History of migraine headaches     Obesity     Pollen allergies     PONV (postoperative nausea and vomiting)     Smoker      Past Surgical History:   Procedure Laterality Date    CHOLECYSTECTOMY      ESOPHAGOGASTRODUODENOSCOPY      EXPLORATORY LAPAROTOMY      HYSTERECTOMY      fibroids    LUMBAR FUSION N/A 2019    Procedure: FUSION, SPINE, LUMBAR Procedure: STG 1: L5-S1 anterior Lumbar interbody fusion / STG 2:L5-S1 Posterior instrumentation;  Surgeon: Corona Bazan MD;  Location: [facility] OR;  Service: Neurosurgery;  Laterality: N/A;  FUSION, SPINE, LUMBARProcedure: STG 1: L5-S1 anterior Lumbar interbody fusion / STG 2:L5-S1 Posterior instrumentationSURGERY TIME: 2.5hrsLOS:ANESTHESIA: GeneralBlood:    LUMBAR SYMPATHETIC NERVE BLOCK Left 2020    Procedure: BLOCK, NERVE, SYMPATHETIC, LUMBAR--Left;  Surgeon: Clemencia Watts Jr., MD;  Location: [facility] PAIN MGT;  Service: Pain Management;  Laterality: Left;    MASTECTOMY, PARTIAL Left 2020    Procedure: MASTECTOMY, PARTIAL LEFT with RADIOLOGIC MARKER;  Surgeon: Mara Munoz MD;  Location: [facility] OR;  Service: General;  Laterality: Left;    SPINAL FUSION      TONSILLECTOMY       Social History     Socioeconomic History    Marital status:      Spouse name: Not on file    Number  of children: 2    Years of education: Not on file    Highest education level: Not on file   Occupational History    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 [employer]   Social Needs    Financial resource strain: Not on file    Food insecurity     Worry: Not on file     Inability: Not on file    Transportation needs     Medical: Not on file     Non-medical: Not on file   Tobacco Use    Smoking status: Former Smoker     Packs/day: 0.50     Years: 15.00     Pack years: 7.50     Types: Vaping with nicotine     Quit date: 2019     Years since quittin.0    Smokeless tobacco: Former User   Substance and Sexual Activity    Alcohol use: Yes     Comment: 2-3 drinks per year    Drug use: No    Sexual activity: Yes     Partners: Male     Birth control/protection: See Surgical Hx     Comment: Hysterectomy   Lifestyle    Physical activity     Days per week: Not on file     Minutes per session: Not on file    Stress: Not on file   Relationships    Social connections     Talks on phone: Not on file     Gets together: Not on file     Attends Scientologist service: Not on file     Active member of club or organization: Not on file     Attends meetings of clubs or organizations: Not on file     Relationship status: Not on file   Other Topics Concern    Not on file   Social History Narrative    Not on file     Family History   Problem Relation Age of Onset    Cancer Mother     No Known Problems Father     Cancer Maternal Grandfather     Diabetes Neg Hx     Heart disease Neg Hx     Stroke Neg Hx      OB History        2    Para   2    Term   2            AB        Living   2       SAB        TAB        Ectopic        Multiple        Live Births                     Current Outpatient Medications:     acetaminophen (TYLENOL 8 HOUR) 650 MG TbSR, Take 650 mg by mouth as needed. , Disp: , Rfl:     amitriptyline (ELAVIL) 25 MG tablet, Take 1 tablet (25 mg total) by mouth nightly as needed for Insomnia.  Take in addition to 50 mg tablet for total dose of 75 mg nightly, Disp: 90 tablet, Rfl: 0    amitriptyline (ELAVIL) 50 MG tablet, Take 1 tablet (50 mg total) by mouth nightly as needed for Insomnia. Take in addition to 25 mg tablet for total dose of 75 mg nightly, Disp: 90 tablet, Rfl: 0    baclofen (LIORESAL) 10 MG tablet, Take 1 tablet (10 mg total) by mouth 3 (three) times daily., Disp: 270 tablet, Rfl: 3    cholecalciferol, vitamin D3, (VITAMIN D3) 2,000 unit Tab, Take by mouth once daily., Disp: , Rfl:     diclofenac sodium (VOLTAREN) 1 % Gel, as needed. , Disp: , Rfl: 3    DULoxetine (CYMBALTA) 60 MG capsule, Take 1 capsule (60 mg total) by mouth once daily., Disp: 90 capsule, Rfl: 1    fexofenadine (ALLEGRA) 180 MG tablet, Take 180 mg by mouth once daily., Disp: , Rfl:     fluticasone propionate (FLONASE) 50 mcg/actuation nasal spray, 1 spray (50 mcg total) by Each Nostril route once daily., Disp: 16 g, Rfl: 11    gabapentin (NEURONTIN) 600 MG tablet, Take 2 tablets (1,200 mg total) by mouth 3 (three) times daily., Disp: 540 tablet, Rfl: 3    ketoconazole (NIZORAL) 2 % shampoo, APPLY TOPICALLY TWICE A  WEEK., Disp: 120 mL, Rfl: 0    levocetirizine (XYZAL) 5 MG tablet, Take 5 mg by mouth every evening., Disp: , Rfl:     lidocaine-tetracaine 7-7 % Crea, as needed. , Disp: , Rfl: 3    ondansetron (ZOFRAN-ODT) 4 MG TbDL, Take 2 tablets (8 mg total) by mouth every 6 (six) hours as needed., Disp: 60 tablet, Rfl: 1    pantoprazole (PROTONIX) 40 MG tablet, Take 1 tablet (40 mg total) by mouth 2 (two) times daily., Disp: 180 tablet, Rfl: 1    SANARE ADV SCAR THERAPY BASE Gel, as needed. , Disp: , Rfl: 3    scopolamine (TRANSDERM-SCOP) 1.3-1.5 mg (1 mg over 3 days), Place 1 patch onto the skin every 72 hours. (Patient taking differently: Place 1 patch onto the skin as needed. ), Disp: 4 patch, Rfl: 3  No current facility-administered medications for this visit.     Facility-Administered Medications  "Ordered in Other Visits:     lidocaine (PF) 10 mg/ml (1%) injection 10 mg, 1 mL, Intradermal, Once, Clemencia Watts Jr., MD    The 10-year ASCVD risk score (Rgbeulah GARCIA Jr., et al., 2013) is: 1.3%    Values used to calculate the score:      Age: 45 years      Sex: Female      Is Non- : No      Diabetic: No      Tobacco smoker: No      Systolic Blood Pressure: 116 mmHg      Is BP treated: No      HDL Cholesterol: 41 mg/dL      Total Cholesterol: 221 mg/dL    BP (!) 116/92   Ht 5' 9" (1.753 m)   Wt (!) 152.3 kg (335 lb 12.2 oz)   BMI 49.58 kg/m²       ROS:  GENERAL: Denies weight loss. Feeling well overall. +weight gain  SKIN: Denies rash or lesions.   HEAD: Denies head injury or headache.   NODES: Denies enlarged lymph nodes.   CHEST: Denies chest pain or shortness of breath.   CARDIOVASCULAR: Denies palpitations or left sided chest pain.   ABDOMEN: No abdominal pain, constipation, diarrhea, nausea, vomiting or rectal bleeding.   URINARY: No frequency, dysuria, hematuria, or burning on urination.  REPRODUCTIVE: See HPI.   BREASTS: Denies pain, lumps, or nipple discharge.   HEMATOLOGIC: No easy bruisability or excessive bleeding.   MUSCULOSKELETAL: +pain and swelling  NEUROLOGIC: Denies syncope or weakness.   PSYCHIATRIC: Denies depression, anxiety or mood swings.    PHYSICAL EXAM:  APPEARANCE: Well nourished, well developed, in no acute distress.  AFFECT: WNL, alert and oriented x 3  SKIN: No acne or hirsutism  NECK: Neck symmetric without masses or thyromegaly  NODES: No inguinal, cervical, axillary, or femoral lymph node enlargement  CHEST: Good respiratory effect  ABDOMEN: Soft.  No tenderness or masses.  No hepatosplenomegaly.  No hernias.  BREASTS:  No palpable masses, nipple discharge bilaterally. Lumpectomy scar on the left breast is clean, dry and intact. Left breast skin is diffusely pink due to radiation.  PELVIC: Normal external genitalia without lesions.  Normal hair " distribution.  Adequate perineal body, normal urethral meatus.  Vagina moist and well rugated without lesions or discharge. Cervix and Uterus are surgically absent. Adnexa without masses or tenderness.    EXTREMITIES: No edema.    ASSESSMENT:     Routine gynecological examination: WWE today. Needs 6 month follow up with breast surgery. Weigh gain with pain medications. Would benefit from PT to help with reconditioning. Happy to help with weight loss in the future.      PLAN:   Continue breast imaging yearly with Breast surgery.  Referral to PT to help with reconditioning.  Contacted breast surgery to schedule 6 month follow up.  Follow up yearly for WWE or sooner PRN.    Patient was counseled today on A.C.S. Pap guidelines and recommendations for yearly pelvic exams, mammograms and monthly self breast exams; to see her PCP for other health maintenance.

## 2020-10-14 ENCOUNTER — TELEPHONE (OUTPATIENT)
Dept: SLEEP MEDICINE | Facility: OTHER | Age: 45
End: 2020-10-14

## 2020-10-14 NOTE — DISCHARGE INSTRUCTIONS
Home Care Instructions Pain Management:    1.  DIET:    You may resume your normal diet today.    2.  BATHING:    You may shower with luke warm water.    3.  DRESSING:    You may remove your bandage today.    4.  ACTIVITY LEVEL:      You may resume your normal activities 24 hours after your procedure.    5.  MEDICATIONS:    You may resume your normal medications today.    6.  SPECIAL INSTRUCTIONS:    No heat to the injection site for 24 hours including bath or shower, heating pad, moist heat or hot tubs.    Use an ice pack to the injection site for any pain or discomfort.  Apply ice packs for 20 minute intervals as needed.    If you have received any sedatives by mouth today, you can not drive for 12 hours.    If you have received sedation through an IV, you can not drive for 24 hours.    PLEASE CALL YOUR DOCTOR FOR THE FOLLOWIN.  Redness or swelling around the injection site.  2.  Fever of 101 degrees.  3.  Drainage (pus) from the injection site.  4.  For any continuous bleeding (some dried blood over the incision is normal.)    FOR EMERGENCIES:    If any unusual problems or difficulties occur during clinic hours, call [phone] or dial [phone].    Follow up with with your physician in 2-3 weeks.

## 2020-10-15 ENCOUNTER — PATIENT OUTREACH (OUTPATIENT)
Dept: ADMINISTRATIVE | Facility: OTHER | Age: 45
End: 2020-10-15

## 2020-10-15 NOTE — PROGRESS NOTES
Health Maintenance Due   Topic Date Due    HIV Screening  08/21/1990    Influenza Vaccine (1) 08/01/2020     Updates were requested from care everywhere.  Chart was reviewed for overdue Proactive [facility] Encounters ([facility]) topics (CRS, Breast Cancer Screening, Eye exam)  Health Maintenance has been updated.  LINKS immunization registry triggered.  Immunizations were reconciled.

## 2020-10-19 ENCOUNTER — TELEPHONE (OUTPATIENT)
Dept: GASTROENTEROLOGY | Facility: CLINIC | Age: 45
End: 2020-10-19

## 2020-10-19 ENCOUNTER — OFFICE VISIT (OUTPATIENT)
Dept: GASTROENTEROLOGY | Facility: CLINIC | Age: 45
End: 2020-10-19
Payer: COMMERCIAL

## 2020-10-19 ENCOUNTER — PATIENT MESSAGE (OUTPATIENT)
Dept: GASTROENTEROLOGY | Facility: CLINIC | Age: 45
End: 2020-10-19

## 2020-10-19 DIAGNOSIS — Z01.812 PRE-PROCEDURE LAB EXAM: ICD-10-CM

## 2020-10-19 DIAGNOSIS — R13.10 DYSPHAGIA, UNSPECIFIED TYPE: ICD-10-CM

## 2020-10-19 DIAGNOSIS — Z12.11 SCREENING FOR MALIGNANT NEOPLASM OF COLON: ICD-10-CM

## 2020-10-19 DIAGNOSIS — K21.9 GASTROESOPHAGEAL REFLUX DISEASE, UNSPECIFIED WHETHER ESOPHAGITIS PRESENT: Primary | ICD-10-CM

## 2020-10-19 PROCEDURE — 99214 PR OFFICE/OUTPT VISIT, EST, LEVL IV, 30-39 MIN: ICD-10-PCS | Mod: 95,,, | Performed by: INTERNAL MEDICINE

## 2020-10-19 PROCEDURE — 99214 OFFICE O/P EST MOD 30 MIN: CPT | Mod: 95,,, | Performed by: INTERNAL MEDICINE

## 2020-10-19 NOTE — PROGRESS NOTES
Subjective:       Patient ID: Dianne Hemphill is a 45 y.o. female.    Chief Complaint: F/u    This is a 45-year-old female for a follow-up visit regarding her GI symptoms.  Recently she has noted some nocturnal symptoms where she will wake up noting sensation of choking.  It is acute and causes significant anxiety for her.  Some occasional dysphagia as well as globus sensation occurring to solids with a sensation of a sticking in the suprasternal region.  No difficulty with liquids.  No nasal regurgitation.  No other exacerbating or relieving factors or other associated symptoms.  This has correlated to a worsening of her reflux symptoms and she had been on omeprazole/pantoprazole for years.  She changed pantoprazole to bid from daily over the past few weeks.  This has not significantly improved her symptoms    The following portions of the patient's history were reviewed and updated as appropriate: allergies, current medications, past family history, past medical history, past social history, past surgical history and problem list.    (Portions of this note were dictated using voice recognition software and may contain dictation related errors in spelling/grammar/syntax not found on text review)  HPI  Review of Systems   Constitutional: Negative for fatigue and unexpected weight change.   HENT: Positive for trouble swallowing. Negative for voice change.    Respiratory: Positive for choking. Negative for apnea.    Cardiovascular: Negative for chest pain and palpitations.   Gastrointestinal: Negative for anal bleeding and blood in stool.         Objective:      Physical Exam  Vitals signs and nursing note reviewed.   Constitutional:       General: She is not in acute distress.     Appearance: She is well-developed. She is not diaphoretic.   HENT:      Head: Normocephalic and atraumatic.   Eyes:      General: No scleral icterus.     Conjunctiva/sclera: Conjunctivae normal.   Neck:      Musculoskeletal: Normal  range of motion and neck supple.      Trachea: No tracheal deviation.   Pulmonary:      Effort: Pulmonary effort is normal. No respiratory distress.   Abdominal:      General: Bowel sounds are normal. There is no distension.      Palpations: Abdomen is soft.   Skin:     Coloration: Skin is not jaundiced or pale.   Neurological:      General: No focal deficit present.      Mental Status: She is alert and oriented to person, place, and time.   Psychiatric:         Mood and Affect: Mood normal.         Behavior: Behavior normal.           Labs/imaging; reviewed  Assessment:       1. Gastroesophageal reflux disease, unspecified whether esophagitis present    2. Screening for malignant neoplasm of colon    3. Dysphagia, unspecified type        Plan:   1. Discussed egd with bravo vs esophagram; pt prefers to proceed with egd/bravo  2. Average risk colon cancer screening, reviewed ACS guidelines      The patient location is: out of office  The chief complaint leading to consultation is: f/u    Visit type: audiovisual    Each patient to whom he or she provides medical services by telemedicine is:  (1) informed of the relationship between the physician and patient and the respective role of any other health care provider with respect to management of the patient; and (2) notified that he or she may decline to receive medical services by telemedicine and may withdraw from such care at any time.

## 2020-10-20 RX ORDER — SODIUM, POTASSIUM,MAG SULFATES 17.5-3.13G
1 SOLUTION, RECONSTITUTED, ORAL ORAL DAILY
Qty: 1 KIT | Refills: 0 | Status: SHIPPED | OUTPATIENT
Start: 2020-10-20 | End: 2020-10-22

## 2020-10-22 ENCOUNTER — HOSPITAL ENCOUNTER (OUTPATIENT)
Facility: HOSPITAL | Age: 45
Discharge: HOME | End: 2020-10-22
Attending: PAIN MEDICINE | Admitting: PAIN MEDICINE
Payer: COMMERCIAL

## 2020-10-22 VITALS
RESPIRATION RATE: 16 BRPM | DIASTOLIC BLOOD PRESSURE: 52 MMHG | WEIGHT: 293 LBS | SYSTOLIC BLOOD PRESSURE: 103 MMHG | BODY MASS INDEX: 43.4 KG/M2 | HEIGHT: 69 IN | HEART RATE: 81 BPM | TEMPERATURE: 97 F | OXYGEN SATURATION: 100 %

## 2020-10-22 DIAGNOSIS — G89.29 CHRONIC PAIN: ICD-10-CM

## 2020-10-22 DIAGNOSIS — G90.522 COMPLEX REGIONAL PAIN SYNDROME TYPE 1 OF LEFT LOWER EXTREMITY: Primary | ICD-10-CM

## 2020-10-22 DIAGNOSIS — G89.4 CHRONIC PAIN SYNDROME: ICD-10-CM

## 2020-10-22 PROCEDURE — 99152 MOD SED SAME PHYS/QHP 5/>YRS: CPT | Performed by: PAIN MEDICINE

## 2020-10-22 PROCEDURE — 63650 PR PERCUT IMPLNT NEUROELECT,EPIDURAL: ICD-10-PCS | Mod: ,,, | Performed by: PAIN MEDICINE

## 2020-10-22 PROCEDURE — 25000003 PHARM REV CODE 250: Performed by: PAIN MEDICINE

## 2020-10-22 PROCEDURE — 63600175 PHARM REV CODE 636 W HCPCS: Performed by: PAIN MEDICINE

## 2020-10-22 PROCEDURE — 63650 IMPLANT NEUROELECTRODES: CPT | Performed by: PAIN MEDICINE

## 2020-10-22 PROCEDURE — C1897 LEAD, NEUROSTIM TEST KIT: HCPCS | Performed by: PAIN MEDICINE

## 2020-10-22 PROCEDURE — 99153 MOD SED SAME PHYS/QHP EA: CPT | Performed by: PAIN MEDICINE

## 2020-10-22 PROCEDURE — 63650 IMPLANT NEUROELECTRODES: CPT | Mod: ,,, | Performed by: PAIN MEDICINE

## 2020-10-22 PROCEDURE — 25000003 PHARM REV CODE 250: Performed by: STUDENT IN AN ORGANIZED HEALTH CARE EDUCATION/TRAINING PROGRAM

## 2020-10-22 PROCEDURE — 63600175 PHARM REV CODE 636 W HCPCS: Performed by: STUDENT IN AN ORGANIZED HEALTH CARE EDUCATION/TRAINING PROGRAM

## 2020-10-22 PROCEDURE — C1778 LEAD, NEUROSTIMULATOR: HCPCS | Performed by: PAIN MEDICINE

## 2020-10-22 RX ORDER — ONDANSETRON 2 MG/ML
4 INJECTION INTRAMUSCULAR; INTRAVENOUS ONCE
Status: COMPLETED | OUTPATIENT
Start: 2020-10-22 | End: 2020-10-22

## 2020-10-22 RX ORDER — LIDOCAINE HYDROCHLORIDE 10 MG/ML
1 INJECTION, SOLUTION EPIDURAL; INFILTRATION; INTRACAUDAL; PERINEURAL ONCE
Status: ACTIVE | OUTPATIENT
Start: 2020-10-22

## 2020-10-22 RX ORDER — FENTANYL CITRATE 50 UG/ML
INJECTION, SOLUTION INTRAMUSCULAR; INTRAVENOUS
Status: DISCONTINUED | OUTPATIENT
Start: 2020-10-22 | End: 2020-10-22 | Stop reason: HOSPADM

## 2020-10-22 RX ORDER — MIDAZOLAM HYDROCHLORIDE 1 MG/ML
INJECTION INTRAMUSCULAR; INTRAVENOUS
Status: DISCONTINUED | OUTPATIENT
Start: 2020-10-22 | End: 2020-10-22 | Stop reason: HOSPADM

## 2020-10-22 RX ORDER — LIDOCAINE HYDROCHLORIDE 10 MG/ML
INJECTION INFILTRATION; PERINEURAL
Status: DISCONTINUED | OUTPATIENT
Start: 2020-10-22 | End: 2020-10-22 | Stop reason: HOSPADM

## 2020-10-22 RX ORDER — SODIUM BICARBONATE 1 MEQ/ML
SYRINGE (ML) INTRAVENOUS
Status: DISCONTINUED | OUTPATIENT
Start: 2020-10-22 | End: 2020-10-22 | Stop reason: HOSPADM

## 2020-10-22 RX ORDER — BUPIVACAINE HYDROCHLORIDE 2.5 MG/ML
INJECTION, SOLUTION EPIDURAL; INFILTRATION; INTRACAUDAL
Status: DISCONTINUED | OUTPATIENT
Start: 2020-10-22 | End: 2020-10-22 | Stop reason: HOSPADM

## 2020-10-22 RX ORDER — SODIUM CHLORIDE 9 MG/ML
500 INJECTION, SOLUTION INTRAVENOUS CONTINUOUS
Status: ACTIVE | OUTPATIENT
Start: 2020-10-22 | End: 2020-10-23

## 2020-10-22 RX ADMIN — ONDANSETRON 4 MG: 2 INJECTION, SOLUTION INTRAMUSCULAR; INTRAVENOUS at 12:10

## 2020-10-22 RX ADMIN — DEXTROSE 3 G: 50 INJECTION, SOLUTION INTRAVENOUS at 11:10

## 2020-10-22 RX ADMIN — SODIUM CHLORIDE 500 ML: 0.9 INJECTION, SOLUTION INTRAVENOUS at 11:10

## 2020-10-22 NOTE — INTERVAL H&P NOTE
The patient has been examined and the H&P has been reviewed:    I concur with the findings and no changes have occurred since H&P was written.     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  She is status post left lumbar sympathetic block which did not provide significant relief but continues to display symptoms of CRPS including vasomotor changes, hypersensitivity, and hair loss of the left lower extremity.  We will move forward today with a trial of NevroHF 10 therapy spinal cord stimulation.  She will receive ancef IV 3g in preop holding 30 minutes prior to procedure for surgical prophylaxis.    Anesthesia/Surgery risks, benefits and alternative options discussed and understood by patient/family.          There are no hospital problems to display for this patient.

## 2020-10-22 NOTE — DISCHARGE SUMMARY
[facility]  Discharge Note  Short Stay     Admit Date: 10/22/2020    Discharge Date: 10/22/2020     Attending Physician: Clemencia Watts Jr, MD    Diagnoses:  Active Hospital Problems    Diagnosis  POA    Chronic pain [G89.29]  Yes      Resolved Hospital Problems   No resolved problems to display.     Discharged Condition: Good     Hospital Course: Patient was admitted for an outpatient interventional pain management procedure and tolerated the procedure well with no complications.     Final Diagnoses: Same as principal problem.     Disposition: Home or Self Care     Follow up/Patient Instructions:    Follow-up Information     Clemencia Watts Jr, MD. Go in 5 days.    Specialty: Pain Medicine  Why: Post-procedural Follow Up As Scheduled, Call to make an appointment if you do not have one  Contact information:  [address]  [address]  [place] [id number] [phone]                   Reconciled Medications:     Medication List      CHANGE how you take these medications    scopolamine 1.3-1.5 mg (1 mg over 3 days)  Commonly known as: TRANSDERM-SCOP  Place 1 patch onto the skin every 72 hours.  What changed:   · when to take this  · reasons to take this        CONTINUE taking these medications    * amitriptyline 25 MG tablet  Commonly known as: ELAVIL  Take 1 tablet (25 mg total) by mouth nightly as needed for Insomnia. Take in addition to 50 mg tablet for total dose of 75 mg nightly     * amitriptyline 50 MG tablet  Commonly known as: ELAVIL  Take 1 tablet (50 mg total) by mouth nightly as needed for Insomnia. Take in addition to 25 mg tablet for total dose of 75 mg nightly     baclofen 10 MG tablet  Commonly known as: LIORESAL  Take 1 tablet (10 mg total) by mouth 3 (three) times daily.     diclofenac sodium 1 % Gel  Commonly known as: VOLTAREN  as needed.     DULoxetine 60 MG capsule  Commonly known as: CYMBALTA  Take 1 capsule (60 mg total) by mouth once daily.     fexofenadine 180 MG  tablet  Commonly known as: ALLEGRA  Take 180 mg by mouth once daily.     fluticasone propionate 50 mcg/actuation nasal spray  Commonly known as: FLONASE  1 spray (50 mcg total) by Each Nostril route once daily.     gabapentin 600 MG tablet  Commonly known as: NEURONTIN  Take 2 tablets (1,200 mg total) by mouth 3 (three) times daily.     ketoconazole 2 % shampoo  Commonly known as: NIZORAL  APPLY TOPICALLY TWICE A  WEEK.     levocetirizine 5 MG tablet  Commonly known as: XYZAL  Take 5 mg by mouth every evening.     lidocaine-tetracaine 7-7 % Crea  as needed.     ondansetron 4 MG Tbdl  Commonly known as: ZOFRAN-ODT  Take 2 tablets (8 mg total) by mouth every 6 (six) hours as needed.     pantoprazole 40 MG tablet  Commonly known as: PROTONIX  Take 1 tablet (40 mg total) by mouth 2 (two) times daily.     SANARE ADV SCAR THERAPY BASE Gel  Generic drug: gel base no.184 (bulk)  as needed.     SUPREP BOWEL PREP KIT 17.5-3.13-1.6 gram Solr  Generic drug: sodium,potassium,mag sulfates  Take 177 mLs by mouth once daily. for 2 days     TYLENOL 8 HOUR 650 MG Tbsr  Generic drug: acetaminophen  Take 650 mg by mouth as needed.     VITAMIN D3 50 mcg (2,000 unit) Tab  Generic drug: cholecalciferol (vitamin D3)  Take by mouth once daily.         * This list has 2 medication(s) that are the same as other medications prescribed for you. Read the directions carefully, and ask your doctor or other care provider to review them with you.               Discharge Procedure Orders (must include Diet, Follow-up, Activity)   Sponge bath only until clinic visit     Other restrictions (specify):   Order Comments: Do not restart any blood thinning medications at this time.  You will be directions to restart them at your next clinic visit.     Notify your health care provider if you experience any of the following:  temperature >100.4     Notify your health care provider if you experience any of the following:  persistent nausea and vomiting or  diarrhea     Notify your health care provider if you experience any of the following:  severe uncontrolled pain     Notify your health care provider if you experience any of the following:  redness, tenderness, or signs of infection (pain, swelling, redness, odor or green/yellow discharge around incision site)     Notify your health care provider if you experience any of the following:  severe persistent headache     Notify your health care provider if you experience any of the following:  persistent dizziness, light-headedness, or visual disturbances     Notify your health care provider if you experience any of the following:  increased confusion or weakness     Leave dressing on - Keep it clean, dry, and intact until clinic visit     Activity as tolerated       Clemencia Watts Jr, MD  Interventional Pain Medicine / Anesthesiology

## 2020-10-22 NOTE — OP NOTE
Spinal Cord Stimulator Trial Lead Placement    Pre-operative Diagnosis: CRPS Type 1 and Chronic Pain Syndrome  Post-operative Diagnosis: CRPS Type 1 and Chronic Pain Syndrome  Procedure Date: 10/22/2020  Procedure: (1) Insertion of Spinal Cord Stimulator Leads x2    (2) Intraoperative fluoroscopy    (3) Initial Device Programming    (4) IV Moderate Sedation (Midazolam 2 mg, Fentanyl 50 mcg)    Device : Deloris    Antibiotic Prophylaxis: Ancef 3 gm IV given 30 minutes prior to the procedure    Indications: To alleviate pain and suffering, and reduce functional impairment associated with CRPS Type 1.      Physician: Clemencia Watts Jr, MD  Assistant: None    Procedure In Detail: Time-out was taken to identify the correct patient and procedure prior to starting the procedure.     The patient was positioned prone on the fluoroscopy table and was prepped and draped in the usual sterile fashion using Chloroprep and a fenestrated drape. Routine vital sign monitors were applied and anesthesia was initiated. The patient remained conversant throughout the procedure. The area to be injected was determined using fluoroscopy and marked on the skin. Local anesthetic was given by raising a skin wheal and going down to the hub of a 25-gauge 1.25-inch needle. A 25-gauge 3.5-inch needle was used to anesthetize down to lamina inferior to the interspace to be accessed.  A 14-gauge 6 inch Tuohy needle was then advanced to contact the left L1 lamina. It was walked off in a superior medial direction until the ligamentum flavum was engage.  Entrance into the epidural space was identified using loss of resistance to saline. The first spinal cord stimulator lead was advanced through the Tuohy needle and directed to rest the tip at the level of the bottom of  with its tip just right of midline. The same procedure was repeated on the left side at the same interspace to insert a second lead within the epidural space to reside with  its tip at the top of T9 just right of midline. The patient was allowed to fully awaken from anesthesia. Testing was carried out by the device representative under the guidance of myself, Dr. Clemencia Watts Jr, MD. Once the leads were assured to be in the correct position and coverage of stimulation was sufficient per the patient and the rep, the needles were withdrawn leaving the leads in place and were then secured to the patients skin using silk suture, gauze, bacitracin ointment, and Tegaderm. The patients back was cleaned. The patient was taken to the recovery room allowed to fully recover from anesthesia.    After dressing were applied, the patient was allowed to sit up and a final image was taken for placement of the leads.  Final position of the leads was recorded.    Final stimulation programming and testing of the device was done in the recovery room by the device representative. The patient (or responsible party) was given post-procedure and discharge instructions to follow at home. The patient was discharged in stable condition. A follow-up appointment was made.    Complications: That patient experienced a 10-15 minute period of vaso-vagal response to pain stimulus which as successfully treated with IV fluid bolus and trendelenburg bed postioning.    Blood Loss: brenden Watts Jr, MD  Interventional Pain Medicine / Anesthesiology

## 2020-10-23 ENCOUNTER — PATIENT MESSAGE (OUTPATIENT)
Dept: PAIN MEDICINE | Facility: CLINIC | Age: 45
End: 2020-10-23

## 2020-10-27 ENCOUNTER — OFFICE VISIT (OUTPATIENT)
Dept: PAIN MEDICINE | Facility: CLINIC | Age: 45
End: 2020-10-27
Payer: COMMERCIAL

## 2020-10-27 VITALS — WEIGHT: 293 LBS | BODY MASS INDEX: 48.73 KG/M2

## 2020-10-27 DIAGNOSIS — M54.15 RADICULOPATHY, THORACOLUMBAR REGION: ICD-10-CM

## 2020-10-27 DIAGNOSIS — M43.27 FUSION OF LUMBOSACRAL SPINE: ICD-10-CM

## 2020-10-27 DIAGNOSIS — Z01.818 PRE-OP TESTING: ICD-10-CM

## 2020-10-27 DIAGNOSIS — G89.4 CHRONIC PAIN SYNDROME: ICD-10-CM

## 2020-10-27 DIAGNOSIS — G90.522 COMPLEX REGIONAL PAIN SYNDROME TYPE 1 OF LEFT LOWER EXTREMITY: Primary | ICD-10-CM

## 2020-10-27 PROCEDURE — 99024 PR POST-OP FOLLOW-UP VISIT: ICD-10-PCS | Mod: S$GLB,,, | Performed by: NURSE PRACTITIONER

## 2020-10-27 PROCEDURE — 99024 POSTOP FOLLOW-UP VISIT: CPT | Mod: S$GLB,,, | Performed by: NURSE PRACTITIONER

## 2020-10-27 PROCEDURE — 99999 PR PBB SHADOW E&M-EST. PATIENT-LVL III: ICD-10-PCS | Mod: PBBFAC,,, | Performed by: NURSE PRACTITIONER

## 2020-10-27 PROCEDURE — 99999 PR PBB SHADOW E&M-EST. PATIENT-LVL III: CPT | Mod: PBBFAC,,, | Performed by: NURSE PRACTITIONER

## 2020-10-27 NOTE — H&P (VIEW-ONLY)
Post-Op Follow Up for Spinal Cord Stimulator Trial    Subjective  Dianne Lauren Hemphill returns today following SCS implant with a Nevro system.  Patient reports mild to moderate incisional discomfort and denies fever, chills, wound drainage, or increasing tenderness of the surgical sites.  Patient verbalized 75-80% relief during her  SCS trial for 4-5 days, patient reported specific functional improvement that included being able walk without a cane, she also verbalized going out and walking with her  in public with minimal pain, she verbalized a desrenable difference in her pain during the trial.     Objective  Vitals:    10/27/20 1131   Weight: (!) 149.7 kg (330 lb)   PainSc:   6       Exam  -The SCS trial leads were removed intact and wound is clean, clear, and has no signs of infection. Patient informed about risk of epidural hematoma. Patient denies any easy brusing or bleeding.   -Dressing removed to expose wounds on low back.      Assessment:  Successful SCS Nevro trial with appropriate wound healing and reported 75-80% relief with improved functionality.     Plan  Band-Aid applied.  OK to shower.  Do no submerge wound for 2 weeks in bath/pool.  Will s/f SCS Nevro Implant with Dr. Watts.     Follow Up: RTC 4 weeks    ARUNA Garcia  Interventional Pain Management      Addendum:  The following orders were added in preparation for her implant.   - MRSA nasal swab   - Hibiclens wash the night before procedure   - Bactroban to nares x5 days prior to surgery    - UA for occult UTI    Clemencia Watts Jr, MD  Interventional Pain Medicine / Anesthesiology        Disclaimer: This note was partly generated using dictation software which may occasionally result in transcription errors.

## 2020-10-30 ENCOUNTER — PATIENT MESSAGE (OUTPATIENT)
Dept: GASTROENTEROLOGY | Facility: CLINIC | Age: 45
End: 2020-10-30

## 2020-10-30 ENCOUNTER — PATIENT MESSAGE (OUTPATIENT)
Dept: PAIN MEDICINE | Facility: CLINIC | Age: 45
End: 2020-10-30

## 2020-10-30 ENCOUNTER — TELEPHONE (OUTPATIENT)
Dept: PAIN MEDICINE | Facility: CLINIC | Age: 45
End: 2020-10-30

## 2020-10-30 DIAGNOSIS — Z01.818 PRE-OP TESTING: Primary | ICD-10-CM

## 2020-10-30 DIAGNOSIS — G90.522 COMPLEX REGIONAL PAIN SYNDROME TYPE 1 OF LEFT LOWER EXTREMITY: Primary | ICD-10-CM

## 2020-10-30 DIAGNOSIS — G90.522 COMPLEX REGIONAL PAIN SYNDROME TYPE 1 OF LEFT LOWER EXTREMITY: ICD-10-CM

## 2020-10-30 DIAGNOSIS — G89.4 CHRONIC PAIN SYNDROME: ICD-10-CM

## 2020-10-30 RX ORDER — CHLORHEXIDINE GLUCONATE 40 MG/ML
SOLUTION TOPICAL ONCE
Refills: 0
Start: 2020-10-30 | End: 2020-10-30

## 2020-10-30 NOTE — TELEPHONE ENCOUNTER
Pt scheduled for 11/13/20 at 7:00 am for SCS Implant. Pt aware to check in at registration desk on the first floor of the hospital for 6:00 am. Pt denied taking blood thinners and is not diabetic. Pt denied having a pacemaker/ICD.

## 2020-11-02 ENCOUNTER — TELEPHONE (OUTPATIENT)
Dept: PAIN MEDICINE | Facility: CLINIC | Age: 45
End: 2020-11-02

## 2020-11-02 NOTE — TELEPHONE ENCOUNTER
Attempted to contact pt to schedule procedure for 11/13/20 arrival time 7 am. Left a detailed message in regards to getting a UA, CBC, bactroban for the nares, and a hibiclens bath. Advised pt to give our office a call back to confirm procedure date and time.

## 2020-11-03 ENCOUNTER — TELEPHONE (OUTPATIENT)
Dept: PAIN MEDICINE | Facility: CLINIC | Age: 45
End: 2020-11-03

## 2020-11-03 ENCOUNTER — ANESTHESIA EVENT (OUTPATIENT)
Dept: SURGERY | Facility: HOSPITAL | Age: 45
End: 2020-11-03
Payer: COMMERCIAL

## 2020-11-03 NOTE — TELEPHONE ENCOUNTER
Spoke with pt regarding her upcoming procedure with Dr. Watts. I informed pt we have her scheduled for 11/13/20 arrival time is 6 am. Pt was also informed to get a UA, CBC, bactroban for the nares, and a hibiclens bath within 5 days of her procedure and her f/u is 11/18/20 at 11 am with Rudy. Pt verbalized understanding and confirmed procedure date and time. Travis was notified.

## 2020-11-04 ENCOUNTER — PATIENT MESSAGE (OUTPATIENT)
Dept: PAIN MEDICINE | Facility: CLINIC | Age: 45
End: 2020-11-04

## 2020-11-09 ENCOUNTER — PATIENT MESSAGE (OUTPATIENT)
Dept: PAIN MEDICINE | Facility: CLINIC | Age: 45
End: 2020-11-09

## 2020-11-10 ENCOUNTER — TELEPHONE (OUTPATIENT)
Dept: SLEEP MEDICINE | Facility: OTHER | Age: 45
End: 2020-11-10

## 2020-11-10 ENCOUNTER — LAB VISIT (OUTPATIENT)
Dept: FAMILY MEDICINE | Facility: CLINIC | Age: 45
End: 2020-11-10
Payer: COMMERCIAL

## 2020-11-10 ENCOUNTER — PATIENT MESSAGE (OUTPATIENT)
Dept: ADMINISTRATIVE | Facility: OTHER | Age: 45
End: 2020-11-10

## 2020-11-10 ENCOUNTER — LAB VISIT (OUTPATIENT)
Dept: LAB | Facility: HOSPITAL | Age: 45
End: 2020-11-10
Attending: PAIN MEDICINE
Payer: COMMERCIAL

## 2020-11-10 DIAGNOSIS — Z01.812 PRE-PROCEDURE LAB EXAM: ICD-10-CM

## 2020-11-10 DIAGNOSIS — Z01.818 PRE-OP TESTING: ICD-10-CM

## 2020-11-10 DIAGNOSIS — G90.522 COMPLEX REGIONAL PAIN SYNDROME TYPE 1 OF LEFT LOWER EXTREMITY: ICD-10-CM

## 2020-11-10 DIAGNOSIS — Z01.818 PRE-OP TESTING: Primary | ICD-10-CM

## 2020-11-10 DIAGNOSIS — G89.4 CHRONIC PAIN SYNDROME: ICD-10-CM

## 2020-11-10 LAB — SARS-COV-2 RNA RESP QL NAA+PROBE: NOT DETECTED

## 2020-11-10 PROCEDURE — U0003 INFECTIOUS AGENT DETECTION BY NUCLEIC ACID (DNA OR RNA); SEVERE ACUTE RESPIRATORY SYNDROME CORONAVIRUS 2 (SARS-COV-2) (CORONAVIRUS DISEASE [COVID-19]), AMPLIFIED PROBE TECHNIQUE, MAKING USE OF HIGH THROUGHPUT TECHNOLOGIES AS DESCRIBED BY CMS-2020-01-R: HCPCS

## 2020-11-10 PROCEDURE — 87081 CULTURE SCREEN ONLY: CPT

## 2020-11-12 LAB — MRSA SPEC QL CULT: NORMAL

## 2020-11-13 ENCOUNTER — ANESTHESIA (OUTPATIENT)
Dept: SURGERY | Facility: HOSPITAL | Age: 45
End: 2020-11-13
Payer: COMMERCIAL

## 2020-11-13 ENCOUNTER — HOSPITAL ENCOUNTER (OUTPATIENT)
Facility: HOSPITAL | Age: 45
Discharge: HOME | End: 2020-11-13
Attending: PAIN MEDICINE | Admitting: PAIN MEDICINE
Payer: COMMERCIAL

## 2020-11-13 VITALS
TEMPERATURE: 98 F | BODY MASS INDEX: 43.4 KG/M2 | WEIGHT: 293 LBS | HEART RATE: 88 BPM | OXYGEN SATURATION: 96 % | RESPIRATION RATE: 17 BRPM | HEIGHT: 69 IN | SYSTOLIC BLOOD PRESSURE: 133 MMHG | DIASTOLIC BLOOD PRESSURE: 77 MMHG

## 2020-11-13 DIAGNOSIS — M96.1 FAILED BACK SURGICAL SYNDROME: ICD-10-CM

## 2020-11-13 DIAGNOSIS — G90.522 COMPLEX REGIONAL PAIN SYNDROME TYPE 1 OF LEFT LOWER EXTREMITY: Primary | ICD-10-CM

## 2020-11-13 PROCEDURE — 63600175 PHARM REV CODE 636 W HCPCS: Performed by: NURSE ANESTHETIST, CERTIFIED REGISTERED

## 2020-11-13 PROCEDURE — 25000003 PHARM REV CODE 250: Performed by: NURSE ANESTHETIST, CERTIFIED REGISTERED

## 2020-11-13 PROCEDURE — 25000003 PHARM REV CODE 250: Performed by: ANESTHESIOLOGY

## 2020-11-13 PROCEDURE — 63600175 PHARM REV CODE 636 W HCPCS: Performed by: PAIN MEDICINE

## 2020-11-13 PROCEDURE — 63650 IMPLANT NEUROELECTRODES: CPT | Mod: 59,,, | Performed by: PAIN MEDICINE

## 2020-11-13 PROCEDURE — 25000003 PHARM REV CODE 250: Performed by: PAIN MEDICINE

## 2020-11-13 PROCEDURE — 71000016 HC POSTOP RECOV ADDL HR: Performed by: PAIN MEDICINE

## 2020-11-13 PROCEDURE — C1822 GEN, NEURO, HF, RECHG BAT: HCPCS | Performed by: PAIN MEDICINE

## 2020-11-13 PROCEDURE — 36000707: Performed by: PAIN MEDICINE

## 2020-11-13 PROCEDURE — 37000008 HC ANESTHESIA 1ST 15 MINUTES: Performed by: PAIN MEDICINE

## 2020-11-13 PROCEDURE — 63600175 PHARM REV CODE 636 W HCPCS: Performed by: ANESTHESIOLOGY

## 2020-11-13 PROCEDURE — 27201423 OPTIME MED/SURG SUP & DEVICES STERILE SUPPLY: Performed by: PAIN MEDICINE

## 2020-11-13 PROCEDURE — 37000009 HC ANESTHESIA EA ADD 15 MINS: Performed by: PAIN MEDICINE

## 2020-11-13 PROCEDURE — C1778 LEAD, NEUROSTIMULATOR: HCPCS | Performed by: PAIN MEDICINE

## 2020-11-13 PROCEDURE — 63650 PR PERCUT IMPLNT NEUROELECT,EPIDURAL: ICD-10-PCS | Mod: 59,,, | Performed by: PAIN MEDICINE

## 2020-11-13 PROCEDURE — 71000015 HC POSTOP RECOV 1ST HR: Performed by: PAIN MEDICINE

## 2020-11-13 PROCEDURE — C1787 PATIENT PROGR, NEUROSTIM: HCPCS | Performed by: PAIN MEDICINE

## 2020-11-13 PROCEDURE — 36000706: Performed by: PAIN MEDICINE

## 2020-11-13 PROCEDURE — 63685 PR IMPLANT SPINAL NEUROSTIM/RECEIVER: ICD-10-PCS | Mod: ,,, | Performed by: PAIN MEDICINE

## 2020-11-13 PROCEDURE — 63685 INS/RPLC SPI NPG/RCVR POCKET: CPT | Mod: ,,, | Performed by: PAIN MEDICINE

## 2020-11-13 DEVICE — KIT LEAD 70CM: Type: IMPLANTABLE DEVICE | Site: BACK | Status: FUNCTIONAL

## 2020-11-13 RX ORDER — FENTANYL CITRATE 50 UG/ML
INJECTION, SOLUTION INTRAMUSCULAR; INTRAVENOUS
Status: DISCONTINUED | OUTPATIENT
Start: 2020-11-13 | End: 2020-11-13

## 2020-11-13 RX ORDER — PROPOFOL 10 MG/ML
VIAL (ML) INTRAVENOUS CONTINUOUS PRN
Status: DISCONTINUED | OUTPATIENT
Start: 2020-11-13 | End: 2020-11-13

## 2020-11-13 RX ORDER — ONDANSETRON 2 MG/ML
INJECTION INTRAMUSCULAR; INTRAVENOUS
Status: DISCONTINUED | OUTPATIENT
Start: 2020-11-13 | End: 2020-11-13

## 2020-11-13 RX ORDER — HYDROCODONE BITARTRATE AND ACETAMINOPHEN 5; 325 MG/1; MG/1
1 TABLET ORAL EVERY 8 HOURS PRN
Qty: 21 TABLET | Refills: 0 | Status: SHIPPED | OUTPATIENT
Start: 2020-11-13 | End: 2020-11-20

## 2020-11-13 RX ORDER — MIDAZOLAM HYDROCHLORIDE 1 MG/ML
INJECTION, SOLUTION INTRAMUSCULAR; INTRAVENOUS
Status: DISCONTINUED | OUTPATIENT
Start: 2020-11-13 | End: 2020-11-13

## 2020-11-13 RX ORDER — HYDROMORPHONE HYDROCHLORIDE 2 MG/ML
0.5 INJECTION, SOLUTION INTRAMUSCULAR; INTRAVENOUS; SUBCUTANEOUS EVERY 5 MIN PRN
Status: DISCONTINUED | OUTPATIENT
Start: 2020-11-13 | End: 2020-11-13 | Stop reason: HOSPADM

## 2020-11-13 RX ORDER — CEPHALEXIN 500 MG/1
500 CAPSULE ORAL 4 TIMES DAILY
Qty: 28 CAPSULE | Refills: 0 | Status: SHIPPED | OUTPATIENT
Start: 2020-11-13 | End: 2020-11-20

## 2020-11-13 RX ORDER — SODIUM CHLORIDE 9 MG/ML
1000 INJECTION, SOLUTION INTRAVENOUS CONTINUOUS
Status: DISCONTINUED | OUTPATIENT
Start: 2020-11-13 | End: 2020-11-13 | Stop reason: HOSPADM

## 2020-11-13 RX ORDER — BUPIVACAINE HYDROCHLORIDE 2.5 MG/ML
INJECTION, SOLUTION INFILTRATION; PERINEURAL
Status: DISCONTINUED | OUTPATIENT
Start: 2020-11-13 | End: 2020-11-13 | Stop reason: HOSPADM

## 2020-11-13 RX ORDER — ONDANSETRON 2 MG/ML
4 INJECTION INTRAMUSCULAR; INTRAVENOUS ONCE AS NEEDED
Status: DISCONTINUED | OUTPATIENT
Start: 2020-11-13 | End: 2020-11-13 | Stop reason: HOSPADM

## 2020-11-13 RX ORDER — SCOLOPAMINE TRANSDERMAL SYSTEM 1 MG/1
1 PATCH, EXTENDED RELEASE TRANSDERMAL
Status: DISCONTINUED | OUTPATIENT
Start: 2020-11-13 | End: 2020-11-13 | Stop reason: HOSPADM

## 2020-11-13 RX ORDER — PROPOFOL 10 MG/ML
VIAL (ML) INTRAVENOUS
Status: DISCONTINUED | OUTPATIENT
Start: 2020-11-13 | End: 2020-11-13

## 2020-11-13 RX ORDER — LIDOCAINE HYDROCHLORIDE 20 MG/ML
INJECTION INTRAVENOUS
Status: DISCONTINUED | OUTPATIENT
Start: 2020-11-13 | End: 2020-11-13

## 2020-11-13 RX ORDER — LIDOCAINE HYDROCHLORIDE AND EPINEPHRINE 10; 10 MG/ML; UG/ML
INJECTION, SOLUTION INFILTRATION; PERINEURAL
Status: DISCONTINUED | OUTPATIENT
Start: 2020-11-13 | End: 2020-11-13 | Stop reason: HOSPADM

## 2020-11-13 RX ORDER — LIDOCAINE HYDROCHLORIDE 10 MG/ML
1 INJECTION, SOLUTION EPIDURAL; INFILTRATION; INTRACAUDAL; PERINEURAL ONCE
Status: DISCONTINUED | OUTPATIENT
Start: 2020-11-13 | End: 2020-11-13 | Stop reason: HOSPADM

## 2020-11-13 RX ADMIN — LIDOCAINE HYDROCHLORIDE 100 MG: 20 INJECTION, SOLUTION INTRAVENOUS at 07:11

## 2020-11-13 RX ADMIN — SODIUM CHLORIDE: 0.9 INJECTION, SOLUTION INTRAVENOUS at 07:11

## 2020-11-13 RX ADMIN — DEXTROSE 3 G: 50 INJECTION, SOLUTION INTRAVENOUS at 07:11

## 2020-11-13 RX ADMIN — ONDANSETRON 4 MG: 2 INJECTION, SOLUTION INTRAMUSCULAR; INTRAVENOUS at 08:11

## 2020-11-13 RX ADMIN — SCOPALAMINE 1 PATCH: 1 PATCH, EXTENDED RELEASE TRANSDERMAL at 06:11

## 2020-11-13 RX ADMIN — FENTANYL CITRATE 25 MCG: 50 INJECTION, SOLUTION INTRAMUSCULAR; INTRAVENOUS at 07:11

## 2020-11-13 RX ADMIN — FENTANYL CITRATE 50 MCG: 50 INJECTION, SOLUTION INTRAMUSCULAR; INTRAVENOUS at 08:11

## 2020-11-13 RX ADMIN — PROPOFOL 150 MCG/KG/MIN: 10 INJECTION, EMULSION INTRAVENOUS at 07:11

## 2020-11-13 RX ADMIN — PROMETHAZINE HYDROCHLORIDE 6.25 MG: 25 INJECTION INTRAMUSCULAR; INTRAVENOUS at 10:11

## 2020-11-13 RX ADMIN — SODIUM CHLORIDE: 0.9 INJECTION, SOLUTION INTRAVENOUS at 08:11

## 2020-11-13 RX ADMIN — MIDAZOLAM 5 MG: 1 INJECTION INTRAMUSCULAR; INTRAVENOUS at 07:11

## 2020-11-13 RX ADMIN — PROPOFOL 50 MG: 10 INJECTION, EMULSION INTRAVENOUS at 07:11

## 2020-11-13 NOTE — OP NOTE
Operative Report: Spinal Cord Stimulator Implantation    Pre-operative Diagnosis: Chronic Pain Syndrome and CRPS Type 1  Post-operative Diagnosis: Chronic Pain Syndrome and CRPS Type 1  Procedure Date: 11/13/2020  Procedure: (1) Placement of Implanted Spinal Cord Stimulator Lead x2    (2) Placement of Implanted Pulse Generator    (3) Intraoperative fluoroscopy    (4) Initial Device Programming    Device : Deloris  Anesthesia: Local & Monitored Anesthesia Care  Indications: To alleviate pain & suffering and reduce functional impairment associated with Chronic Pain Syndrome and CRPS Type 1.      Surgeon: Clemencia Watts Jr, MD  Assistant: None    Preoperative Antibiotics: Ancef 3 gm IV 30 minutes prior to incision    Procedure in Detail: The patient was brought to the operating room and was placed in the prone position, then prepped and draped in the usual sterile fashion with chlorhexidine and ioban. Time out was performed and consent verified.      With fluoroscopic guidance, the location of the desired site for placement of the percutaneous leads was identified and local anesthetic consisting of Lidocaine and Bupivacaine with epinephrine was injected subcutaneously over the area. An approximately 6 cm longitudinal incision was made in the midline. The incision was dissected down to the supraspinous ligament. Hemostasis was established and a self-retaining retractor was used to allow adequate visualization.  A 14 gauge touhy was then introduced with a paraspinous approach from the right side under fluoroscopic guidance into the T12-L1 interlaminar space. Entry of the Tuohy into the epidural space was verified by using loss of resistance to a saline with a 5 mL glass loss-of-resistance syringe. Once the epidural space was entered, the syringe was removed and an 8-contact lead was passed through the needle and advanced to the bottom of T7 vertebra using continuous fluoroscopy. A second lead was then  placed in similar fashion using a right paraspinous approach and guided using into position, adjacent to the first lead but at T8, with fluoroscopy.  A test stimulation was performed and we ensured adequate coverage of the painful areas as well as appropriate function of the device. After removal of the Touhy needles, the leads were then anchored to the supraspinous ligaments with #2-0 Ethibond suture.  Intermittent fluoroscopy demonstrated maintenance of lead position throughout this process.      A pocket site was identified just above the right iliac crest and marked.  Local anesthetic was administered in the region and a 4-5 centimeter incision was made.  A subcutaneous pocket was then created with a blunt dissection technique for placement of the implantable pulse generator.  The leads were then tunneled from the midline incision to the IPG pocket using a asiya-tipped tunneling tool.  The leads were then inserted into the implantable pulse generator and the anchoring screws were tightened with the provided torque wrench. The generator was then tested to ensure appropriate function.     Both surgical wounds were irrigated with antibiotic solution.  A small loop in the leads was created and inserted into the midline incision.  Excess lead was coiled behind the IPG and both were inserted into the pocket site.  Both incisions were closed with a 3-layer closure employing #2-0 Vicryl for the deep fascia; #2-0 Vicryl for superficial fascia; and Dermabond for skin.  Wound dressing consisted of non-adhesive dressing followed by gauze and secured with transparant, adhesive bandages.    Complications: There were no complications.    Blood Loss: 5-10 mL    Follow-Up: The patient will follow up in clinic as scheduled for post-op wound check.    D/C Medications: 1) Norco 5-325 PO TID PRN #21 and 2) Keflex 500 mg PO QID X7 days #28    Clemencia Watts Jr, MD  Interventional Pain Medicine / Anesthesiology

## 2020-11-13 NOTE — TRANSFER OF CARE
"Anesthesia Transfer of Care Note    Patient: Dianne Hemphill    Procedure(s) Performed: Procedure(s) (LRB):  Insertion, Neurostimulator, Spinal Cord--Nevro (Travis Maher) (N/A)    Patient location: [facility]    Anesthesia Type: general    Transport from OR: Transported from OR on room air with adequate spontaneous ventilation    Post pain: adequate analgesia    Post assessment: no apparent anesthetic complications and tolerated procedure well    Post vital signs: stable    Level of consciousness: awake and alert    Nausea/Vomiting: no nausea/vomiting    Complications: none    Transfer of care protocol was followed      Last vitals:   Visit Vitals  /77   Pulse 92   Temp 36.9 °C (98.4 °F) (Skin)   Resp 16   Ht 5' 9" (1.753 m)   Wt (!) 149.7 kg (330 lb)   SpO2 96%   Breastfeeding No   BMI 48.73 kg/m²     " no decreased eating/drinking

## 2020-11-13 NOTE — PLAN OF CARE
PATIENT OOB AND URINATED, NAUSEA RESOLVED, VSS, ALL INSTRUCTIONS GIVEN TO PATIENT AND WIFE, STIMULATOR REP SPOKE WITH PATIENT ALSO, DISCHARGED SAFELY IN WC

## 2020-11-13 NOTE — DISCHARGE INSTRUCTIONS
DRESSING CARE AND ACTIVITY  -KEEP DRESSING CLEAN DRY AND INTACT FOR 3 DAYS, AFTER 3 DAYS YOU CAN REMOVE DRESSING AND SHOWER AND GET INCISIONS LIGHTLY WET WITH SOAP AND WATER, NO SUBMERGING INCISION IN WATER SUCH AS TUB, POOL, ETC.  - ACTIVITY AS TOLERATED WITH EXCEPTION TO NO HEAVY LIFTING OR STRENUOUS ACTIVITY    Spinal Cord Stimulation    Pain messages travel over nerve pathways to the spinal cord, inside the spine. The spinal cord carries the messages to the brain. Constant pain messages can cause long-term pain that is hard to treat. This is known as chronic pain. Spinal cord stimulation uses a medical device to send signals to the nerve pathways inside your hardeep cord. These signals help block the pain.  Keeping a pain log  Your doctor may ask you to keep a pain log for a certain amount of time. In it, you may answer certain questions: When do you feel pain? What does it feel like? How long does the pain last? What makes it better or worse? When the stimulation is on, is your pain relieved? Your answers help show how well spinal cord stimulation may work for you. Your doctor will give you guidelines for your pain log. During the time you write the log, you may not be able to take pain medications. Be sure to discuss this with your doctor.  Spinal cord stimulation may help  Spinal cord stimulation is one treatment for chronic pain. Certain criteria need to be met to be a good candidate for spinal cord stimulation. A small medical device sends signals to your spinal cord. These signals keep the chronic pain messages from being sent to your brain. Instead, you may feel tingling from the electrical signals. A trial stimulator that is worn outside the body in tried first to see if it will work. If it does, the permanent stimulator system may be used. This device can be removed at any time.  The stimulator system  The stimulator system has several parts. A power source makes the signals. This power source may be  worn outside the body or implanted under the skin on your abdomen or buttocks. One or more leads (flexible, plastic-covered wires or paddle) are placed inside the body to carry the signals to the spinal cord. Your doctor can explain the system youll be using in more detail.  Risks and possible complications  · Infection  · Bleeding  · Nerve damage  · Spinal cord damage  · Failure to relieve pain   Date Last Reviewed: 10/19/2015  © 7807-0744 [employer]. [address] [place] [address], [place] [address]. All rights reserved. This information is not intended as a substitute for professional medical care. Always follow your healthcare professional's instructions.  ANESTHESIA  -For the first 24 hours after surgery:  Do not drive, use heavy equipment, make important decisions, or drink alcohol  -It is normal to feel sleepy for several hours.  Rest until you are more awake.  -Have someone stay with you, if needed.  They can watch for problems and help keep you safe.  -Some possible post anesthesia side effects include: nausea and vomiting, sore throat and hoarseness, sleepiness, and dizziness.    PAIN  -If you have pain after surgery, pain medicine will help you feel better.  Take it as directed, before pain becomes severe.  Most pain relievers taken by mouth need at least 20-30 minutes to start working.  -Do not drive or drink alcohol while taking pain medicine.  -Pain medication can upset your stomach.  Taking them with a little food may help.  -Other ways to help control pain: elevation, ice, and relaxation  -Call your surgeon if still having unmanageable pain an hour after taking pain medicine.  -Pain medicine can cause constipation.  Taking an over-the counter stool softener while on prescription pain medicine and drinking plenty of fluids can prevent this side effect.  -Call your surgeon if you have severe side effects like: breathing problems, trouble waking up, dizziness, confusion, or severe  constipation.    NAUSEA  -Some people have nausea after surgery.  This is often because of anesthesia, pain, pain medicine, or the stress of surgery.  -Do not push yourself to eat.  Start off with clear liquids and soup.  Slowly move to solid foods.  Don't eat fatty, rich, spicy foods at first.  Eat smaller amounts.  -If you develop persistent nausea and vomiting please notify your surgeon immediately.    BLEEDING  -Different types of surgery require different types of care and dressing changes.  It is important to follow all instructions and advice from your surgeon.  Change dressing as directed.  Call your surgeon for any concerns regarding postop bleeding.    SIGNS OF INFECTION  -Signs of infection include: fever, swelling, drainage, and redness  -Notify your surgeon if you have a fever of 100.4 F (38.0 C) or higher.  -Notify your surgeon if you notice redness, swelling, increased pain, pus, or a foul smell at the incision site.    Acetaminophen; Hydrocodone tablets or capsules  What is this medicine?  ACETAMINOPHEN; HYDROCODONE (a set a KAVON carolyn fen; nanci droe KOE done) is a pain reliever. It is used to treat moderate to severe pain.  How should I use this medicine?  Take this medicine by mouth with a glass of water. Follow the directions on the prescription label. You can take it with or without food. If it upsets your stomach, take it with food. Do not take your medicine more often than directed.  A special MedGuide will be given to you by the pharmacist with each prescription and refill. Be sure to read this information carefully each time.  Talk to your pediatrician regarding the use of this medicine in children. Special care may be needed.  What side effects may I notice from receiving this medicine?  Side effects that you should report to your doctor or health care professional as soon as possible:  · allergic reactions like skin rash, itching or hives, swelling of the face, lips, or tongue  · breathing  problems  · confusion  · redness, blistering, peeling or loosening of the skin, including inside the mouth  · signs and symptoms of low blood pressure like dizziness; feeling faint or lightheaded, falls; unusually weak or tired  · trouble passing urine or change in the amount of urine  · yellowing of the eyes or skin  Side effects that usually do not require medical attention (report to your doctor or health care professional if they continue or are bothersome):  · constipation  · dry mouth  · nausea, vomiting  · tiredness  What may interact with this medicine?  This medicine may interact with the following medications:  · alcohol  · antiviral medicines for HIV or AIDS  · atropine  · antihistamines for allergy, cough and cold  · certain antibiotics like erythromycin, clarithromycin  · certain medicines for anxiety or sleep  · certain medicines for bladder problems like oxybutynin, tolterodine  · certain medicines for depression like amitriptyline, fluoxetine, sertraline  · certain medicines for fungal infections like ketoconazole and itraconazole  · certain medicines for Parkinson's disease like benztropine, trihexyphenidyl  · certain medicines for seizures like carbamazepine, phenobarbital, phenytoin, primidone  · certain medicines for stomach problems like dicyclomine, hyoscyamine  · certain medicines for travel sickness like scopolamine  · general anesthetics like halothane, isoflurane, methoxyflurane, propofol  · ipratropium  · local anesthetics like lidocaine, pramoxine, tetracaine  · MAOIs like Carbex, Eldepryl, Marplan, Nardil, and Parnate  · medicines that relax muscles for surgery  · other medicines with acetaminophen  · other narcotic medicines for pain or cough  · phenothiazines like chlorpromazine, mesoridazine, prochlorperazine, thioridazine  · rifampin  What if I miss a dose?  If you miss a dose, take it as soon as you can. If it is almost time for your next dose, take only that dose. Do not take  double or extra doses.  Where should I keep my medicine?  Keep out of the reach of children. This medicine can be abused. Keep your medicine in a safe place to protect it from theft. Do not share this medicine with anyone. Selling or giving away this medicine is dangerous and against the law.  This medicine may cause accidental overdose and death if it taken by other adults, children, or pets. Mix any unused medicine with a substance like cat litter or coffee grounds. Then throw the medicine away in a sealed container like a sealed bag or a coffee can with a lid. Do not use the medicine after the expiration date.  Store at room temperature between 15 and 30 degrees C (59 and 86 degrees F).  What should I tell my health care provider before I take this medicine?  They need to know if you have any of these conditions:  · brain tumor  · Crohn's disease, inflammatory bowel disease, or ulcerative colitis  · drug abuse or addiction  · head injury  · heart or circulation problems  · if you often drink alcohol  · kidney disease or problems going to the bathroom  · liver disease  · lung disease, asthma, or breathing problems  · an unusual or allergic reaction to acetaminophen, hydrocodone, other opioid analgesics, other medicines, foods, dyes, or preservatives  · pregnant or trying to get pregnant  · breast-feeding  What should I watch for while using this medicine?  Tell your doctor or health care professional if your pain does not go away, if it gets worse, or if you have new or a different type of pain. You may develop tolerance to the medicine. Tolerance means that you will need a higher dose of the medicine for pain relief. Tolerance is normal and is expected if you take the medicine for a long time.  Do not suddenly stop taking your medicine because you may develop a severe reaction. Your body becomes used to the medicine. This does NOT mean you are addicted. Addiction is a behavior related to getting and using a drug  for a non-medical reason. If you have pain, you have a medical reason to take pain medicine. Your doctor will tell you how much medicine to take. If your doctor wants you to stop the medicine, the dose will be slowly lowered over time to avoid any side effects.  There are different types of narcotic medicines (opiates). If you take more than one type at the same time or if you are taking another medicine that also causes drowsiness, you may have more side effects. Give your health care provider a list of all medicines you use. Your doctor will tell you how much medicine to take. Do not take more medicine than directed. Call emergency for help if you have problems breathing or unusual sleepiness.  Do not take other medicines that contain acetaminophen with this medicine. Always read labels carefully. If you have questions, ask your doctor or pharmacist.  If you take too much acetaminophen get medical help right away. Too much acetaminophen can be very dangerous and cause liver damage. Even if you do not have symptoms, it is important to get help right away.  You may get drowsy or dizzy. Do not drive, use machinery, or do anything that needs mental alertness until you know how this medicine affects you. Do not stand or sit up quickly, especially if you are an older patient. This reduces the risk of dizzy or fainting spells. Alcohol may interfere with the effect of this medicine. Avoid alcoholic drinks.  The medicine will cause constipation. Try to have a bowel movement at least every 2 to 3 days. If you do not have a bowel movement for 3 days, call your doctor or health care professional.  Your mouth may get dry. Chewing sugarless gum or sucking hard candy, and drinking plenty of water may help. Contact your doctor if the problem does not go away or is severe.  NOTE:This sheet is a summary. It may not cover all possible information. If you have questions about this medicine, talk to your doctor, pharmacist, or health  care provider. Copyright© 2017 [employer]        Cephalexin tablets or capsules  What is this medicine?  CEPHALEXIN (sef a DEMI in) is a cephalosporin antibiotic. It is used to treat certain kinds of bacterial infections It will not work for colds, flu, or other viral infections.  How should I use this medicine?  Take this medicine by mouth with a full glass of water. Follow the directions on the prescription label. This medicine can be taken with or without food. Take your medicine at regular intervals. Do not take your medicine more often than directed. Take all of your medicine as directed even if you think you are better. Do not skip doses or stop your medicine early.  Talk to your pediatrician regarding the use of this medicine in children. While this drug may be prescribed for selected conditions, precautions do apply.  What side effects may I notice from receiving this medicine?  Side effects that you should report to your doctor or health care professional as soon as possible:  · allergic reactions like skin rash, itching or hives, swelling of the face, lips, or tongue  · breathing problems  · pain or trouble passing urine  · redness, blistering, peeling or loosening of the skin, including inside the mouth  · severe or watery diarrhea  · unusually weak or tired  · yellowing of the eyes, skin  Side effects that usually do not require medical attention (report to your doctor or health care professional if they continue or are bothersome):  · gas or heartburn  · genital or anal irritation  · headache  · joint or muscle pain  · nausea, vomiting  What may interact with this medicine?  · probenecid  · some other antibiotics  What if I miss a dose?  If you miss a dose, take it as soon as you can. If it is almost time for your next dose, take only that dose. Do not take double or extra doses. There should be at least 4 to 6 hours between doses.  Where should I keep my medicine?  Keep out of the reach of  children.  Store at room temperature between 59 and 86 degrees F (15 and 30 degrees C). Throw away any unused medicine after the expiration date.  What should I tell my health care provider before I take this medicine?  They need to know if you have any of these conditions:  · kidney disease  · stomach or intestine problems, especially colitis  · an unusual or allergic reaction to cephalexin, other cephalosporins, penicillins, other antibiotics, medicines, foods, dyes or preservatives  · pregnant or trying to get pregnant  · breast-feeding  What should I watch for while using this medicine?  Tell your doctor or health care professional if your symptoms do not begin to improve in a few days.  Do not treat diarrhea with over the counter products. Contact your doctor if you have diarrhea that lasts more than 2 days or if it is severe and watery.  If you have diabetes, you may get a false-positive result for sugar in your urine. Check with your doctor or health care professional.  NOTE:This sheet is a summary. It may not cover all possible information. If you have questions about this medicine, talk to your doctor, pharmacist, or health care provider. Copyright© 2017 [employer]

## 2020-11-13 NOTE — ANESTHESIA PREPROCEDURE EVALUATION
11/13/2020  Dianne Hemphill is a 45 y.o., female with Morbid obesity, PONV, and complex regional pain syndrome scheduled for spinal cord neurostimulator.     Anesthesia Evaluation    I have reviewed the Patient Summary Reports.    I have reviewed the Nursing Notes.    I have reviewed the Medications.     Review of Systems  Anesthesia Hx:  PONV History of prior surgery of interest to airway management or planning: Previous anesthesia: General Nov 2019 Brenda Romero with general anesthesia.  Procedure performed at an [facility]. Denies Family Hx of Anesthesia complications.  Personal Hx of Anesthesia complications, Post-Operative Nausea/Vomiting, in the past, but not with recent anesthetics / prophylaxis   Social:  Non-Smoker    Hematology/Oncology:  Hematology Normal   Oncology Normal     EENT/Dental:EENT/Dental Normal   Cardiovascular:   Runs low BP   Pulmonary:   Asthma asymptomatic    Renal/:  Renal/ Normal     Hepatic/GI:   GERD, well controlled    Musculoskeletal:   Arthritis   Spine Disorders: lumbar Disc disease    Neurological:   Neuromuscular Disease,   Chronic Pain Syndrome   Dermatological:  Skin Normal        Physical Exam  General:  Morbid Obesity    Airway/Jaw/Neck:  Airway Findings: Mallampati: II     Dental:  Dental Findings: Molar caps        Mental Status:  Mental Status Findings:  Cooperative, Alert and Oriented         Anesthesia Plan  Type of Anesthesia, risks & benefits discussed:  Anesthesia Type:  general, MAC  Patient's Preference:   Intra-op Monitoring Plan: standard ASA monitors  Intra-op Monitoring Plan Comments:   Post Op Pain Control Plan:   Post Op Pain Control Plan Comments:   Induction:   IV  Beta Blocker:  Patient is not currently on a Beta-Blocker (No further documentation required).       Informed Consent: Patient understands risks and agrees with Anesthesia  plan.  Questions answered. Anesthesia consent signed with patient.  ASA Score: 3     Day of Surgery Review of History & Physical: I have interviewed and examined the patient. I have reviewed the patient's H&P dated:  There are no significant changes.          Ready For Surgery From Anesthesia Perspective.

## 2020-11-13 NOTE — ANESTHESIA POSTPROCEDURE EVALUATION
Anesthesia Post Evaluation    Patient: Dianne Hemphill    Procedure(s) Performed: Procedure(s) (LRB):  Insertion, Neurostimulator, Spinal Cord--Deloris Maher) (N/A)    Final Anesthesia Type: general    Patient location during evaluation: PACU  Patient participation: Yes- Able to Participate  Level of consciousness: awake and alert and oriented  Post-procedure vital signs: reviewed and stable  Pain management: adequate  Airway patency: patent    PONV status at discharge: No PONV  Anesthetic complications: no      Cardiovascular status: blood pressure returned to baseline and hemodynamically stable  Respiratory status: unassisted  Hydration status: euvolemic  Follow-up not needed.          Vitals Value Taken Time   /78 11/13/20 0942   Temp  11/13/20 1220   Pulse 89 11/13/20 0942   Resp 16 11/13/20 0942   SpO2 100 % 11/13/20 0942         No case tracking events are documented in the log.      Pain/Laureen Score: No data recorded

## 2020-11-13 NOTE — INTERVAL H&P NOTE
No significant changes were noted from the H&P or last clinic note.    The risks and benefits of this intervention, and alternative therapies were discussed with the patient.  The discussion of risks included infection, bleeding, need for additional procedures or surgery, nerve damage, paralysis, adverse medication reaction(s), stroke, and/or death.  Questions regarding the procedure, risks, expected outcome, and possible side effects were solicited and answered to the patient's satisfaction.  Dianne Hemphill wishes to proceed with the injection or procedure.  Written consent was obtained.    Clemencia Watts Jr, MD  Interventional Pain Medicine / Anesthesiology

## 2020-11-16 ENCOUNTER — PATIENT OUTREACH (OUTPATIENT)
Dept: ADMINISTRATIVE | Facility: OTHER | Age: 45
End: 2020-11-16

## 2020-11-18 ENCOUNTER — OFFICE VISIT (OUTPATIENT)
Dept: PAIN MEDICINE | Facility: CLINIC | Age: 45
End: 2020-11-18
Payer: COMMERCIAL

## 2020-11-18 ENCOUNTER — PATIENT MESSAGE (OUTPATIENT)
Dept: ENDOSCOPY | Facility: HOSPITAL | Age: 45
End: 2020-11-18

## 2020-11-18 ENCOUNTER — PATIENT MESSAGE (OUTPATIENT)
Dept: RADIATION ONCOLOGY | Facility: CLINIC | Age: 45
End: 2020-11-18

## 2020-11-18 VITALS
DIASTOLIC BLOOD PRESSURE: 99 MMHG | HEART RATE: 115 BPM | BODY MASS INDEX: 48.74 KG/M2 | WEIGHT: 293 LBS | SYSTOLIC BLOOD PRESSURE: 168 MMHG

## 2020-11-18 DIAGNOSIS — G90.522 COMPLEX REGIONAL PAIN SYNDROME TYPE 1 OF LEFT LOWER EXTREMITY: Primary | ICD-10-CM

## 2020-11-18 DIAGNOSIS — G89.4 CHRONIC PAIN SYNDROME: ICD-10-CM

## 2020-11-18 DIAGNOSIS — M54.15 RADICULOPATHY, THORACOLUMBAR REGION: ICD-10-CM

## 2020-11-18 DIAGNOSIS — M43.27 FUSION OF LUMBOSACRAL SPINE: ICD-10-CM

## 2020-11-18 PROCEDURE — 99999 PR PBB SHADOW E&M-EST. PATIENT-LVL IV: CPT | Mod: PBBFAC,,, | Performed by: NURSE PRACTITIONER

## 2020-11-18 PROCEDURE — 3008F PR BODY MASS INDEX (BMI) DOCUMENTED: ICD-10-PCS | Mod: CPTII,S$GLB,, | Performed by: NURSE PRACTITIONER

## 2020-11-18 PROCEDURE — 3008F BODY MASS INDEX DOCD: CPT | Mod: CPTII,S$GLB,, | Performed by: NURSE PRACTITIONER

## 2020-11-18 PROCEDURE — 99024 PR POST-OP FOLLOW-UP VISIT: ICD-10-PCS | Mod: S$GLB,,, | Performed by: NURSE PRACTITIONER

## 2020-11-18 PROCEDURE — 1125F PR PAIN SEVERITY QUANTIFIED, PAIN PRESENT: ICD-10-PCS | Mod: S$GLB,,, | Performed by: NURSE PRACTITIONER

## 2020-11-18 PROCEDURE — 1125F AMNT PAIN NOTED PAIN PRSNT: CPT | Mod: S$GLB,,, | Performed by: NURSE PRACTITIONER

## 2020-11-18 PROCEDURE — 99024 POSTOP FOLLOW-UP VISIT: CPT | Mod: S$GLB,,, | Performed by: NURSE PRACTITIONER

## 2020-11-18 PROCEDURE — 99999 PR PBB SHADOW E&M-EST. PATIENT-LVL IV: ICD-10-PCS | Mod: PBBFAC,,, | Performed by: NURSE PRACTITIONER

## 2020-11-18 NOTE — PROGRESS NOTES
Post-Op Follow Up for Spinal Cord Stimulator Implant     Subjective  Diannenohemy Hemphill returns today following SCS implant with a Nevro system on 11/13/2020  with 10% relief at this point, she is here for her post op appt and to get her SCS implant reprogrammed/adjusted.   Pain intensity is currently 8/10.   Patient reports mild to moderate incisional discomfort and denies fever, chills, wound drainage, or increasing tenderness of the surgical sites. Her and I discussed that if she continues to heal appropriately then it would be appropriate for her to travel in late Dec to see her daughter.     Objective  Vitals:    11/18/20 1115   BP: (!) 168/99   Pulse: (!) 115   Weight: (!) 149.7 kg (330 lb 0.5 oz)   PainSc:   7       Exam  Dressing removed to expose wounds on low back.  Dermabond remains intact over the wounds.  There is no erythema or discharge.  Wounds are appear clean, dry, and intact.    Assessment:  Successful SCS implant with appropriate wound healing.    Plan  Dressing no re-applied wound open to air   OK to shower.  Do no submerge wound for 2 weeks in bath/pool.  Reprogrammed per Nevro rep today    Follow Up: RTC 1 weeks for wound check.     Rudy Valdes, EDY-C  Interventional Pain Management      Disclaimer: This note was partly generated using dictation software which may occasionally result in transcription errors.

## 2020-11-24 ENCOUNTER — OFFICE VISIT (OUTPATIENT)
Dept: PAIN MEDICINE | Facility: CLINIC | Age: 45
End: 2020-11-24
Payer: COMMERCIAL

## 2020-11-24 VITALS
WEIGHT: 293 LBS | DIASTOLIC BLOOD PRESSURE: 93 MMHG | BODY MASS INDEX: 48.74 KG/M2 | SYSTOLIC BLOOD PRESSURE: 130 MMHG | HEART RATE: 77 BPM

## 2020-11-24 DIAGNOSIS — G90.522 COMPLEX REGIONAL PAIN SYNDROME TYPE 1 OF LEFT LOWER EXTREMITY: Primary | ICD-10-CM

## 2020-11-24 DIAGNOSIS — M54.15 RADICULOPATHY, THORACOLUMBAR REGION: ICD-10-CM

## 2020-11-24 DIAGNOSIS — M43.27 FUSION OF LUMBOSACRAL SPINE: ICD-10-CM

## 2020-11-24 DIAGNOSIS — G89.4 CHRONIC PAIN SYNDROME: ICD-10-CM

## 2020-11-24 PROCEDURE — 99024 POSTOP FOLLOW-UP VISIT: CPT | Mod: S$GLB,,, | Performed by: NURSE PRACTITIONER

## 2020-11-24 PROCEDURE — 99024 PR POST-OP FOLLOW-UP VISIT: ICD-10-PCS | Mod: S$GLB,,, | Performed by: NURSE PRACTITIONER

## 2020-11-24 PROCEDURE — 1125F PR PAIN SEVERITY QUANTIFIED, PAIN PRESENT: ICD-10-PCS | Mod: S$GLB,,, | Performed by: NURSE PRACTITIONER

## 2020-11-24 PROCEDURE — 99999 PR PBB SHADOW E&M-EST. PATIENT-LVL IV: CPT | Mod: PBBFAC,,, | Performed by: NURSE PRACTITIONER

## 2020-11-24 PROCEDURE — 3008F BODY MASS INDEX DOCD: CPT | Mod: CPTII,S$GLB,, | Performed by: NURSE PRACTITIONER

## 2020-11-24 PROCEDURE — 3008F PR BODY MASS INDEX (BMI) DOCUMENTED: ICD-10-PCS | Mod: CPTII,S$GLB,, | Performed by: NURSE PRACTITIONER

## 2020-11-24 PROCEDURE — 1125F AMNT PAIN NOTED PAIN PRSNT: CPT | Mod: S$GLB,,, | Performed by: NURSE PRACTITIONER

## 2020-11-24 PROCEDURE — 99999 PR PBB SHADOW E&M-EST. PATIENT-LVL IV: ICD-10-PCS | Mod: PBBFAC,,, | Performed by: NURSE PRACTITIONER

## 2020-11-24 NOTE — PROGRESS NOTES
Post-Op Follow Up for Spinal Cord Stimulator Implant     11/24/20 - Ms. Hemphill returns to clinic for follow up visit reporting stable left foot pain She reports mild incisional pain to her wounds.  Pain intensity is currently 8/10.      Subjective  Dianne Lauren Hemphill returns today following SCS implant with a Nevro system on 11/13/2020  with 10% relief at this point, she is here for her post op appt and to get her SCS implant reprogrammed/adjusted.   Pain intensity is currently 8/10.   Patient reports mild to moderate incisional discomfort and denies fever, chills, wound drainage, or increasing tenderness of the surgical sites. Her and I discussed that if she continues to heal appropriately then it would be appropriate for her to travel in late Dec to see her daughter.     Objective  Vitals:    11/24/20 1002   BP: (!) 130/93   Pulse: 77   Weight: (!) 149.7 kg (330 lb 0.5 oz)   PainSc:   8       Exam  Dressing removed to expose wounds on low back.  Dermabond remains intact over the wounds.  There is no erythema or discharge.  Wounds are appear clean, dry, and intact.          Assessment:  Successful SCS implant with appropriate wound healing.    Plan  wounds open to air   OK to shower.  Do no submerge wound for 2 weeks in bath/pool.  Reprogrammed per Nevro rep/Ray  today    Follow Up: RTC 1 weeks for wound check.     CIELO GarciaC  Interventional Pain Management      Disclaimer: This note was partly generated using dictation software which may occasionally result in transcription errors.

## 2020-11-30 ENCOUNTER — TELEPHONE (OUTPATIENT)
Dept: SLEEP MEDICINE | Facility: OTHER | Age: 45
End: 2020-11-30

## 2020-12-01 ENCOUNTER — OFFICE VISIT (OUTPATIENT)
Dept: PAIN MEDICINE | Facility: CLINIC | Age: 45
End: 2020-12-01
Payer: COMMERCIAL

## 2020-12-01 DIAGNOSIS — M54.10 RADICULAR PAIN OF LEFT LOWER EXTREMITY: ICD-10-CM

## 2020-12-01 DIAGNOSIS — M43.27 FUSION OF LUMBOSACRAL SPINE: ICD-10-CM

## 2020-12-01 DIAGNOSIS — G89.4 CHRONIC PAIN SYNDROME: ICD-10-CM

## 2020-12-01 DIAGNOSIS — G90.522 COMPLEX REGIONAL PAIN SYNDROME TYPE 1 OF LEFT LOWER EXTREMITY: Primary | ICD-10-CM

## 2020-12-01 DIAGNOSIS — M54.15 RADICULOPATHY, THORACOLUMBAR REGION: ICD-10-CM

## 2020-12-01 DIAGNOSIS — Z98.890 S/P SPINAL SURGERY: ICD-10-CM

## 2020-12-01 DIAGNOSIS — Z72.820 POOR SLEEP: ICD-10-CM

## 2020-12-01 DIAGNOSIS — M48.061 NEURAL FORAMINAL STENOSIS OF LUMBAR SPINE: ICD-10-CM

## 2020-12-01 PROCEDURE — 99024 PR POST-OP FOLLOW-UP VISIT: ICD-10-PCS | Mod: S$GLB,,, | Performed by: NURSE PRACTITIONER

## 2020-12-01 PROCEDURE — 99999 PR PBB SHADOW E&M-EST. PATIENT-LVL III: ICD-10-PCS | Mod: PBBFAC,,, | Performed by: NURSE PRACTITIONER

## 2020-12-01 PROCEDURE — 99999 PR PBB SHADOW E&M-EST. PATIENT-LVL III: CPT | Mod: PBBFAC,,, | Performed by: NURSE PRACTITIONER

## 2020-12-01 PROCEDURE — 99024 POSTOP FOLLOW-UP VISIT: CPT | Mod: S$GLB,,, | Performed by: NURSE PRACTITIONER

## 2020-12-01 NOTE — PROGRESS NOTES
Post-Op Follow Up for Spinal Cord Stimulator Implant      12/1/20 - Ms. Hemphill returns to clinic for follow up visit reporting stable left foot pain. Pain intensity is currently 7/10.  Patient did report stable left foot pain however she has not been provided the relief that which she received during the trial the Nevro rep will continue to optimize her programming in effort to provide her with sustained relief that mimics the trial.    11/24/20 - Ms. Hemphill returns to clinic for follow up visit reporting stable left foot pain She reports mild incisional pain to her wounds.  Pain intensity is currently 8/10.      Subjective  Dianne Lauren Hemphill returns today following SCS implant with a Nevro system on 11/13/2020  with 10% relief at this point, she is here for her post op appt and to get her SCS implant reprogrammed/adjusted.   Pain intensity is currently 8/10.   Patient reports mild to moderate incisional discomfort and denies fever, chills, wound drainage, or increasing tenderness of the surgical sites. Her and I discussed that if she continues to heal appropriately then it would be appropriate for her to travel in late Dec to see her daughter.     Objective  There were no vitals filed for this visit.    Exam  Dressing removed to expose wounds on low back.  Dermabond remains intact over the wounds.  There is no erythema or discharge.  Wounds are appear clean, dry, and intact.              Assessment:  Successful SCS implant with appropriate wound healing.    Plan  wounds open to air   OK to shower.  Do no submerge wound for 1 weeks in bath/pool.  Reprogrammed per Nevro rep/Toyin  today  Slowly wean off gabapentin weekly we will reduce by 25% which should equate to 600 mg per week starting today, patient will follow up me on 12/08/2020 let me know the effectiveness of weaning.  Patient was being Program over the 9/10 disc she has been moved to a program that will cover the lower T9 disc.    Follow Up: RTC in 1  week  via my [facility] to discuss weaning of gabapentin.    Rudy Valdes, NP-C  Interventional Pain Management      Disclaimer: This note was partly generated using dictation software which may occasionally result in transcription errors.

## 2020-12-02 ENCOUNTER — PATIENT MESSAGE (OUTPATIENT)
Dept: INTERNAL MEDICINE | Facility: CLINIC | Age: 45
End: 2020-12-02

## 2020-12-02 DIAGNOSIS — M43.10 ANTEROLISTHESIS: ICD-10-CM

## 2020-12-02 DIAGNOSIS — F43.23 SITUATIONAL MIXED ANXIETY AND DEPRESSIVE DISORDER: ICD-10-CM

## 2020-12-02 DIAGNOSIS — T78.40XA ALLERGY, INITIAL ENCOUNTER: Primary | ICD-10-CM

## 2020-12-02 DIAGNOSIS — M48.061 NEURAL FORAMINAL STENOSIS OF LUMBAR SPINE: ICD-10-CM

## 2020-12-02 DIAGNOSIS — F43.21 SITUATIONAL DEPRESSION: ICD-10-CM

## 2020-12-02 DIAGNOSIS — M47.816 LUMBAR SPONDYLOSIS: ICD-10-CM

## 2020-12-03 ENCOUNTER — TELEPHONE (OUTPATIENT)
Dept: INTERNAL MEDICINE | Facility: CLINIC | Age: 45
End: 2020-12-03

## 2020-12-03 ENCOUNTER — OFFICE VISIT (OUTPATIENT)
Dept: INTERNAL MEDICINE | Facility: CLINIC | Age: 45
End: 2020-12-03
Payer: COMMERCIAL

## 2020-12-03 DIAGNOSIS — T63.464S ANAPHYLACTIC REACTION TO WASP STING, UNDETERMINED INTENT, SEQUELA: ICD-10-CM

## 2020-12-03 DIAGNOSIS — L30.4 PRURITIC INTERTRIGO: Primary | ICD-10-CM

## 2020-12-03 DIAGNOSIS — Z87.892 HISTORY OF ANAPHYLAXIS: ICD-10-CM

## 2020-12-03 PROCEDURE — 99214 OFFICE O/P EST MOD 30 MIN: CPT | Mod: 95,,, | Performed by: FAMILY MEDICINE

## 2020-12-03 PROCEDURE — 1125F AMNT PAIN NOTED PAIN PRSNT: CPT | Mod: ,,, | Performed by: FAMILY MEDICINE

## 2020-12-03 PROCEDURE — 1125F PR PAIN SEVERITY QUANTIFIED, PAIN PRESENT: ICD-10-PCS | Mod: ,,, | Performed by: FAMILY MEDICINE

## 2020-12-03 PROCEDURE — 99214 PR OFFICE/OUTPT VISIT, EST, LEVL IV, 30-39 MIN: ICD-10-PCS | Mod: 95,,, | Performed by: FAMILY MEDICINE

## 2020-12-03 RX ORDER — EPINEPHRINE 0.3 MG/.3ML
2 INJECTION SUBCUTANEOUS ONCE
Qty: 4 DEVICE | Refills: 0 | Status: SHIPPED | OUTPATIENT
Start: 2020-12-03 | End: 2022-06-01

## 2020-12-03 RX ORDER — EPINEPHRINE 0.3 MG/.3ML
1 INJECTION SUBCUTANEOUS ONCE
Qty: 2 EACH | Refills: 1 | OUTPATIENT
Start: 2020-12-03 | End: 2020-12-03

## 2020-12-03 RX ORDER — FAMOTIDINE 20 MG/1
20 TABLET, FILM COATED ORAL 2 TIMES DAILY
Qty: 60 TABLET | Refills: 3 | Status: SHIPPED | OUTPATIENT
Start: 2020-12-03 | End: 2021-03-24

## 2020-12-03 RX ORDER — TRIAMCINOLONE ACETONIDE 1 MG/G
OINTMENT TOPICAL 2 TIMES DAILY
Qty: 80 G | Refills: 0 | Status: SHIPPED | OUTPATIENT
Start: 2020-12-03 | End: 2021-12-10

## 2020-12-03 RX ORDER — DULOXETIN HYDROCHLORIDE 60 MG/1
60 CAPSULE, DELAYED RELEASE ORAL DAILY
Qty: 90 CAPSULE | Refills: 0 | OUTPATIENT
Start: 2020-12-03

## 2020-12-03 RX ORDER — KETOCONAZOLE 20 MG/G
CREAM TOPICAL DAILY
Qty: 60 G | Refills: 0 | Status: SHIPPED | OUTPATIENT
Start: 2020-12-03 | End: 2021-12-10

## 2020-12-03 NOTE — PATIENT INSTRUCTIONS
Mix dime size of ketoconazole plus a dime size of triamcinolone plus a nickel size of zinc oxide paste (desitin/vipin etc) twice daily to affected areas.    Once the itching and burning goes away you can stop the ketoconazole and triamcinolone and just use zinc oxide paste as a barrier.

## 2020-12-08 ENCOUNTER — PATIENT MESSAGE (OUTPATIENT)
Dept: PAIN MEDICINE | Facility: CLINIC | Age: 45
End: 2020-12-08

## 2020-12-09 ENCOUNTER — HOSPITAL ENCOUNTER (OUTPATIENT)
Dept: RADIOLOGY | Facility: HOSPITAL | Age: 45
Discharge: HOME | End: 2020-12-09
Attending: SURGERY
Payer: COMMERCIAL

## 2020-12-09 DIAGNOSIS — Z12.31 SCREENING MAMMOGRAM, ENCOUNTER FOR: ICD-10-CM

## 2020-12-09 DIAGNOSIS — Z85.3 PERSONAL HISTORY OF BREAST CANCER: ICD-10-CM

## 2020-12-09 PROCEDURE — 77063 MAMMO DIGITAL SCREENING BILAT WITH TOMO: ICD-10-PCS | Mod: 26,,, | Performed by: RADIOLOGY

## 2020-12-09 PROCEDURE — 77067 MAMMO DIGITAL SCREENING BILAT WITH TOMO: ICD-10-PCS | Mod: 26,,, | Performed by: RADIOLOGY

## 2020-12-09 PROCEDURE — 77067 SCR MAMMO BI INCL CAD: CPT | Mod: 26,,, | Performed by: RADIOLOGY

## 2020-12-09 PROCEDURE — 77063 BREAST TOMOSYNTHESIS BI: CPT | Mod: 26,,, | Performed by: RADIOLOGY

## 2020-12-09 PROCEDURE — 77067 SCR MAMMO BI INCL CAD: CPT | Mod: TC

## 2020-12-15 ENCOUNTER — PATIENT MESSAGE (OUTPATIENT)
Dept: PAIN MEDICINE | Facility: CLINIC | Age: 45
End: 2020-12-15

## 2020-12-21 ENCOUNTER — PATIENT MESSAGE (OUTPATIENT)
Dept: INTERNAL MEDICINE | Facility: CLINIC | Age: 45
End: 2020-12-21

## 2020-12-21 ENCOUNTER — OFFICE VISIT (OUTPATIENT)
Dept: PAIN MEDICINE | Facility: CLINIC | Age: 45
End: 2020-12-21
Payer: COMMERCIAL

## 2020-12-21 ENCOUNTER — PATIENT OUTREACH (OUTPATIENT)
Dept: ADMINISTRATIVE | Facility: OTHER | Age: 45
End: 2020-12-21

## 2020-12-21 DIAGNOSIS — R13.10 DYSPHAGIA, UNSPECIFIED TYPE: ICD-10-CM

## 2020-12-21 DIAGNOSIS — M43.27 FUSION OF LUMBOSACRAL SPINE: ICD-10-CM

## 2020-12-21 DIAGNOSIS — K21.9 GASTROESOPHAGEAL REFLUX DISEASE: ICD-10-CM

## 2020-12-21 DIAGNOSIS — M47.816 LUMBAR SPONDYLOSIS: ICD-10-CM

## 2020-12-21 DIAGNOSIS — Z72.820 POOR SLEEP: Primary | ICD-10-CM

## 2020-12-21 DIAGNOSIS — F43.23 SITUATIONAL MIXED ANXIETY AND DEPRESSIVE DISORDER: ICD-10-CM

## 2020-12-21 DIAGNOSIS — M43.10 ANTEROLISTHESIS: ICD-10-CM

## 2020-12-21 DIAGNOSIS — G89.4 CHRONIC PAIN SYNDROME: ICD-10-CM

## 2020-12-21 DIAGNOSIS — M48.061 NEURAL FORAMINAL STENOSIS OF LUMBAR SPINE: ICD-10-CM

## 2020-12-21 DIAGNOSIS — F43.21 SITUATIONAL DEPRESSION: ICD-10-CM

## 2020-12-21 DIAGNOSIS — M54.10 RADICULAR PAIN OF LEFT LOWER EXTREMITY: ICD-10-CM

## 2020-12-21 DIAGNOSIS — M54.15 RADICULOPATHY, THORACOLUMBAR REGION: ICD-10-CM

## 2020-12-21 DIAGNOSIS — F43.23 SITUATIONAL MIXED ANXIETY AND DEPRESSIVE DISORDER: Primary | ICD-10-CM

## 2020-12-21 DIAGNOSIS — G90.522 COMPLEX REGIONAL PAIN SYNDROME TYPE 1 OF LEFT LOWER EXTREMITY: ICD-10-CM

## 2020-12-21 DIAGNOSIS — Z98.890 S/P SPINAL SURGERY: ICD-10-CM

## 2020-12-21 PROCEDURE — 99214 OFFICE O/P EST MOD 30 MIN: CPT | Mod: 95,,, | Performed by: NURSE PRACTITIONER

## 2020-12-21 PROCEDURE — 99214 PR OFFICE/OUTPT VISIT, EST, LEVL IV, 30-39 MIN: ICD-10-PCS | Mod: 95,,, | Performed by: NURSE PRACTITIONER

## 2020-12-21 RX ORDER — GABAPENTIN 600 MG/1
1200 TABLET ORAL 3 TIMES DAILY
Qty: 540 TABLET | Refills: 1 | Status: SHIPPED | OUTPATIENT
Start: 2020-12-21 | End: 2021-05-12

## 2020-12-21 RX ORDER — AMITRIPTYLINE HYDROCHLORIDE 75 MG/1
75 TABLET ORAL NIGHTLY
Qty: 30 TABLET | Refills: 0 | Status: SHIPPED | OUTPATIENT
Start: 2020-12-21 | End: 2021-07-13 | Stop reason: SDUPTHER

## 2020-12-21 RX ORDER — BACLOFEN 10 MG/1
10 TABLET ORAL 3 TIMES DAILY
Qty: 90 TABLET | Refills: 0 | Status: SHIPPED | OUTPATIENT
Start: 2020-12-21 | End: 2021-05-17

## 2020-12-21 RX ORDER — DULOXETIN HYDROCHLORIDE 60 MG/1
60 CAPSULE, DELAYED RELEASE ORAL DAILY
Qty: 30 CAPSULE | Refills: 0 | Status: SHIPPED | OUTPATIENT
Start: 2020-12-21 | End: 2021-03-10 | Stop reason: SDUPTHER

## 2020-12-21 RX ORDER — GABAPENTIN 100 MG/1
100 CAPSULE ORAL 3 TIMES DAILY
Qty: 90 CAPSULE | Refills: 2 | Status: SHIPPED | OUTPATIENT
Start: 2020-12-21 | End: 2021-03-21

## 2020-12-21 NOTE — PROGRESS NOTES
"[facility] New Patient Evaluation  Telemedicine Encounter    Telemedicine Bundle:  This visit was completed during the Coronavirus Crisis to enhance patient safety.  The patient location is: patient's home  The chief complaint leading to consultation is: Foot Pain (left foot )  Visit type: Virtual visit with synchronous audio and video  Total time spent with patient: 23 minutes  Each patient to whom he or she provides medical services by telemedicine is:    (1) informed of the relationship between the physician and patient and the respective role of any other health care provider with respect to management of the patient  (2) notified that he or she may decline to receive medical services by telemedicine and may withdraw from such care at any time.    Referred by: Dr. Bazan  Reason for referral: CRPS Type 1    CC:   Chief Complaint   Patient presents with    Foot Pain     left foot      Last 3 PDI Scores 11/24/2020 11/18/2020 10/27/2020   Pain Disability Index (PDI) 49 [phone]       Interval Updates:  12/21/2020 - 45-year-old female with a history of severe left foot pain, she presents for a virtual visit she is status post SCS implant with a Nevro system on 11/13/2020, she has completed her 3 week postop follow-up visits her wounds have healed she is currently continuing to work with the [employer] reps to find the appropriate programs that will help with pain in the left foot.  The focus of today's visit was  to optimize her medication as we are currently attempting to wean her off gabapentin(current dose 2700 mg  Daily)  she also takes Elavil 75 mg daily, baclofen 10 mg t.i.d.and  duloxetine 60 mg daily.  She reports no current adverse side effects with any of her medications.    HPI:   Dianne Hemphill is a 45 y.o. female who complains of severe foot pain starting in Nov 2019.  Patient reports that pain is in the arch of the foot and great toe.  The pain "travels" at times and feels like the "skin is " "being ripped" off of her foot.  The skin on the left foot is glossy now (sometimes completely white and sometimes purple) and the pain is now traveling up the calf and shin.  The shin is now very painful with intermittent pains like a person is drilling a spike through the foot.  Weather sets of the pain as does standing or walking for more than 10-15 minutes.  It feels like she's walking on a "clubbed foot."  Light touch from towels or bed sheets as well as air movement can make the pain "horrific."  Patient also states that she's on so many medication that she barely stays awake.    Location: left lower extremity  Onset: Nov 7 - she woke up from lumbar surgery with this pain  Current Pain Score: 6-7/10  Daily Pain of Range: 6-10/10  Quality: Burning, Throbbing, Numb and Stabbing  Radiation: see HPI above  Worsened by: standing for more than 10 minutes and walking for more than 10 minutes  Improved by: medications    Previous Therapies:  PT/OT: Denies OT for the foot.  Endorses PT.  HEP: Endorses daily performance of PT exercises at home.    TENS: Yes, along with PT exercises  Interventions:   Surgery:  Medications:   - NSAIDS:   - MSK Relaxants:   - TCAs:   - SNRIs:   - Topicals:   - Anticonvulsants:  - Opioids: Tramadol - too little relief; Dilaudid and Norco never provided any relief    Current Pain Medications:  1. Gabapentin 2700 daily   1. 1200mg am, 2. 600 mg afternoon, 3. 900 mg   2. Duloxetine 60 mg daily  3. Elavil 75 mg QHS  4. Baclofen 10 TID  5. Tylenol 650 QHS    Review of Systems:  Review of Systems   Constitutional: Negative for chills and fever.   HENT: Negative for nosebleeds.    Eyes: Negative for blurred vision and pain.   Respiratory: Negative for hemoptysis.    Cardiovascular: Negative for chest pain and palpitations.   Gastrointestinal: Negative for heartburn, nausea and vomiting.   Genitourinary: Negative for dysuria and hematuria.   Musculoskeletal: Positive for joint pain. Negative for " falls and myalgias.   Skin: Negative for rash.   Neurological: Positive for tingling and focal weakness. Negative for seizures and loss of consciousness.   Endo/Heme/Allergies: Does not bruise/bleed easily.   Psychiatric/Behavioral: Negative for hallucinations. The patient has insomnia.        History:    Current Outpatient Medications:     acetaminophen (TYLENOL 8 HOUR) 650 MG TbSR, Take 650 mg by mouth as needed. , Disp: , Rfl:     amitriptyline (ELAVIL) 75 MG tablet, Take 1 tablet (75 mg total) by mouth every evening., Disp: 30 tablet, Rfl: 0    baclofen (LIORESAL) 10 MG tablet, Take 1 tablet (10 mg total) by mouth 3 (three) times daily., Disp: 90 tablet, Rfl: 0    cholecalciferol, vitamin D3, (VITAMIN D3) 2,000 unit Tab, Take 5,000 Units by mouth once daily. , Disp: , Rfl:     DULoxetine (CYMBALTA) 60 MG capsule, Take 1 capsule (60 mg total) by mouth once daily., Disp: 30 capsule, Rfl: 0    EPINEPHrine (EPIPEN 2-JADE) 0.3 mg/0.3 mL AtIn, Inject 0.6 mLs (0.6 mg total) into the muscle once. for 1 dose, Disp: 4 Device, Rfl: 0    famotidine (PEPCID AC) 20 MG tablet, Take 1 tablet (20 mg total) by mouth 2 (two) times daily., Disp: 60 tablet, Rfl: 3    fexofenadine (ALLEGRA) 180 MG tablet, Take 180 mg by mouth as needed. , Disp: , Rfl:     fluticasone propionate (FLONASE) 50 mcg/actuation nasal spray, 1 spray (50 mcg total) by Each Nostril route once daily. (Patient taking differently: 1 spray by Each Nostril route daily as needed. ), Disp: 16 g, Rfl: 11    gabapentin (NEURONTIN) 100 MG capsule, Take 1 capsule (100 mg total) by mouth 3 (three) times daily., Disp: 90 capsule, Rfl: 2    gabapentin (NEURONTIN) 600 MG tablet, Take 2 tablets (1,200 mg total) by mouth 3 (three) times daily., Disp: 540 tablet, Rfl: 1    ketoconazole (NIZORAL) 2 % cream, Apply topically once daily., Disp: 60 g, Rfl: 0    ketoconazole (NIZORAL) 2 % shampoo, APPLY TOPICALLY TWICE A  WEEK., Disp: 120 mL, Rfl: 0    levocetirizine  (XYZAL) 5 MG tablet, Take 5 mg by mouth every evening., Disp: , Rfl:     ondansetron (ZOFRAN-ODT) 4 MG TbDL, Take 2 tablets (8 mg total) by mouth every 6 (six) hours as needed., Disp: 60 tablet, Rfl: 1    pantoprazole (PROTONIX) 40 MG tablet, Take 1 tablet (40 mg total) by mouth 2 (two) times daily., Disp: 180 tablet, Rfl: 1    scopolamine (TRANSDERM-SCOP) 1.3-1.5 mg (1 mg over 3 days), Place 1 patch onto the skin every 72 hours. (Patient taking differently: Place 1 patch onto the skin as needed. ), Disp: 4 patch, Rfl: 3    triamcinolone acetonide 0.1% (KENALOG) 0.1 % ointment, Apply topically 2 (two) times daily., Disp: 80 g, Rfl: 0  No current facility-administered medications for this visit.     Facility-Administered Medications Ordered in Other Visits:     lidocaine (PF) 10 mg/ml (1%) injection 10 mg, 1 mL, Intradermal, Once, Clemencia Watts Jr., MD    lidocaine (PF) 10 mg/ml (1%) injection 10 mg, 1 mL, Intradermal, Once, Clemencia Watts Jr., MD    Past Medical History:   Diagnosis Date    Breast cancer     DCIS, left partical mastectomy    GERD (gastroesophageal reflux disease)     History of migraine headaches     Obesity     Pollen allergies     PONV (postoperative nausea and vomiting)     Smoker        Past Surgical History:   Procedure Laterality Date    CHOLECYSTECTOMY      ESOPHAGOGASTRODUODENOSCOPY      EXPLORATORY LAPAROTOMY      HYSTERECTOMY      fibroids    INSERTION OF DORSAL COLUMN NERVE STIMULATOR FOR TRIAL N/A 10/22/2020    Procedure: SPINAL CORD STIMULATOR TRIAL ([facility]);  Surgeon: Clemencia Watts Jr., MD;  Location: [facility];  Service: Pain Management;  Laterality: N/A;    LUMBAR FUSION N/A 11/7/2019    Procedure: FUSION, SPINE, LUMBAR Procedure: STG 1: L5-S1 anterior Lumbar interbody fusion / STG 2:L5-S1 Posterior instrumentation;  Surgeon: Corona Bazan MD;  Location: [facility] OR;  Service: Neurosurgery;  Laterality: N/A;  FUSION, SPINE, LUMBARProcedure: STG 1:  L5-S1 anterior Lumbar interbody fusion / STG 2:L5-S1 Posterior instrumentationSURGERY TIME: 2.5hrsLOS:ANESTHESIA: GeneralBlood:    LUMBAR SYMPATHETIC NERVE BLOCK Left 2020    Procedure: BLOCK, NERVE, SYMPATHETIC, LUMBAR--Left;  Surgeon: Clemencia Watts Jr., MD;  [facility];  Service: Pain Management;  Laterality: Left;    MASTECTOMY, PARTIAL Left 2020    Procedure: MASTECTOMY, PARTIAL LEFT with RADIOLOGIC MARKER;  Surgeon: Mara Munoz MD;  Location: 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 [employer]   Social Needs    Financial resource strain: Not on file    Food insecurity     Worry: Not on file     Inability: Not on file    Transportation needs     Medical: Not on file     Non-medical: Not on file   Tobacco Use    Smoking status: Former Smoker     Packs/day: 0.50     Years: 15.00     Pack years: 7.50     Types: Vaping with nicotine     Quit date: 2019     Years since quittin.2    Smokeless tobacco: Former User   Substance and Sexual Activity    Alcohol use: Yes     Comment: 2-3 drinks per year    Drug use: No    Sexual activity: Yes     Partners: Male     Birth control/protection: See Surgical Hx     Comment: Hysterectomy   Lifestyle    Physical activity     Days per week: Not on file     Minutes per session: Not on file    Stress: Not on file   Relationships    Social connections     Talks on phone: Not on file     Gets together: Not on file     Attends  "Moravian service: Not on file     Active member of club or organization: Not on file     Attends meetings of clubs or organizations: Not on file     Relationship status: Not on file   Other Topics Concern    Not on file   Social History Narrative    Not on file       Review of patient's allergies indicates:   Allergen Reactions    Ativan [lorazepam] Other (See Comments)     Pt states she cannot "wake up or sleeps for three days."    Gluten protein Nausea And Vomiting    Milk containing products Hives    Wasp venom Anaphylaxis    Demerol [meperidine] Nausea And Vomiting    Imitrex [sumatriptan succinate]      Hysterics      Morphine Nausea And Vomiting    Tetracyclines Nausea And Vomiting    Ciprofloxacin hcl Rash    Codeine Nausea And Vomiting     Can take Dilaudid, oxycontin, oxycodone & tramadol    Erythromycin Rash       Physical Exam:  There were no vitals filed for this visit.  There was no physical exam done for this clinical visit    Labs:  BMP  Lab Results   Component Value Date     08/25/2020    K 4.3 08/25/2020     08/25/2020    CO2 27 08/25/2020    BUN 12 08/25/2020    CREATININE 0.68 08/25/2020    CALCIUM 9.1 08/25/2020    ANIONGAP 10 08/25/2020    ESTGFRAFRICA >60.0 08/25/2020    EGFRNONAA >60.0 08/25/2020     Lab Results   Component Value Date    ALT 29 08/25/2020    AST 31 08/25/2020    ALKPHOS 84 08/25/2020    BILITOT 0.4 08/25/2020       Assessment:  Problem List Items Addressed This Visit        Neuro    Lumbar spondylosis    Relevant Medications    DULoxetine (CYMBALTA) 60 MG capsule    gabapentin (NEURONTIN) 600 MG tablet    Radicular pain of left lower extremity    Relevant Medications    baclofen (LIORESAL) 10 MG tablet    amitriptyline (ELAVIL) 75 MG tablet    gabapentin (NEURONTIN) 100 MG capsule    Complex regional pain syndrome type 1 of left lower extremity    Chronic pain syndrome       Orthopedic    Neural foraminal stenosis of lumbar spine    Relevant " Medications    baclofen (LIORESAL) 10 MG tablet    DULoxetine (CYMBALTA) 60 MG capsule    amitriptyline (ELAVIL) 75 MG tablet    gabapentin (NEURONTIN) 100 MG capsule    Anterolisthesis    Relevant Medications    DULoxetine (CYMBALTA) 60 MG capsule    S/P spinal surgery    Relevant Medications    amitriptyline (ELAVIL) 75 MG tablet    Fusion of lumbosacral spine    Relevant Medications    baclofen (LIORESAL) 10 MG tablet    gabapentin (NEURONTIN) 600 MG tablet    gabapentin (NEURONTIN) 100 MG capsule       Other    Poor sleep - Primary    Relevant Medications    baclofen (LIORESAL) 10 MG tablet    amitriptyline (ELAVIL) 75 MG tablet    gabapentin (NEURONTIN) 600 MG tablet      Other Visit Diagnoses     Situational depression        Relevant Medications    DULoxetine (CYMBALTA) 60 MG capsule    gabapentin (NEURONTIN) 100 MG capsule    Situational mixed anxiety and depressive disorder        Relevant Medications    DULoxetine (CYMBALTA) 60 MG capsule    Radiculopathy, thoracolumbar region              5/29/20 - Dianne Hemphill is a 45 y.o. female with chronic left lower extremity pain associated with lumbosacral fusion L5-S1 in November 2019.  Patient states that she awoke from surgery with a severe pain in the left foot and felt like someone was peeling of her skin.  Since that time the pain has continued to be present, but his migrating proximally and now affecting the foot, shin, and calf.  She reports a constellation of neuropathic symptoms including simultaneous numbness along with dysesthesias (burning, tingling, stabbing).  She also endorses allodynia to light touch from bed sheets and air movement along with proximal spread of her symptoms.  She believes that her hips may not be affected as well.  Currently, she is on a robust regiment of neuropathic medications and experiencing very little relief as her pain range is 6-10/10 and severely impacts her ability to walk for more than 10 min.  Given the lack  of response to a robust regimen such as hers, I have recommended changing course and pursuing interventional procedures as the primary treatment modality at this point.  This is especially important given the length of time she has been experiencing the symptoms.  Failure to improve or resolve over the past 6 months indicates a poor likelihood of spontaneous resolution.  We will start with a left lumbar sympathetic block for diagnostic and therapeutic purposes.  We also briefly discussed the role of spinal cord stimulation therapy for the treatment of CRPS type 1, which may be needed if interventional injections like the LSB fail to provide significant and sustained relief.  I spent a significant amount of time explaining what is known about the underlying pathophysiology and characteristics of CRPS.    10/7/20 - this is a 45-year-old female with CRPS type 1 chronic pain syndrome secondary to a lumbar surgery.  She is status post left lumbar sympathetic block which did not provide significant relief but continues to display symptoms of CRPS including vasomotor changes, hypersensitivity, and hair loss of the left lower extremity.  I have recommended that we move forward with a trial of spinal cord stimulation.  We discussed to spinal cord stimulator options today and she would like to move forward with the Nevro device which provides HF 10 therapy.    12/21/2020- 45-year-old female with a history of CRPS type 1 chronic pain syndrome to get her lumbar surgery.  She is status post SCS  implant with a Nevro system on 11/13/2020,she reported that currently she is working with her rep to obtain the appropriate programs to provide her with more relief.  Currently we are  weaning her off gabapentin she is currently taking 2700 mg daily, I  discussed that we will start weaning her 100 mg per week she reported that she is leaving town for 3 months to visit her grand baby. I discussed with her the planned to begin weaning her  and I had a  long discussion over the virtual visit today explaining in detail how we were going to accomplish our goal of weaning 100 mg a week.  I will also  send  refills of her Baclofen  10 mg t.i.d., Elavil 75 mg daily, and duloxetine 60 mg daily to cover her while she is out of town for 3 months.  Patient verbalized understanding of the weaning plan she states she will contact me via my [facility] with any questions regarding this.    : Reviewed and consistent with medication use as prescribed.    Treatment Plan:   Procedures:  None at this time  PT/OT/HEP: Patient may need dedicated OT but she appears a little resistant at the moment due to cost of PT and receipt of exercises that she performing daily.  I will approach this topic at subsequent visits.  Medications:  Refilled duloxetine 60 mg daily  Refilled baclofen 10 mg t.i.d.  Refilled Elavil 75 mg daily  Refilled gabapentin 600 mg p.o. 3 times daily  Extra refill of gabapentin 100 mg t.i.d. this prescription will help with weaning.  Labs: reviewed and medications are appropriately dosed for current hepatorenal function.  Imaging: No additional recommended at this time.    Follow Up: RTC 3 months     ARUNA Garcia  Interventional Pain Management    Disclaimer: This note was partly generated using dictation software which may occasionally result in transcription errors.

## 2020-12-22 RX ORDER — PANTOPRAZOLE SODIUM 40 MG/1
40 TABLET, DELAYED RELEASE ORAL 2 TIMES DAILY
Qty: 30 TABLET | Refills: 0 | Status: SHIPPED | OUTPATIENT
Start: 2020-12-22 | End: 2021-03-10 | Stop reason: SDUPTHER

## 2020-12-22 RX ORDER — SCOLOPAMINE TRANSDERMAL SYSTEM 1 MG/1
1 PATCH, EXTENDED RELEASE TRANSDERMAL
Qty: 4 PATCH | Refills: 1 | Status: SHIPPED | OUTPATIENT
Start: 2020-12-22 | End: 2022-08-01 | Stop reason: SDUPTHER

## 2021-01-06 ENCOUNTER — PATIENT MESSAGE (OUTPATIENT)
Dept: PAIN MEDICINE | Facility: CLINIC | Age: 46
End: 2021-01-06

## 2021-01-14 ENCOUNTER — PATIENT MESSAGE (OUTPATIENT)
Dept: PAIN MEDICINE | Facility: CLINIC | Age: 46
End: 2021-01-14

## 2021-02-02 ENCOUNTER — PATIENT MESSAGE (OUTPATIENT)
Dept: PAIN MEDICINE | Facility: CLINIC | Age: 46
End: 2021-02-02

## 2021-02-17 ENCOUNTER — PATIENT MESSAGE (OUTPATIENT)
Dept: PAIN MEDICINE | Facility: CLINIC | Age: 46
End: 2021-02-17

## 2021-02-18 ENCOUNTER — PATIENT MESSAGE (OUTPATIENT)
Dept: PAIN MEDICINE | Facility: CLINIC | Age: 46
End: 2021-02-18

## 2021-03-09 ENCOUNTER — PATIENT MESSAGE (OUTPATIENT)
Dept: INTERNAL MEDICINE | Facility: CLINIC | Age: 46
End: 2021-03-09

## 2021-03-09 DIAGNOSIS — M48.061 NEURAL FORAMINAL STENOSIS OF LUMBAR SPINE: ICD-10-CM

## 2021-03-09 DIAGNOSIS — F43.21 SITUATIONAL DEPRESSION: ICD-10-CM

## 2021-03-09 DIAGNOSIS — K21.9 GASTROESOPHAGEAL REFLUX DISEASE: ICD-10-CM

## 2021-03-09 DIAGNOSIS — R13.10 DYSPHAGIA, UNSPECIFIED TYPE: ICD-10-CM

## 2021-03-09 DIAGNOSIS — F43.23 SITUATIONAL MIXED ANXIETY AND DEPRESSIVE DISORDER: ICD-10-CM

## 2021-03-09 DIAGNOSIS — M47.816 LUMBAR SPONDYLOSIS: ICD-10-CM

## 2021-03-09 DIAGNOSIS — M43.10 ANTEROLISTHESIS: ICD-10-CM

## 2021-03-10 RX ORDER — DULOXETIN HYDROCHLORIDE 60 MG/1
60 CAPSULE, DELAYED RELEASE ORAL DAILY
Qty: 90 CAPSULE | Refills: 0 | Status: SHIPPED | OUTPATIENT
Start: 2021-03-10 | End: 2021-03-24 | Stop reason: SDUPTHER

## 2021-03-10 RX ORDER — PANTOPRAZOLE SODIUM 40 MG/1
40 TABLET, DELAYED RELEASE ORAL DAILY
Qty: 90 TABLET | Refills: 0 | Status: SHIPPED | OUTPATIENT
Start: 2021-03-10 | End: 2021-03-12 | Stop reason: SDUPTHER

## 2021-03-12 DIAGNOSIS — R13.10 DYSPHAGIA, UNSPECIFIED TYPE: ICD-10-CM

## 2021-03-12 DIAGNOSIS — K21.9 GASTROESOPHAGEAL REFLUX DISEASE: ICD-10-CM

## 2021-03-12 RX ORDER — PANTOPRAZOLE SODIUM 40 MG/1
40 TABLET, DELAYED RELEASE ORAL DAILY
Qty: 90 TABLET | Refills: 0 | Status: SHIPPED | OUTPATIENT
Start: 2021-03-12 | End: 2021-03-24 | Stop reason: SDUPTHER

## 2021-03-19 ENCOUNTER — PATIENT MESSAGE (OUTPATIENT)
Dept: RADIATION ONCOLOGY | Facility: CLINIC | Age: 46
End: 2021-03-19

## 2021-03-24 ENCOUNTER — OFFICE VISIT (OUTPATIENT)
Dept: INTERNAL MEDICINE | Facility: CLINIC | Age: 46
End: 2021-03-24
Payer: COMMERCIAL

## 2021-03-24 DIAGNOSIS — E55.9 VITAMIN D DEFICIENCY: ICD-10-CM

## 2021-03-24 DIAGNOSIS — M43.10 ANTEROLISTHESIS: ICD-10-CM

## 2021-03-24 DIAGNOSIS — F43.21 SITUATIONAL DEPRESSION: ICD-10-CM

## 2021-03-24 DIAGNOSIS — K21.9 GASTROESOPHAGEAL REFLUX DISEASE WITHOUT ESOPHAGITIS: Primary | ICD-10-CM

## 2021-03-24 DIAGNOSIS — F43.23 SITUATIONAL MIXED ANXIETY AND DEPRESSIVE DISORDER: ICD-10-CM

## 2021-03-24 DIAGNOSIS — M47.816 LUMBAR SPONDYLOSIS: ICD-10-CM

## 2021-03-24 DIAGNOSIS — Z85.3 HISTORY OF BREAST CANCER: ICD-10-CM

## 2021-03-24 DIAGNOSIS — R13.10 DYSPHAGIA, UNSPECIFIED TYPE: ICD-10-CM

## 2021-03-24 DIAGNOSIS — M48.061 NEURAL FORAMINAL STENOSIS OF LUMBAR SPINE: ICD-10-CM

## 2021-03-24 PROCEDURE — 99214 PR OFFICE/OUTPT VISIT, EST, LEVL IV, 30-39 MIN: ICD-10-PCS | Mod: 95,,, | Performed by: FAMILY MEDICINE

## 2021-03-24 PROCEDURE — 99214 OFFICE O/P EST MOD 30 MIN: CPT | Mod: 95,,, | Performed by: FAMILY MEDICINE

## 2021-03-24 PROCEDURE — 1125F AMNT PAIN NOTED PAIN PRSNT: CPT | Mod: ,,, | Performed by: FAMILY MEDICINE

## 2021-03-24 PROCEDURE — 1125F PR PAIN SEVERITY QUANTIFIED, PAIN PRESENT: ICD-10-PCS | Mod: ,,, | Performed by: FAMILY MEDICINE

## 2021-03-24 RX ORDER — PANTOPRAZOLE SODIUM 40 MG/1
40 TABLET, DELAYED RELEASE ORAL DAILY
Qty: 90 TABLET | Refills: 0 | Status: SHIPPED | OUTPATIENT
Start: 2021-03-24 | End: 2021-05-17

## 2021-03-24 RX ORDER — DULOXETIN HYDROCHLORIDE 30 MG/1
30 CAPSULE, DELAYED RELEASE ORAL DAILY
Qty: 90 CAPSULE | Refills: 1 | Status: SHIPPED | OUTPATIENT
Start: 2021-03-24 | End: 2022-01-24 | Stop reason: SDUPTHER

## 2021-04-06 ENCOUNTER — PATIENT MESSAGE (OUTPATIENT)
Dept: SURGERY | Facility: CLINIC | Age: 46
End: 2021-04-06

## 2021-04-08 ENCOUNTER — PATIENT MESSAGE (OUTPATIENT)
Dept: INTERNAL MEDICINE | Facility: CLINIC | Age: 46
End: 2021-04-08

## 2021-04-14 ENCOUNTER — PATIENT MESSAGE (OUTPATIENT)
Dept: INTERNAL MEDICINE | Facility: CLINIC | Age: 46
End: 2021-04-14

## 2021-04-26 ENCOUNTER — PATIENT MESSAGE (OUTPATIENT)
Dept: SURGERY | Facility: CLINIC | Age: 46
End: 2021-04-26

## 2021-04-26 ENCOUNTER — OFFICE VISIT (OUTPATIENT)
Dept: SURGERY | Facility: CLINIC | Age: 46
End: 2021-04-26
Payer: COMMERCIAL

## 2021-04-26 VITALS
SYSTOLIC BLOOD PRESSURE: 194 MMHG | HEART RATE: 81 BPM | HEIGHT: 69 IN | DIASTOLIC BLOOD PRESSURE: 82 MMHG | WEIGHT: 293 LBS | BODY MASS INDEX: 43.4 KG/M2

## 2021-04-26 DIAGNOSIS — Z85.3 PERSONAL HISTORY OF BREAST CANCER: Primary | ICD-10-CM

## 2021-04-26 DIAGNOSIS — Z12.31 SCREENING MAMMOGRAM, ENCOUNTER FOR: ICD-10-CM

## 2021-04-26 PROCEDURE — 1125F PR PAIN SEVERITY QUANTIFIED, PAIN PRESENT: ICD-10-PCS | Mod: S$GLB,,, | Performed by: SURGERY

## 2021-04-26 PROCEDURE — 99213 OFFICE O/P EST LOW 20 MIN: CPT | Mod: S$GLB,,, | Performed by: SURGERY

## 2021-04-26 PROCEDURE — 1125F AMNT PAIN NOTED PAIN PRSNT: CPT | Mod: S$GLB,,, | Performed by: SURGERY

## 2021-04-26 PROCEDURE — 3008F PR BODY MASS INDEX (BMI) DOCUMENTED: ICD-10-PCS | Mod: CPTII,S$GLB,, | Performed by: SURGERY

## 2021-04-26 PROCEDURE — 99999 PR PBB SHADOW E&M-EST. PATIENT-LVL III: CPT | Mod: PBBFAC,,, | Performed by: SURGERY

## 2021-04-26 PROCEDURE — 3008F BODY MASS INDEX DOCD: CPT | Mod: CPTII,S$GLB,, | Performed by: SURGERY

## 2021-04-26 PROCEDURE — 99999 PR PBB SHADOW E&M-EST. PATIENT-LVL III: ICD-10-PCS | Mod: PBBFAC,,, | Performed by: SURGERY

## 2021-04-26 PROCEDURE — 99213 PR OFFICE/OUTPT VISIT, EST, LEVL III, 20-29 MIN: ICD-10-PCS | Mod: S$GLB,,, | Performed by: SURGERY

## 2021-04-30 ENCOUNTER — PATIENT OUTREACH (OUTPATIENT)
Dept: ADMINISTRATIVE | Facility: OTHER | Age: 46
End: 2021-04-30

## 2021-05-06 ENCOUNTER — PATIENT MESSAGE (OUTPATIENT)
Dept: INTERNAL MEDICINE | Facility: CLINIC | Age: 46
End: 2021-05-06

## 2021-05-06 ENCOUNTER — OFFICE VISIT (OUTPATIENT)
Dept: FAMILY MEDICINE | Facility: CLINIC | Age: 46
End: 2021-05-06
Payer: COMMERCIAL

## 2021-05-06 DIAGNOSIS — N30.00 ACUTE CYSTITIS WITHOUT HEMATURIA: ICD-10-CM

## 2021-05-06 PROBLEM — N39.0 URINARY TRACT INFECTION: Status: ACTIVE | Noted: 2021-05-06

## 2021-05-06 PROCEDURE — 99214 PR OFFICE/OUTPT VISIT, EST, LEVL IV, 30-39 MIN: ICD-10-PCS | Mod: 95,,, | Performed by: INTERNAL MEDICINE

## 2021-05-06 PROCEDURE — 99214 OFFICE O/P EST MOD 30 MIN: CPT | Mod: 95,,, | Performed by: INTERNAL MEDICINE

## 2021-05-06 RX ORDER — NITROFURANTOIN 25; 75 MG/1; MG/1
100 CAPSULE ORAL 2 TIMES DAILY
Qty: 10 CAPSULE | Refills: 0 | Status: SHIPPED | OUTPATIENT
Start: 2021-05-06 | End: 2021-05-11

## 2021-05-11 ENCOUNTER — OFFICE VISIT (OUTPATIENT)
Dept: OBSTETRICS AND GYNECOLOGY | Facility: CLINIC | Age: 46
End: 2021-05-11
Payer: COMMERCIAL

## 2021-05-11 VITALS
BODY MASS INDEX: 43.4 KG/M2 | HEIGHT: 69 IN | DIASTOLIC BLOOD PRESSURE: 92 MMHG | WEIGHT: 293 LBS | SYSTOLIC BLOOD PRESSURE: 124 MMHG

## 2021-05-11 DIAGNOSIS — N95.2 VAGINAL ATROPHY: Primary | ICD-10-CM

## 2021-05-11 DIAGNOSIS — N76.0 VAGINOSIS: ICD-10-CM

## 2021-05-11 PROCEDURE — 3008F PR BODY MASS INDEX (BMI) DOCUMENTED: ICD-10-PCS | Mod: CPTII,S$GLB,, | Performed by: PHYSICIAN ASSISTANT

## 2021-05-11 PROCEDURE — 1126F PR PAIN SEVERITY QUANTIFIED, NO PAIN PRESENT: ICD-10-PCS | Mod: S$GLB,,, | Performed by: PHYSICIAN ASSISTANT

## 2021-05-11 PROCEDURE — 87661 TRICHOMONAS VAGINALIS AMPLIF: CPT | Mod: 59 | Performed by: PHYSICIAN ASSISTANT

## 2021-05-11 PROCEDURE — 99213 OFFICE O/P EST LOW 20 MIN: CPT | Mod: S$GLB,,, | Performed by: PHYSICIAN ASSISTANT

## 2021-05-11 PROCEDURE — 87481 CANDIDA DNA AMP PROBE: CPT | Mod: 59 | Performed by: PHYSICIAN ASSISTANT

## 2021-05-11 PROCEDURE — 1126F AMNT PAIN NOTED NONE PRSNT: CPT | Mod: S$GLB,,, | Performed by: PHYSICIAN ASSISTANT

## 2021-05-11 PROCEDURE — 99213 PR OFFICE/OUTPT VISIT, EST, LEVL III, 20-29 MIN: ICD-10-PCS | Mod: S$GLB,,, | Performed by: PHYSICIAN ASSISTANT

## 2021-05-11 PROCEDURE — 3008F BODY MASS INDEX DOCD: CPT | Mod: CPTII,S$GLB,, | Performed by: PHYSICIAN ASSISTANT

## 2021-05-11 RX ORDER — ESTRADIOL 0.1 MG/G
CREAM VAGINAL
Qty: 42.5 G | Refills: 1 | Status: SHIPPED | OUTPATIENT
Start: 2021-05-11

## 2021-05-12 ENCOUNTER — PATIENT MESSAGE (OUTPATIENT)
Dept: FAMILY MEDICINE | Facility: CLINIC | Age: 46
End: 2021-05-12

## 2021-05-12 ENCOUNTER — PATIENT MESSAGE (OUTPATIENT)
Dept: OBSTETRICS AND GYNECOLOGY | Facility: CLINIC | Age: 46
End: 2021-05-12

## 2021-05-12 LAB
BACTERIAL VAGINOSIS DNA: NEGATIVE
CANDIDA GLABRATA DNA: NEGATIVE
CANDIDA KRUSEI DNA: NEGATIVE
CANDIDA RRNA VAG QL PROBE: POSITIVE
T VAGINALIS RRNA GENITAL QL PROBE: NEGATIVE

## 2021-05-12 RX ORDER — FLUCONAZOLE 150 MG/1
150 TABLET ORAL DAILY
Qty: 1 TABLET | Refills: 0 | Status: SHIPPED | OUTPATIENT
Start: 2021-05-12 | End: 2021-05-13

## 2021-05-13 ENCOUNTER — PATIENT MESSAGE (OUTPATIENT)
Dept: INTERNAL MEDICINE | Facility: CLINIC | Age: 46
End: 2021-05-13

## 2021-05-19 ENCOUNTER — PATIENT MESSAGE (OUTPATIENT)
Dept: INTERNAL MEDICINE | Facility: CLINIC | Age: 46
End: 2021-05-19

## 2021-05-19 ENCOUNTER — PATIENT MESSAGE (OUTPATIENT)
Dept: PAIN MEDICINE | Facility: CLINIC | Age: 46
End: 2021-05-19

## 2021-05-21 ENCOUNTER — TELEPHONE (OUTPATIENT)
Dept: INFUSION THERAPY | Facility: HOSPITAL | Age: 46
End: 2021-05-21

## 2021-05-21 ENCOUNTER — OFFICE VISIT (OUTPATIENT)
Dept: OBSTETRICS AND GYNECOLOGY | Facility: CLINIC | Age: 46
End: 2021-05-21
Payer: COMMERCIAL

## 2021-05-21 VITALS
BODY MASS INDEX: 43.4 KG/M2 | DIASTOLIC BLOOD PRESSURE: 96 MMHG | SYSTOLIC BLOOD PRESSURE: 148 MMHG | HEIGHT: 69 IN | WEIGHT: 293 LBS

## 2021-05-21 DIAGNOSIS — E66.01 CLASS 3 SEVERE OBESITY WITH BODY MASS INDEX (BMI) OF 45.0 TO 49.9 IN ADULT, UNSPECIFIED OBESITY TYPE, UNSPECIFIED WHETHER SERIOUS COMORBIDITY PRESENT: ICD-10-CM

## 2021-05-21 DIAGNOSIS — F32.A DEPRESSION, UNSPECIFIED DEPRESSION TYPE: ICD-10-CM

## 2021-05-21 DIAGNOSIS — D05.12 BREAST NEOPLASM, TIS (DCIS), LEFT: Primary | ICD-10-CM

## 2021-05-21 DIAGNOSIS — R39.9 UTI SYMPTOMS: ICD-10-CM

## 2021-05-21 LAB
BACTERIA #/AREA URNS AUTO: ABNORMAL /HPF
BILIRUB UR QL STRIP: NEGATIVE
CLARITY UR REFRACT.AUTO: ABNORMAL
COLOR UR AUTO: YELLOW
GLUCOSE UR QL STRIP: NEGATIVE
HGB UR QL STRIP: ABNORMAL
KETONES UR QL STRIP: NEGATIVE
LEUKOCYTE ESTERASE UR QL STRIP: ABNORMAL
MICROSCOPIC COMMENT: ABNORMAL
NITRITE UR QL STRIP: POSITIVE
PH UR STRIP: 5 [PH] (ref 5–8)
PROT UR QL STRIP: NEGATIVE
RBC #/AREA URNS AUTO: 3 /HPF (ref 0–4)
SP GR UR STRIP: 1.01 (ref 1–1.03)
URN SPEC COLLECT METH UR: ABNORMAL
WBC #/AREA URNS AUTO: 20 /HPF (ref 0–5)
WBC CLUMPS UR QL AUTO: ABNORMAL

## 2021-05-21 PROCEDURE — 87077 CULTURE AEROBIC IDENTIFY: CPT | Performed by: PHYSICIAN ASSISTANT

## 2021-05-21 PROCEDURE — 3008F PR BODY MASS INDEX (BMI) DOCUMENTED: ICD-10-PCS | Mod: CPTII,S$GLB,, | Performed by: PHYSICIAN ASSISTANT

## 2021-05-21 PROCEDURE — 81001 URINALYSIS AUTO W/SCOPE: CPT | Performed by: PHYSICIAN ASSISTANT

## 2021-05-21 PROCEDURE — 87086 URINE CULTURE/COLONY COUNT: CPT | Performed by: PHYSICIAN ASSISTANT

## 2021-05-21 PROCEDURE — 99215 PR OFFICE/OUTPT VISIT, EST, LEVL V, 40-54 MIN: ICD-10-PCS | Mod: S$GLB,,, | Performed by: PHYSICIAN ASSISTANT

## 2021-05-21 PROCEDURE — 1125F PR PAIN SEVERITY QUANTIFIED, PAIN PRESENT: ICD-10-PCS | Mod: S$GLB,,, | Performed by: PHYSICIAN ASSISTANT

## 2021-05-21 PROCEDURE — 99215 OFFICE O/P EST HI 40 MIN: CPT | Mod: S$GLB,,, | Performed by: PHYSICIAN ASSISTANT

## 2021-05-21 PROCEDURE — 1125F AMNT PAIN NOTED PAIN PRSNT: CPT | Mod: S$GLB,,, | Performed by: PHYSICIAN ASSISTANT

## 2021-05-21 PROCEDURE — 3008F BODY MASS INDEX DOCD: CPT | Mod: CPTII,S$GLB,, | Performed by: PHYSICIAN ASSISTANT

## 2021-05-21 PROCEDURE — 87088 URINE BACTERIA CULTURE: CPT | Performed by: PHYSICIAN ASSISTANT

## 2021-05-21 PROCEDURE — 87186 SC STD MICRODIL/AGAR DIL: CPT | Performed by: PHYSICIAN ASSISTANT

## 2021-05-21 RX ORDER — METFORMIN HYDROCHLORIDE 500 MG/1
500 TABLET ORAL DAILY
Qty: 90 TABLET | Refills: 3 | Status: SHIPPED | OUTPATIENT
Start: 2021-05-21 | End: 2021-05-21

## 2021-05-21 RX ORDER — METFORMIN HYDROCHLORIDE 500 MG/1
500 TABLET ORAL DAILY
Qty: 90 TABLET | Refills: 3 | Status: SHIPPED | OUTPATIENT
Start: 2021-05-21 | End: 2022-01-24

## 2021-05-21 RX ORDER — SULFAMETHOXAZOLE AND TRIMETHOPRIM 800; 160 MG/1; MG/1
1 TABLET ORAL 2 TIMES DAILY
Qty: 20 TABLET | Refills: 0 | Status: SHIPPED | OUTPATIENT
Start: 2021-05-21 | End: 2021-05-31

## 2021-05-24 LAB — BACTERIA UR CULT: ABNORMAL

## 2021-05-28 ENCOUNTER — OFFICE VISIT (OUTPATIENT)
Dept: PAIN MEDICINE | Facility: CLINIC | Age: 46
End: 2021-05-28
Payer: COMMERCIAL

## 2021-05-28 VITALS
BODY MASS INDEX: 48.67 KG/M2 | DIASTOLIC BLOOD PRESSURE: 85 MMHG | WEIGHT: 293 LBS | SYSTOLIC BLOOD PRESSURE: 139 MMHG | HEART RATE: 97 BPM

## 2021-05-28 DIAGNOSIS — M43.10 ANTEROLISTHESIS: ICD-10-CM

## 2021-05-28 DIAGNOSIS — Z98.890 S/P SPINAL SURGERY: ICD-10-CM

## 2021-05-28 DIAGNOSIS — M47.816 LUMBAR SPONDYLOSIS: ICD-10-CM

## 2021-05-28 DIAGNOSIS — F43.21 SITUATIONAL DEPRESSION: ICD-10-CM

## 2021-05-28 DIAGNOSIS — M48.061 NEURAL FORAMINAL STENOSIS OF LUMBAR SPINE: ICD-10-CM

## 2021-05-28 DIAGNOSIS — M54.10 RADICULAR PAIN OF LEFT LOWER EXTREMITY: ICD-10-CM

## 2021-05-28 DIAGNOSIS — G89.4 CHRONIC PAIN SYNDROME: ICD-10-CM

## 2021-05-28 DIAGNOSIS — G90.522 COMPLEX REGIONAL PAIN SYNDROME TYPE 1 OF LEFT LOWER EXTREMITY: ICD-10-CM

## 2021-05-28 DIAGNOSIS — M43.27 FUSION OF LUMBOSACRAL SPINE: Primary | ICD-10-CM

## 2021-05-28 DIAGNOSIS — F43.23 SITUATIONAL MIXED ANXIETY AND DEPRESSIVE DISORDER: ICD-10-CM

## 2021-05-28 DIAGNOSIS — Z72.820 POOR SLEEP: ICD-10-CM

## 2021-05-28 PROCEDURE — 99999 PR PBB SHADOW E&M-EST. PATIENT-LVL IV: CPT | Mod: PBBFAC,,, | Performed by: NURSE PRACTITIONER

## 2021-05-28 PROCEDURE — 3008F PR BODY MASS INDEX (BMI) DOCUMENTED: ICD-10-PCS | Mod: CPTII,S$GLB,, | Performed by: NURSE PRACTITIONER

## 2021-05-28 PROCEDURE — 99999 PR PBB SHADOW E&M-EST. PATIENT-LVL IV: ICD-10-PCS | Mod: PBBFAC,,, | Performed by: NURSE PRACTITIONER

## 2021-05-28 PROCEDURE — 1125F AMNT PAIN NOTED PAIN PRSNT: CPT | Mod: S$GLB,,, | Performed by: NURSE PRACTITIONER

## 2021-05-28 PROCEDURE — 1125F PR PAIN SEVERITY QUANTIFIED, PAIN PRESENT: ICD-10-PCS | Mod: S$GLB,,, | Performed by: NURSE PRACTITIONER

## 2021-05-28 PROCEDURE — 99214 OFFICE O/P EST MOD 30 MIN: CPT | Mod: S$GLB,,, | Performed by: NURSE PRACTITIONER

## 2021-05-28 PROCEDURE — 3008F BODY MASS INDEX DOCD: CPT | Mod: CPTII,S$GLB,, | Performed by: NURSE PRACTITIONER

## 2021-05-28 PROCEDURE — 99214 PR OFFICE/OUTPT VISIT, EST, LEVL IV, 30-39 MIN: ICD-10-PCS | Mod: S$GLB,,, | Performed by: NURSE PRACTITIONER

## 2021-05-31 ENCOUNTER — TELEPHONE (OUTPATIENT)
Dept: INFUSION THERAPY | Facility: HOSPITAL | Age: 46
End: 2021-05-31

## 2021-06-17 ENCOUNTER — DOCUMENTATION ONLY (OUTPATIENT)
Dept: ONCOLOGY | Facility: HOSPITAL | Age: 46
End: 2021-06-17

## 2021-06-17 ENCOUNTER — OFFICE VISIT (OUTPATIENT)
Dept: PSYCHIATRY | Facility: CLINIC | Age: 46
End: 2021-06-17
Payer: COMMERCIAL

## 2021-06-17 DIAGNOSIS — Z72.820 POOR SLEEP: ICD-10-CM

## 2021-06-17 DIAGNOSIS — F41.9 ANXIETY: Primary | ICD-10-CM

## 2021-06-17 DIAGNOSIS — D05.12 BREAST NEOPLASM, TIS (DCIS), LEFT: ICD-10-CM

## 2021-06-17 DIAGNOSIS — G89.4 CHRONIC PAIN SYNDROME: ICD-10-CM

## 2021-06-17 DIAGNOSIS — E66.01 CLASS 3 SEVERE OBESITY WITH BODY MASS INDEX (BMI) OF 45.0 TO 49.9 IN ADULT, UNSPECIFIED OBESITY TYPE, UNSPECIFIED WHETHER SERIOUS COMORBIDITY PRESENT: ICD-10-CM

## 2021-06-17 PROCEDURE — 90791 PR PSYCHIATRIC DIAGNOSTIC EVALUATION: ICD-10-PCS | Mod: 95,,, | Performed by: PSYCHOLOGIST

## 2021-06-17 PROCEDURE — 90791 PSYCH DIAGNOSTIC EVALUATION: CPT | Mod: 95,,, | Performed by: PSYCHOLOGIST

## 2021-06-28 ENCOUNTER — PATIENT MESSAGE (OUTPATIENT)
Dept: PAIN MEDICINE | Facility: CLINIC | Age: 46
End: 2021-06-28

## 2021-07-01 ENCOUNTER — PATIENT OUTREACH (OUTPATIENT)
Dept: ADMINISTRATIVE | Facility: OTHER | Age: 46
End: 2021-07-01

## 2021-07-12 ENCOUNTER — PATIENT MESSAGE (OUTPATIENT)
Dept: INTERNAL MEDICINE | Facility: CLINIC | Age: 46
End: 2021-07-12

## 2021-07-12 DIAGNOSIS — Z98.890 S/P SPINAL SURGERY: ICD-10-CM

## 2021-07-12 DIAGNOSIS — M54.10 RADICULAR PAIN OF LEFT LOWER EXTREMITY: ICD-10-CM

## 2021-07-12 DIAGNOSIS — R13.10 DYSPHAGIA, UNSPECIFIED TYPE: ICD-10-CM

## 2021-07-12 DIAGNOSIS — Z72.820 POOR SLEEP: ICD-10-CM

## 2021-07-12 DIAGNOSIS — K21.9 GASTROESOPHAGEAL REFLUX DISEASE WITHOUT ESOPHAGITIS: ICD-10-CM

## 2021-07-12 DIAGNOSIS — M48.061 NEURAL FORAMINAL STENOSIS OF LUMBAR SPINE: ICD-10-CM

## 2021-07-13 RX ORDER — PANTOPRAZOLE SODIUM 40 MG/1
40 TABLET, DELAYED RELEASE ORAL DAILY
Qty: 90 TABLET | Refills: 3 | Status: SHIPPED | OUTPATIENT
Start: 2021-07-13 | End: 2021-07-14 | Stop reason: SDUPTHER

## 2021-07-13 RX ORDER — AMITRIPTYLINE HYDROCHLORIDE 75 MG/1
75 TABLET ORAL NIGHTLY
Qty: 90 TABLET | Refills: 1 | Status: SHIPPED | OUTPATIENT
Start: 2021-07-13 | End: 2021-07-14 | Stop reason: SDUPTHER

## 2021-07-14 RX ORDER — AMITRIPTYLINE HYDROCHLORIDE 75 MG/1
75 TABLET ORAL NIGHTLY
Qty: 90 TABLET | Refills: 1 | Status: SHIPPED | OUTPATIENT
Start: 2021-07-14 | End: 2021-10-01 | Stop reason: SDUPTHER

## 2021-07-14 RX ORDER — PANTOPRAZOLE SODIUM 40 MG/1
40 TABLET, DELAYED RELEASE ORAL DAILY
Qty: 90 TABLET | Refills: 3 | Status: SHIPPED | OUTPATIENT
Start: 2021-07-14 | End: 2021-10-01 | Stop reason: SDUPTHER

## 2021-10-01 DIAGNOSIS — K21.9 GASTROESOPHAGEAL REFLUX DISEASE WITHOUT ESOPHAGITIS: ICD-10-CM

## 2021-10-01 DIAGNOSIS — M54.10 RADICULAR PAIN OF LEFT LOWER EXTREMITY: ICD-10-CM

## 2021-10-01 DIAGNOSIS — M48.061 NEURAL FORAMINAL STENOSIS OF LUMBAR SPINE: ICD-10-CM

## 2021-10-01 DIAGNOSIS — R13.10 DYSPHAGIA, UNSPECIFIED TYPE: ICD-10-CM

## 2021-10-01 DIAGNOSIS — Z98.890 S/P SPINAL SURGERY: ICD-10-CM

## 2021-10-01 DIAGNOSIS — Z72.820 POOR SLEEP: ICD-10-CM

## 2021-10-01 DIAGNOSIS — M43.27 FUSION OF LUMBOSACRAL SPINE: ICD-10-CM

## 2021-10-01 RX ORDER — AMITRIPTYLINE HYDROCHLORIDE 50 MG/1
50 TABLET, FILM COATED ORAL NIGHTLY
Qty: 90 TABLET | Refills: 0 | Status: SHIPPED | OUTPATIENT
Start: 2021-10-01 | End: 2022-01-14 | Stop reason: SDUPTHER

## 2021-10-01 RX ORDER — PANTOPRAZOLE SODIUM 40 MG/1
40 TABLET, DELAYED RELEASE ORAL DAILY
Qty: 90 TABLET | Refills: 0 | Status: SHIPPED | OUTPATIENT
Start: 2021-10-01 | End: 2022-01-24 | Stop reason: SDUPTHER

## 2021-10-01 RX ORDER — BACLOFEN 10 MG/1
10 TABLET ORAL 3 TIMES DAILY
Qty: 270 TABLET | Refills: 0 | Status: SHIPPED | OUTPATIENT
Start: 2021-10-01 | End: 2022-01-24 | Stop reason: SDUPTHER

## 2021-10-18 ENCOUNTER — PATIENT MESSAGE (OUTPATIENT)
Dept: INTERNAL MEDICINE | Facility: CLINIC | Age: 46
End: 2021-10-18
Payer: COMMERCIAL

## 2021-10-18 DIAGNOSIS — L23.7 POISON IVY: Primary | ICD-10-CM

## 2021-10-20 ENCOUNTER — PATIENT MESSAGE (OUTPATIENT)
Dept: PAIN MEDICINE | Facility: CLINIC | Age: 46
End: 2021-10-20
Payer: COMMERCIAL

## 2021-10-22 ENCOUNTER — TELEPHONE (OUTPATIENT)
Dept: PAIN MEDICINE | Facility: CLINIC | Age: 46
End: 2021-10-22

## 2021-12-07 ENCOUNTER — PATIENT MESSAGE (OUTPATIENT)
Dept: OBSTETRICS AND GYNECOLOGY | Facility: CLINIC | Age: 46
End: 2021-12-07
Payer: COMMERCIAL

## 2021-12-10 ENCOUNTER — OFFICE VISIT (OUTPATIENT)
Dept: SURGERY | Facility: CLINIC | Age: 46
End: 2021-12-10
Payer: MEDICAID

## 2021-12-10 ENCOUNTER — HOSPITAL ENCOUNTER (OUTPATIENT)
Dept: RADIOLOGY | Facility: HOSPITAL | Age: 46
Discharge: HOME | End: 2021-12-10
Attending: SURGERY
Payer: MEDICAID

## 2021-12-10 VITALS
WEIGHT: 225 LBS | SYSTOLIC BLOOD PRESSURE: 146 MMHG | BODY MASS INDEX: 34.1 KG/M2 | HEIGHT: 68 IN | DIASTOLIC BLOOD PRESSURE: 99 MMHG | HEART RATE: 90 BPM

## 2021-12-10 VITALS — WEIGHT: 225 LBS | BODY MASS INDEX: 34.1 KG/M2 | HEIGHT: 68 IN

## 2021-12-10 DIAGNOSIS — F43.23 SITUATIONAL MIXED ANXIETY AND DEPRESSIVE DISORDER: ICD-10-CM

## 2021-12-10 DIAGNOSIS — Z12.31 SCREENING MAMMOGRAM, ENCOUNTER FOR: ICD-10-CM

## 2021-12-10 DIAGNOSIS — R11.0 NAUSEA: ICD-10-CM

## 2021-12-10 DIAGNOSIS — Z85.3 PERSONAL HISTORY OF BREAST CANCER: Primary | ICD-10-CM

## 2021-12-10 PROCEDURE — 99999 PR PBB SHADOW E&M-EST. PATIENT-LVL IV: ICD-10-PCS | Mod: PBBFAC,,, | Performed by: PHYSICIAN ASSISTANT

## 2021-12-10 PROCEDURE — 77067 SCR MAMMO BI INCL CAD: CPT | Mod: TC

## 2021-12-10 PROCEDURE — 77067 SCR MAMMO BI INCL CAD: CPT | Mod: 26,,, | Performed by: RADIOLOGY

## 2021-12-10 PROCEDURE — 99213 OFFICE O/P EST LOW 20 MIN: CPT | Mod: S$PBB,,, | Performed by: PHYSICIAN ASSISTANT

## 2021-12-10 PROCEDURE — 99213 PR OFFICE/OUTPT VISIT, EST, LEVL III, 20-29 MIN: ICD-10-PCS | Mod: S$PBB,,, | Performed by: PHYSICIAN ASSISTANT

## 2021-12-10 PROCEDURE — 77063 BREAST TOMOSYNTHESIS BI: CPT | Mod: 26,,, | Performed by: RADIOLOGY

## 2021-12-10 PROCEDURE — 77063 MAMMO DIGITAL SCREENING BILAT WITH TOMO: ICD-10-PCS | Mod: 26,,, | Performed by: RADIOLOGY

## 2021-12-10 PROCEDURE — 99999 PR PBB SHADOW E&M-EST. PATIENT-LVL IV: CPT | Mod: PBBFAC,,, | Performed by: PHYSICIAN ASSISTANT

## 2021-12-10 PROCEDURE — 77067 MAMMO DIGITAL SCREENING BILAT WITH TOMO: ICD-10-PCS | Mod: 26,,, | Performed by: RADIOLOGY

## 2021-12-10 PROCEDURE — 99214 OFFICE O/P EST MOD 30 MIN: CPT | Mod: PBBFAC | Performed by: PHYSICIAN ASSISTANT

## 2021-12-10 RX ORDER — SCOLOPAMINE TRANSDERMAL SYSTEM 1 MG/1
1 PATCH, EXTENDED RELEASE TRANSDERMAL
Qty: 10 PATCH | Refills: 3 | Status: SHIPPED | OUTPATIENT
Start: 2021-12-10 | End: 2022-02-21 | Stop reason: SDUPTHER

## 2022-01-10 ENCOUNTER — PATIENT MESSAGE (OUTPATIENT)
Dept: PRIMARY CARE CLINIC | Facility: CLINIC | Age: 47
End: 2022-01-10
Payer: MEDICAID

## 2022-01-14 ENCOUNTER — PATIENT MESSAGE (OUTPATIENT)
Dept: PRIMARY CARE CLINIC | Facility: CLINIC | Age: 47
End: 2022-01-14
Payer: MEDICAID

## 2022-01-14 DIAGNOSIS — Z98.890 S/P SPINAL SURGERY: ICD-10-CM

## 2022-01-14 DIAGNOSIS — M54.10 RADICULAR PAIN OF LEFT LOWER EXTREMITY: ICD-10-CM

## 2022-01-14 DIAGNOSIS — Z72.820 POOR SLEEP: ICD-10-CM

## 2022-01-14 DIAGNOSIS — M48.061 NEURAL FORAMINAL STENOSIS OF LUMBAR SPINE: ICD-10-CM

## 2022-01-14 RX ORDER — AMITRIPTYLINE HYDROCHLORIDE 50 MG/1
50 TABLET, FILM COATED ORAL NIGHTLY
Qty: 90 TABLET | Refills: 0 | Status: SHIPPED | OUTPATIENT
Start: 2022-01-14 | End: 2022-01-24 | Stop reason: SDUPTHER

## 2022-01-24 ENCOUNTER — OFFICE VISIT (OUTPATIENT)
Dept: PRIMARY CARE CLINIC | Facility: CLINIC | Age: 47
End: 2022-01-24
Payer: MEDICAID

## 2022-01-24 ENCOUNTER — TELEPHONE (OUTPATIENT)
Dept: PRIMARY CARE CLINIC | Facility: CLINIC | Age: 47
End: 2022-01-24

## 2022-01-24 ENCOUNTER — PATIENT MESSAGE (OUTPATIENT)
Dept: PRIMARY CARE CLINIC | Facility: CLINIC | Age: 47
End: 2022-01-24

## 2022-01-24 VITALS — WEIGHT: 293 LBS | BODY MASS INDEX: 43.4 KG/M2 | HEIGHT: 69 IN

## 2022-01-24 DIAGNOSIS — K21.9 GASTROESOPHAGEAL REFLUX DISEASE WITHOUT ESOPHAGITIS: ICD-10-CM

## 2022-01-24 DIAGNOSIS — M54.10 RADICULAR PAIN OF LEFT LOWER EXTREMITY: ICD-10-CM

## 2022-01-24 DIAGNOSIS — M48.061 NEURAL FORAMINAL STENOSIS OF LUMBAR SPINE: Primary | ICD-10-CM

## 2022-01-24 DIAGNOSIS — Z12.11 SCREEN FOR COLON CANCER: ICD-10-CM

## 2022-01-24 DIAGNOSIS — K76.0 FATTY LIVER: ICD-10-CM

## 2022-01-24 DIAGNOSIS — M47.816 LUMBAR SPONDYLOSIS: ICD-10-CM

## 2022-01-24 DIAGNOSIS — Z13.220 ENCOUNTER FOR LIPID SCREENING FOR CARDIOVASCULAR DISEASE: ICD-10-CM

## 2022-01-24 DIAGNOSIS — Z00.00 ANNUAL PHYSICAL EXAM: ICD-10-CM

## 2022-01-24 DIAGNOSIS — R13.10 DYSPHAGIA, UNSPECIFIED TYPE: ICD-10-CM

## 2022-01-24 DIAGNOSIS — Z79.899 ENCOUNTER FOR MONITORING LONG-TERM PROTON PUMP INHIBITOR THERAPY: ICD-10-CM

## 2022-01-24 DIAGNOSIS — Z13.6 ENCOUNTER FOR LIPID SCREENING FOR CARDIOVASCULAR DISEASE: ICD-10-CM

## 2022-01-24 DIAGNOSIS — Z51.81 ENCOUNTER FOR MONITORING LONG-TERM PROTON PUMP INHIBITOR THERAPY: ICD-10-CM

## 2022-01-24 DIAGNOSIS — Z72.820 POOR SLEEP: ICD-10-CM

## 2022-01-24 DIAGNOSIS — F43.23 SITUATIONAL MIXED ANXIETY AND DEPRESSIVE DISORDER: ICD-10-CM

## 2022-01-24 DIAGNOSIS — M43.27 FUSION OF LUMBOSACRAL SPINE: ICD-10-CM

## 2022-01-24 DIAGNOSIS — Z98.890 S/P SPINAL SURGERY: ICD-10-CM

## 2022-01-24 DIAGNOSIS — M43.10 ANTEROLISTHESIS: ICD-10-CM

## 2022-01-24 PROCEDURE — 3008F BODY MASS INDEX DOCD: CPT | Mod: CPTII,95,, | Performed by: FAMILY MEDICINE

## 2022-01-24 PROCEDURE — 1159F MED LIST DOCD IN RCRD: CPT | Mod: CPTII,95,, | Performed by: FAMILY MEDICINE

## 2022-01-24 PROCEDURE — 1160F RVW MEDS BY RX/DR IN RCRD: CPT | Mod: CPTII,95,, | Performed by: FAMILY MEDICINE

## 2022-01-24 PROCEDURE — 1160F PR REVIEW ALL MEDS BY PRESCRIBER/CLIN PHARMACIST DOCUMENTED: ICD-10-PCS | Mod: CPTII,95,, | Performed by: FAMILY MEDICINE

## 2022-01-24 PROCEDURE — 99214 OFFICE O/P EST MOD 30 MIN: CPT | Mod: 95,,, | Performed by: FAMILY MEDICINE

## 2022-01-24 PROCEDURE — 1159F PR MEDICATION LIST DOCUMENTED IN MEDICAL RECORD: ICD-10-PCS | Mod: CPTII,95,, | Performed by: FAMILY MEDICINE

## 2022-01-24 PROCEDURE — 3008F PR BODY MASS INDEX (BMI) DOCUMENTED: ICD-10-PCS | Mod: CPTII,95,, | Performed by: FAMILY MEDICINE

## 2022-01-24 PROCEDURE — 99214 PR OFFICE/OUTPT VISIT, EST, LEVL IV, 30-39 MIN: ICD-10-PCS | Mod: 95,,, | Performed by: FAMILY MEDICINE

## 2022-01-24 RX ORDER — BACLOFEN 10 MG/1
10 TABLET ORAL 3 TIMES DAILY
Qty: 270 TABLET | Refills: 1 | Status: SHIPPED | OUTPATIENT
Start: 2022-01-24 | End: 2022-08-01 | Stop reason: SDUPTHER

## 2022-01-24 RX ORDER — DULOXETIN HYDROCHLORIDE 30 MG/1
30 CAPSULE, DELAYED RELEASE ORAL DAILY
Qty: 90 CAPSULE | Refills: 1 | Status: SHIPPED | OUTPATIENT
Start: 2022-01-24 | End: 2022-03-09

## 2022-01-24 RX ORDER — AMITRIPTYLINE HYDROCHLORIDE 50 MG/1
50 TABLET, FILM COATED ORAL NIGHTLY
Qty: 90 TABLET | Refills: 1 | Status: SHIPPED | OUTPATIENT
Start: 2022-01-24 | End: 2022-04-12 | Stop reason: SDUPTHER

## 2022-01-24 RX ORDER — PREGABALIN 25 MG/1
25 CAPSULE ORAL 3 TIMES DAILY
Qty: 90 CAPSULE | Refills: 3 | Status: SHIPPED | OUTPATIENT
Start: 2022-01-24 | End: 2022-02-10 | Stop reason: SDUPTHER

## 2022-01-24 RX ORDER — PANTOPRAZOLE SODIUM 40 MG/1
40 TABLET, DELAYED RELEASE ORAL DAILY
Qty: 90 TABLET | Refills: 1 | Status: SHIPPED | OUTPATIENT
Start: 2022-01-24 | End: 2022-03-28 | Stop reason: SDUPTHER
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
## 2022-01-30 ENCOUNTER — PATIENT OUTREACH (OUTPATIENT)
Dept: ADMINISTRATIVE | Facility: OTHER | Age: 47
End: 2022-01-30
Payer: MEDICAID

## 2022-01-31 ENCOUNTER — PATIENT MESSAGE (OUTPATIENT)
Dept: PRIMARY CARE CLINIC | Facility: CLINIC | Age: 47
End: 2022-01-31
Payer: MEDICAID

## 2022-01-31 ENCOUNTER — OFFICE VISIT (OUTPATIENT)
Dept: GASTROENTEROLOGY | Facility: CLINIC | Age: 47
End: 2022-01-31
Payer: MEDICAID

## 2022-01-31 ENCOUNTER — OFFICE VISIT (OUTPATIENT)
Dept: HEMATOLOGY/ONCOLOGY | Facility: CLINIC | Age: 47
End: 2022-01-31
Payer: MEDICAID

## 2022-01-31 VITALS — HEIGHT: 69 IN | BODY MASS INDEX: 43.4 KG/M2 | HEART RATE: 96 BPM | WEIGHT: 293 LBS | OXYGEN SATURATION: 95 %

## 2022-01-31 VITALS
DIASTOLIC BLOOD PRESSURE: 82 MMHG | HEIGHT: 69 IN | BODY MASS INDEX: 43.4 KG/M2 | SYSTOLIC BLOOD PRESSURE: 153 MMHG | WEIGHT: 293 LBS

## 2022-01-31 DIAGNOSIS — E66.9 OBESITY, UNSPECIFIED CLASSIFICATION, UNSPECIFIED OBESITY TYPE, UNSPECIFIED WHETHER SERIOUS COMORBIDITY PRESENT: ICD-10-CM

## 2022-01-31 DIAGNOSIS — R19.8 IRREGULAR BOWEL HABITS: ICD-10-CM

## 2022-01-31 DIAGNOSIS — K21.9 GASTROESOPHAGEAL REFLUX DISEASE WITHOUT ESOPHAGITIS: Primary | ICD-10-CM

## 2022-01-31 DIAGNOSIS — R13.10 DYSPHAGIA, UNSPECIFIED TYPE: ICD-10-CM

## 2022-01-31 DIAGNOSIS — N30.01 ACUTE CYSTITIS WITH HEMATURIA: Primary | ICD-10-CM

## 2022-01-31 DIAGNOSIS — E66.01 CLASS 3 SEVERE OBESITY WITH BODY MASS INDEX (BMI) OF 45.0 TO 49.9 IN ADULT, UNSPECIFIED OBESITY TYPE, UNSPECIFIED WHETHER SERIOUS COMORBIDITY PRESENT: ICD-10-CM

## 2022-01-31 DIAGNOSIS — D05.12 BREAST NEOPLASM, TIS (DCIS), LEFT: Primary | ICD-10-CM

## 2022-01-31 PROCEDURE — 1160F PR REVIEW ALL MEDS BY PRESCRIBER/CLIN PHARMACIST DOCUMENTED: ICD-10-PCS | Mod: CPTII,,, | Performed by: PHYSICIAN ASSISTANT

## 2022-01-31 PROCEDURE — 3079F PR MOST RECENT DIASTOLIC BLOOD PRESSURE 80-89 MM HG: ICD-10-PCS | Mod: CPTII,,, | Performed by: PHYSICIAN ASSISTANT

## 2022-01-31 PROCEDURE — 1160F RVW MEDS BY RX/DR IN RCRD: CPT | Mod: CPTII,,, | Performed by: PHYSICIAN ASSISTANT

## 2022-01-31 PROCEDURE — 99999 PR PBB SHADOW E&M-EST. PATIENT-LVL V: CPT | Mod: PBBFAC,,, | Performed by: PHYSICIAN ASSISTANT

## 2022-01-31 PROCEDURE — 3044F PR MOST RECENT HEMOGLOBIN A1C LEVEL <7.0%: ICD-10-PCS | Mod: CPTII,,, | Performed by: PHYSICIAN ASSISTANT

## 2022-01-31 PROCEDURE — 1159F PR MEDICATION LIST DOCUMENTED IN MEDICAL RECORD: ICD-10-PCS | Mod: CPTII,,, | Performed by: PHYSICIAN ASSISTANT

## 2022-01-31 PROCEDURE — 1159F MED LIST DOCD IN RCRD: CPT | Mod: CPTII,,, | Performed by: PHYSICIAN ASSISTANT

## 2022-01-31 PROCEDURE — 99999 PR PBB SHADOW E&M-EST. PATIENT-LVL V: ICD-10-PCS | Mod: PBBFAC,,, | Performed by: PHYSICIAN ASSISTANT

## 2022-01-31 PROCEDURE — 1160F PR REVIEW ALL MEDS BY PRESCRIBER/CLIN PHARMACIST DOCUMENTED: ICD-10-PCS | Mod: CPTII,,, | Performed by: NURSE PRACTITIONER

## 2022-01-31 PROCEDURE — 3077F PR MOST RECENT SYSTOLIC BLOOD PRESSURE >= 140 MM HG: ICD-10-PCS | Mod: CPTII,,, | Performed by: PHYSICIAN ASSISTANT

## 2022-01-31 PROCEDURE — 99999 PR PBB SHADOW E&M-EST. PATIENT-LVL V: CPT | Mod: PBBFAC,,, | Performed by: NURSE PRACTITIONER

## 2022-01-31 PROCEDURE — 1159F MED LIST DOCD IN RCRD: CPT | Mod: CPTII,,, | Performed by: NURSE PRACTITIONER

## 2022-01-31 PROCEDURE — 99213 PR OFFICE/OUTPT VISIT, EST, LEVL III, 20-29 MIN: ICD-10-PCS | Mod: S$PBB,,, | Performed by: PHYSICIAN ASSISTANT

## 2022-01-31 PROCEDURE — 3008F BODY MASS INDEX DOCD: CPT | Mod: CPTII,,, | Performed by: PHYSICIAN ASSISTANT

## 2022-01-31 PROCEDURE — 1160F RVW MEDS BY RX/DR IN RCRD: CPT | Mod: CPTII,,, | Performed by: NURSE PRACTITIONER

## 2022-01-31 PROCEDURE — 99214 PR OFFICE/OUTPT VISIT, EST, LEVL IV, 30-39 MIN: ICD-10-PCS | Mod: S$PBB,,, | Performed by: NURSE PRACTITIONER

## 2022-01-31 PROCEDURE — 99214 OFFICE O/P EST MOD 30 MIN: CPT | Mod: S$PBB,,, | Performed by: NURSE PRACTITIONER

## 2022-01-31 PROCEDURE — 99999 PR PBB SHADOW E&M-EST. PATIENT-LVL V: ICD-10-PCS | Mod: PBBFAC,,, | Performed by: NURSE PRACTITIONER

## 2022-01-31 PROCEDURE — 3008F BODY MASS INDEX DOCD: CPT | Mod: CPTII,,, | Performed by: NURSE PRACTITIONER

## 2022-01-31 PROCEDURE — 1159F PR MEDICATION LIST DOCUMENTED IN MEDICAL RECORD: ICD-10-PCS | Mod: CPTII,,, | Performed by: NURSE PRACTITIONER

## 2022-01-31 PROCEDURE — 3008F PR BODY MASS INDEX (BMI) DOCUMENTED: ICD-10-PCS | Mod: CPTII,,, | Performed by: NURSE PRACTITIONER

## 2022-01-31 PROCEDURE — 99213 OFFICE O/P EST LOW 20 MIN: CPT | Mod: S$PBB,,, | Performed by: PHYSICIAN ASSISTANT

## 2022-01-31 PROCEDURE — 3008F PR BODY MASS INDEX (BMI) DOCUMENTED: ICD-10-PCS | Mod: CPTII,,, | Performed by: PHYSICIAN ASSISTANT

## 2022-01-31 PROCEDURE — 3077F SYST BP >= 140 MM HG: CPT | Mod: CPTII,,, | Performed by: PHYSICIAN ASSISTANT

## 2022-01-31 PROCEDURE — 3044F HG A1C LEVEL LT 7.0%: CPT | Mod: CPTII,,, | Performed by: PHYSICIAN ASSISTANT

## 2022-01-31 PROCEDURE — 99215 OFFICE O/P EST HI 40 MIN: CPT | Mod: PBBFAC,27 | Performed by: PHYSICIAN ASSISTANT

## 2022-01-31 PROCEDURE — 99215 OFFICE O/P EST HI 40 MIN: CPT | Mod: PBBFAC,PO | Performed by: NURSE PRACTITIONER

## 2022-01-31 PROCEDURE — 3079F DIAST BP 80-89 MM HG: CPT | Mod: CPTII,,, | Performed by: PHYSICIAN ASSISTANT

## 2022-01-31 NOTE — PROGRESS NOTES
Subjective:       Patient ID: Dianne Hemphill is a 46 y.o. female.    Chief Complaint: Nausea and Gastroesophageal Reflux    47 y/o female with chronic pain and hx of breast cancer referred by PCP for GERD. Patient is new to me; previously seen by GI, Dr. Rizvi 3/2020. Patient reports heartburn after meals and constant nausea. She is no longer having nocturnal choking episodes as reported in 2020. Often feels like something is stuck in her throat. Sensation resolves with continuous forceful swallowing. She is taking a daily PPI and has Zofran and scopolamine prn with minimal symptoms relief. Has also tried eliminating gluten and following a Mediterranean diet with no symptom change. She denies abdominal pain, vomiting, hematemesis, blood in stool, or melena. Has irregular bowel pattern with alternating constipation and diarrhea. EUS in 2017 revealed normal esophagus; chronic gastritis. Biopsied; normal examined duodenum; no sign of significant pathology in the entire pancreas; few abnormal lymph nodes were visualized in the right hilar region (level 10R); no sign of significant pathology in the common bile duct.         Past Medical History:   Diagnosis Date    Anxiety     Breast cancer     DCIS, left partical mastectomy    Fatigue     GERD (gastroesophageal reflux disease)     History of migraine headaches     Hx of psychiatric care     Obesity     Pollen allergies     PONV (postoperative nausea and vomiting)     Psychiatric problem     Sleep difficulties     Smoker     Therapy     Urinary tract infection 5/6/2021       Past Surgical History:   Procedure Laterality Date    CHOLECYSTECTOMY      ESOPHAGOGASTRODUODENOSCOPY      EXPLORATORY LAPAROTOMY      HYSTERECTOMY      fibroids    INSERTION OF DORSAL COLUMN NERVE STIMULATOR FOR TRIAL N/A 10/22/2020    Procedure: SPINAL CORD STIMULATOR TRIAL (Nevro);  Surgeon: Clemencia Watts Jr., MD;  Location: [facility] [place];  Service: Pain Management;   Laterality: N/A;    LUMBAR FUSION N/A 2019    Procedure: FUSION, SPINE, LUMBAR Procedure: STG 1: L5-S1 anterior Lumbar interbody fusion / STG 2:L5-S1 Posterior instrumentation;  Surgeon: Corona Bazan MD;  Location: [facility] OR;  Service: Neurosurgery;  Laterality: N/A;  FUSION, SPINE, LUMBARProcedure: STG 1: L5-S1 anterior Lumbar interbody fusion / STG 2:L5-S1 Posterior instrumentationSURGERY TIME: 2.5hrsLOS:ANESTHESIA: GeneralBlood:    LUMBAR SYMPATHETIC NERVE BLOCK Left 2020    Procedure: BLOCK, NERVE, SYMPATHETIC, LUMBAR--Left;  Surgeon: Clemencia Watts Jr., MD;  Location: [facility];  Service: Pain Management;  Laterality: Left;    MASTECTOMY, PARTIAL Left 2020    Procedure: MASTECTOMY, PARTIAL LEFT with RADIOLOGIC MARKER;  Surgeon: Mara Munoz MD;  Location: 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   HENT: Positive for trouble swallowing.    Respiratory: Negative for shortness of breath.   "  Gastrointestinal: Positive for constipation and diarrhea. Negative for abdominal pain.   Genitourinary: Negative for dysuria.   Neurological: Negative for dizziness and weakness.   Hematological: Negative for adenopathy. Does not bruise/bleed easily.   Psychiatric/Behavioral: Negative for dysphoric mood.         Objective:     Vitals:    01/31/22 0853   Pulse: 96   SpO2: 95%   Weight: (!) 141 kg (310 lb 13.6 oz)   Height: 5' 9" (1.753 m)          Physical Exam  Constitutional:       General: She is not in acute distress.     Appearance: Normal appearance. She is not ill-appearing.   HENT:      Head: Normocephalic.   Eyes:      Conjunctiva/sclera: Conjunctivae normal.      Pupils: Pupils are equal, round, and reactive to light.   Pulmonary:      Effort: Pulmonary effort is normal. No respiratory distress.   Musculoskeletal:         General: Normal range of motion.      Cervical back: Normal range of motion.   Skin:     General: Skin is warm and dry.   Neurological:      Mental Status: She is alert and oriented to person, place, and time.   Psychiatric:         Mood and Affect: Mood normal.         Behavior: Behavior normal.               Assessment:         ICD-10-CM ICD-9-CM   1. Gastroesophageal reflux disease without esophagitis  K21.9 530.81   2. Dysphagia, unspecified type  R13.10 787.20   3. Irregular bowel habits  R19.8 569.89   4. Class 3 severe obesity with body mass index (BMI) of 45.0 to 49.9 in adult, unspecified obesity type, unspecified whether serious comorbidity present  E66.01 278.01    Z68.42 V85.42       Plan:       Gastroesophageal reflux disease without esophagitis; Dysphagia, unspecified type  -     Continue current medications as prescribed  -     Case Request Endoscopy: EGD (ESOPHAGOGASTRODUODENOSCOPY)    Irregular bowel habits         -     Recommend starting daily fiber supplement    Class 3 severe obesity with body mass index (BMI) of 45.0 to 49.9 in adult, unspecified obesity type, " unspecified whether serious comorbidity present         -      Weight loss advised. Dietary and exercise          counseling done.    Patient declined to schedule colonoscopy at this time as her home is being renovated; will call to schedule in 2-3 months.   Follow up if symptoms worsen or fail to improve.     Patient's Medications   New Prescriptions    No medications on file   Previous Medications    ACETAMINOPHEN (TYLENOL) 650 MG TBSR    Take 650 mg by mouth as needed.     AMITRIPTYLINE (ELAVIL) 50 MG TABLET    Take 1 tablet (50 mg total) by mouth every evening.    BACLOFEN (LIORESAL) 10 MG TABLET    Take 1 tablet (10 mg total) by mouth 3 (three) times daily.    CHOLECALCIFEROL, VITAMIN D3, (VITAMIN D3) 50 MCG (2,000 UNIT) TAB    Take 5,000 Units by mouth once daily.     DULOXETINE (CYMBALTA) 30 MG CAPSULE    Take 1 capsule (30 mg total) by mouth once daily.    EPINEPHRINE (EPIPEN 2-JADE) 0.3 MG/0.3 ML ATIN    Inject 0.6 mLs (0.6 mg total) into the muscle once. for 1 dose    ESTRADIOL (ESTRACE) 0.01 % (0.1 MG/GRAM) VAGINAL CREAM    Apply as directed TIW    FEXOFENADINE (ALLEGRA) 180 MG TABLET    Take 180 mg by mouth as needed.     FLUTICASONE PROPIONATE (FLONASE) 50 MCG/ACTUATION NASAL SPRAY    1 spray (50 mcg total) by Each Nostril route once daily.    KETOCONAZOLE (NIZORAL) 2 % SHAMPOO    APPLY TOPICALLY TWICE A  WEEK.    LEVOCETIRIZINE (XYZAL) 5 MG TABLET    Take 5 mg by mouth every evening.    ONDANSETRON (ZOFRAN-ODT) 4 MG TBDL    Take 2 tablets (8 mg total) by mouth every 6 (six) hours as needed.    PANTOPRAZOLE (PROTONIX) 40 MG TABLET    Take 1 tablet (40 mg total) by mouth once daily.    PREGABALIN (LYRICA) 25 MG CAPSULE    Take 1 capsule (25 mg total) by mouth 3 (three) times daily.    SCOPOLAMINE (TRANSDERM-SCOP) 1.3-1.5 MG (1 MG OVER 3 DAYS)    Place 1 patch onto the skin every 72 hours.    SCOPOLAMINE (TRANSDERM-SCOP) 1.3-1.5 MG (1 MG OVER 3 DAYS)    Place 1 patch onto the skin every 72 hours.    Modified Medications    No medications on file   Discontinued Medications    No medications on file

## 2022-01-31 NOTE — PROGRESS NOTES
Subjective:      Dianne Hemphill is a 46 y.o. female who would like to participate in the 6 month Survivorship Weight Loss Program.  History of DCIS ER+/OR + of the left breast. S/p lumpectomy and completed radiation therapy on 6/19/20. She meets criteria of the program and agrees to participate in biweekly group meetings for 12 sessions. Does have chronic pain that is managed by PCP. Recently added Lyrica and hopeful that this well help. Struggles with physical activity due to pain. Able to walk 2 blocks but very slowly due to pain.    Current physical activity: None    Past Medical History:   Diagnosis Date    Anxiety     Breast cancer     DCIS, left partical mastectomy    Fatigue     GERD (gastroesophageal reflux disease)     History of migraine headaches     Hx of psychiatric care     Obesity     Pollen allergies     PONV (postoperative nausea and vomiting)     Psychiatric problem     Sleep difficulties     Smoker     Therapy     Urinary tract infection 5/6/2021     Past Surgical History:   Procedure Laterality Date    CHOLECYSTECTOMY      ESOPHAGOGASTRODUODENOSCOPY      EXPLORATORY LAPAROTOMY      HYSTERECTOMY      fibroids    INSERTION OF DORSAL COLUMN NERVE STIMULATOR FOR TRIAL N/A 10/22/2020    Procedure: SPINAL CORD STIMULATOR TRIAL (Nevro);  Surgeon: Clemencia Watts Jr., MD;  Location: [facility]IN [facility]GT;  Service: Pain Management;  Laterality: N/A;    LUMBAR FUSION N/A 11/7/2019    Procedure: FUSION, SPINE, LUMBAR Procedure: STG 1: L5-S1 anterior Lumbar interbody fusion / STG 2:L5-S1 Posterior instrumentation;  Surgeon: Corona Bazan MD;  Location: [facility] OR;  Service: Neurosurgery;  Laterality: N/A;  FUSION, SPINE, LUMBARProcedure: STG 1: L5-S1 anterior Lumbar interbody fusion / STG 2:L5-S1 Posterior instrumentationSURGERY TIME: 2.5hrsLOS:ANESTHESIA: GeneralBlood:    LUMBAR SYMPATHETIC NERVE BLOCK Left 6/11/2020    Procedure: BLOCK, NERVE, SYMPATHETIC, LUMBAR--Left;  Surgeon:  Clemencia Watts Jr., MD;  Location: [facility] PAIN [facility]GT;  Service: Pain Management;  Laterality: Left;    MASTECTOMY, PARTIAL Left 2020    Procedure: MASTECTOMY, PARTIAL LEFT with RADIOLOGIC MARKER;  Surgeon: Mara Munoz MD;  Location: [facility] OR;  Service: General;  Laterality: Left;    SPINAL FUSION      TONSILLECTOMY       Social History     Tobacco Use    Smoking status: Former Smoker     Packs/day: 0.50     Years: 15.00     Pack years: 7.50     Types: Vaping with nicotine     Quit date: 2019     Years since quittin.4    Smokeless tobacco: Former User   Substance Use Topics    Alcohol use: Yes     Comment: 2-3 drinks per year    Drug use: No     Family History   Problem Relation Age of Onset    Cancer Mother     Alcohol abuse Mother     No Known Problems Father     Cancer Maternal Grandfather     Diabetes Neg Hx     Heart disease Neg Hx     Stroke Neg Hx      OB History    Para Term  AB Living   2 2 2     2   SAB IAB Ectopic Multiple Live Births                  # Outcome Date GA Lbr Carlos/2nd Weight Sex Delivery Anes PTL Lv   2 Term            1 Term                Current Outpatient Medications:     acetaminophen (TYLENOL) 650 MG TbSR, Take 650 mg by mouth as needed. , Disp: , Rfl:     amitriptyline (ELAVIL) 50 MG tablet, Take 1 tablet (50 mg total) by mouth every evening., Disp: 90 tablet, Rfl: 1    baclofen (LIORESAL) 10 MG tablet, Take 1 tablet (10 mg total) by mouth 3 (three) times daily., Disp: 270 tablet, Rfl: 1    cholecalciferol, vitamin D3, (VITAMIN D3) 50 mcg (2,000 unit) Tab, Take 5,000 Units by mouth once daily. , Disp: , Rfl:     DULoxetine (CYMBALTA) 30 MG capsule, Take 1 capsule (30 mg total) by mouth once daily., Disp: 90 capsule, Rfl: 1    estradioL (ESTRACE) 0.01 % (0.1 mg/gram) vaginal cream, Apply as directed TIW, Disp: 42.5 g, Rfl: 1    fexofenadine (ALLEGRA) 180 MG tablet, Take 180 mg by mouth as needed. , Disp: , Rfl:     fluticasone propionate  (FLONASE) 50 mcg/actuation nasal spray, 1 spray (50 mcg total) by Each Nostril route once daily. (Patient taking differently: 1 spray by Each Nostril route daily as needed.), Disp: 16 g, Rfl: 11    ketoconazole (NIZORAL) 2 % shampoo, APPLY TOPICALLY TWICE A  WEEK. (Patient taking differently: Apply topically as needed.), Disp: 120 mL, Rfl: 0    levocetirizine (XYZAL) 5 MG tablet, Take 5 mg by mouth every evening., Disp: , Rfl:     ondansetron (ZOFRAN-ODT) 4 MG TbDL, Take 2 tablets (8 mg total) by mouth every 6 (six) hours as needed., Disp: 60 tablet, Rfl: 1    pantoprazole (PROTONIX) 40 MG tablet, Take 1 tablet (40 mg total) by mouth once daily., Disp: 90 tablet, Rfl: 1    pregabalin (LYRICA) 25 MG capsule, Take 1 capsule (25 mg total) by mouth 3 (three) times daily., Disp: 90 capsule, Rfl: 3    scopolamine (TRANSDERM-SCOP) 1.3-1.5 mg (1 mg over 3 days), Place 1 patch onto the skin every 72 hours. (Patient taking differently: Place 1 patch onto the skin as needed.), Disp: 4 patch, Rfl: 1    scopolamine (TRANSDERM-SCOP) 1.3-1.5 mg (1 mg over 3 days), Place 1 patch onto the skin every 72 hours., Disp: 10 patch, Rfl: 3    EPINEPHrine (EPIPEN 2-JADE) 0.3 mg/0.3 mL AtIn, Inject 0.6 mLs (0.6 mg total) into the muscle once. for 1 dose, Disp: 4 Device, Rfl: 0  No current facility-administered medications for this visit.    Facility-Administered Medications Ordered in Other Visits:     lidocaine (PF) 10 mg/ml (1%) injection 10 mg, 1 mL, Intradermal, Once, Clemencia Watts Jr., MD    lidocaine (PF) 10 mg/ml (1%) injection 10 mg, 1 mL, Intradermal, Once, Clemencia Watts Jr., MD    Review of Systems:  General: No fever, chills, or weight loss.  Chest: No chest pain, shortness of breath, or palpitations.  Breast: No pain, masses, or nipple discharge.  Vulva: No pain, lesions, or itching.  Vagina: No relaxation, itching, discharge, or lesions.  Abdomen: No pain, nausea, vomiting, diarrhea, or constipation.  Urinary:  "No incontinence, nocturia, frequency, or dysuria.  Extremities:  No leg cramps, edema, or calf pain.  Neurologic: No headaches, dizziness, or visual changes.    Objective:     Vitals:    01/31/22 1107   BP: (!) 153/82   Weight: (!) 141.4 kg (311 lb 11.7 oz)   Height: 5' 9" (1.753 m)   PainSc:   8     Body mass index is 46.03 kg/m².  WAIST CIRCUMFERENCE: 129cm    PHYSICAL EXAM:  APPEARANCE: Well nourished, well developed, in no acute distress.    Resting Energy Expenditure (using [place]-St Jeor Equation): 2115  Calorie Goals per day (REE x1.2 - 500): 2038    Assessment:    Breast neoplasm, Tis (DCIS), left  -     Ambulatory Referral/Consult to Oncology Weight Loss/Behavioral Health; Future; Expected date: 02/07/2022  -     Ambulatory referral/consult to Oncology Weight Loss/Nutrition; Future; Expected date: 02/07/2022  -     Ambulatory referral/consult to Physical/Occupational Therapy; Future; Expected date: 02/07/2022    Obesity, unspecified classification, unspecified obesity type, unspecified whether serious comorbidity present  -     Ambulatory Referral/Consult to Oncology Weight Loss/Behavioral Health; Future; Expected date: 02/07/2022  -     Ambulatory referral/consult to Oncology Weight Loss/Nutrition; Future; Expected date: 02/07/2022  -     Ambulatory referral/consult to Physical/Occupational Therapy; Future; Expected date: 02/07/2022      Details of the Survivorship Weight Loss Program was reviewed in detail with the patient. All questions answered.      Plan:   Consent was reviewed and signed.  She understands that if she is unable to participate in the group sessions, she will be asked to return the FitBit and Scale  Goal of 5,000 steps daily.  Food diary daily in the FitBit Cam.  Goal of 2,038 calories per day.  Weight via scale once a week.  Messages will be sent to the patient via [facility] once a month for the duration of the program.  Referral to physical therapy for physical strength and endurance " assessment. Will have follow ups with PT at least every 3 months for the duration of the program. Possibly more frequently depending on assessment.   Follow up in 3 and 6 months.   She will participate in Group A with the first session starting on March 8th, 2022.  Schedule of the sessions provided to patient.   She understands that if insurance does not pay for all the visits, she will be responsible to cover the difference.     Instructed patient to call if she experiences any side effects or has any questions.    I spent a total of 25 minutes on the day of the visit.This includes face to face time and non-face to face time preparing to see the patient (eg, review of tests), obtaining and/or reviewing separately obtained history, documenting clinical information in the electronic or other health record, independently interpreting results and communicating results to the patient/family/caregiver, or care coordinator.

## 2022-01-31 NOTE — PATIENT INSTRUCTIONS
EGD Prep Instructions    [facility] [address] [place]  [address]    You are scheduled for an EGD with Dr. Denson on 2/7/2022 at [facility].  You will enter through the [facility] entrance and check in at Same Day Surgery.    Nothing to eat or drink after midnight before the procedure.  You MAY brush your teeth.    You MAY take your blood pressure, heart, and seizure medication on the morning of the procedure, with a SIP of water.  Hold ALL other medications until after the procedure.    You must have someone with you to DRIVE YOU HOME since you will be receiving IV sedation for the procedure.    If you are on blood thinners THAT YOU HAVE BEEN INSTRUCTED TO HOLD BY YOUR DOCTOR FOR THIS PROCEDURE, then do NOT take this the morning of your EGD.  Do NOT stop these medications on your own, they must be approved to be held by your doctor.  Your EGD can NOT be done if you are on these medications.  Examples of blood thinners include: Coumadin, Aggrenox, Plavix, Pradaxa, Reapro, Pletal, Xarelto, Ticagrelor, Brilinta, Eliquis, and high dose aspirin (325 mg).  You do not have to stop baby aspirin 81 mg.    You will receive a call a few days before your EGD to tell you the time to arrive.  If you have not received a call by the day before your procedure, call the Pre-op Coordinator at [phone].

## 2022-01-31 NOTE — PROGRESS NOTES
Health Maintenance Due   Topic Date Due    Pneumococcal Vaccines (Age 0-64) (1 of 2 - PPSV23) Never done    Colorectal Cancer Screening  Never done    COVID-19 Vaccine (3 - Booster for Pfizer series) 12/02/2021     Updates were requested from care everywhere.  Chart was reviewed for overdue Proactive [facility] Encounters ([facility]) topics (CRS, Breast Cancer Screening, Eye exam)  Health Maintenance has been updated.  LINKS immunization registry triggered.  Immunizations were reconciled.  Pt has appt with GI on 01/31/2022. FIT kit not ordered.

## 2022-02-01 ENCOUNTER — TELEPHONE (OUTPATIENT)
Dept: GASTROENTEROLOGY | Facility: CLINIC | Age: 47
End: 2022-02-01
Payer: MEDICAID

## 2022-02-01 ENCOUNTER — PATIENT MESSAGE (OUTPATIENT)
Dept: PRIMARY CARE CLINIC | Facility: CLINIC | Age: 47
End: 2022-02-01
Payer: MEDICAID

## 2022-02-01 DIAGNOSIS — Z01.818 PRE-OP TESTING: ICD-10-CM

## 2022-02-01 RX ORDER — SULFAMETHOXAZOLE AND TRIMETHOPRIM 800; 160 MG/1; MG/1
1 TABLET ORAL 2 TIMES DAILY
Qty: 14 TABLET | Refills: 0 | Status: SHIPPED | OUTPATIENT
Start: 2022-02-01 | End: 2022-02-08

## 2022-02-01 NOTE — TELEPHONE ENCOUNTER
Sent a my chart message. Please call the lab and have them send the urine from yesterday off for culture.

## 2022-02-08 ENCOUNTER — PATIENT MESSAGE (OUTPATIENT)
Dept: GASTROENTEROLOGY | Facility: CLINIC | Age: 47
End: 2022-02-08
Payer: MEDICAID

## 2022-02-09 ENCOUNTER — PATIENT MESSAGE (OUTPATIENT)
Dept: PRIMARY CARE CLINIC | Facility: CLINIC | Age: 47
End: 2022-02-09
Payer: MEDICAID

## 2022-02-09 DIAGNOSIS — M48.061 NEURAL FORAMINAL STENOSIS OF LUMBAR SPINE: ICD-10-CM

## 2022-02-09 DIAGNOSIS — M43.27 FUSION OF LUMBOSACRAL SPINE: ICD-10-CM

## 2022-02-09 DIAGNOSIS — M54.10 RADICULAR PAIN OF LEFT LOWER EXTREMITY: ICD-10-CM

## 2022-02-09 NOTE — LETTER
February 10, 2022      [facility]  [address], [address]  [place] [address]       Patient: Dianne Hemphill   YOB: 1975  Date of Visit: 02/10/2022    To Whom It May Concern:    Armando Hemphill  is under my care at [facility].  She has class 3 severe obesity with a BMI of 45. If you have any questions or concerns, or if I can be of further assistance, please do not hesitate to contact me.    Sincerely,    Annette Burton MD

## 2022-02-10 ENCOUNTER — PATIENT MESSAGE (OUTPATIENT)
Dept: PRIMARY CARE CLINIC | Facility: CLINIC | Age: 47
End: 2022-02-10
Payer: MEDICAID

## 2022-02-10 ENCOUNTER — PATIENT MESSAGE (OUTPATIENT)
Dept: GASTROENTEROLOGY | Facility: CLINIC | Age: 47
End: 2022-02-10
Payer: MEDICAID

## 2022-02-10 RX ORDER — PREGABALIN 50 MG/1
50 CAPSULE ORAL 3 TIMES DAILY
Qty: 90 CAPSULE | Refills: 3 | Status: SHIPPED | OUTPATIENT
Start: 2022-02-10 | End: 2022-02-21 | Stop reason: SDUPTHER

## 2022-02-11 ENCOUNTER — PATIENT MESSAGE (OUTPATIENT)
Dept: PRIMARY CARE CLINIC | Facility: CLINIC | Age: 47
End: 2022-02-11
Payer: MEDICAID

## 2022-02-11 RX ORDER — NYSTATIN 100000 [USP'U]/ML
5 SUSPENSION ORAL 4 TIMES DAILY
Qty: 200 ML | Refills: 0 | Status: SHIPPED | OUTPATIENT
Start: 2022-02-11 | End: 2022-02-16

## 2022-02-21 ENCOUNTER — OFFICE VISIT (OUTPATIENT)
Dept: PRIMARY CARE CLINIC | Facility: CLINIC | Age: 47
End: 2022-02-21
Payer: MEDICAID

## 2022-02-21 VITALS
HEIGHT: 68 IN | BODY MASS INDEX: 44.41 KG/M2 | SYSTOLIC BLOOD PRESSURE: 126 MMHG | RESPIRATION RATE: 18 BRPM | WEIGHT: 293 LBS | OXYGEN SATURATION: 97 % | TEMPERATURE: 99 F | DIASTOLIC BLOOD PRESSURE: 76 MMHG | HEART RATE: 99 BPM

## 2022-02-21 DIAGNOSIS — K76.0 FATTY LIVER: ICD-10-CM

## 2022-02-21 DIAGNOSIS — E66.01 CLASS 3 SEVERE OBESITY WITH BODY MASS INDEX (BMI) OF 45.0 TO 49.9 IN ADULT, UNSPECIFIED OBESITY TYPE, UNSPECIFIED WHETHER SERIOUS COMORBIDITY PRESENT: ICD-10-CM

## 2022-02-21 DIAGNOSIS — Z00.00 ANNUAL PHYSICAL EXAM: Primary | ICD-10-CM

## 2022-02-21 DIAGNOSIS — M48.061 NEURAL FORAMINAL STENOSIS OF LUMBAR SPINE: ICD-10-CM

## 2022-02-21 DIAGNOSIS — M43.27 FUSION OF LUMBOSACRAL SPINE: ICD-10-CM

## 2022-02-21 DIAGNOSIS — K59.00 CONSTIPATION, UNSPECIFIED CONSTIPATION TYPE: ICD-10-CM

## 2022-02-21 DIAGNOSIS — R16.0 HEPATOMEGALY: Primary | ICD-10-CM

## 2022-02-21 DIAGNOSIS — M54.10 RADICULAR PAIN OF LEFT LOWER EXTREMITY: ICD-10-CM

## 2022-02-21 PROCEDURE — 3078F DIAST BP <80 MM HG: CPT | Mod: CPTII,,, | Performed by: FAMILY MEDICINE

## 2022-02-21 PROCEDURE — 1159F MED LIST DOCD IN RCRD: CPT | Mod: CPTII,,, | Performed by: FAMILY MEDICINE

## 2022-02-21 PROCEDURE — 99396 PREV VISIT EST AGE 40-64: CPT | Mod: S$PBB,,, | Performed by: FAMILY MEDICINE

## 2022-02-21 PROCEDURE — 99999 PR PBB SHADOW E&M-EST. PATIENT-LVL IV: CPT | Mod: PBBFAC,,, | Performed by: FAMILY MEDICINE

## 2022-02-21 PROCEDURE — 3074F SYST BP LT 130 MM HG: CPT | Mod: CPTII,,, | Performed by: FAMILY MEDICINE

## 2022-02-21 PROCEDURE — 3044F HG A1C LEVEL LT 7.0%: CPT | Mod: CPTII,,, | Performed by: FAMILY MEDICINE

## 2022-02-21 PROCEDURE — 99214 OFFICE O/P EST MOD 30 MIN: CPT | Mod: PBBFAC,PN | Performed by: FAMILY MEDICINE

## 2022-02-21 PROCEDURE — 99999 PR PBB SHADOW E&M-EST. PATIENT-LVL IV: ICD-10-PCS | Mod: PBBFAC,,, | Performed by: FAMILY MEDICINE

## 2022-02-21 PROCEDURE — 3008F PR BODY MASS INDEX (BMI) DOCUMENTED: ICD-10-PCS | Mod: CPTII,,, | Performed by: FAMILY MEDICINE

## 2022-02-21 PROCEDURE — 3078F PR MOST RECENT DIASTOLIC BLOOD PRESSURE < 80 MM HG: ICD-10-PCS | Mod: CPTII,,, | Performed by: FAMILY MEDICINE

## 2022-02-21 PROCEDURE — 1159F PR MEDICATION LIST DOCUMENTED IN MEDICAL RECORD: ICD-10-PCS | Mod: CPTII,,, | Performed by: FAMILY MEDICINE

## 2022-02-21 PROCEDURE — 99396 PR PREVENTIVE VISIT,EST,40-64: ICD-10-PCS | Mod: S$PBB,,, | Performed by: FAMILY MEDICINE

## 2022-02-21 PROCEDURE — 3074F PR MOST RECENT SYSTOLIC BLOOD PRESSURE < 130 MM HG: ICD-10-PCS | Mod: CPTII,,, | Performed by: FAMILY MEDICINE

## 2022-02-21 PROCEDURE — 3044F PR MOST RECENT HEMOGLOBIN A1C LEVEL <7.0%: ICD-10-PCS | Mod: CPTII,,, | Performed by: FAMILY MEDICINE

## 2022-02-21 PROCEDURE — 3008F BODY MASS INDEX DOCD: CPT | Mod: CPTII,,, | Performed by: FAMILY MEDICINE

## 2022-02-21 RX ORDER — PREGABALIN 75 MG/1
75 CAPSULE ORAL 3 TIMES DAILY
Qty: 90 CAPSULE | Refills: 1 | Status: SHIPPED | OUTPATIENT
Start: 2022-02-21 | End: 2022-03-28 | Stop reason: SDUPTHER

## 2022-02-21 RX ORDER — POLYETHYLENE GLYCOL 3350 17 G/17G
17 POWDER, FOR SOLUTION ORAL DAILY
Qty: 850 G | Refills: 0 | Status: SHIPPED | OUTPATIENT
Start: 2022-02-21
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
## 2022-02-22 ENCOUNTER — OFFICE VISIT (OUTPATIENT)
Dept: HEPATOLOGY | Facility: CLINIC | Age: 47
End: 2022-02-22
Payer: MEDICAID

## 2022-02-22 ENCOUNTER — PATIENT MESSAGE (OUTPATIENT)
Dept: OBSTETRICS AND GYNECOLOGY | Facility: CLINIC | Age: 47
End: 2022-02-22
Payer: MEDICAID

## 2022-02-22 VITALS
DIASTOLIC BLOOD PRESSURE: 88 MMHG | OXYGEN SATURATION: 95 % | RESPIRATION RATE: 18 BRPM | SYSTOLIC BLOOD PRESSURE: 124 MMHG | HEIGHT: 68 IN | TEMPERATURE: 98 F | HEART RATE: 82 BPM | WEIGHT: 293 LBS | BODY MASS INDEX: 44.41 KG/M2

## 2022-02-22 DIAGNOSIS — K76.0 FATTY LIVER: ICD-10-CM

## 2022-02-22 DIAGNOSIS — R79.89 ELEVATED LIVER FUNCTION TESTS: ICD-10-CM

## 2022-02-22 DIAGNOSIS — R16.0 HEPATOMEGALY: Primary | ICD-10-CM

## 2022-02-22 PROCEDURE — 99204 OFFICE O/P NEW MOD 45 MIN: CPT | Mod: S$PBB,,, | Performed by: INTERNAL MEDICINE

## 2022-02-22 PROCEDURE — 3074F SYST BP LT 130 MM HG: CPT | Mod: CPTII,,, | Performed by: INTERNAL MEDICINE

## 2022-02-22 PROCEDURE — 3008F PR BODY MASS INDEX (BMI) DOCUMENTED: ICD-10-PCS | Mod: CPTII,,, | Performed by: INTERNAL MEDICINE

## 2022-02-22 PROCEDURE — 1160F RVW MEDS BY RX/DR IN RCRD: CPT | Mod: CPTII,,, | Performed by: INTERNAL MEDICINE

## 2022-02-22 PROCEDURE — 99999 PR PBB SHADOW E&M-EST. PATIENT-LVL V: CPT | Mod: PBBFAC,,, | Performed by: INTERNAL MEDICINE

## 2022-02-22 PROCEDURE — 3044F HG A1C LEVEL LT 7.0%: CPT | Mod: CPTII,,, | Performed by: INTERNAL MEDICINE

## 2022-02-22 PROCEDURE — 99215 OFFICE O/P EST HI 40 MIN: CPT | Mod: PBBFAC,PN | Performed by: INTERNAL MEDICINE

## 2022-02-22 PROCEDURE — 3044F PR MOST RECENT HEMOGLOBIN A1C LEVEL <7.0%: ICD-10-PCS | Mod: CPTII,,, | Performed by: INTERNAL MEDICINE

## 2022-02-22 PROCEDURE — 3079F DIAST BP 80-89 MM HG: CPT | Mod: CPTII,,, | Performed by: INTERNAL MEDICINE

## 2022-02-22 PROCEDURE — 1160F PR REVIEW ALL MEDS BY PRESCRIBER/CLIN PHARMACIST DOCUMENTED: ICD-10-PCS | Mod: CPTII,,, | Performed by: INTERNAL MEDICINE

## 2022-02-22 PROCEDURE — 3079F PR MOST RECENT DIASTOLIC BLOOD PRESSURE 80-89 MM HG: ICD-10-PCS | Mod: CPTII,,, | Performed by: INTERNAL MEDICINE

## 2022-02-22 PROCEDURE — 1159F PR MEDICATION LIST DOCUMENTED IN MEDICAL RECORD: ICD-10-PCS | Mod: CPTII,,, | Performed by: INTERNAL MEDICINE

## 2022-02-22 PROCEDURE — 3008F BODY MASS INDEX DOCD: CPT | Mod: CPTII,,, | Performed by: INTERNAL MEDICINE

## 2022-02-22 PROCEDURE — 99204 PR OFFICE/OUTPT VISIT, NEW, LEVL IV, 45-59 MIN: ICD-10-PCS | Mod: S$PBB,,, | Performed by: INTERNAL MEDICINE

## 2022-02-22 PROCEDURE — 1159F MED LIST DOCD IN RCRD: CPT | Mod: CPTII,,, | Performed by: INTERNAL MEDICINE

## 2022-02-22 PROCEDURE — 3074F PR MOST RECENT SYSTOLIC BLOOD PRESSURE < 130 MM HG: ICD-10-PCS | Mod: CPTII,,, | Performed by: INTERNAL MEDICINE

## 2022-02-22 PROCEDURE — 99999 PR PBB SHADOW E&M-EST. PATIENT-LVL V: ICD-10-PCS | Mod: PBBFAC,,, | Performed by: INTERNAL MEDICINE

## 2022-02-22 NOTE — PROGRESS NOTES
HEPATOLOGY CONSULTATION    Referring Physician: Annette Burton MD  Current Corresponding Physician: Annette Burton MD    Reason for Consultation: Consultation for evaluation of Fatty Liver    History of Present Illness: Dianne Hemphill is a 46 y.o. female with a hx of breast DCIS, chronic pain and GERD who presents for evaluation of fatty liver.    CT abdp pelvis w cpmtrast 2017: unremarkable liver  CT urogram abd pelvis 12/30/19: fatty liver     Labs 1/31/22: ALT 35, AST 29, ALKP 99, Tbil 0.3 (1/20 ALT was 14, AST 13), plts normal (447)    The patient denies any symptoms of decompensated cirrhosis, including no ascites or edema, cognitive problems that would suggest hepatic encephalopathy, or GI bleeding from varices.    Chief Complaint   Patient presents with    Fatty Liver       Past Medical History:   Diagnosis Date    Anxiety     Breast cancer     DCIS, left partical mastectomy    CRPS (complex regional pain syndrome type I)     Fatigue     General anesthetics causing adverse effect in therapeutic use     verbal combative after 1 procedure    GERD (gastroesophageal reflux disease)     History of migraine headaches     Hx of psychiatric care     Obesity     Pollen allergies     PONV (postoperative nausea and vomiting)     Psychiatric problem     Sleep difficulties     Smoker     Therapy     Urinary tract infection 5/6/2021     Outpatient Encounter Medications as of 2/22/2022   Medication Sig Dispense Refill    acetaminophen (TYLENOL) 650 MG TbSR Take 650 mg by mouth as needed.       amitriptyline (ELAVIL) 50 MG tablet Take 1 tablet (50 mg total) by mouth every evening. 90 tablet 1    baclofen (LIORESAL) 10 MG tablet Take 1 tablet (10 mg total) by mouth 3 (three) times daily. 270 tablet 1    cetirizine (ZYRTEC) 10 MG tablet Take 10 mg by mouth once daily.      cholecalciferol, vitamin D3, (VITAMIN D3) 50 mcg (2,000 unit) Tab Take 5,000 Units by mouth once daily.       DULoxetine  "(CYMBALTA) 30 MG capsule Take 1 capsule (30 mg total) by mouth once daily. 90 capsule 1    EPINEPHrine (EPIPEN 2-JADE) 0.3 mg/0.3 mL AtIn Inject 0.6 mLs (0.6 mg total) into the muscle once. for 1 dose 4 Device 0    estradioL (ESTRACE) 0.01 % (0.1 mg/gram) vaginal cream Apply as directed TIW 42.5 g 1    fexofenadine (ALLEGRA) 180 MG tablet Take 180 mg by mouth as needed.      fluticasone propionate (FLONASE) 50 mcg/actuation nasal spray 1 spray (50 mcg total) by Each Nostril route once daily. 16 g 11    ketoconazole (NIZORAL) 2 % shampoo APPLY TOPICALLY TWICE A  WEEK. (Patient not taking: No sig reported) 120 mL 0    ondansetron (ZOFRAN-ODT) 4 MG TbDL Take 2 tablets (8 mg total) by mouth every 6 (six) hours as needed. (Patient not taking: No sig reported) 60 tablet 1    pantoprazole (PROTONIX) 40 MG tablet Take 1 tablet (40 mg total) by mouth once daily. 90 tablet 1    polyethylene glycol (GLYCOLAX) 17 gram/dose powder Take 17 g by mouth once daily. 850 g 0    pregabalin (LYRICA) 75 MG capsule Take 1 capsule (75 mg total) by mouth 3 (three) times daily. 90 capsule 1    scopolamine (TRANSDERM-SCOP) 1.3-1.5 mg (1 mg over 3 days) Place 1 patch onto the skin every 72 hours. (Patient taking differently: Place 1 patch onto the skin as needed.) 4 patch 1     Facility-Administered Encounter Medications as of 2/22/2022   Medication Dose Route Frequency Provider Last Rate Last Admin    lidocaine (PF) 10 mg/ml (1%) injection 10 mg  1 mL Intradermal Once Clemencia Watts Jr., MD        lidocaine (PF) 10 mg/ml (1%) injection 10 mg  1 mL Intradermal Once Clemencia Watts Jr., MD         Review of patient's allergies indicates:   Allergen Reactions    Ativan [lorazepam] Other (See Comments)     Pt states she cannot "wake up or sleeps for three days."    Gluten protein Nausea And Vomiting    Milk containing products Hives    Wasp venom Anaphylaxis    Demerol [meperidine] Nausea And Vomiting    Imitrex [sumatriptan " succinate]      Hysterics      Morphine Nausea And Vomiting    Tetracyclines Nausea And Vomiting    Ciprofloxacin hcl Rash    Codeine Nausea And Vomiting     Can take Dilaudid, oxycontin, oxycodone & tramadol    Erythromycin Rash    Macrobid [nitrofurantoin monohyd/m-cryst] Rash     Family History   Problem Relation Age of Onset    Cancer Mother     Alcohol abuse Mother     No Known Problems Father     Cancer Maternal Grandfather     Diabetes Neg Hx     Heart disease Neg Hx     Stroke Neg Hx        Social History     Socioeconomic History    Marital status:     Number of children: 4   Occupational History    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    HENT: Negative.    Eyes: Negative.    Respiratory: Negative.    Cardiovascular: Negative.    Gastrointestinal: Negative.    Genitourinary: Negative.    Musculoskeletal: Negative.    Skin: Negative.    Neurological: Negative.    Psychiatric/Behavioral: Negative.      Vitals:    22 0902   BP: 124/88   Pulse: 82   Resp: 18   Temp: 98.3 °F (36.8 °C)       Physical Exam  Vitals reviewed.   Constitutional:       Appearance: She is well-developed.   HENT:      Head: Normocephalic and atraumatic.   Eyes:      General: No scleral icterus.     Conjunctiva/sclera: Conjunctivae normal.      Pupils: Pupils are equal, round, and reactive to light.   Neck:      Thyroid: No thyromegaly.   Cardiovascular:      Rate and Rhythm: Normal rate and regular rhythm.       Heart sounds: Normal heart sounds.   Pulmonary:      Effort: Pulmonary effort is normal.      Breath sounds: Normal breath sounds. No rales.   Abdominal:      General: Bowel sounds are normal. There is no distension.      Palpations: Abdomen is soft. There is no mass.      Tenderness: There is no abdominal tenderness.   Musculoskeletal:         General: Normal range of motion.      Cervical back: Normal range of motion and neck supple.   Skin:     General: Skin is warm and dry.      Findings: No rash.   Neurological:      Mental Status: She is alert and oriented to person, place, and time.             Lab Results   Component Value Date     01/31/2022    BUN 17 01/31/2022    CREATININE 0.88 01/31/2022    CALCIUM 9.7 01/31/2022     (H) 01/31/2022    K 4.6 01/31/2022     01/31/2022    PROT 7.9 01/31/2022    CO2 27 01/31/2022    ANIONGAP 15 01/31/2022    WBC 8.97 01/31/2022    RBC 5.53 (H) 01/31/2022    HGB 16.0 01/31/2022    HCT 49.1 (H) 01/31/2022    MCV 89 01/31/2022    MCH 28.9 01/31/2022    MCHC 32.6 01/31/2022     Lab Results   Component Value Date    RDW 13.2 01/31/2022     01/31/2022    MPV 9.7 01/31/2022    GRAN 5.1 01/31/2022    GRAN 56.4 01/31/2022    LYMPH 3.2 01/31/2022    LYMPH 35.3 01/31/2022    MONO 0.4 01/31/2022    MONO 4.7 01/31/2022    EOSINOPHIL 2.5 01/31/2022    BASOPHIL 0.8 01/31/2022    EOS 0.2 01/31/2022    BASO 0.07 01/31/2022    APTT 30.4 10/16/2019    BNP 20 11/16/2018    CHOL 193 01/31/2022    TRIG 134 01/31/2022    HDL 41 01/31/2022    CHOLHDL 21.2 01/31/2022    TOTALCHOLEST 4.7 01/31/2022    ALBUMIN 4.5 01/31/2022    BILIDIR 0.1 11/08/2017    AST 28 01/31/2022    ALT 35 01/31/2022    ALKPHOS 99 01/31/2022    MG 2.2 01/31/2022    LABPROT 10.7 10/16/2019    INR 0.9 10/16/2019       Assessment and Plan:  Patient Active Problem List   Diagnosis    Nausea    Hepatomegaly    GERD (gastroesophageal reflux disease)    Former smokeless tobacco use    Cyst of left  ovary    Lumbar spondylosis    Neural foraminal stenosis of lumbar spine    Anterolisthesis    Spondylolisthesis of lumbar region    S/P spinal surgery    Fatty liver    Radicular pain of left lower extremity    Lumbosacral radiculopathy at L5    Breast neoplasm, Tis (DCIS), left    Poor sleep    Complex regional pain syndrome type 1 of left lower extremity    Fusion of lumbosacral spine    Chronic pain    Failed back surgical syndrome    Class 3 severe obesity with body mass index (BMI) of 45.0 to 49.9 in adult    Dysphagia    Irregular bowel habits     Dianne Hemphill is a 46 y.o. female with Fatty Liver  Her liver tests are only minimally elevated (normal ALT <25) currently, and were normal a year ago. I am recommending a full serologic w/u to confirm no additional causes of chronic liver disease and an abdo US as well as MRE (if can have it with a neurostimulator) to screen for fibrosis. She should abstain from alcohol, eat foods low in fat and attempt weight loss.    Return 4 weeks

## 2022-02-28 ENCOUNTER — TELEPHONE (OUTPATIENT)
Dept: GASTROENTEROLOGY | Facility: CLINIC | Age: 47
End: 2022-02-28
Payer: MEDICAID

## 2022-02-28 NOTE — TELEPHONE ENCOUNTER
----- Message from Richelle Denson MD sent at 2/23/2022  3:14 PM CST -----  HP (-), cont PPI, GES already scheduled

## 2022-03-01 ENCOUNTER — PATIENT MESSAGE (OUTPATIENT)
Dept: OBSTETRICS AND GYNECOLOGY | Facility: CLINIC | Age: 47
End: 2022-03-01
Payer: MEDICAID

## 2022-03-01 ENCOUNTER — PATIENT MESSAGE (OUTPATIENT)
Dept: PRIMARY CARE CLINIC | Facility: CLINIC | Age: 47
End: 2022-03-01
Payer: MEDICAID

## 2022-03-01 DIAGNOSIS — M43.10 ANTEROLISTHESIS: ICD-10-CM

## 2022-03-01 DIAGNOSIS — F43.23 SITUATIONAL MIXED ANXIETY AND DEPRESSIVE DISORDER: ICD-10-CM

## 2022-03-01 DIAGNOSIS — M47.816 LUMBAR SPONDYLOSIS: ICD-10-CM

## 2022-03-01 DIAGNOSIS — M48.061 NEURAL FORAMINAL STENOSIS OF LUMBAR SPINE: ICD-10-CM

## 2022-03-02 ENCOUNTER — PATIENT MESSAGE (OUTPATIENT)
Dept: HEPATOLOGY | Facility: CLINIC | Age: 47
End: 2022-03-02
Payer: MEDICAID

## 2022-03-02 DIAGNOSIS — M47.816 LUMBAR SPONDYLOSIS: ICD-10-CM

## 2022-03-02 DIAGNOSIS — E66.01 CLASS 3 SEVERE OBESITY WITH BODY MASS INDEX (BMI) OF 45.0 TO 49.9 IN ADULT, UNSPECIFIED OBESITY TYPE, UNSPECIFIED WHETHER SERIOUS COMORBIDITY PRESENT: ICD-10-CM

## 2022-03-02 DIAGNOSIS — M48.061 NEURAL FORAMINAL STENOSIS OF LUMBAR SPINE: ICD-10-CM

## 2022-03-02 DIAGNOSIS — F43.23 SITUATIONAL MIXED ANXIETY AND DEPRESSIVE DISORDER: ICD-10-CM

## 2022-03-02 DIAGNOSIS — D05.12 BREAST NEOPLASM, TIS (DCIS), LEFT: Primary | ICD-10-CM

## 2022-03-02 DIAGNOSIS — M43.10 ANTEROLISTHESIS: ICD-10-CM

## 2022-03-02 RX ORDER — DULOXETIN HYDROCHLORIDE 30 MG/1
30 CAPSULE, DELAYED RELEASE ORAL DAILY
Qty: 90 CAPSULE | Refills: 1 | Status: CANCELLED | OUTPATIENT
Start: 2022-03-02 | End: 2022-04-01

## 2022-03-02 NOTE — TELEPHONE ENCOUNTER
No new care gaps identified.  Powered by [employer]. Reference number: [id number].   3/02/2022 11:13:44 AM CST

## 2022-03-03 ENCOUNTER — PATIENT MESSAGE (OUTPATIENT)
Dept: ADMINISTRATIVE | Facility: OTHER | Age: 47
End: 2022-03-03
Payer: MEDICAID

## 2022-03-03 ENCOUNTER — PATIENT MESSAGE (OUTPATIENT)
Dept: HEMATOLOGY/ONCOLOGY | Facility: CLINIC | Age: 47
End: 2022-03-03
Payer: MEDICAID

## 2022-03-04 ENCOUNTER — PATIENT MESSAGE (OUTPATIENT)
Dept: GASTROENTEROLOGY | Facility: CLINIC | Age: 47
End: 2022-03-04
Payer: MEDICAID

## 2022-03-04 DIAGNOSIS — R76.8 ELEVATED ANTI-TISSUE TRANSGLUTAMINASE (TTG) IGA LEVEL: Primary | ICD-10-CM

## 2022-03-09 RX ORDER — DULOXETIN HYDROCHLORIDE 30 MG/1
CAPSULE, DELAYED RELEASE ORAL
Qty: 90 CAPSULE | Refills: 0 | Status: SHIPPED | OUTPATIENT
Start: 2022-03-09 | End: 2022-08-22 | Stop reason: SDUPTHER

## 2022-03-10 ENCOUNTER — HOSPITAL ENCOUNTER (OUTPATIENT)
Dept: RADIOLOGY | Facility: HOSPITAL | Age: 47
Discharge: HOME | End: 2022-03-10
Attending: INTERNAL MEDICINE
Payer: MEDICAID

## 2022-03-10 DIAGNOSIS — K76.0 FATTY LIVER: ICD-10-CM

## 2022-03-10 PROCEDURE — 76391 MR ELASTOGRAPHY: ICD-10-PCS | Mod: 26,,, | Performed by: RADIOLOGY

## 2022-03-10 PROCEDURE — 76391 MR ELASTOGRAPHY: CPT | Mod: TC

## 2022-03-10 PROCEDURE — 76391 MR ELASTOGRAPHY: CPT | Mod: 26,,, | Performed by: RADIOLOGY

## 2022-03-15 ENCOUNTER — PATIENT MESSAGE (OUTPATIENT)
Dept: OBSTETRICS AND GYNECOLOGY | Facility: CLINIC | Age: 47
End: 2022-03-15
Payer: MEDICAID

## 2022-03-17 ENCOUNTER — PATIENT OUTREACH (OUTPATIENT)
Dept: ADMINISTRATIVE | Facility: OTHER | Age: 47
End: 2022-03-17
Payer: MEDICAID

## 2022-03-17 NOTE — PROGRESS NOTES
Care Everywhere: updated  Immunization: updated  Health Maintenance: updated  Media Review:   Legacy Review:   DIS:  Order placed:   Upcoming appts:  EFAX:  Task Tickets:  Referrals:  Colonoscopy case request 1.24.2022

## 2022-03-18 ENCOUNTER — OFFICE VISIT (OUTPATIENT)
Dept: OBSTETRICS AND GYNECOLOGY | Facility: CLINIC | Age: 47
End: 2022-03-18
Payer: MEDICAID

## 2022-03-18 VITALS
SYSTOLIC BLOOD PRESSURE: 152 MMHG | BODY MASS INDEX: 44.41 KG/M2 | DIASTOLIC BLOOD PRESSURE: 96 MMHG | HEIGHT: 68 IN | WEIGHT: 293 LBS

## 2022-03-18 DIAGNOSIS — Z01.419 ROUTINE GYNECOLOGICAL EXAMINATION: Primary | ICD-10-CM

## 2022-03-18 DIAGNOSIS — N76.0 VAGINOSIS: ICD-10-CM

## 2022-03-18 PROCEDURE — 99213 OFFICE O/P EST LOW 20 MIN: CPT | Mod: PBBFAC | Performed by: PHYSICIAN ASSISTANT

## 2022-03-18 PROCEDURE — 1159F MED LIST DOCD IN RCRD: CPT | Mod: CPTII,,, | Performed by: PHYSICIAN ASSISTANT

## 2022-03-18 PROCEDURE — 99396 PREV VISIT EST AGE 40-64: CPT | Mod: S$PBB,,, | Performed by: PHYSICIAN ASSISTANT

## 2022-03-18 PROCEDURE — 3077F PR MOST RECENT SYSTOLIC BLOOD PRESSURE >= 140 MM HG: ICD-10-PCS | Mod: CPTII,,, | Performed by: PHYSICIAN ASSISTANT

## 2022-03-18 PROCEDURE — 1159F PR MEDICATION LIST DOCUMENTED IN MEDICAL RECORD: ICD-10-PCS | Mod: CPTII,,, | Performed by: PHYSICIAN ASSISTANT

## 2022-03-18 PROCEDURE — 99999 PR PBB SHADOW E&M-EST. PATIENT-LVL III: CPT | Mod: PBBFAC,,, | Performed by: PHYSICIAN ASSISTANT

## 2022-03-18 PROCEDURE — 3044F PR MOST RECENT HEMOGLOBIN A1C LEVEL <7.0%: ICD-10-PCS | Mod: CPTII,,, | Performed by: PHYSICIAN ASSISTANT

## 2022-03-18 PROCEDURE — 87801 DETECT AGNT MULT DNA AMPLI: CPT | Performed by: PHYSICIAN ASSISTANT

## 2022-03-18 PROCEDURE — 3080F PR MOST RECENT DIASTOLIC BLOOD PRESSURE >= 90 MM HG: ICD-10-PCS | Mod: CPTII,,, | Performed by: PHYSICIAN ASSISTANT

## 2022-03-18 PROCEDURE — 3080F DIAST BP >= 90 MM HG: CPT | Mod: CPTII,,, | Performed by: PHYSICIAN ASSISTANT

## 2022-03-18 PROCEDURE — 99396 PR PREVENTIVE VISIT,EST,40-64: ICD-10-PCS | Mod: S$PBB,,, | Performed by: PHYSICIAN ASSISTANT

## 2022-03-18 PROCEDURE — 1160F RVW MEDS BY RX/DR IN RCRD: CPT | Mod: CPTII,,, | Performed by: PHYSICIAN ASSISTANT

## 2022-03-18 PROCEDURE — 3008F PR BODY MASS INDEX (BMI) DOCUMENTED: ICD-10-PCS | Mod: CPTII,,, | Performed by: PHYSICIAN ASSISTANT

## 2022-03-18 PROCEDURE — 87481 CANDIDA DNA AMP PROBE: CPT | Mod: 59 | Performed by: PHYSICIAN ASSISTANT

## 2022-03-18 PROCEDURE — 99999 PR PBB SHADOW E&M-EST. PATIENT-LVL III: ICD-10-PCS | Mod: PBBFAC,,, | Performed by: PHYSICIAN ASSISTANT

## 2022-03-18 PROCEDURE — 3008F BODY MASS INDEX DOCD: CPT | Mod: CPTII,,, | Performed by: PHYSICIAN ASSISTANT

## 2022-03-18 PROCEDURE — 3077F SYST BP >= 140 MM HG: CPT | Mod: CPTII,,, | Performed by: PHYSICIAN ASSISTANT

## 2022-03-18 PROCEDURE — 1160F PR REVIEW ALL MEDS BY PRESCRIBER/CLIN PHARMACIST DOCUMENTED: ICD-10-PCS | Mod: CPTII,,, | Performed by: PHYSICIAN ASSISTANT

## 2022-03-18 PROCEDURE — 3044F HG A1C LEVEL LT 7.0%: CPT | Mod: CPTII,,, | Performed by: PHYSICIAN ASSISTANT

## 2022-03-18 NOTE — PROGRESS NOTES
CC: Well woman exam    Dianne Hemphill is a 46 y.o. female  presents for well woman exam.  LMP: No LMP recorded. Patient has had a hysterectomy.  History of DCIS ER+/MD + of the left breast. S/p lumpectomy and completed radiation therapy on 20. Declined endocrine therapy.   Irritation with intercourse on the vaginal wall. Has been using estrace cream 2-3x per week but still occurring on the left side. Denies vaginal dryness. Using lubricants as well.  D/c metformin due to diarrhea. Working with GI for concerns of gastroparesis. Participating in the [facility] weight loss program.    Mammogram: 12/10/2021  Pap: n/a  PCP: Annette Burton MD  Routine Screening Labs: 2022 with PCP    Past Medical History:   Diagnosis Date    Anxiety     Breast cancer     DCIS, left partical mastectomy    CRPS (complex regional pain syndrome type I)     Elevated liver function tests 2022    Fatigue     General anesthetics causing adverse effect in therapeutic use     verbal combative after 1 procedure    GERD (gastroesophageal reflux disease)     History of migraine headaches     Hx of psychiatric care     Obesity     Pollen allergies     PONV (postoperative nausea and vomiting)     Psychiatric problem     Sleep difficulties     Smoker     Therapy     Urinary tract infection 2021     Past Surgical History:   Procedure Laterality Date    CHOLECYSTECTOMY      ESOPHAGOGASTRODUODENOSCOPY      ESOPHAGOGASTRODUODENOSCOPY N/A 2022    Procedure: EGD (ESOPHAGOGASTRODUODENOSCOPY);  Surgeon: Richelle Denson MD;  Location: [facility];  Service: Endoscopy;  Laterality: N/A;    ESOPHAGOGASTRODUODENOSCOPY N/A 2022    Procedure: EGD (ESOPHAGOGASTRODUODENOSCOPY);  Surgeon: Richelle Denson MD;  Location: [facility];  Service: Endoscopy;  Laterality: N/A;    EXPLORATORY LAPAROTOMY      HYSTERECTOMY      fibroids    INSERTION OF DORSAL COLUMN NERVE STIMULATOR FOR TRIAL N/A 10/22/2020     Procedure: SPINAL CORD STIMULATOR TRIAL (Nevro);  Surgeon: Clemencia Watts Jr., MD;  Location: [facility];  Service: Pain Management;  Laterality: N/A;    LUMBAR FUSION N/A 2019    Procedure: FUSION, SPINE, LUMBAR Procedure: STG 1: L5-S1 anterior Lumbar interbody fusion / STG 2:L5-S1 Posterior instrumentation;  Surgeon: Corona Bazan MD;  Location: [facility];  Service: Neurosurgery;  Laterality: N/A;  FUSION, SPINE, LUMBARProcedure: STG 1: L5-S1 anterior Lumbar interbody fusion / STG 2:L5-S1 Posterior instrumentationSURGERY TIME: 2.5hrsLOS:ANESTHESIA: GeneralBlood:    LUMBAR SYMPATHETIC NERVE BLOCK Left 2020    Procedure: BLOCK, NERVE, SYMPATHETIC, LUMBAR--Left;  Surgeon: Clemencia Watts Jr., MD;  Location: [facility];  Service: Pain Management;  Laterality: Left;    MASTECTOMY, PARTIAL Left 2020    Procedure: MASTECTOMY, PARTIAL LEFT with RADIOLOGIC MARKER;  Surgeon: Mara Munoz MD;  Location[facility];  Service: General;  Laterality: Left;    SPINAL FUSION      TONSILLECTOMY       Social History     Socioeconomic History    Marital status:     Number of children: 4   Occupational History    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 [employer]   Tobacco Use    Smoking status: Former Smoker     Packs/day: 0.50     Years: 15.00     Pack years: 7.50     Types: Vaping with nicotine     Quit date: 2019     Years since quittin.5    Smokeless tobacco: Former User   Substance and Sexual Activity    Alcohol use: Yes     Comment: 2-3 drinks per year    Drug use: No    Sexual activity: Yes     Partners: Male     Birth control/protection: See Surgical Hx     Comment: Hysterectomy     Family History   Problem Relation Age of Onset    Cancer Mother     Alcohol abuse Mother     No Known Problems Father     Cancer Maternal Grandfather     Diabetes Neg Hx     Heart disease Neg Hx     Stroke Neg Hx      OB History        2    Para   2    Term   2            AB         Living   2       SAB        IAB        Ectopic        Multiple        Live Births                     Current Outpatient Medications:     acetaminophen (TYLENOL) 650 MG TbSR, Take 650 mg by mouth as needed. , Disp: , Rfl:     amitriptyline (ELAVIL) 50 MG tablet, Take 1 tablet (50 mg total) by mouth every evening., Disp: 90 tablet, Rfl: 1    baclofen (LIORESAL) 10 MG tablet, Take 1 tablet (10 mg total) by mouth 3 (three) times daily., Disp: 270 tablet, Rfl: 1    cetirizine (ZYRTEC) 10 MG tablet, Take 10 mg by mouth once daily., Disp: , Rfl:     cholecalciferol, vitamin D3, (VITAMIN D3) 50 mcg (2,000 unit) Tab, Take 5,000 Units by mouth once daily. , Disp: , Rfl:     DULoxetine (CYMBALTA) 30 MG capsule, Take 1 capsule by mouth once daily, Disp: 90 capsule, Rfl: 0    estradioL (ESTRACE) 0.01 % (0.1 mg/gram) vaginal cream, Apply as directed TIW, Disp: 42.5 g, Rfl: 1    fexofenadine (ALLEGRA) 180 MG tablet, Take 180 mg by mouth as needed., Disp: , Rfl:     fluticasone propionate (FLONASE) 50 mcg/actuation nasal spray, 1 spray (50 mcg total) by Each Nostril route once daily., Disp: 16 g, Rfl: 11    ondansetron (ZOFRAN-ODT) 4 MG TbDL, Take 2 tablets (8 mg total) by mouth every 6 (six) hours as needed., Disp: 60 tablet, Rfl: 1    pantoprazole (PROTONIX) 40 MG tablet, Take 1 tablet (40 mg total) by mouth once daily., Disp: 90 tablet, Rfl: 1    polyethylene glycol (GLYCOLAX) 17 gram/dose powder, Take 17 g by mouth once daily., Disp: 850 g, Rfl: 0    pregabalin (LYRICA) 75 MG capsule, Take 1 capsule (75 mg total) by mouth 3 (three) times daily., Disp: 90 capsule, Rfl: 1    scopolamine (TRANSDERM-SCOP) 1.3-1.5 mg (1 mg over 3 days), Place 1 patch onto the skin every 72 hours. (Patient taking differently: Place 1 patch onto the skin as needed.), Disp: 4 patch, Rfl: 1    EPINEPHrine (EPIPEN 2-JADE) 0.3 mg/0.3 mL AtIn, Inject 0.6 mLs (0.6 mg total) into the muscle once. for 1 dose, Disp: 4 Device, Rfl: 0     "ketoconazole (NIZORAL) 2 % shampoo, APPLY TOPICALLY TWICE A  WEEK. (Patient not taking: Reported on 3/18/2022), Disp: 120 mL, Rfl: 0  No current facility-administered medications for this visit.    Facility-Administered Medications Ordered in Other Visits:     lidocaine (PF) 10 mg/ml (1%) injection 10 mg, 1 mL, Intradermal, Once, Clemencia Watts Jr., MD    lidocaine (PF) 10 mg/ml (1%) injection 10 mg, 1 mL, Intradermal, Once, Clemencia Watts Jr., MD    The 10-year ASCVD risk score (Rg GARCIA Jr., et al., 2013) is: 1.8%    Values used to calculate the score:      Age: 46 years      Sex: Female      Is Non- : No      Diabetic: No      Tobacco smoker: No      Systolic Blood Pressure: 152 mmHg      Is BP treated: No      HDL Cholesterol: 41 mg/dL      Total Cholesterol: 193 mg/dL    BP (!) 152/96   Ht 5' 8" (1.727 m)   Wt (!) 143.3 kg (316 lb)   BMI 48.05 kg/m²       ROS:  GENERAL: Denies weight gain or weight loss. Feeling well overall.   SKIN: Denies rash or lesions.   HEAD: Denies head injury or headache.   NODES: Denies enlarged lymph nodes.   CHEST: Denies chest pain or shortness of breath.   CARDIOVASCULAR: Denies palpitations or left sided chest pain.   ABDOMEN: No abdominal pain, constipation, diarrhea, nausea, vomiting or rectal bleeding.   URINARY: No frequency, dysuria, hematuria, or burning on urination.  REPRODUCTIVE: See HPI.   BREASTS: Denies pain, lumps, or nipple discharge.   HEMATOLOGIC: No easy bruisability or excessive bleeding.   MUSCULOSKELETAL: Denies joint pain or swelling.   NEUROLOGIC: Denies syncope or weakness.   PSYCHIATRIC: Denies depression, anxiety or mood swings.    PHYSICAL EXAM:  APPEARANCE: Well nourished, well developed, in no acute distress.  AFFECT: WNL, alert and oriented x 3  CHEST: Good respiratory effect  ABDOMEN: Soft.  No tenderness or masses.  No hepatosplenomegaly.  No hernias.  BREASTS: Symmetrical, no skin changes or visible lesions.  No " palpable masses, nipple discharge bilaterally. S/p left breast lumpectomy  PELVIC: Normal external genitalia without lesions. Normal hair distribution.  Adequate perineal body, normal urethral meatus.  Vagina moist and well rugated +thin white discharge, +Slight erythema on the left vaginal wall at entrance to vagina .  Cervix and uterus are surgically absent Adnexa without masses or tenderness.    EXTREMITIES: No edema.    ASSESSMENT:   Routine gynecological examination: WWE today. Annual Mammogram.    Left vaginal irritation: Improved from last evaluated in 5/2021. Will r/o infectious etiology. No history of HSV.   -     Vaginosis Screen by DNA Probe      PLAN:   Annual mammogram.  Routine screening labs with PCP.  Vaginosis screen.  Use estrace cream daily x 2 weeks and then BIW.  Avoid intercourse for 2 weeks.  If not resolving, will schedule follow up with Dr. Conner for evaluation.  Follow up annually for WWE or sooner PRN.    Patient was counseled today on A.C.S. Pap guidelines and recommendations for yearly pelvic exams, mammograms and monthly self breast exams; to see her PCP for other health maintenance.

## 2022-03-21 ENCOUNTER — PATIENT MESSAGE (OUTPATIENT)
Dept: GASTROENTEROLOGY | Facility: CLINIC | Age: 47
End: 2022-03-21
Payer: MEDICAID

## 2022-03-21 LAB
BACTERIAL VAGINOSIS DNA: NEGATIVE
CANDIDA GLABRATA DNA: NEGATIVE
CANDIDA KRUSEI DNA: NEGATIVE
CANDIDA RRNA VAG QL PROBE: NEGATIVE
T VAGINALIS RRNA GENITAL QL PROBE: NEGATIVE

## 2022-03-22 ENCOUNTER — PATIENT MESSAGE (OUTPATIENT)
Dept: PRIMARY CARE CLINIC | Facility: CLINIC | Age: 47
End: 2022-03-22
Payer: MEDICAID

## 2022-03-22 NOTE — PROGRESS NOTES
The patient location is: home   The chief complaint leading to consultation is: obesity     Visit type: audiovisual    Face to Face time with patient: 30 minutes   45 minutes of total time spent on the encounter, which includes face to face time and non-face to face time preparing to see the patient (eg, review of tests), Obtaining and/or reviewing separately obtained history, Documenting clinical information in the electronic or other health record, Independently interpreting results (not separately reported) and communicating results to the patient/family/caregiver, or Care coordination (not separately reported).     Each patient to whom he or she provides medical services by telemedicine is:  (1) informed of the relationship between the physician and patient and the respective role of any other health care provider with respect to management of the patient; and (2) notified that he or she may decline to receive medical services by telemedicine and may withdraw from such care at any time.    Oncology Survivorship Weight Loss Program   Group Medical Nutrition Therapy Visit     Session # 1     Dianne Hemphill   1975    Referring Provider:  No ref. provider found      Reason for Visit: Pt here for nutrition education and counseling for weight loss following breast or endometrial cancer     PMHx:   Past Medical History:   Diagnosis Date    Anxiety     Breast cancer     DCIS, left partical mastectomy    CRPS (complex regional pain syndrome type I)     Elevated liver function tests 2/22/2022    Fatigue     General anesthetics causing adverse effect in therapeutic use     verbal combative after 1 procedure    GERD (gastroesophageal reflux disease)     History of migraine headaches     Hx of psychiatric care     Obesity     Pollen allergies     PONV (postoperative nausea and vomiting)     Psychiatric problem     Sleep difficulties     Smoker     Therapy     Urinary tract infection 5/6/2021  "      Nutrition Assessment    This is a 46 y.o.female with a history of breast cancer and drug induced obesity (gained about 140lb from gabapentin).   She has Celiac disease. Also found to have clinical gastroparesis. Found out about both yesterday. Considering gastric bypass for weight loss as well as treating gastroparesis. However, she would very much like to participate in our program regardless.   Patient was seen today as part of 6- month weight management program. Today was session 1 (make up) which covered the following topics:   Added sugars  Label reading   Building a healthy plate   Portion sizes   Patient participated in lecture and subsequent discussion. Weights, calories goals, and tracking reinforced.     Weight:(!) 141.5 kg (312 lb)  Height:5' 9" (1.753 m)  BMI:Body mass index is 46.07 kg/m².   IBW: Ideal body weight: 66.2 kg (145 lb 15.1 oz)  Adjusted ideal body weight: 96.3 kg (212 lb 5.9 oz)    Allergies: Ativan [lorazepam], Gluten protein, Milk containing products, Wasp venom, Demerol [meperidine], Imitrex [sumatriptan succinate], Morphine, Tetracyclines, Ciprofloxacin hcl, Codeine, Erythromycin, and Macrobid [nitrofurantoin monohyd/m-cryst]    Current Medications:    Current Outpatient Medications:     acetaminophen (TYLENOL) 650 MG TbSR, Take 650 mg by mouth as needed. , Disp: , Rfl:     amitriptyline (ELAVIL) 50 MG tablet, Take 1 tablet (50 mg total) by mouth every evening., Disp: 90 tablet, Rfl: 1    baclofen (LIORESAL) 10 MG tablet, Take 1 tablet (10 mg total) by mouth 3 (three) times daily., Disp: 270 tablet, Rfl: 1    cetirizine (ZYRTEC) 10 MG tablet, Take 10 mg by mouth once daily., Disp: , Rfl:     cholecalciferol, vitamin D3, (VITAMIN D3) 50 mcg (2,000 unit) Tab, Take 5,000 Units by mouth once daily. , Disp: , Rfl:     DULoxetine (CYMBALTA) 30 MG capsule, Take 1 capsule by mouth once daily, Disp: 90 capsule, Rfl: 0    EPINEPHrine (EPIPEN 2-JADE) 0.3 mg/0.3 mL AtIn, Inject 0.6 mLs " (0.6 mg total) into the muscle once. for 1 dose, Disp: 4 Device, Rfl: 0    estradioL (ESTRACE) 0.01 % (0.1 mg/gram) vaginal cream, Apply as directed TIW, Disp: 42.5 g, Rfl: 1    fexofenadine (ALLEGRA) 180 MG tablet, Take 180 mg by mouth as needed., Disp: , Rfl:     fluticasone propionate (FLONASE) 50 mcg/actuation nasal spray, 1 spray (50 mcg total) by Each Nostril route once daily., Disp: 16 g, Rfl: 11    ketoconazole (NIZORAL) 2 % shampoo, APPLY TOPICALLY TWICE A  WEEK. (Patient not taking: Reported on 3/18/2022), Disp: 120 mL, Rfl: 0    ondansetron (ZOFRAN-ODT) 4 MG TbDL, Take 2 tablets (8 mg total) by mouth every 6 (six) hours as needed., Disp: 60 tablet, Rfl: 1    pantoprazole (PROTONIX) 40 MG tablet, Take 1 tablet (40 mg total) by mouth once daily., Disp: 90 tablet, Rfl: 1    polyethylene glycol (GLYCOLAX) 17 gram/dose powder, Take 17 g by mouth once daily., Disp: 850 g, Rfl: 0    pregabalin (LYRICA) 75 MG capsule, Take 1 capsule (75 mg total) by mouth 3 (three) times daily., Disp: 90 capsule, Rfl: 1    scopolamine (TRANSDERM-SCOP) 1.3-1.5 mg (1 mg over 3 days), Place 1 patch onto the skin every 72 hours. (Patient taking differently: Place 1 patch onto the skin as needed.), Disp: 4 patch, Rfl: 1    traMADoL (ULTRAM-ER) 100 MG Tb24, Take 100 mg by mouth daily as needed., Disp: , Rfl:   No current facility-administered medications for this visit.    Facility-Administered Medications Ordered in Other Visits:     lidocaine (PF) 10 mg/ml (1%) injection 10 mg, 1 mL, Intradermal, Once, Clemencia Watts Jr., MD    lidocaine (PF) 10 mg/ml (1%) injection 10 mg, 1 mL, Intradermal, Once, Clemencia Watts Jr., MD     Supplements: Vitamin D    Nutrition Diagnosis    Problem: Obese  Etiology (related to): medication use  Signs/Symptoms (as evidenced by): BMI = 46    Nutrition Intervention and Program Adherence    Nutrition Prescription   Patient has been given a goal of 2038 kcal per day for the duration of the  weight loss program   Discussed high protein, low carbohydrate, low fiber diet   Avoid wheat, rye, and barley containing products   3-5 small meals per day  At least 12 hour overnight fast   Add L-glutamine to help with intestinal repair from Celiac disease     Materials Provided/Reviewed   Label reading guide   Portion estimate guide     Nutrition Monitoring and Evaluation    Monitor: energy intake, weight and step counts    Goals: weight loss 1-2lb per week    Follow up in 4 weeks      Communication to referring provider/care team: note available in chart     Counseling time: 30 Minutes    Zoe Schwarz, MPH, RD, , LDN, FAND   [phone]

## 2022-03-23 ENCOUNTER — CLINICAL SUPPORT (OUTPATIENT)
Dept: HEMATOLOGY/ONCOLOGY | Facility: CLINIC | Age: 47
End: 2022-03-23
Payer: MEDICAID

## 2022-03-23 ENCOUNTER — PATIENT MESSAGE (OUTPATIENT)
Dept: HEMATOLOGY/ONCOLOGY | Facility: CLINIC | Age: 47
End: 2022-03-23

## 2022-03-23 VITALS — BODY MASS INDEX: 43.4 KG/M2 | HEIGHT: 69 IN | WEIGHT: 293 LBS

## 2022-03-23 DIAGNOSIS — D05.12 BREAST NEOPLASM, TIS (DCIS), LEFT: ICD-10-CM

## 2022-03-23 DIAGNOSIS — E66.9 OBESITY, UNSPECIFIED CLASSIFICATION, UNSPECIFIED OBESITY TYPE, UNSPECIFIED WHETHER SERIOUS COMORBIDITY PRESENT: ICD-10-CM

## 2022-03-23 DIAGNOSIS — K90.0 CELIAC DISEASE: ICD-10-CM

## 2022-03-23 DIAGNOSIS — E66.1 CLASS 3 DRUG-INDUCED OBESITY WITH SERIOUS COMORBIDITY AND BODY MASS INDEX (BMI) OF 45.0 TO 49.9 IN ADULT: ICD-10-CM

## 2022-03-23 DIAGNOSIS — Z71.3 NUTRITIONAL COUNSELING: Primary | ICD-10-CM

## 2022-03-23 PROCEDURE — 97802 PR MED NUTR THER, 1ST, INDIV, EA 15 MIN: ICD-10-PCS | Mod: 95,,, | Performed by: DIETITIAN, REGISTERED

## 2022-03-23 PROCEDURE — 97802 MEDICAL NUTRITION INDIV IN: CPT | Mod: 95,,, | Performed by: DIETITIAN, REGISTERED

## 2022-03-23 NOTE — PROGRESS NOTES
Subjective:       Patient ID: Dianne Hemphill is a 44 y.o. female.    Chief Complaint: Change in bowel habits    This is a 44-year-old female here for a follow-up visit.  Previously she was seen with abdominal symptoms several years ago, recently having undergone a significant amount of medical issues.  She has undergone multiple orthopedic procedures and has significant neuropathy.  This has resulted in persistent nausea, though no vomiting.  She is now noted changes in her bowel habits which have correlated to escalating doses of Neurontin.  She is in hospital several times, though does not note any recent antibiotic usage.  She has never had a colonoscopy.  No family history of gastrointestinal disease or malignancy. Family present to give additional history.     The following portions of the patient's history were reviewed and updated as appropriate: allergies, current medications, past family history, past medical history, past social history, past surgical history and problem list.    (Portions of this note were dictated using voice recognition software and may contain dictation related errors in spelling/grammar/syntax not found on text review)  HPI  Review of Systems   Constitutional: Positive for unexpected weight change. Negative for diaphoresis.   Respiratory: Negative for shortness of breath and wheezing.    Cardiovascular: Negative for chest pain and palpitations.   Gastrointestinal: Positive for diarrhea and nausea.   Musculoskeletal: Positive for arthralgias and gait problem. Negative for neck stiffness.         Objective:      Physical Exam   Constitutional: She is oriented to person, place, and time. She appears well-developed and well-nourished. No distress.   HENT:   Head: Normocephalic and atraumatic.   Eyes: Conjunctivae are normal. No scleral icterus.   Neck: Normal range of motion. Neck supple. No tracheal deviation present.   Cardiovascular: Normal rate and regular rhythm. Exam reveals  no gallop and no friction rub.   Pulmonary/Chest: Effort normal and breath sounds normal. No respiratory distress. She has no wheezes.   Abdominal: Soft. Bowel sounds are normal. She exhibits no distension. There is no tenderness.   Neurological: She is alert and oriented to person, place, and time.   Skin: Skin is warm and dry. No rash noted. She is not diaphoretic. No erythema.   Psychiatric: She has a normal mood and affect. Her behavior is normal.   Nursing note and vitals reviewed.        Labs/imaging; reviewed  Assessment:       1. Diarrhea, unspecified type    2. Nausea        Plan:   1. Stool studies  2. Consider EGD/Colonoscopy   sinus bradycardia

## 2022-03-28 ENCOUNTER — PATIENT MESSAGE (OUTPATIENT)
Dept: PRIMARY CARE CLINIC | Facility: CLINIC | Age: 47
End: 2022-03-28
Payer: MEDICAID

## 2022-03-28 DIAGNOSIS — K21.9 GASTROESOPHAGEAL REFLUX DISEASE WITHOUT ESOPHAGITIS: ICD-10-CM

## 2022-03-28 DIAGNOSIS — M43.27 FUSION OF LUMBOSACRAL SPINE: ICD-10-CM

## 2022-03-28 DIAGNOSIS — M54.10 RADICULAR PAIN OF LEFT LOWER EXTREMITY: ICD-10-CM

## 2022-03-28 DIAGNOSIS — M48.061 NEURAL FORAMINAL STENOSIS OF LUMBAR SPINE: ICD-10-CM

## 2022-03-28 DIAGNOSIS — R13.10 DYSPHAGIA, UNSPECIFIED TYPE: ICD-10-CM

## 2022-03-28 RX ORDER — PREGABALIN 100 MG/1
100 CAPSULE ORAL 3 TIMES DAILY
Qty: 90 CAPSULE | Refills: 1 | Status: SHIPPED | OUTPATIENT
Start: 2022-03-28 | End: 2022-06-01 | Stop reason: SDUPTHER

## 2022-03-28 RX ORDER — PANTOPRAZOLE SODIUM 40 MG/1
40 TABLET, DELAYED RELEASE ORAL DAILY
Qty: 90 TABLET | Refills: 3 | Status: SHIPPED | OUTPATIENT
Start: 2022-03-28 | End: 2022-08-22 | Stop reason: SDUPTHER

## 2022-03-28 NOTE — TELEPHONE ENCOUNTER
----- Message from Ellyn Gonzalez sent at 3/28/2022  2:01 PM CDT -----  Contact: pt [phone]  Vivian from [facility]  requesting for provider to contact pt pharm in regards to diagnosis code for pantoprazole (PROTONIX) 40 MG tablet.      [id number] - [place], [place] - [address]  [address]  [facility] [address]  Phone: [phone] Fax: [phone]      Please call and advise pt when it's done

## 2022-03-28 NOTE — TELEPHONE ENCOUNTER
Medication went to [facility] pharmacy. Spoke w/ Moreno @ [facility] pharmacy & gave Lyrica & pantoprazole RX from today

## 2022-03-28 NOTE — TELEPHONE ENCOUNTER
Please advise  Pt would like to go up on her lyrica (pending w/ increase dosage)  Pt changing pharmacies too

## 2022-03-29 ENCOUNTER — OFFICE VISIT (OUTPATIENT)
Dept: PSYCHIATRY | Facility: CLINIC | Age: 47
End: 2022-03-29
Payer: MEDICAID

## 2022-03-29 DIAGNOSIS — E66.1 CLASS 3 DRUG-INDUCED OBESITY WITH SERIOUS COMORBIDITY AND BODY MASS INDEX (BMI) OF 45.0 TO 49.9 IN ADULT: Primary | ICD-10-CM

## 2022-03-29 DIAGNOSIS — D05.12 BREAST NEOPLASM, TIS (DCIS), LEFT: ICD-10-CM

## 2022-03-29 PROCEDURE — 99211 OFF/OP EST MAY X REQ PHY/QHP: CPT | Mod: PBBFAC

## 2022-03-29 PROCEDURE — 96164 HLTH BHV IVNTJ GRP 1ST 30: CPT | Mod: AH,HB,, | Performed by: PSYCHOLOGIST

## 2022-03-29 PROCEDURE — 1160F PR REVIEW ALL MEDS BY PRESCRIBER/CLIN PHARMACIST DOCUMENTED: ICD-10-PCS | Mod: AH,HB,CPTII, | Performed by: PSYCHOLOGIST

## 2022-03-29 PROCEDURE — 1160F RVW MEDS BY RX/DR IN RCRD: CPT | Mod: AH,HB,CPTII, | Performed by: PSYCHOLOGIST

## 2022-03-29 PROCEDURE — 1159F MED LIST DOCD IN RCRD: CPT | Mod: AH,HB,CPTII, | Performed by: PSYCHOLOGIST

## 2022-03-29 PROCEDURE — 3044F HG A1C LEVEL LT 7.0%: CPT | Mod: AH,HB,CPTII, | Performed by: PSYCHOLOGIST

## 2022-03-29 PROCEDURE — 96165 HLTH BHV IVNTJ GRP EA ADDL: CPT | Mod: AH,HB,, | Performed by: PSYCHOLOGIST

## 2022-03-29 PROCEDURE — 96164 PR INTERVENTION, HEALTH BEHAVIOR, GROUP, 1ST 30 MINS: ICD-10-PCS | Mod: AH,HB,, | Performed by: PSYCHOLOGIST

## 2022-03-29 PROCEDURE — 99999 PR PBB SHADOW E&M-EST. PATIENT-LVL I: ICD-10-PCS | Mod: PBBFAC,HB,,

## 2022-03-29 PROCEDURE — 96165 PR INTERVENTION, HEALTH BEHAVIOR, GROUP, EA ADDTL 15 MINS: ICD-10-PCS | Mod: AH,HB,, | Performed by: PSYCHOLOGIST

## 2022-03-29 PROCEDURE — 1159F PR MEDICATION LIST DOCUMENTED IN MEDICAL RECORD: ICD-10-PCS | Mod: AH,HB,CPTII, | Performed by: PSYCHOLOGIST

## 2022-03-29 PROCEDURE — 3044F PR MOST RECENT HEMOGLOBIN A1C LEVEL <7.0%: ICD-10-PCS | Mod: AH,HB,CPTII, | Performed by: PSYCHOLOGIST

## 2022-03-29 PROCEDURE — 99999 PR PBB SHADOW E&M-EST. PATIENT-LVL I: CPT | Mod: PBBFAC,HB,,

## 2022-03-29 RX ORDER — PANTOPRAZOLE SODIUM 40 MG/1
TABLET, DELAYED RELEASE ORAL
Qty: 90 TABLET | Refills: 3 | OUTPATIENT
Start: 2022-03-29

## 2022-03-29 NOTE — TELEPHONE ENCOUNTER
Pharmacy Call Documentation    Pharmacy Called:  [facility] Call Time: 7:30 (closed) 12:20pm   Spoke with: hold (15minutes) voicemail (2minutes) 03/29/2022      Called to verify diagnosis codes associated with these orders: Gastroesophageal reflux disease without esophagitis (K21.9); Dysphagia, unspecified type (R13.10).     Script was received.  request is duplicate or refill too soon.    Request will be: refused     Additional actions required: no     Current Medication Requested: (refill encounter)   Requested Prescriptions     Pending Prescriptions Disp Refills    pantoprazole (PROTONIX) 40 MG tablet [Pharmacy Med Name: PANTOPRAZOLE SOD 40MG TAB] 90 tablet 3     Sig: TAKE 1 TABLET BY MOUTH ONCE DAILY      [facility]     Note composed: 03/29/2022 7:30 AM

## 2022-03-30 NOTE — PROGRESS NOTES
"Health and Behavior Intervention Group Note -Psychologist       Date: 03/29/2022    Site:   [place]     Number of patients in attendance: 10    Group Type: Health and Behavior Group Intervention for Cancer Survivors (08205 first 30 minutes, 87407 additional 15 minutes x2)    Dianne Hemphill arrived on time for the group. Patient was appropriately dressed and groomed. Dianne Hemphill  appeared alert and oriented in all spheres. No suicidal or homicidal ideation was reported.  Dianne Hemphill actively participated in the group process and provided both prompted and unprompted contribution to the group discussion.      Content of session:    Group 1:  Getting Started and My Weight Loss Goals  Introduction to group, establishing SMART goals, Identified Reasons for Weight loss, Identified Factors that Influence Weight       Goals for next visit: Review Major influences on maintaining weight worksheet     Review SMART goals worksheet and set 1 SMART goal each week before next psychoeducational group visit     Complete set-up of FITbit and Scale through [facility]     Complete initial worksheets for group participation through [employer]        Patient's response to intervention:The patient's response to intervention is accepting, motivated. Patient stated she was working toward behavior change with numerous barriers. She has several other, conflicting health conditions (ongoing limitations from broken back, gastroparesis) which make dietary planning a challenge. She acknowledged the role of self-image ("not feeling sexy") in motivating weight loss efforts.    Behavior: Interested, Shared Feelings, Engaged and Participated    Insight: good     Progress toward goals:Progress as Expected      Goals from last visit: N/A    Treatment Plan: Continue group participation  · Target symptoms: weight loss   · Why chosen therapy is appropriate versus another modality: relevant to diagnosis, evidence based " practice  · Outcome monitoring methods: checklist/rating scale, weight monitoring  · Return to clinic: 1 month     Length of Service (minutes direct face-to-face contact): 60    Encounter Diagnoses   Name Primary?    Class 3 drug-induced obesity with serious comorbidity and body mass index (BMI) of 45.0 to 49.9 in adult Yes    Breast neoplasm, Tis (DCIS), left          Raad Limon, PhD  [place] License #[id number]  [place] License #[id number]

## 2022-04-01 ENCOUNTER — TELEPHONE (OUTPATIENT)
Dept: PRIMARY CARE CLINIC | Facility: CLINIC | Age: 47
End: 2022-04-01
Payer: MEDICAID

## 2022-04-01 NOTE — TELEPHONE ENCOUNTER
Spoke with pt re: pantoprazole needs a PA from [employer].  I called [facility] & did a PA over the phone.   I received PA for this medication for 5 month.  AUTH #: [id number]    Called [facility] & spoke with Leslie. Gave Leslie all information. She is processing the RX for the pt now.   I called pt & informed her that [facility] was processing the RX now bc pt out of her medication.    I called [facility] pharmacy & spoke with kezia.   I gave her the information & she will process the RX for 90 days for in one month, bc the 1st 30 days [facility] is filling bc pt needs meds now.

## 2022-04-01 NOTE — TELEPHONE ENCOUNTER
----- Message from Ellyn Gonzalez sent at 3/29/2022  1:47 PM CDT -----  Contact: DEIDRA [facility]) [phone]  Deidra from [facility] pharm stated returning a call, also stated diagnosis code that was provided did not work.    Please call and advise

## 2022-04-01 NOTE — TELEPHONE ENCOUNTER
Felicitas Burton Annette Staff  Caller: [employer] [phone] (3 days ago, 12:48 PM)    [facility] called and stated that they needed to speak to you about ITB code. Ref # [id number]     Please call and advise

## 2022-04-01 NOTE — TELEPHONE ENCOUNTER
PLease see note from 4-1-22.   Spoke with both pharmacies & [facility].   Medication approved for 5 months   AUTH #: [id number]    Pt informed

## 2022-04-04 ENCOUNTER — TELEPHONE (OUTPATIENT)
Dept: GASTROENTEROLOGY | Facility: CLINIC | Age: 47
End: 2022-04-04
Payer: MEDICAID

## 2022-04-04 NOTE — TELEPHONE ENCOUNTER
----- Message from Richelle Denson MD sent at 3/30/2022 11:56 AM CDT -----  Celiac (-) but she can still continue the gluten-free diet b/c it has helped her.

## 2022-04-05 ENCOUNTER — NUTRITION (OUTPATIENT)
Dept: PSYCHIATRY | Facility: CLINIC | Age: 47
End: 2022-04-05
Payer: MEDICAID

## 2022-04-05 VITALS — WEIGHT: 293 LBS | HEIGHT: 68 IN | BODY MASS INDEX: 44.41 KG/M2

## 2022-04-05 DIAGNOSIS — Z71.3 NUTRITIONAL COUNSELING: Primary | ICD-10-CM

## 2022-04-05 DIAGNOSIS — D05.12 BREAST NEOPLASM, TIS (DCIS), LEFT: ICD-10-CM

## 2022-04-05 DIAGNOSIS — E66.1 CLASS 3 DRUG-INDUCED OBESITY WITH SERIOUS COMORBIDITY AND BODY MASS INDEX (BMI) OF 45.0 TO 49.9 IN ADULT: ICD-10-CM

## 2022-04-05 PROCEDURE — 97804 MEDICAL NUTRITION GROUP: CPT | Mod: PBBFAC | Performed by: DIETITIAN, REGISTERED

## 2022-04-05 PROCEDURE — 99999 PR PBB SHADOW E&M-EST. PATIENT-LVL I: CPT | Mod: PBBFAC,HB,,

## 2022-04-05 PROCEDURE — 99211 OFF/OP EST MAY X REQ PHY/QHP: CPT | Mod: PBBFAC

## 2022-04-05 PROCEDURE — 99999 PR PBB SHADOW E&M-EST. PATIENT-LVL I: ICD-10-PCS | Mod: PBBFAC,HB,,

## 2022-04-06 NOTE — PROGRESS NOTES
"Oncology Survivorship Weight Loss Program   Group Medical Nutrition Therapy Visit     Session # 2    Dianne Hemphill   1975    Referring Provider:  No ref. provider found      Reason for Visit: Pt here for nutrition education and counseling for weight loss following breast or endometrial cancer     PMHx:   Past Medical History:   Diagnosis Date    Anxiety     Breast cancer     DCIS, left partical mastectomy    CRPS (complex regional pain syndrome type I)     Elevated liver function tests 2/22/2022    Fatigue     General anesthetics causing adverse effect in therapeutic use     verbal combative after 1 procedure    GERD (gastroesophageal reflux disease)     History of migraine headaches     Hx of psychiatric care     Obesity     Pollen allergies     PONV (postoperative nausea and vomiting)     Psychiatric problem     Sleep difficulties     Smoker     Therapy     Urinary tract infection 5/6/2021       Nutrition Assessment    This is a 46 y.o.female with a history of breast cancer.   Patient was seen today as part of 6- month weight management program. Today was session 2 which covered the following topics:   Benefits of plant foods of different colors   Benefits of natural herbs and spices   Creating colorful, flavorful meals   Patient participated in lecture and subsequent discussion. Weights, calories goals, and tracking reinforced.   Patient has gained about 3lb since previous visit.     Weight:(!) 143.8 kg (317 lb 0.3 oz)  Height:5' 8" (1.727 m)  BMI:Body mass index is 48.2 kg/m².   IBW: Ideal body weight: 63.9 kg (140 lb 14 oz)  Adjusted ideal body weight: 95.9 kg (211 lb 5.3 oz)    Allergies: Ativan [lorazepam], Gluten protein, Milk containing products, Wasp venom, Demerol [meperidine], Imitrex [sumatriptan succinate], Morphine, Tetracyclines, Ciprofloxacin hcl, Codeine, Erythromycin, and Macrobid [nitrofurantoin monohyd/m-cryst]    Current Medications:    Current Outpatient " Medications:     acetaminophen (TYLENOL) 650 MG TbSR, Take 650 mg by mouth as needed. , Disp: , Rfl:     amitriptyline (ELAVIL) 50 MG tablet, Take 1 tablet (50 mg total) by mouth every evening., Disp: 90 tablet, Rfl: 1    baclofen (LIORESAL) 10 MG tablet, Take 1 tablet (10 mg total) by mouth 3 (three) times daily., Disp: 270 tablet, Rfl: 1    cetirizine (ZYRTEC) 10 MG tablet, Take 10 mg by mouth once daily., Disp: , Rfl:     cholecalciferol, vitamin D3, (VITAMIN D3) 50 mcg (2,000 unit) Tab, Take 5,000 Units by mouth once daily. , Disp: , Rfl:     DULoxetine (CYMBALTA) 30 MG capsule, Take 1 capsule by mouth once daily, Disp: 90 capsule, Rfl: 0    EPINEPHrine (EPIPEN 2-JADE) 0.3 mg/0.3 mL AtIn, Inject 0.6 mLs (0.6 mg total) into the muscle once. for 1 dose, Disp: 4 Device, Rfl: 0    estradioL (ESTRACE) 0.01 % (0.1 mg/gram) vaginal cream, Apply as directed TIW, Disp: 42.5 g, Rfl: 1    fexofenadine (ALLEGRA) 180 MG tablet, Take 180 mg by mouth as needed., Disp: , Rfl:     fluticasone propionate (FLONASE) 50 mcg/actuation nasal spray, 1 spray (50 mcg total) by Each Nostril route once daily., Disp: 16 g, Rfl: 11    ketoconazole (NIZORAL) 2 % shampoo, APPLY TOPICALLY TWICE A  WEEK. (Patient not taking: Reported on 3/18/2022), Disp: 120 mL, Rfl: 0    ondansetron (ZOFRAN-ODT) 4 MG TbDL, Take 2 tablets (8 mg total) by mouth every 6 (six) hours as needed., Disp: 60 tablet, Rfl: 1    pantoprazole (PROTONIX) 40 MG tablet, Take 1 tablet (40 mg total) by mouth once daily., Disp: 90 tablet, Rfl: 3    polyethylene glycol (GLYCOLAX) 17 gram/dose powder, Take 17 g by mouth once daily., Disp: 850 g, Rfl: 0    pregabalin (LYRICA) 100 MG capsule, Take 1 capsule (100 mg total) by mouth 3 (three) times daily., Disp: 90 capsule, Rfl: 1    scopolamine (TRANSDERM-SCOP) 1.3-1.5 mg (1 mg over 3 days), Place 1 patch onto the skin every 72 hours. (Patient taking differently: Place 1 patch onto the skin as needed.), Disp: 4 patch,  Rfl: 1    traMADoL (ULTRAM-ER) 100 MG Tb24, Take 100 mg by mouth daily as needed., Disp: , Rfl:   No current facility-administered medications for this visit.    Facility-Administered Medications Ordered in Other Visits:     lidocaine (PF) 10 mg/ml (1%) injection 10 mg, 1 mL, Intradermal, Once, Clemencia Watts Jr., MD    lidocaine (PF) 10 mg/ml (1%) injection 10 mg, 1 mL, Intradermal, Once, Clemencia Watts Jr., MD    Nutrition Diagnosis    Problem: Obese  Etiology (related to): medication and lack of physical activity   Signs/Symptoms (as evidenced by): BMI = 48.7    Nutrition Intervention and Program Adherence    Nutrition Prescription   Patient has been given a goal of 2050 kcal per day for the duration of the weight loss program     Materials Provided/Reviewed Spice Things Up (handout)     Nutrition Monitoring and Evaluation    Monitor: energy intake and weight and step counts     Goals: weight loss 1-2lb per week    Follow up in 4 weeks      Communication to referring provider/care team: note available in chart     Counseling time: 30 Minutes    Zoe Schwarz, MPH, RD, , LDN, FAND   [phone]

## 2022-04-07 ENCOUNTER — PATIENT MESSAGE (OUTPATIENT)
Dept: GASTROENTEROLOGY | Facility: CLINIC | Age: 47
End: 2022-04-07
Payer: MEDICAID

## 2022-04-07 NOTE — TELEPHONE ENCOUNTER
Is this just sudden nausea?  The most common cause for nausea is constipation, is she moving her bowels well?  She really should not be using scop patches this frequently, so please take that off.  She might need an appt with SG, might need an xray to check stool burden.

## 2022-04-07 NOTE — TELEPHONE ENCOUNTER
Asked patient about her bowel movements. Patient states she has suffered with constipation and nausea for a few years. Scheduled appointment for patient to come in and discuss.

## 2022-04-08 ENCOUNTER — OFFICE VISIT (OUTPATIENT)
Dept: GASTROENTEROLOGY | Facility: CLINIC | Age: 47
End: 2022-04-08
Payer: MEDICAID

## 2022-04-08 VITALS
DIASTOLIC BLOOD PRESSURE: 84 MMHG | HEART RATE: 84 BPM | SYSTOLIC BLOOD PRESSURE: 136 MMHG | HEIGHT: 68 IN | BODY MASS INDEX: 44.41 KG/M2 | WEIGHT: 293 LBS

## 2022-04-08 DIAGNOSIS — R11.0 NAUSEA: Primary | ICD-10-CM

## 2022-04-08 DIAGNOSIS — R19.8 IRREGULAR BOWEL HABITS: ICD-10-CM

## 2022-04-08 DIAGNOSIS — E66.1 CLASS 3 DRUG-INDUCED OBESITY WITH SERIOUS COMORBIDITY AND BODY MASS INDEX (BMI) OF 45.0 TO 49.9 IN ADULT: ICD-10-CM

## 2022-04-08 PROCEDURE — 3075F PR MOST RECENT SYSTOLIC BLOOD PRESS GE 130-139MM HG: ICD-10-PCS | Mod: CPTII,,, | Performed by: NURSE PRACTITIONER

## 2022-04-08 PROCEDURE — 99999 PR PBB SHADOW E&M-EST. PATIENT-LVL V: CPT | Mod: PBBFAC,,, | Performed by: NURSE PRACTITIONER

## 2022-04-08 PROCEDURE — 1159F PR MEDICATION LIST DOCUMENTED IN MEDICAL RECORD: ICD-10-PCS | Mod: CPTII,,, | Performed by: NURSE PRACTITIONER

## 2022-04-08 PROCEDURE — 99214 PR OFFICE/OUTPT VISIT, EST, LEVL IV, 30-39 MIN: ICD-10-PCS | Mod: S$PBB,,, | Performed by: NURSE PRACTITIONER

## 2022-04-08 PROCEDURE — 3044F PR MOST RECENT HEMOGLOBIN A1C LEVEL <7.0%: ICD-10-PCS | Mod: CPTII,,, | Performed by: NURSE PRACTITIONER

## 2022-04-08 PROCEDURE — 1159F MED LIST DOCD IN RCRD: CPT | Mod: CPTII,,, | Performed by: NURSE PRACTITIONER

## 2022-04-08 PROCEDURE — 3075F SYST BP GE 130 - 139MM HG: CPT | Mod: CPTII,,, | Performed by: NURSE PRACTITIONER

## 2022-04-08 PROCEDURE — 3044F HG A1C LEVEL LT 7.0%: CPT | Mod: CPTII,,, | Performed by: NURSE PRACTITIONER

## 2022-04-08 PROCEDURE — 3079F DIAST BP 80-89 MM HG: CPT | Mod: CPTII,,, | Performed by: NURSE PRACTITIONER

## 2022-04-08 PROCEDURE — 1160F PR REVIEW ALL MEDS BY PRESCRIBER/CLIN PHARMACIST DOCUMENTED: ICD-10-PCS | Mod: CPTII,,, | Performed by: NURSE PRACTITIONER

## 2022-04-08 PROCEDURE — 99999 PR PBB SHADOW E&M-EST. PATIENT-LVL V: ICD-10-PCS | Mod: PBBFAC,,, | Performed by: NURSE PRACTITIONER

## 2022-04-08 PROCEDURE — 3008F PR BODY MASS INDEX (BMI) DOCUMENTED: ICD-10-PCS | Mod: CPTII,,, | Performed by: NURSE PRACTITIONER

## 2022-04-08 PROCEDURE — 1160F RVW MEDS BY RX/DR IN RCRD: CPT | Mod: CPTII,,, | Performed by: NURSE PRACTITIONER

## 2022-04-08 PROCEDURE — 3008F BODY MASS INDEX DOCD: CPT | Mod: CPTII,,, | Performed by: NURSE PRACTITIONER

## 2022-04-08 PROCEDURE — 99215 OFFICE O/P EST HI 40 MIN: CPT | Mod: PBBFAC,PO | Performed by: NURSE PRACTITIONER

## 2022-04-08 PROCEDURE — 3079F PR MOST RECENT DIASTOLIC BLOOD PRESSURE 80-89 MM HG: ICD-10-PCS | Mod: CPTII,,, | Performed by: NURSE PRACTITIONER

## 2022-04-08 PROCEDURE — 99214 OFFICE O/P EST MOD 30 MIN: CPT | Mod: S$PBB,,, | Performed by: NURSE PRACTITIONER

## 2022-04-08 RX ORDER — PROMETHAZINE HYDROCHLORIDE 25 MG/1
25 TABLET ORAL 3 TIMES DAILY PRN
Qty: 90 TABLET | Refills: 5 | Status: SHIPPED | OUTPATIENT
Start: 2022-04-08 | End: 2022-04-11 | Stop reason: SDUPTHER

## 2022-04-08 RX ORDER — HYOSCYAMINE SULFATE 0.12 MG/1
0.12 TABLET SUBLINGUAL EVERY 6 HOURS PRN
Qty: 120 TABLET | Refills: 2 | Status: SHIPPED | OUTPATIENT
Start: 2022-04-08 | End: 2022-08-04 | Stop reason: SDUPTHER

## 2022-04-08 NOTE — PATIENT INSTRUCTIONS
- Pantoprazole 40 mg every morning on empty stomach 30 minutes prior to food or other medication  - Miralax 1 capful daily  - Levsin prn abdominal discomfort; take at least once daily

## 2022-04-11 ENCOUNTER — PATIENT MESSAGE (OUTPATIENT)
Dept: GASTROENTEROLOGY | Facility: CLINIC | Age: 47
End: 2022-04-11
Payer: MEDICAID

## 2022-04-11 DIAGNOSIS — R11.0 NAUSEA: ICD-10-CM

## 2022-04-11 RX ORDER — PROMETHAZINE HYDROCHLORIDE 25 MG/1
25 TABLET ORAL 3 TIMES DAILY PRN
Qty: 90 TABLET | Refills: 5 | Status: SHIPPED | OUTPATIENT
Start: 2022-04-11 | End: 2022-05-11

## 2022-04-11 NOTE — PROGRESS NOTES
Subjective:       Patient ID: Dianne Hemphill is a 46 y.o. female.    Chief Complaint: Nausea and Constipation    45 y/o female with chronic pain and hx of breast cancer presents to clinic with c/o nausea and constipation. Reports constant nausea without vomiting since last week. Zofran prn without symptom relief. Symptoms worse after meals. Slight upper abdominal pain described as dull ache. Has irregular bowel pattern with alternating constipation and diarrhea. EGD 3/2022 normal with exception of hiatal hernia and mild gastritis; biopsy (-) HP & (-) celiac.       Past Medical History:   Diagnosis Date    Anxiety     Breast cancer     DCIS, left partical mastectomy    CRPS (complex regional pain syndrome type I)     Elevated liver function tests 2/22/2022    Fatigue     General anesthetics causing adverse effect in therapeutic use     verbal combative after 1 procedure    GERD (gastroesophageal reflux disease)     History of migraine headaches     Hx of psychiatric care     Obesity     Pollen allergies     PONV (postoperative nausea and vomiting)     Psychiatric problem     Sleep difficulties     Smoker     Therapy     Urinary tract infection 5/6/2021       Past Surgical History:   Procedure Laterality Date    CHOLECYSTECTOMY      ESOPHAGOGASTRODUODENOSCOPY      ESOPHAGOGASTRODUODENOSCOPY N/A 2/7/2022    Procedure: EGD (ESOPHAGOGASTRODUODENOSCOPY);  Surgeon: Richelle Denson MD;  Location: [facility];  Service: Endoscopy;  Laterality: N/A;    ESOPHAGOGASTRODUODENOSCOPY N/A 2/18/2022    Procedure: EGD (ESOPHAGOGASTRODUODENOSCOPY);  Surgeon: Richelle Denson MD;  Location: [facility];  Service: Endoscopy;  Laterality: N/A;    ESOPHAGOGASTRODUODENOSCOPY N/A 3/22/2022    Procedure: EGD (ESOPHAGOGASTRODUODENOSCOPY);  Surgeon: Richelle Denson MD;  Location: [facility];  Service: Endoscopy;  Laterality: N/A;    EXPLORATORY LAPAROTOMY      HYSTERECTOMY      fibroids    INSERTION OF  DORSAL COLUMN NERVE STIMULATOR FOR TRIAL N/A 10/22/2020    Procedure: SPINAL CORD STIMULATOR TRIAL (Nevro);  Surgeon: Clemencia Watts Jr., MD;  Location: [facility];  Service: Pain Management;  Laterality: N/A;    LUMBAR FUSION N/A 2019    Procedure: FUSION, SPINE, LUMBAR Procedure: STG 1: L5-S1 anterior Lumbar interbody fusion / STG 2:L5-S1 Posterior instrumentation;  Surgeon: Corona Bazan MD;  Location: [facility] [place] [facility];  Service: Neurosurgery;  Laterality: N/A;  FUSION, SPINE, LUMBARProcedure: STG 1: L5-S1 anterior Lumbar interbody fusion / STG 2:L5-S1 Posterior instrumentationSURGERY TIME: 2.5hrsLOS:ANESTHESIA: GeneralBlood:    LUMBAR SYMPATHETIC NERVE BLOCK Left 2020    Procedure: BLOCK, NERVE, SYMPATHETIC, LUMBAR--Left;  Surgeon: Clemencia Watts Jr., MD;  Location: [facility] [place] [facility]IN [facility];  Service: Pain Management;  Laterality: Left;    MASTECTOMY, PARTIAL Left 2020    Procedure: MASTECTOMY, PARTIAL LEFT with RADIOLOGIC MARKER;  Surgeon: Mara Munoz MD;  Location: [facility];  Service: General;  Laterality: Left;    SPINAL FUSION      TONSILLECTOMY         Family History   Problem Relation Age of Onset    Cancer Mother     Alcohol abuse Mother     No Known Problems Father     Cancer Maternal Grandfather     Diabetes Neg Hx     Heart disease Neg Hx     Stroke Neg Hx        Social History     Socioeconomic History    Marital status:     Number of children: 4   Occupational History    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   HENT: Negative for trouble swallowing.    Respiratory: Negative for shortness of breath.    Cardiovascular: Negative for chest pain.   Gastrointestinal: Positive for abdominal pain, constipation, diarrhea and nausea.   Genitourinary: Negative for dysuria.   Neurological: Negative for dizziness and weakness.   Hematological: Negative for adenopathy. Does not bruise/bleed easily.   Psychiatric/Behavioral: Negative for dysphoric mood.         Objective:     Vitals:    04/08/22 1417   BP: 136/84   Pulse: 84   Weight: (!) 142.3 kg (313 lb 12.8 oz)   Height: 5' 8" (1.727 m)          Physical Exam  Constitutional:       General: She is not in acute distress.     Appearance: Normal appearance. She is not ill-appearing.   HENT:      Head: Normocephalic.   Eyes:      Conjunctiva/sclera: Conjunctivae normal.      Pupils: Pupils are equal, round, and reactive to light.   Pulmonary:      Effort: Pulmonary effort is normal. No respiratory distress.   Musculoskeletal:         General: Normal range of motion.      Cervical back: Normal range of motion.   Skin:     General: Skin is warm and dry.   Neurological:      Mental Status: She is alert and oriented to person, place, and time.   Psychiatric:         Mood and Affect: Mood normal.         Behavior: Behavior normal.               Assessment:         ICD-10-CM ICD-9-CM   1. Nausea  R11.0 787.02   2. Irregular bowel habits  R19.8 569.89   3. Class 3 drug-induced obesity with serious comorbidity and body mass index (BMI) of 45.0 to 49.9 in adult  E66.1 278.00    Z68.42 V85.42       Plan:       Nausea  -     promethazine (PHENERGAN) 25 MG tablet; Take 1 tablet (25 mg total) by mouth 3 (three) times daily as needed for Nausea.  Dispense: 90 tablet; Refill: 5    Irregular bowel habits    Class 3 drug-induced obesity with serious comorbidity and body mass index (BMI) of 45.0 to " 49.9 in adult    Other orders  -     hyoscyamine (LEVSIN/SL) 0.125 mg Subl; Place 1 tablet (0.125 mg total) under the tongue every 6 (six) hours as needed (abdominal pain).  Dispense: 120 tablet; Refill: 2    - Pantoprazole 40 mg every morning on empty stomach 30 minutes prior to food or other medication  - Miralax 1 capful daily  - Levsin prn abdominal discomfort; take at least once daily    Follow up if symptoms worsen or fail to improve.     Patient's Medications   New Prescriptions    HYOSCYAMINE (LEVSIN/SL) 0.125 MG SUBL    Place 1 tablet (0.125 mg total) under the tongue every 6 (six) hours as needed (abdominal pain).    PROMETHAZINE (PHENERGAN) 25 MG TABLET    Take 1 tablet (25 mg total) by mouth 3 (three) times daily as needed for Nausea.   Previous Medications    ACETAMINOPHEN (TYLENOL) 650 MG TBSR    Take 650 mg by mouth as needed.     AMITRIPTYLINE (ELAVIL) 50 MG TABLET    Take 1 tablet (50 mg total) by mouth every evening.    BACLOFEN (LIORESAL) 10 MG TABLET    Take 1 tablet (10 mg total) by mouth 3 (three) times daily.    CETIRIZINE (ZYRTEC) 10 MG TABLET    Take 10 mg by mouth once daily.    CHOLECALCIFEROL, VITAMIN D3, (VITAMIN D3) 50 MCG (2,000 UNIT) TAB    Take 5,000 Units by mouth once daily.     DULOXETINE (CYMBALTA) 30 MG CAPSULE    Take 1 capsule by mouth once daily    EPINEPHRINE (EPIPEN 2-JADE) 0.3 MG/0.3 ML ATIN    Inject 0.6 mLs (0.6 mg total) into the muscle once. for 1 dose    ESTRADIOL (ESTRACE) 0.01 % (0.1 MG/GRAM) VAGINAL CREAM    Apply as directed TIW    FEXOFENADINE (ALLEGRA) 180 MG TABLET    Take 180 mg by mouth as needed.    FLUTICASONE PROPIONATE (FLONASE) 50 MCG/ACTUATION NASAL SPRAY    1 spray (50 mcg total) by Each Nostril route once daily.    KETOCONAZOLE (NIZORAL) 2 % SHAMPOO    APPLY TOPICALLY TWICE A  WEEK.    ONDANSETRON (ZOFRAN-ODT) 4 MG TBDL    Take 2 tablets (8 mg total) by mouth every 6 (six) hours as needed.    PANTOPRAZOLE (PROTONIX) 40 MG TABLET    Take 1 tablet (40 mg  total) by mouth once daily.    POLYETHYLENE GLYCOL (GLYCOLAX) 17 GRAM/DOSE POWDER    Take 17 g by mouth once daily.    PREGABALIN (LYRICA) 100 MG CAPSULE    Take 1 capsule (100 mg total) by mouth 3 (three) times daily.    SCOPOLAMINE (TRANSDERM-SCOP) 1.3-1.5 MG (1 MG OVER 3 DAYS)    Place 1 patch onto the skin every 72 hours.    TRAMADOL (ULTRAM-ER) 100 MG TB24    Take 100 mg by mouth daily as needed.   Modified Medications    No medications on file   Discontinued Medications    No medications on file

## 2022-04-12 ENCOUNTER — PATIENT MESSAGE (OUTPATIENT)
Dept: PRIMARY CARE CLINIC | Facility: CLINIC | Age: 47
End: 2022-04-12
Payer: MEDICAID

## 2022-04-12 DIAGNOSIS — M48.061 NEURAL FORAMINAL STENOSIS OF LUMBAR SPINE: ICD-10-CM

## 2022-04-12 DIAGNOSIS — Z98.890 S/P SPINAL SURGERY: ICD-10-CM

## 2022-04-12 DIAGNOSIS — M54.10 RADICULAR PAIN OF LEFT LOWER EXTREMITY: ICD-10-CM

## 2022-04-12 DIAGNOSIS — Z72.820 POOR SLEEP: ICD-10-CM

## 2022-04-12 RX ORDER — AMITRIPTYLINE HYDROCHLORIDE 50 MG/1
50 TABLET, FILM COATED ORAL NIGHTLY
Qty: 90 TABLET | Refills: 1 | Status: SHIPPED | OUTPATIENT
Start: 2022-04-12 | End: 2022-04-12

## 2022-04-13 ENCOUNTER — PATIENT MESSAGE (OUTPATIENT)
Dept: OBSTETRICS AND GYNECOLOGY | Facility: CLINIC | Age: 47
End: 2022-04-13
Payer: MEDICAID

## 2022-04-19 ENCOUNTER — NUTRITION (OUTPATIENT)
Dept: PSYCHIATRY | Facility: CLINIC | Age: 47
End: 2022-04-19
Payer: MEDICAID

## 2022-04-19 ENCOUNTER — PATIENT MESSAGE (OUTPATIENT)
Dept: HEMATOLOGY/ONCOLOGY | Facility: CLINIC | Age: 47
End: 2022-04-19
Payer: MEDICAID

## 2022-04-19 ENCOUNTER — TELEPHONE (OUTPATIENT)
Dept: HEMATOLOGY/ONCOLOGY | Facility: CLINIC | Age: 47
End: 2022-04-19
Payer: MEDICAID

## 2022-04-19 DIAGNOSIS — E66.01 CLASS 3 SEVERE OBESITY WITH BODY MASS INDEX (BMI) OF 45.0 TO 49.9 IN ADULT, UNSPECIFIED OBESITY TYPE, UNSPECIFIED WHETHER SERIOUS COMORBIDITY PRESENT: ICD-10-CM

## 2022-04-19 DIAGNOSIS — D05.12 BREAST NEOPLASM, TIS (DCIS), LEFT: Primary | ICD-10-CM

## 2022-04-19 PROCEDURE — 96165 PR INTERVENTION, HEALTH BEHAVIOR, GROUP, EA ADDTL 15 MINS: ICD-10-PCS | Mod: HB,AH,, | Performed by: PSYCHOLOGIST

## 2022-04-19 PROCEDURE — 96164 PR INTERVENTION, HEALTH BEHAVIOR, GROUP, 1ST 30 MINS: ICD-10-PCS | Mod: HB,AH,, | Performed by: PSYCHOLOGIST

## 2022-04-19 PROCEDURE — 96165 HLTH BHV IVNTJ GRP EA ADDL: CPT | Mod: HB,AH,, | Performed by: PSYCHOLOGIST

## 2022-04-19 PROCEDURE — 96164 HLTH BHV IVNTJ GRP 1ST 30: CPT | Mod: HB,AH,, | Performed by: PSYCHOLOGIST

## 2022-04-19 NOTE — TELEPHONE ENCOUNTER
----- Message from Sugey Connors, PhD sent at 4/19/2022  4:51 PM CDT -----  Regarding: RE: Hey  Sorry! I attached her  ----- Message -----  From: ROVERTO Dean MA  Sent: 4/19/2022   3:40 PM CDT  To: Sugey Connors, PhD  Subject: RE: Hey                                          Who is pt?  ----- Message -----  From: Sugey Connors, PhD  Sent: 4/19/2022   3:29 PM CDT  To: ROVERTO Dean MA  Subject: Danyn Delgado patient is confused about her next class. And if she is group a or b. She is also interested in yoga

## 2022-04-20 ENCOUNTER — PATIENT MESSAGE (OUTPATIENT)
Dept: HEMATOLOGY/ONCOLOGY | Facility: CLINIC | Age: 47
End: 2022-04-20
Payer: MEDICAID

## 2022-04-20 DIAGNOSIS — D05.12 BREAST NEOPLASM, TIS (DCIS), LEFT: ICD-10-CM

## 2022-04-20 DIAGNOSIS — E66.01 CLASS 3 SEVERE OBESITY WITH BODY MASS INDEX (BMI) OF 45.0 TO 49.9 IN ADULT, UNSPECIFIED OBESITY TYPE, UNSPECIFIED WHETHER SERIOUS COMORBIDITY PRESENT: Primary | ICD-10-CM

## 2022-04-20 NOTE — PROGRESS NOTES
INFORMED CONSENT: Dianne Hemphill   is known to this provider and identity was confirmed via NAME and .  The patient has been informed of the risks and benefits associated with engaging in psychotherapy, the handling of protected health information, the rights of privacy and the limits of confidentiality. The patient has also been informed of the importance of reporting any suicidal or homicidal ideation to this or any provider to ensure safety of all parties, and the Dianne Hemphill expressed understanding. The patient was agreeable to these terms and freely participates in psychotherapy.    Health and Behavior Intervention Group Note -Psychologist       Date: 2022    Site:   [facility]     Number of patients in attendance: 5    Group Type: Health and Behavior Group Intervention for Cancer Survivors ([id number] first 30 minutes, [id number] additional 15 minutes)    Dianne Hemphill arrived on time for the group. Patient was appropriately dressed and groomed. Dianne Hemphill  appeared alert and oriented in all spheres. No suicidal or homicidal ideation was reported.  Dianne Hemphill actively participated in the group process and provided both prompted and unprompted contribution to the group discussion.      Content of session:        Group 2: Barriers, triggers, and relationship with food   Reviewed SMART Goals from previous visit   Discussed types of barriers to well-being and weight management: physical environmental, psychological, behavioral,  and interpersonal  Discussed importance of understanding Relationship dynamics with food    Goals for next visit: Complete barriers checklist, identifying current relationship with food          Patient's response to intervention:The patient's response to intervention is accepting. Patient stated reported difficulty with food deprivation/intolerance due to various allergies.     Behavior: Interested    Insight: good     Progress toward  goals:Progress as Expected      Goals from last visit: Met    Treatment Plan: Continue group participation  · Target symptoms: weight loss   · Why chosen therapy is appropriate versus another modality: relevant to diagnosis, evidence based practice  · Outcome monitoring methods: checklist/rating scale, weight monitoring  · Return to clinic: 1 month     Length of Service (minutes direct face-to-face contact): 60    Encounter Diagnoses   Name Primary?    Breast neoplasm, Tis (DCIS), left Yes    Class 3 severe obesity with body mass index (BMI) of 45.0 to 49.9 in adult, unspecified obesity type, unspecified whether serious comorbidity present           Sugey Connors, PhD  Clinical Psychologist  [place] License #[id number]  [place] License #[id number]

## 2022-04-21 ENCOUNTER — OFFICE VISIT (OUTPATIENT)
Dept: HEPATOLOGY | Facility: CLINIC | Age: 47
End: 2022-04-21
Payer: MEDICAID

## 2022-04-21 DIAGNOSIS — K76.0 FATTY LIVER: ICD-10-CM

## 2022-04-21 DIAGNOSIS — E66.9 OBESITY, UNSPECIFIED CLASSIFICATION, UNSPECIFIED OBESITY TYPE, UNSPECIFIED WHETHER SERIOUS COMORBIDITY PRESENT: Primary | ICD-10-CM

## 2022-04-21 PROCEDURE — 1160F RVW MEDS BY RX/DR IN RCRD: CPT | Mod: CPTII,95,, | Performed by: INTERNAL MEDICINE

## 2022-04-21 PROCEDURE — 3044F PR MOST RECENT HEMOGLOBIN A1C LEVEL <7.0%: ICD-10-PCS | Mod: CPTII,95,, | Performed by: INTERNAL MEDICINE

## 2022-04-21 PROCEDURE — 99213 OFFICE O/P EST LOW 20 MIN: CPT | Mod: 95,,, | Performed by: INTERNAL MEDICINE

## 2022-04-21 PROCEDURE — 3044F HG A1C LEVEL LT 7.0%: CPT | Mod: CPTII,95,, | Performed by: INTERNAL MEDICINE

## 2022-04-21 PROCEDURE — 1159F MED LIST DOCD IN RCRD: CPT | Mod: CPTII,95,, | Performed by: INTERNAL MEDICINE

## 2022-04-21 PROCEDURE — 1159F PR MEDICATION LIST DOCUMENTED IN MEDICAL RECORD: ICD-10-PCS | Mod: CPTII,95,, | Performed by: INTERNAL MEDICINE

## 2022-04-21 PROCEDURE — 99213 PR OFFICE/OUTPT VISIT, EST, LEVL III, 20-29 MIN: ICD-10-PCS | Mod: 95,,, | Performed by: INTERNAL MEDICINE

## 2022-04-21 PROCEDURE — 1160F PR REVIEW ALL MEDS BY PRESCRIBER/CLIN PHARMACIST DOCUMENTED: ICD-10-PCS | Mod: CPTII,95,, | Performed by: INTERNAL MEDICINE

## 2022-04-21 NOTE — PROGRESS NOTES
The patient location is: home  The chief complaint leading to consultation is: fatty liver    Visit type: audiovisual    Face to Face time with patient: 15 mintues  30 minutes of total time spent on the encounter, which includes face to face time and non-face to face time preparing to see the patient (eg, review of tests), Obtaining and/or reviewing separately obtained history, Documenting clinical information in the electronic or other health record, Independently interpreting results (not separately reported) and communicating results to the patient/family/caregiver, or Care coordination (not separately reported).         Each patient to whom he or she provides medical services by telemedicine is:  (1) informed of the relationship between the physician and patient and the respective role of any other health care provider with respect to management of the patient; and (2) notified that he or she may decline to receive medical services by telemedicine and may withdraw from such care at any time.    Notes: HEPATOLOGY FOLLOW UP    Referring Physician: Annette Burton MD  Current Corresponding Physician: Annette Burton MD    Dianne Hemphill is here for follow up of Fatty Liver      HPI  Dianne Hemphill is a 46 y.o. female with a hx of breast DCIS, chronic pain and GERD who presents for evaluation of fatty liver.     CT abdp pelvis w cpmtrast 2017: unremarkable liver  CT urogram abd pelvis 12/30/19: fatty liver     Labs 1/31/22: ALT 35, AST 29, ALKP 99, Tbil 0.3 (1/20 ALT was 14, AST 13), plts normal (447)     The patient denies any symptoms of decompensated cirrhosis, including no ascites or edema, cognitive problems that would suggest hepatic encephalopathy, or GI bleeding from varices.    Interval History  Since Dianne Hemphill's last visit:  She has been diagnosed with gastroparesis and is seeking bariatric surgery to help alleviate the symptoms she is having due to the gastroparesis.    Serologic  w/u: HCV Ab-, HBsAg-, HBsAb+, HBcAb-, HAV IgG-, RIO-, ASMA-, AMA-, IgG normal, Iron sat<45%, ferritin not elevated, ceruloplasmin normal, alpha 1 antitrysin negaitve, MM phenotype, TTG IgA 20 (<20 is normal; IgA level normal and EGD with duodenal bx negative for celiac sprue)    Abdo US 2/28/22: Hepatic enlargement with hepatic steatosis; small hepatic cyst    MRE 3/10/22: mod-sever steatosis; F0 fibrosis    The patient continues to deny any symptoms of decompensated cirrhosis, including no ascites or edema, cognitive problems that would suggest hepatic encephalopathy, or GI bleeding from varices.    Outpatient Encounter Medications as of 4/21/2022   Medication Sig Dispense Refill    acetaminophen (TYLENOL) 650 MG TbSR Take 650 mg by mouth as needed.       amitriptyline (ELAVIL) 50 MG tablet Take 1 tablet by mouth in the evening 90 tablet 0    baclofen (LIORESAL) 10 MG tablet Take 1 tablet (10 mg total) by mouth 3 (three) times daily. 270 tablet 1    cetirizine (ZYRTEC) 10 MG tablet Take 10 mg by mouth once daily.      cholecalciferol, vitamin D3, (VITAMIN D3) 50 mcg (2,000 unit) Tab Take 5,000 Units by mouth once daily.       DULoxetine (CYMBALTA) 30 MG capsule Take 1 capsule by mouth once daily 90 capsule 0    EPINEPHrine (EPIPEN 2-JADE) 0.3 mg/0.3 mL AtIn Inject 0.6 mLs (0.6 mg total) into the muscle once. for 1 dose 4 Device 0    estradioL (ESTRACE) 0.01 % (0.1 mg/gram) vaginal cream Apply as directed TIW 42.5 g 1    fexofenadine (ALLEGRA) 180 MG tablet Take 180 mg by mouth as needed.      fluticasone propionate (FLONASE) 50 mcg/actuation nasal spray 1 spray (50 mcg total) by Each Nostril route once daily. 16 g 11    hyoscyamine (LEVSIN/SL) 0.125 mg Subl Place 1 tablet (0.125 mg total) under the tongue every 6 (six) hours as needed (abdominal pain). 120 tablet 2    ketoconazole (NIZORAL) 2 % shampoo APPLY TOPICALLY TWICE A  WEEK. 120 mL 0    ondansetron (ZOFRAN-ODT) 4 MG TbDL Take 2 tablets (8 mg  "total) by mouth every 6 (six) hours as needed. 60 tablet 1    pantoprazole (PROTONIX) 40 MG tablet Take 1 tablet (40 mg total) by mouth once daily. 90 tablet 3    polyethylene glycol (GLYCOLAX) 17 gram/dose powder Take 17 g by mouth once daily. 850 g 0    pregabalin (LYRICA) 100 MG capsule Take 1 capsule (100 mg total) by mouth 3 (three) times daily. 90 capsule 1    promethazine (PHENERGAN) 25 MG tablet Take 1 tablet (25 mg total) by mouth 3 (three) times daily as needed for Nausea. 90 tablet 5    scopolamine (TRANSDERM-SCOP) 1.3-1.5 mg (1 mg over 3 days) Place 1 patch onto the skin every 72 hours. (Patient taking differently: Place 1 patch onto the skin as needed.) 4 patch 1    traMADoL (ULTRAM-ER) 100 MG Tb24 Take 100 mg by mouth daily as needed.       Facility-Administered Encounter Medications as of 4/21/2022   Medication Dose Route Frequency Provider Last Rate Last Admin    lidocaine (PF) 10 mg/ml (1%) injection 10 mg  1 mL Intradermal Once Clemencia Watts Jr., MD        lidocaine (PF) 10 mg/ml (1%) injection 10 mg  1 mL Intradermal Once Clemencia Watts Jr., MD         Review of patient's allergies indicates:   Allergen Reactions    Ativan [lorazepam] Other (See Comments)     Pt states she cannot "wake up or sleeps for three days."    Gluten protein Nausea And Vomiting    Milk containing products Hives    Wasp venom Anaphylaxis    Demerol [meperidine] Nausea And Vomiting    Imitrex [sumatriptan succinate]      Hysterics      Morphine Nausea And Vomiting    Tetracyclines Nausea And Vomiting    Ciprofloxacin hcl Rash    Codeine Nausea And Vomiting     Can take Dilaudid, oxycontin, oxycodone & tramadol    Erythromycin Rash    Macrobid [nitrofurantoin monohyd/m-cryst] Rash     Past Medical History:   Diagnosis Date    Anxiety     Breast cancer     DCIS, left partical mastectomy    CRPS (complex regional pain syndrome type I)     Elevated liver function tests 2/22/2022    Fatigue     " General anesthetics causing adverse effect in therapeutic use     verbal combative after 1 procedure    GERD (gastroesophageal reflux disease)     History of migraine headaches     Hx of psychiatric care     Obesity     Pollen allergies     PONV (postoperative nausea and vomiting)     Psychiatric problem     Sleep difficulties     Smoker     Therapy     Urinary tract infection 5/6/2021       Review of Systems   Constitutional: Negative.    HENT: Negative.    Eyes: Negative.    Respiratory: Negative.    Cardiovascular: Negative.    Gastrointestinal: Negative.    Genitourinary: Negative.    Musculoskeletal: Negative.    Skin: Negative.    Neurological: Negative.    Psychiatric/Behavioral: Negative.          Physical Exam    MELD-Na score: 6 at 2/22/2022  9:47 AM  MELD score: 6 at 2/22/2022  9:47 AM  Calculated from:  Serum Creatinine: 0.70 mg/dL (Using min of 1 mg/dL) at 2/22/2022  9:47 AM  Serum Sodium: 143 mmol/L (Using max of 137 mmol/L) at 2/22/2022  9:47 AM  Total Bilirubin: 0.4 mg/dL (Using min of 1 mg/dL) at 2/22/2022  9:47 AM  INR(ratio): 0.9 (Using min of 1) at 2/22/2022  9:47 AM  Age: 46 years    Lab Results   Component Value Date     02/22/2022    BUN 12 02/22/2022    CREATININE 0.70 02/22/2022    CALCIUM 9.5 02/22/2022     02/22/2022    K 4.2 02/22/2022     02/22/2022    PROT 7.1 02/22/2022    CO2 27 02/22/2022    ANIONGAP 11 02/22/2022    WBC 7.93 02/22/2022    RBC 5.13 02/22/2022    HGB 15.0 02/22/2022    HCT 45.0 02/22/2022    MCV 88 02/22/2022    MCH 29.2 02/22/2022    MCHC 33.3 02/22/2022     Lab Results   Component Value Date    RDW 13.5 02/22/2022     02/22/2022    MPV 10.0 02/22/2022    GRAN 4.4 02/22/2022    GRAN 55.7 02/22/2022    LYMPH 2.7 02/22/2022    LYMPH 34.2 02/22/2022    MONO 0.5 02/22/2022    MONO 6.4 02/22/2022    EOSINOPHIL 2.5 02/22/2022    BASOPHIL 0.9 02/22/2022    EOS 0.2 02/22/2022    BASO 0.07 02/22/2022    APTT 30.4 10/16/2019    BNP 20  11/16/2018    CHOL 193 01/31/2022    TRIG 134 01/31/2022    HDL 41 01/31/2022    CHOLHDL 21.2 01/31/2022    TOTALCHOLEST 4.7 01/31/2022    ALBUMIN 3.9 02/22/2022    BILIDIR 0.1 11/08/2017    AST 23 02/22/2022    ALT 30 02/22/2022    ALKPHOS 76 02/22/2022    MG 2.2 01/31/2022    LABPROT 9.8 02/22/2022    INR 0.9 02/22/2022       Assessment and Plan:  Dianne Hemphill is a 46 y.o. female with Fatty Liver  Serologic w/u negative for other causes of liver disease. MRE suggests no fibrosis. I am recommending weight loss; eat foods low in fat; consider vaccination for hepatitis A. She will see bariatric surgery for weight loss surgery in large part to help the symptoms of gastroparesis. Recommend intra-op liver biopsy.

## 2022-04-26 ENCOUNTER — TELEPHONE (OUTPATIENT)
Dept: HEMATOLOGY/ONCOLOGY | Facility: CLINIC | Age: 47
End: 2022-04-26
Payer: MEDICAID

## 2022-04-26 NOTE — TELEPHONE ENCOUNTER
Spoke with pt regarding canceled appt with Tamarin today. Pt isn't feeling well today but has future appts scheduled with Tamarin.

## 2022-04-27 ENCOUNTER — PATIENT MESSAGE (OUTPATIENT)
Dept: PRIMARY CARE CLINIC | Facility: CLINIC | Age: 47
End: 2022-04-27
Payer: MEDICAID

## 2022-05-09 ENCOUNTER — CLINICAL SUPPORT (OUTPATIENT)
Dept: REHABILITATION | Facility: HOSPITAL | Age: 47
End: 2022-05-09
Payer: MEDICAID

## 2022-05-09 DIAGNOSIS — R26.89 BALANCE PROBLEM: ICD-10-CM

## 2022-05-09 DIAGNOSIS — R29.898 WEAKNESS OF BOTH LOWER EXTREMITIES: ICD-10-CM

## 2022-05-09 DIAGNOSIS — E66.01 CLASS 3 SEVERE OBESITY WITH BODY MASS INDEX (BMI) OF 45.0 TO 49.9 IN ADULT, UNSPECIFIED OBESITY TYPE, UNSPECIFIED WHETHER SERIOUS COMORBIDITY PRESENT: ICD-10-CM

## 2022-05-09 DIAGNOSIS — R26.9 ABNORMALITY OF GAIT: ICD-10-CM

## 2022-05-09 DIAGNOSIS — Z74.09 DECREASED FUNCTIONAL MOBILITY AND ENDURANCE: ICD-10-CM

## 2022-05-09 DIAGNOSIS — D05.12 BREAST NEOPLASM, TIS (DCIS), LEFT: ICD-10-CM

## 2022-05-09 PROCEDURE — 97162 PT EVAL MOD COMPLEX 30 MIN: CPT
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
## 2022-05-09 NOTE — PATIENT INSTRUCTIONS
Cervical Towel for Sleeping Posture    Place a rolled up towel inside of pillowcase to maintain neck support at night.    Use a larger roll if side sleeping.        TENNIS BALL OS RELEASE    Lying on your back take the rolled up towel and place behind your neck, then place the tennis balls at the base of your skull. Set a timer for no more than 5 minutes.    A. rest on tennis balls   B. retract neck into the tennis balls and tilt head forward and backward as if nodding your head yes (while doing this motion you may turn your head left and right slowly to release the entire occipital ridge)     Bent Leg Lift (Hook-Lying)        Tighten stomach and slowly raise right leg 4 inches from floor. Keep trunk rigid. Repeat with the left leg.  Repeat 10 times per set. Do 3 sets per session. Do 1 sessions per day.     https://orth.[employer].us/1090     Copyright © [employer]. All rights reserved.   Bridging        Slowly raise buttocks from floor, keeping stomach tight.  Repeat 10 times per set. Do 3 sets per session. Do 1 sessions per day.     Strengthening: Hip Adduction - Isometric        With ball or folded pillow between knees, squeeze knees together. Hold 3 seconds.  Repeat 10 times per set. Do 3 sets per session. Do 2 sessions per day.

## 2022-05-10 ENCOUNTER — PATIENT MESSAGE (OUTPATIENT)
Dept: HEMATOLOGY/ONCOLOGY | Facility: CLINIC | Age: 47
End: 2022-05-10
Payer: MEDICAID

## 2022-05-16 ENCOUNTER — CLINICAL SUPPORT (OUTPATIENT)
Dept: REHABILITATION | Facility: HOSPITAL | Age: 47
End: 2022-05-16
Payer: MEDICAID

## 2022-05-16 DIAGNOSIS — Z74.09 DECREASED FUNCTIONAL MOBILITY AND ENDURANCE: ICD-10-CM

## 2022-05-16 DIAGNOSIS — R29.898 WEAKNESS OF BOTH LOWER EXTREMITIES: Primary | ICD-10-CM

## 2022-05-16 DIAGNOSIS — R26.9 ABNORMALITY OF GAIT: ICD-10-CM

## 2022-05-16 DIAGNOSIS — R26.89 BALANCE PROBLEM: ICD-10-CM

## 2022-05-16 PROCEDURE — 97110 THERAPEUTIC EXERCISES: CPT

## 2022-05-16 NOTE — PROGRESS NOTES
Physical Therapy Daily Treatment Note     Name: Dianne Aguilar [facility] Number: [id number]    Therapy Diagnosis:   Encounter Diagnoses   Name Primary?    Weakness of both lower extremities Yes    Balance problem     Abnormality of gait     Decreased functional mobility and endurance      Physician: Lilian Villagran PA-C    Visit Date: 5/16/2022    Physician Orders: PT Eval and Treat Survivorship Weight Loss Program Participant  Medical Diagnosis from Referral:   E66.01,Z68.42 (ICD-10-CM) - Class 3 severe obesity with body mass index (BMI) of 45.0 to 49.9 in adult, unspecified obesity type, unspecified whether serious comorbidity present   D05.12 (ICD-10-CM) - Breast neoplasm, Tis (DCIS), left   Evaluation Date: 5/9/2022  Authorization Period Expiration: 4/20/2023  Plan of Care Expiration: 11/9/2022  Visit # / Visits authorized: 1/ 16 (plus evaluation)  Insurance: MEDICAID/[employer] ([employer])     Time In: 1:04 PM  Time Out: 2:00 PM  Total Billable Time: 53 minutes     Precautions: Standard and cancer        Subjective     Pt reports: She's always in pain in her legs. Pt states she has had some success with aquatic therapy in the past. Pt states her stomach is a mess.  She was compliant with home exercise program.  Response to previous treatment: no adverse reaction  Functional change: none stated    Pain: 7/10  Location: bilateral lower legs (left > right)    Cancer Related Surgery and Date: left partial mastectomy without sentinel node biopsy on 2/14/2020     Chemotherapy: N/A  Radiation: 5/20/2020 To 6/19/2020  Adjuvant endocrine therapy started on started, but stopped due to side effects.    Objective     Objective Measures updated at progress report unless specified.         Treatment       Dianne received therapeutic exercises to develop strength, endurance, ROM, flexibility, posture and core stabilization for 53 minutes including:    Aerobic/Cardio Activity: Recumbent bike, Level 1, 5  min      Seated:  -hip ADD x 10  -knee ext, x 10/LE  -bicep curl with 2# bar, 2 x 10  -rows with yellow band, 2 x 10  -triceps ext with yellow band, 2 x 10/UE  -marching x 10 reps B  -gluteal sets, 2 x 10 reps  -B shoulder ER, yellow band, 2 x 10   -B hip ABD, 2 x 10, red band  -hamstring curls to knee ext, 15/LE  -hamstring stretch, 30s/LE    Supine:  -posterior pelvic tilts, 2 x 10 reps  -LTR x 5 reps B    PT discussed potential benefit of aquatic therapy or gym membership with pool access to exercise.      Home Exercises Provided and Patient Education Provided     Education provided:   - HEP, exercise technique, potential benefit of aquatic therapy or exercising in a pool    Written Home Exercises Provided: Patient instructed to cont prior HEP.  Exercises were reviewed and Dianne was able to demonstrate them prior to the end of the session.  Dianne demonstrated good  understanding of the education provided.     See EMR under Patient Instructions for exercises provided Eval.    Assessment     Pt tolerated session fairly well. Pt endorses pain in BLE's but states she always has this pain. Pt was able to complete exercises with occasional adjustments needed due to pain/discomfort. Pt may benefit from exercising or therapy in the pool, as she has endorsed this has been beneficial in the past. Will progress as tolerated.  Dianne Is progressing well towards her goals.   Pt prognosis is Good.     Pt will continue to benefit from skilled outpatient physical therapy to address the deficits listed in the problem list box on initial evaluation, provide pt/family education and to maximize pt's level of independence in the home and community environment.     Pt's spiritual, cultural and educational needs considered and pt agreeable to plan of care and goals.     Anticipated barriers to physical therapy: co-morbidities    Goals:   Short Term Goals:  4 weeks     1. Pt will demo >/= 4+/5 strength in bilateral lower extremities  for improved functional mobility. (progressing, not met)  2. Pt. will improve single leg stance balance test score to >/=  15s to be in a moderate/low risk category for falls. (progressing, not met)  3. Pt will perform >/= 9 sit to stands in 30 seconds with no arms for improved endurance. (progressing, not met)  4. Pt will increase 6 minute walk test score by >/=  50 meters in order to increase ambulation in the community. (progressing, not met)  5. Pt will initiate home exercise program to improve strength, flexibility, endurance, mobility & balance. (progressing, not met)  6. Pt will tolerate 10 min or greater of time in light-->moderate intensity cardio (I.e. Bike, Treadmill) to improve endurance to assist in walking her dog for exercise (progressing, not met)  7. Pt will verbalize understanding of exercise recommendation guidelines for moderate intensity exercise to safely perform and progress home exercise routine. (progressing, not met)     Long Term Goals: 8 weeks  1. Pt will increase strength in bilateral lower extremities to 5/5 for improved functional mobility and tolerance for ADL and work activities. (progressing, not met)  2. Pt will be independent with home exercise program to improve range of motion, strength, balance and return to prior level of function. (progressing, not met)  3. Pt. will improve Single Leg Stance balance test score to >/=  30s for patient to be in low risk category for falls. (progressing, not met)  4. Pt will perform >/= 15 sit to stands in 30 seconds with no arms for improved endurance. (progressing, not met)  5. Pt will increase 6 minute walk test score by >/= 75 meters in order to increase community ambulation. (progressing, not met)  6. Patient will report compliance with  20 -30min  of moderate intensity exercise 5 days a week to improve overall cardiovascular function and decrease cancer related fatigue. (progressing, not met)     Plan   Plan of care Certification: 5/9/2022  to 11/9/2022.     Outpatient Physical Therapy 2 times weekly for 8 weeks to include the following interventions: Gait Training, Manual Therapy, Neuromuscular Re-ed, Patient Education, Self Care, Therapeutic Activities, Therapeutic Exercise and IASTM. Dry needling with manual therapy techniques to decrease pain, inflammation and swelling, increase circulation and promote healing process.       Pt will also benefit from re-assessment at 3 months and 6 months following evaluation to assess for any change in functional status.       Lilian Zapata, PT

## 2022-05-17 ENCOUNTER — NUTRITION (OUTPATIENT)
Dept: PSYCHIATRY | Facility: CLINIC | Age: 47
End: 2022-05-17
Payer: MEDICAID

## 2022-05-17 DIAGNOSIS — D05.12 BREAST NEOPLASM, TIS (DCIS), LEFT: Primary | ICD-10-CM

## 2022-05-17 DIAGNOSIS — E66.01 CLASS 3 SEVERE OBESITY WITH BODY MASS INDEX (BMI) OF 45.0 TO 49.9 IN ADULT, UNSPECIFIED OBESITY TYPE, UNSPECIFIED WHETHER SERIOUS COMORBIDITY PRESENT: ICD-10-CM

## 2022-05-17 PROCEDURE — 96164 HLTH BHV IVNTJ GRP 1ST 30: CPT | Mod: AH,HB,, | Performed by: PSYCHOLOGIST

## 2022-05-17 PROCEDURE — 96165 PR INTERVENTION, HEALTH BEHAVIOR, GROUP, EA ADDTL 15 MINS: ICD-10-PCS | Mod: AH,HB,, | Performed by: PSYCHOLOGIST

## 2022-05-17 PROCEDURE — 96165 HLTH BHV IVNTJ GRP EA ADDL: CPT | Mod: AH,HB,, | Performed by: PSYCHOLOGIST

## 2022-05-17 PROCEDURE — 96164 PR INTERVENTION, HEALTH BEHAVIOR, GROUP, 1ST 30 MINS: ICD-10-PCS | Mod: AH,HB,, | Performed by: PSYCHOLOGIST

## 2022-05-18 NOTE — PROGRESS NOTES
"INFORMED CONSENT: Dianne Hemphill   is known to this provider and identity was confirmed via NAME and .  The patient has been informed of the risks and benefits associated with engaging in psychotherapy, the handling of protected health information, the rights of privacy and the limits of confidentiality. The patient has also been informed of the importance of reporting any suicidal or homicidal ideation to this or any provider to ensure safety of all parties, and the Dianne Hemphill expressed understanding. The patient was agreeable to these terms and freely participates.    Health and Behavior Intervention Group Note -Psychologist       Date: 2022    Site:   [facility]     Number of patients in attendance:     Group Type: Health and Behavior Group Intervention for Cancer Survivors (77611 first 30 minutes, [id number] additional 15 minutes)    Dianne Hemphill arrived on time for the group. Patient was appropriately dressed and groomed. Dianne Hemphill  appeared alert and oriented in all spheres. No suicidal or homicidal ideation was reported.  Dianne Hemphill actively participated in the group process and provided both prompted and unprompted contribution to the group discussion.      Content of session:       Group 3: Overcoming barriers  Reviewed SMART Goals from previous visit   Discussed overcoming barriers to weight management: physical environmental, psychological, behavioral,  and interpersonal    Goals for next visit: Intervene in 1 barrier, read "Trigger awareness" handout; Weekly SMART goals         Patient's response to intervention:The patient's response to intervention is accepting. Patient stated that she struggles with pacing and pain.    Behavior: Interested    Insight: good     Progress toward goals:Progress as Expected      Goals from last visit: Met    Treatment Plan: Continue group participation  · Target symptoms: weight loss   · Why chosen therapy is " appropriate versus another modality: relevant to diagnosis, evidence based practice  · Outcome monitoring methods: checklist/rating scale, weight monitoring  · Return to clinic: 1 month     Length of Service (minutes direct face-to-face contact): 60    Encounter Diagnoses   Name Primary?    Breast neoplasm, Tis (DCIS), left Yes    Class 3 severe obesity with body mass index (BMI) of 45.0 to 49.9 in adult, unspecified obesity type, unspecified whether serious comorbidity present           Sugey Connors, PhD  Clinical Psychologist  [place] License #[id number]  [place] License #[id number]

## 2022-05-27 ENCOUNTER — CLINICAL SUPPORT (OUTPATIENT)
Dept: REHABILITATION | Facility: HOSPITAL | Age: 47
End: 2022-05-27
Payer: MEDICAID

## 2022-05-27 DIAGNOSIS — R29.898 WEAKNESS OF BOTH LOWER EXTREMITIES: Primary | ICD-10-CM

## 2022-05-27 DIAGNOSIS — R26.89 BALANCE PROBLEM: ICD-10-CM

## 2022-05-27 DIAGNOSIS — Z74.09 DECREASED FUNCTIONAL MOBILITY AND ENDURANCE: ICD-10-CM

## 2022-05-27 DIAGNOSIS — R26.9 ABNORMALITY OF GAIT: ICD-10-CM

## 2022-05-27 PROCEDURE — 97110 THERAPEUTIC EXERCISES: CPT

## 2022-05-27 NOTE — PROGRESS NOTES
/Physical Therapy Daily Treatment Note     Name: Dianne Aguilar [facility] Number: [id number]    Therapy Diagnosis:   Encounter Diagnoses   Name Primary?    Weakness of both lower extremities Yes    Balance problem     Abnormality of gait     Decreased functional mobility and endurance      Physician: Lilian Villagran PA-C    Visit Date: 5/27/2022    Physician Orders: PT Eval and Treat Survivorship Weight Loss Program Participant  Medical Diagnosis from Referral:   E66.01,Z68.42 (ICD-10-CM) - Class 3 severe obesity with body mass index (BMI) of 45.0 to 49.9 in adult, unspecified obesity type, unspecified whether serious comorbidity present   D05.12 (ICD-10-CM) - Breast neoplasm, Tis (DCIS), left   Evaluation Date: 5/9/2022  Authorization Period Expiration: 11/9/2022  Plan of Care Expiration: 11/9/2022  Visit # / Visits authorized: 1/ 16 (plus evaluation)  Insurance: MEDICAID/[email or web address]     Time In: 2:00 PM  Time Out: 2:55 PM  Total Billable Time: 55 minutes     Precautions: Standard and cancer        Subjective     Pt reports: not doing well, her GP is acting up, legs are the same. She had to cancel her last appointment due to having car trouble.  She was compliant with home exercise program.  Response to previous treatment: sore, went into a flare up and her legs swelled  Functional change: none stated    Pain: 8/10, 6-7/10  Location: stomach, bilateral lower legs (left > right)    Cancer Related Surgery and Date: left partial mastectomy without sentinel node biopsy on 2/14/2020     Chemotherapy: N/A  Radiation: 5/20/2020 To 6/19/2020  Adjuvant endocrine therapy started on started, but stopped due to side effects.    Objective     Objective Measures updated at progress report unless specified.       Treatment       Dianne received therapeutic exercises to develop strength, endurance, ROM, flexibility, posture and core stabilization for 55 minutes including:    Aerobic/Cardio Activity:  Recumbent bike, Level 4, 4:15 min      Seated:  -hip ADD on green physioball x 10  -knee ext on green physioball, x 10/LE  -bicep curl with 2# bar on green physioball, 2 x 10  -rows with yellow band, 2 x 10  -triceps ext with yellow band, 2 x 10/UE  -side lying hip abd 1 x 10/LE  -side lying hip add 1 x 10/LE  -hamstring stretch, 30s/LE long sitting R, supine with strap L   - ankle dorsiflexion seated on green physioball1 x 10  - upper trap stretch 2 x 15s    Supine:  -marching 1 x 10  - bridging legs on green ball 1 x 10    Home Exercises Provided and Patient Education Provided     Education provided:   - HEP, exercise technique, potential benefit of aquatic therapy or exercising in a pool    Written Home Exercises Provided: Patient instructed to cont prior HEP.  Exercises were reviewed and Dianne was able to demonstrate them prior to the end of the session.  Dianne demonstrated good  understanding of the education provided.     See EMR under Patient Instructions for exercises provided Eval.    Assessment     Pt tolerated session fairly well. Pt endorses pain in BLE's but states she always has this pain. Pt was able to complete all of today's progression and new exercises with no increase in symptoms prior to leaving the clinic. She alternated between seated, supine, and side lying exercises to avoid exacerbations of her lower extremity symptoms. Will progress as tolerated.  Dianne Is progressing well towards her goals.   Pt prognosis is Good.     Pt will continue to benefit from skilled outpatient physical therapy to address the deficits listed in the problem list box on initial evaluation, provide pt/family education and to maximize pt's level of independence in the home and community environment.     Pt's spiritual, cultural and educational needs considered and pt agreeable to plan of care and goals.     Anticipated barriers to physical therapy: co-morbidities    Goals:   Short Term Goals:  4 weeks     1. Pt  will demo >/= 4+/5 strength in bilateral lower extremities for improved functional mobility. (progressing, not met)  2. Pt. will improve single leg stance balance test score to >/=  15s to be in a moderate/low risk category for falls. (progressing, not met)  3. Pt will perform >/= 9 sit to stands in 30 seconds with no arms for improved endurance. (progressing, not met)  4. Pt will increase 6 minute walk test score by >/=  50 meters in order to increase ambulation in the community. (progressing, not met)  5. Pt will initiate home exercise program to improve strength, flexibility, endurance, mobility & balance. (progressing, not met)  6. Pt will tolerate 10 min or greater of time in light-->moderate intensity cardio (I.e. Bike, Treadmill) to improve endurance to assist in walking her dog for exercise (progressing, not met)  7. Pt will verbalize understanding of exercise recommendation guidelines for moderate intensity exercise to safely perform and progress home exercise routine. (progressing, not met)     Long Term Goals: 8 weeks  1. Pt will increase strength in bilateral lower extremities to 5/5 for improved functional mobility and tolerance for ADL and work activities. (progressing, not met)  2. Pt will be independent with home exercise program to improve range of motion, strength, balance and return to prior level of function. (progressing, not met)  3. Pt. will improve Single Leg Stance balance test score to >/=  30s for patient to be in low risk category for falls. (progressing, not met)  4. Pt will perform >/= 15 sit to stands in 30 seconds with no arms for improved endurance. (progressing, not met)  5. Pt will increase 6 minute walk test score by >/= 75 meters in order to increase community ambulation. (progressing, not met)  6. Patient will report compliance with  20 -30min  of moderate intensity exercise 5 days a week to improve overall cardiovascular function and decrease cancer related fatigue.  (progressing, not met)     Plan   Plan of care Certification: 5/9/2022 to 11/9/2022.     Outpatient Physical Therapy 2 times weekly for 8 weeks to include the following interventions: Gait Training, Manual Therapy, Neuromuscular Re-ed, Patient Education, Self Care, Therapeutic Activities, Therapeutic Exercise and IASTM. Dry needling with manual therapy techniques to decrease pain, inflammation and swelling, increase circulation and promote healing process.       Pt will also benefit from re-assessment at 3 months and 6 months following evaluation to assess for any change in functional status.       Koki Ferrera, PT

## 2022-05-31 ENCOUNTER — TELEPHONE (OUTPATIENT)
Dept: PRIMARY CARE CLINIC | Facility: CLINIC | Age: 47
End: 2022-05-31
Payer: MEDICAID

## 2022-05-31 ENCOUNTER — NUTRITION (OUTPATIENT)
Dept: PSYCHIATRY | Facility: CLINIC | Age: 47
End: 2022-05-31
Payer: MEDICAID

## 2022-05-31 VITALS — WEIGHT: 293 LBS | BODY MASS INDEX: 44.41 KG/M2 | HEIGHT: 68 IN

## 2022-05-31 DIAGNOSIS — E66.01 CLASS 3 SEVERE OBESITY WITH BODY MASS INDEX (BMI) OF 45.0 TO 49.9 IN ADULT, UNSPECIFIED OBESITY TYPE, UNSPECIFIED WHETHER SERIOUS COMORBIDITY PRESENT: ICD-10-CM

## 2022-05-31 DIAGNOSIS — Z71.3 NUTRITIONAL COUNSELING: Primary | ICD-10-CM

## 2022-05-31 DIAGNOSIS — D05.12 BREAST NEOPLASM, TIS (DCIS), LEFT: ICD-10-CM

## 2022-05-31 PROCEDURE — 99211 OFF/OP EST MAY X REQ PHY/QHP: CPT | Mod: PBBFAC

## 2022-05-31 PROCEDURE — 97804 MEDICAL NUTRITION GROUP: CPT | Mod: PBBFAC | Performed by: DIETITIAN, REGISTERED

## 2022-05-31 PROCEDURE — 99999 PR PBB SHADOW E&M-EST. PATIENT-LVL I: ICD-10-PCS | Mod: PBBFAC,HB,,

## 2022-05-31 PROCEDURE — 99999 PR PBB SHADOW E&M-EST. PATIENT-LVL I: CPT | Mod: PBBFAC,HB,,

## 2022-05-31 NOTE — PROGRESS NOTES
"Oncology Survivorship Weight Loss Program   Group Medical Nutrition Therapy Visit     Session # 4    Dianne Hemphill   1975    Referring Provider:  No ref. provider found      Reason for Visit: Pt here for nutrition education and counseling for weight loss following breast or endometrial cancer     PMHx:   Past Medical History:   Diagnosis Date    Anxiety     Breast cancer     DCIS, left partical mastectomy    CRPS (complex regional pain syndrome type I)     Elevated liver function tests 2/22/2022    Fatigue     General anesthetics causing adverse effect in therapeutic use     verbal combative after 1 procedure    GERD (gastroesophageal reflux disease)     History of migraine headaches     Hx of psychiatric care     Obesity     Pollen allergies     PONV (postoperative nausea and vomiting)     Psychiatric problem     Sleep difficulties     Smoker     Therapy     Urinary tract infection 5/6/2021       Nutrition Assessment    This is a 46 y.o.female with a history of breast cancer.   Patient was seen today as part of 6- month weight management program. Today was session 4 which covered the following topics:   Grocery shopping   Meal prepping   Batch cooking   Patient participated in lecture and subsequent discussion. Weights, calories goals, and tracking reinforced.   Patient has gained weight since previous visit. Suffering from gastroparesis and constipation.     Weight:(!) 146.7 kg (323 lb 8.4 oz)  Height:5' 8" (1.727 m)  BMI:Body mass index is 49.19 kg/m².   IBW: Ideal body weight: 63.9 kg (140 lb 14 oz)  Adjusted ideal body weight: 97 kg (213 lb 15 oz)    Allergies: Ativan [lorazepam], Gluten protein, Milk containing products, Wasp venom, Demerol [meperidine], Imitrex [sumatriptan succinate], Morphine, Tetracyclines, Ciprofloxacin hcl, Codeine, Erythromycin, and Macrobid [nitrofurantoin monohyd/m-cryst]    Current Medications:    Current Outpatient Medications:     acetaminophen " (TYLENOL) 650 MG TbSR, Take 650 mg by mouth as needed. , Disp: , Rfl:     amitriptyline (ELAVIL) 50 MG tablet, Take 1 tablet by mouth in the evening, Disp: 90 tablet, Rfl: 0    baclofen (LIORESAL) 10 MG tablet, Take 1 tablet (10 mg total) by mouth 3 (three) times daily., Disp: 270 tablet, Rfl: 1    cetirizine (ZYRTEC) 10 MG tablet, Take 10 mg by mouth once daily., Disp: , Rfl:     cholecalciferol, vitamin D3, (VITAMIN D3) 50 mcg (2,000 unit) Tab, Take 5,000 Units by mouth once daily. , Disp: , Rfl:     DULoxetine (CYMBALTA) 30 MG capsule, Take 1 capsule by mouth once daily, Disp: 90 capsule, Rfl: 0    EPINEPHrine (EPIPEN 2-JADE) 0.3 mg/0.3 mL AtIn, Inject 0.6 mLs (0.6 mg total) into the muscle once. for 1 dose, Disp: 4 Device, Rfl: 0    estradioL (ESTRACE) 0.01 % (0.1 mg/gram) vaginal cream, Apply as directed TIW, Disp: 42.5 g, Rfl: 1    fexofenadine (ALLEGRA) 180 MG tablet, Take 180 mg by mouth as needed., Disp: , Rfl:     fluticasone propionate (FLONASE) 50 mcg/actuation nasal spray, 1 spray (50 mcg total) by Each Nostril route once daily., Disp: 16 g, Rfl: 11    hyoscyamine (LEVSIN/SL) 0.125 mg Subl, Place 1 tablet (0.125 mg total) under the tongue every 6 (six) hours as needed (abdominal pain)., Disp: 120 tablet, Rfl: 2    ketoconazole (NIZORAL) 2 % shampoo, APPLY TOPICALLY TWICE A  WEEK., Disp: 120 mL, Rfl: 0    ondansetron (ZOFRAN-ODT) 4 MG TbDL, Take 2 tablets (8 mg total) by mouth every 6 (six) hours as needed., Disp: 60 tablet, Rfl: 1    pantoprazole (PROTONIX) 40 MG tablet, Take 1 tablet (40 mg total) by mouth once daily., Disp: 90 tablet, Rfl: 3    polyethylene glycol (GLYCOLAX) 17 gram/dose powder, Take 17 g by mouth once daily., Disp: 850 g, Rfl: 0    pregabalin (LYRICA) 100 MG capsule, Take 1 capsule (100 mg total) by mouth 3 (three) times daily., Disp: 90 capsule, Rfl: 1    scopolamine (TRANSDERM-SCOP) 1.3-1.5 mg (1 mg over 3 days), Place 1 patch onto the skin every 72 hours. (Patient  taking differently: Place 1 patch onto the skin as needed.), Disp: 4 patch, Rfl: 1    traMADoL (ULTRAM-ER) 100 MG Tb24, Take 100 mg by mouth daily as needed., Disp: , Rfl:   No current facility-administered medications for this visit.    Facility-Administered Medications Ordered in Other Visits:     lidocaine (PF) 10 mg/ml (1%) injection 10 mg, 1 mL, Intradermal, Once, Clemencia Watts Jr., MD    lidocaine (PF) 10 mg/ml (1%) injection 10 mg, 1 mL, Intradermal, Once, Clemencia Watts Jr., MD    Nutrition Diagnosis    Problem: Obese  Etiology (related to): medication and lack of physical activity   Signs/Symptoms (as evidenced by): BMI= 49.2    Nutrition Intervention and Program Adherence    Nutrition Prescription   Patient has been given a goal of 2050 kcal per day for the duration of the weight loss program     Materials Provided/Reviewed Meal prep resources and companies     Nutrition Monitoring and Evaluation    Monitor: energy intake and weight and step counts     Goals: weight loss 1-2lb per week    Follow up in 4 weeks      Communication to referring provider/care team: note available in chart     Counseling time: 30 Minutes    Zoe Schwarz, MPH, RD, , LDN, FAND   [phone]

## 2022-06-01 ENCOUNTER — CLINICAL SUPPORT (OUTPATIENT)
Dept: REHABILITATION | Facility: HOSPITAL | Age: 47
End: 2022-06-01
Payer: MEDICAID

## 2022-06-01 ENCOUNTER — TELEPHONE (OUTPATIENT)
Dept: GASTROENTEROLOGY | Facility: CLINIC | Age: 47
End: 2022-06-01
Payer: MEDICAID

## 2022-06-01 ENCOUNTER — OFFICE VISIT (OUTPATIENT)
Dept: PRIMARY CARE CLINIC | Facility: CLINIC | Age: 47
End: 2022-06-01
Payer: MEDICAID

## 2022-06-01 ENCOUNTER — TELEPHONE (OUTPATIENT)
Dept: PRIMARY CARE CLINIC | Facility: CLINIC | Age: 47
End: 2022-06-01

## 2022-06-01 DIAGNOSIS — R29.898 WEAKNESS OF BOTH LOWER EXTREMITIES: Primary | ICD-10-CM

## 2022-06-01 DIAGNOSIS — Z72.820 POOR SLEEP: ICD-10-CM

## 2022-06-01 DIAGNOSIS — Z74.09 DECREASED FUNCTIONAL MOBILITY AND ENDURANCE: ICD-10-CM

## 2022-06-01 DIAGNOSIS — R19.8 ALTERNATING CONSTIPATION AND DIARRHEA: ICD-10-CM

## 2022-06-01 DIAGNOSIS — R26.9 ABNORMALITY OF GAIT: ICD-10-CM

## 2022-06-01 DIAGNOSIS — Z98.890 S/P SPINAL SURGERY: ICD-10-CM

## 2022-06-01 DIAGNOSIS — M54.10 RADICULAR PAIN OF LEFT LOWER EXTREMITY: ICD-10-CM

## 2022-06-01 DIAGNOSIS — M43.27 FUSION OF LUMBOSACRAL SPINE: ICD-10-CM

## 2022-06-01 DIAGNOSIS — R26.89 BALANCE PROBLEM: ICD-10-CM

## 2022-06-01 DIAGNOSIS — R40.0 DAYTIME SOMNOLENCE: ICD-10-CM

## 2022-06-01 DIAGNOSIS — R11.0 NAUSEA: ICD-10-CM

## 2022-06-01 DIAGNOSIS — E66.01 CLASS 3 SEVERE OBESITY WITH BODY MASS INDEX (BMI) OF 45.0 TO 49.9 IN ADULT, UNSPECIFIED OBESITY TYPE, UNSPECIFIED WHETHER SERIOUS COMORBIDITY PRESENT: ICD-10-CM

## 2022-06-01 DIAGNOSIS — M48.061 NEURAL FORAMINAL STENOSIS OF LUMBAR SPINE: Primary | ICD-10-CM

## 2022-06-01 PROCEDURE — 3044F PR MOST RECENT HEMOGLOBIN A1C LEVEL <7.0%: ICD-10-PCS | Mod: CPTII,95,, | Performed by: FAMILY MEDICINE

## 2022-06-01 PROCEDURE — 3044F HG A1C LEVEL LT 7.0%: CPT | Mod: CPTII,95,, | Performed by: FAMILY MEDICINE

## 2022-06-01 PROCEDURE — 1160F RVW MEDS BY RX/DR IN RCRD: CPT | Mod: CPTII,95,, | Performed by: FAMILY MEDICINE

## 2022-06-01 PROCEDURE — 99214 PR OFFICE/OUTPT VISIT, EST, LEVL IV, 30-39 MIN: ICD-10-PCS | Mod: 95,,, | Performed by: FAMILY MEDICINE

## 2022-06-01 PROCEDURE — 1159F PR MEDICATION LIST DOCUMENTED IN MEDICAL RECORD: ICD-10-PCS | Mod: CPTII,95,, | Performed by: FAMILY MEDICINE

## 2022-06-01 PROCEDURE — 97110 THERAPEUTIC EXERCISES: CPT

## 2022-06-01 PROCEDURE — 99214 OFFICE O/P EST MOD 30 MIN: CPT | Mod: 95,,, | Performed by: FAMILY MEDICINE

## 2022-06-01 PROCEDURE — 1159F MED LIST DOCD IN RCRD: CPT | Mod: CPTII,95,, | Performed by: FAMILY MEDICINE

## 2022-06-01 PROCEDURE — 1160F PR REVIEW ALL MEDS BY PRESCRIBER/CLIN PHARMACIST DOCUMENTED: ICD-10-PCS | Mod: CPTII,95,, | Performed by: FAMILY MEDICINE

## 2022-06-01 RX ORDER — EPINEPHRINE 0.3 MG/.3ML
INJECTION SUBCUTANEOUS
COMMUNITY
End: 2022-07-12

## 2022-06-01 RX ORDER — PROMETHAZINE HYDROCHLORIDE 25 MG/1
25 TABLET ORAL EVERY 4 HOURS
COMMUNITY
End: 2022-08-01 | Stop reason: SDUPTHER

## 2022-06-01 RX ORDER — PREGABALIN 25 MG/1
25 CAPSULE ORAL 3 TIMES DAILY
Qty: 90 CAPSULE | Refills: 3 | Status: SHIPPED | OUTPATIENT
Start: 2022-06-01 | End: 2022-06-23 | Stop reason: SDUPTHER

## 2022-06-01 RX ORDER — AMITRIPTYLINE HYDROCHLORIDE 50 MG/1
50 TABLET, FILM COATED ORAL NIGHTLY
Qty: 90 TABLET | Refills: 2 | Status: SHIPPED | OUTPATIENT
Start: 2022-06-01 | End: 2022-08-22 | Stop reason: SDUPTHER

## 2022-06-01 NOTE — PROGRESS NOTES
/Physical Therapy Daily Treatment Note     Name: Dianne Aguilar [facility] Number: [id number]    Therapy Diagnosis:   Encounter Diagnoses   Name Primary?    Weakness of both lower extremities Yes    Balance problem     Abnormality of gait     Decreased functional mobility and endurance      Physician: Lilian Villagran PA-C    Visit Date: 6/1/2022    Physician Orders: PT Eval and Treat Survivorship Weight Loss Program Participant  Medical Diagnosis from Referral:   E66.01,Z68.42 (ICD-10-CM) - Class 3 severe obesity with body mass index (BMI) of 45.0 to 49.9 in adult, unspecified obesity type, unspecified whether serious comorbidity present   D05.12 (ICD-10-CM) - Breast neoplasm, Tis (DCIS), left   Evaluation Date: 5/9/2022  Authorization Period Expiration: 11/9/2022  Plan of Care Expiration: 11/9/2022  Visit # / Visits authorized: 3/ 16 (plus evaluation)  Insurance: MEDICAID/[employer]     Time In: 1:00 PM  Time Out: 1:45  PM  Total Billable Time: 45 minutes     Precautions: Standard and cancer        Subjective     Pt reports: her stomach is bad today. She is nauseated today but wants to exercise. She was unable to bend forward with yoga last night because she got lightheaded doing a forward bend.   She was compliant with home exercise program.  Response to previous treatment: was swollen that night on her left ankle and foot but better the next day  Functional change: none stated    Pain: 8/10  Location: bilateral lower legs (left > right) & stomach    Cancer Related Surgery and Date: left partial mastectomy without sentinel node biopsy on 2/14/2020     Chemotherapy: N/A  Radiation: 5/20/2020 To 6/19/2020  Adjuvant endocrine therapy started on started, but stopped due to side effects.    Objective     Objective Measures updated at progress report unless specified.       Treatment       Dianne received therapeutic exercises to develop strength, endurance, ROM, flexibility, posture and core  stabilization for 45 minutes including:    Aerobic/Cardio Activity: Recumbent bike, Level 4, seat 7, 6:17 min      Seated:  -hip ADD 2 x 10  -knee ext, 2 x 10/LE  -bicep curl with 4# bar, 2 x 10  -rows with red band, 2 x 10  -triceps ext with red band, 2 x 10/UE  -hip abduction with green band, 2 x 10  -hamstring stretch, 30s/LE long sitting R, supine with strap L   - ankle dorsiflexion 2 x 10    Home Exercises Provided and Patient Education Provided     Education provided:   - HEP, exercise technique, potential benefit of aquatic therapy or exercising in a pool    Written Home Exercises Provided: Patient instructed to cont prior HEP.  Exercises were reviewed and Dianne was able to demonstrate them prior to the end of the session.  Dianne demonstrated good  understanding of the education provided.     See EMR under Patient Instructions for exercises provided Eval.    Assessment     Pt tolerated session fairly well. She continues to endorse pain in BLE's but states this is her usual pain. Pt was able to complete all of today's progressionscwith no increase in symptoms prior to leaving the clinic. She mainly performed seated exercises to avoid exacerbations of her GI symptoms. Will progress as tolerated.  Dianne Is progressing well towards her goals.   Pt prognosis is Good.     Pt will continue to benefit from skilled outpatient physical therapy to address the deficits listed in the problem list box on initial evaluation, provide pt/family education and to maximize pt's level of independence in the home and community environment.     Pt's spiritual, cultural and educational needs considered and pt agreeable to plan of care and goals.     Anticipated barriers to physical therapy: co-morbidities    Goals:   Short Term Goals:  4 weeks     1. Pt will demo >/= 4+/5 strength in bilateral lower extremities for improved functional mobility. (progressing, not met)  2. Pt. will improve single leg stance balance test score to  >/=  15s to be in a moderate/low risk category for falls. (progressing, not met)  3. Pt will perform >/= 9 sit to stands in 30 seconds with no arms for improved endurance. (progressing, not met)  4. Pt will increase 6 minute walk test score by >/=  50 meters in order to increase ambulation in the community. (progressing, not met)  5. Pt will initiate home exercise program to improve strength, flexibility, endurance, mobility & balance. (progressing, not met)  6. Pt will tolerate 10 min or greater of time in light-->moderate intensity cardio (I.e. Bike, Treadmill) to improve endurance to assist in walking her dog for exercise (progressing, not met)  7. Pt will verbalize understanding of exercise recommendation guidelines for moderate intensity exercise to safely perform and progress home exercise routine. (progressing, not met)     Long Term Goals: 8 weeks  1. Pt will increase strength in bilateral lower extremities to 5/5 for improved functional mobility and tolerance for ADL and work activities. (progressing, not met)  2. Pt will be independent with home exercise program to improve range of motion, strength, balance and return to prior level of function. (progressing, not met)  3. Pt. will improve Single Leg Stance balance test score to >/=  30s for patient to be in low risk category for falls. (progressing, not met)  4. Pt will perform >/= 15 sit to stands in 30 seconds with no arms for improved endurance. (progressing, not met)  5. Pt will increase 6 minute walk test score by >/= 75 meters in order to increase community ambulation. (progressing, not met)  6. Patient will report compliance with  20 -30min  of moderate intensity exercise 5 days a week to improve overall cardiovascular function and decrease cancer related fatigue. (progressing, not met)     Plan   Plan of care Certification: 5/9/2022 to 11/9/2022.     Outpatient Physical Therapy 2 times weekly for 8 weeks to include the following interventions: Gait  Training, Manual Therapy, Neuromuscular Re-ed, Patient Education, Self Care, Therapeutic Activities, Therapeutic Exercise and IASTM. Dry needling with manual therapy techniques to decrease pain, inflammation and swelling, increase circulation and promote healing process.       Pt will also benefit from re-assessment at 3 months and 6 months following evaluation to assess for any change in functional status.       Koki Ferrera, PT
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
## 2022-06-01 NOTE — TELEPHONE ENCOUNTER
----- Message from Ellyn Gonzalez sent at 6/1/2022 10:51 AM CDT -----  Contact: [facility] [phone]  Pharmacy is calling to clarify an RX.  RX name:  pregabalin (LYRICA) 25 MG capsule  What do they need to clarify:  therapy   Comments:      Please call and advise

## 2022-06-07 ENCOUNTER — TELEPHONE (OUTPATIENT)
Dept: GASTROENTEROLOGY | Facility: CLINIC | Age: 47
End: 2022-06-07
Payer: MEDICAID

## 2022-06-08 NOTE — TELEPHONE ENCOUNTER
Informed pt August schedule isn't open yet and can try back in about a week and added pt to waiting list.   Pt verbalize understanding and thank me

## 2022-06-13 ENCOUNTER — PATIENT MESSAGE (OUTPATIENT)
Dept: PRIMARY CARE CLINIC | Facility: CLINIC | Age: 47
End: 2022-06-13
Payer: MEDICAID

## 2022-06-14 ENCOUNTER — CLINICAL SUPPORT (OUTPATIENT)
Dept: REHABILITATION | Facility: HOSPITAL | Age: 47
End: 2022-06-14
Payer: MEDICAID

## 2022-06-14 ENCOUNTER — NUTRITION (OUTPATIENT)
Dept: PSYCHIATRY | Facility: CLINIC | Age: 47
End: 2022-06-14
Payer: MEDICAID

## 2022-06-14 DIAGNOSIS — R26.89 BALANCE PROBLEM: ICD-10-CM

## 2022-06-14 DIAGNOSIS — D05.12 BREAST NEOPLASM, TIS (DCIS), LEFT: ICD-10-CM

## 2022-06-14 DIAGNOSIS — R26.9 ABNORMALITY OF GAIT: ICD-10-CM

## 2022-06-14 DIAGNOSIS — R29.898 WEAKNESS OF BOTH LOWER EXTREMITIES: Primary | ICD-10-CM

## 2022-06-14 DIAGNOSIS — E66.01 CLASS 3 SEVERE OBESITY WITH BODY MASS INDEX (BMI) OF 45.0 TO 49.9 IN ADULT, UNSPECIFIED OBESITY TYPE, UNSPECIFIED WHETHER SERIOUS COMORBIDITY PRESENT: Primary | ICD-10-CM

## 2022-06-14 DIAGNOSIS — Z74.09 DECREASED FUNCTIONAL MOBILITY AND ENDURANCE: ICD-10-CM

## 2022-06-14 PROCEDURE — 96164 HLTH BHV IVNTJ GRP 1ST 30: CPT | Mod: HB,AH,, | Performed by: PSYCHOLOGIST

## 2022-06-14 PROCEDURE — 96165 HLTH BHV IVNTJ GRP EA ADDL: CPT | Mod: HB,AH,, | Performed by: PSYCHOLOGIST

## 2022-06-14 PROCEDURE — 96165 PR INTERVENTION, HEALTH BEHAVIOR, GROUP, EA ADDTL 15 MINS: ICD-10-PCS | Mod: HB,AH,, | Performed by: PSYCHOLOGIST

## 2022-06-14 PROCEDURE — 97110 THERAPEUTIC EXERCISES: CPT

## 2022-06-14 PROCEDURE — 96164 PR INTERVENTION, HEALTH BEHAVIOR, GROUP, 1ST 30 MINS: ICD-10-PCS | Mod: HB,AH,, | Performed by: PSYCHOLOGIST

## 2022-06-14 NOTE — PROGRESS NOTES
/Physical Therapy Daily Treatment Note     Name: Dianne Aguilar [facility] Number: [id number]    Therapy Diagnosis:   Encounter Diagnoses   Name Primary?    Weakness of both lower extremities Yes    Balance problem     Abnormality of gait     Decreased functional mobility and endurance      Physician: Lilian Villagran PA-C    Visit Date: 6/14/2022    Physician Orders: PT Eval and Treat Survivorship Weight Loss Program Participant  Medical Diagnosis from Referral:   E66.01,Z68.42 (ICD-10-CM) - Class 3 severe obesity with body mass index (BMI) of 45.0 to 49.9 in adult, unspecified obesity type, unspecified whether serious comorbidity present   D05.12 (ICD-10-CM) - Breast neoplasm, Tis (DCIS), left   Evaluation Date: 5/9/2022  Authorization Period Expiration: 11/9/2022  Plan of Care Expiration: 11/9/2022  Visit # / Visits authorized: 4/ 16 (plus evaluation)  Insurance: MEDICAID/[id number] ([employer])     Time In: 1:00 PM  Time Out: 2:00 PM  Total Billable Time: 60 minutes     Precautions: Standard and cancer        Subjective     Pt reports: was having some trouble tapering off of her Lyrica, but she has been trying to walk more in the stores by holding on to the cart instead of using the motorized scooters.    She was compliant with home exercise program.  Response to previous treatment: I don't remember, I may have been swollen after that visit  Functional change: all the same    Pain: 6/10  Location: bilateral lower legs (left > right) & HA    Cancer Related Surgery and Date: left partial mastectomy without sentinel node biopsy on 2/14/2020     Chemotherapy: N/A  Radiation: 5/20/2020 To 6/19/2020  Adjuvant endocrine therapy started on started, but stopped due to side effects.    Objective     Objective Measures updated at progress report unless specified.     Upper Extremity Strength    (R) UE (L) UE   Shoulder flexion: 5/5 5/5   Shoulder Abduction: 5/5 5/5   Shoulder IR 5/5 5/5   Shoulder ER 5/5 5/5    Elbow flexion: 5/5 5/5   Elbow extension: 5/5 5/5   Wrist flexion: 5/5 5/5   Wrist extension: 5/5 5/5          Lower Extremity Strength  Right LE   Left LE     Hip Flexion: 5/5 Hip Flexion: 4+/5   Hip Extension:  5/5 Hip Extension: 5/5   Hip Abduction: 5/5 Hip Abduction: 5/5   Hip Adduction: 5/5 Hip Adduction 4+/5   Knee Extension: 5/5 Knee Extension: 4+/5   Knee Flexion: 5/5 Knee Flexion: 4+/5   Ankle Dorsiflexion: 5/5 Ankle Dorsiflexion: 4-/5*   Ankle Plantarflexion: 5/5 Ankle Plantarflexion: 4+/5*   * discomfort due to CRPS      Strength (in pounds, setting II, average of three trials):    Left Right   Trial 1: 45.4 lbs 83.1 lbs   Trial 2: 64.8 lbs 72.3 lbs   Trial 3: 69.0 lbs 92.3 lbs   Average: 59.7 lbs 82.6 lbs       Strength (in pounds, setting II, average of three trials): 6/14/22    Left Right   Trial 1: 61.0 lbs 87.7 lbs   Trial 2: 71.6 lbs 87.3 lbs   Trial 3: 68.5 lbs 83.1 lbs   Average: 67.03 lbs 86.03 lbs     Normal  Average Values  Female Right Left Male: Right Left   20-29 66 lbs 62 lbs         94 lbs 86 lbs   30-39 68 lbs 64 lbs 90 lbs 82 lbs   40-49 64 lbs 62 lbs 80 lbs 74 lbs   50-59 62 lbs 57 lbs 72 lbs 65 lbs   60-69 53 lbs 51 lbs 63 lbs 56 lbs   70+ 44 lbs 42 lbs 54 lbs 48 lbs         Balance Assessment:     For Single Limb Stance, enter best of 3 trials for each leg. Test times out at 30s.    Evaluation 6/14/22   Single Limb Stance R LE 11.73 seconds  (<10 sec = HIGH FALL RISK) 15.14 seconds   Single Limb Stance L LE 6.69 seconds  (<10 sec = HIGH FALL RISK) 25.53 seconds        Evaluation 6/14/22   Timed Up and Go 14.46 sec  10.08 sec         Table: Population Norms for TUG    Age  Average TUG    60 - 69 years  8.1 seconds    70 - 79 years  9.2 seconds    80 - 99 years  11.3 seconds          Endurance:     6 Minute Walk Test Distance in meters: 170.60 meters, required 1 standing rest break  6/14/22: 232.40 meters, no rest breaks    - AD used: SPC( no AD when she arrived, borrowed  clinic's cane for test)  - Assistance: supervision  - Distance: 170.60; 6/14/22 232.40 meters     GAIT DEVIATIONS:  Dianne displays the following deviations with ambulation: antalgic gait, wearing slides          Evaluation 6/14/22   30 second Chair Rise  (adults > 61 y/o) 5 completed with no arms 8 completed with hands on knees               CMS Impairment/Limitation/Restriction for FOTO Cancer Survey     Therapist reviewed FOTO scores for Dianne Hemphill on 6/14/2022.   FOTO documents entered into [email or web address] - see Media section.     Limitation Score: 52%  Category: Mobility            Treatment     Dianne received a physical performance test for 40 minutes:   See objective section for measurements    Dianne received therapeutic exercises to develop strength, endurance, ROM, flexibility, posture and core stabilization for 20 minutes including:    Aerobic/Cardio Activity: Recumbent bike, Level 4, seat 7, held today      Seated:  -hip ADD 2 x 10  -knee ext, 2 x 10/L LE, 2 x 10 x 1#/ R LE  -rows with red band, 2 x 10  -triceps ext with red band, 2 x 10/UE  -hip abduction with green band, 2 x 10  - ankle dorsiflexion 2 x 10    Home Exercises Provided and Patient Education Provided     Education provided:   - HEP, exercise technique, potential benefit of aquatic therapy or exercising in a pool  - use of a tennis ball to do self deep tissue work at home.     Written Home Exercises Provided: Patient instructed to cont prior HEP.  Exercises were reviewed and Dianne was able to demonstrate them prior to the end of the session.  Dianne demonstrated good  understanding of the education provided.     See EMR under Patient Instructions for exercises provided Eval.    Assessment     Pt tolerated session fairly well. She was able to demonstrate an increase in B UE and LE strength compared to her initial evaluation. Her scores on TUG, 6 minute walk test, and single leg stance have improved but continue to indicate she is at risk  for falls. Her score on 30 second sit to stand has improved but she did require hands on knees today. She would benefit from continued physical therapy to improve her balance, strength, and endurance in order to increase her independence with her ADLs. She has met goals noted below. Will progress as tolerated.  Dianne Is progressing well towards her goals.   Pt prognosis is Good.     Pt will continue to benefit from skilled outpatient physical therapy to address the deficits listed in the problem list box on initial evaluation, provide pt/family education and to maximize pt's level of independence in the home and community environment.     Pt's spiritual, cultural and educational needs considered and pt agreeable to plan of care and goals.     Anticipated barriers to physical therapy: co-morbidities    Goals:   Short Term Goals:  4 weeks     1. Pt will demo >/= 4+/5 strength in bilateral lower extremities for improved functional mobility. (progressing, not met)  2. Pt. will improve single leg stance balance test score to >/=  15s to be in a moderate/low risk category for falls. (met, 6/14/22)  3. Pt will perform >/= 9 sit to stands in 30 seconds with no arms for improved endurance. (progressing, not met)  4. Pt will increase 6 minute walk test score by >/=  50 meters in order to increase ambulation in the community. (exceeded, 6/14/22)  5. Pt will initiate home exercise program to improve strength, flexibility, endurance, mobility & balance. (met, 6/14/22)  6. Pt will tolerate 10 min or greater of time in light-->moderate intensity cardio (I.e. Bike, Treadmill) to improve endurance to assist in walking her dog for exercise (progressing, not met)  7. Pt will verbalize understanding of exercise recommendation guidelines for moderate intensity exercise to safely perform and progress home exercise routine. (progressing, not met)     Long Term Goals: 8 weeks  1. Pt will increase strength in bilateral lower extremities to  5/5 for improved functional mobility and tolerance for ADL and work activities. (progressing, not met)  2. Pt will be independent with home exercise program to improve range of motion, strength, balance and return to prior level of function. (progressing, not met)  3. Pt. will improve Single Leg Stance balance test score to >/=  30s for patient to be in low risk category for falls. (progressing, not met)  4. Pt will perform >/= 15 sit to stands in 30 seconds with no arms for improved endurance. (progressing, not met)  5. Pt will increase 6 minute walk test score by >/= 75 meters in order to increase community ambulation. (progressing, not met)  6. Patient will report compliance with  20 -30min  of moderate intensity exercise 5 days a week to improve overall cardiovascular function and decrease cancer related fatigue. (progressing, not met)     Plan   Plan of care Certification: 5/9/2022 to 11/9/2022.     Outpatient Physical Therapy 2 times weekly for 8 weeks to include the following interventions: Gait Training, Manual Therapy, Neuromuscular Re-ed, Patient Education, Self Care, Therapeutic Activities, Therapeutic Exercise and IASTM. Dry needling with manual therapy techniques to decrease pain, inflammation and swelling, increase circulation and promote healing process.       Pt will also benefit from re-assessment at 3 months and 6 months following evaluation to assess for any change in functional status.       Koki Ferrera, PT

## 2022-06-16 ENCOUNTER — PATIENT MESSAGE (OUTPATIENT)
Dept: GASTROENTEROLOGY | Facility: CLINIC | Age: 47
End: 2022-06-16
Payer: MEDICAID

## 2022-06-20 ENCOUNTER — PATIENT MESSAGE (OUTPATIENT)
Dept: PRIMARY CARE CLINIC | Facility: CLINIC | Age: 47
End: 2022-06-20
Payer: MEDICAID

## 2022-06-21 NOTE — PROGRESS NOTES
INFORMED CONSENT: Dianne Hemphill   is known to this provider and identity was confirmed via NAME and .  The patient has been informed of the risks and benefits associated with engaging in psychotherapy, the handling of protected health information, the rights of privacy and the limits of confidentiality. The patient has also been informed of the importance of reporting any suicidal or homicidal ideation to this or any provider to ensure safety of all parties, and the Dianne Hemphill expressed understanding. The patient was agreeable to these terms and freely participates    Health and Behavior Intervention Group Note -Psychologist       Date: 2022    Site:   [facility]     Number of patients in attendance: 5    Group Type: Health and Behavior Group Intervention for Cancer Survivors ([id number] first 30 minutes, [id number] additional 15 minutes)    Dianne Hemphill arrived on time for the group. Patient was appropriately dressed and groomed. Dianne Hemphill  appeared alert and oriented in all spheres. No suicidal or homicidal ideation was reported.  Dianne Hemphill actively participated in the group process and provided both prompted and unprompted contribution to the group discussion.      Content of session:        Group 4: ABCs of behavior: thoughts, feelings, and social influences on eating behavior  Introduced to Model of ABCs of behavior and uncovered link between thoughts, feeling, and behaviors on weight management  Reviewed unhelpful automatic thoughts related to weight and wellness  Developed Helpful thinking styles to replace/balance unhelpful thinking  Revisited SMART Goals    Progressive Muscle Relaxation exercise conducted during visit.   Goals for next visit: Complete Weight related thought record           Patient's response to intervention:The patient's response to intervention is accepting. Patient stated that she struggle with cognitions related to food  intolerances.    Behavior: Interested    Insight: good     Progress toward goals:Progress as Expected      Goals from last visit: Met    Treatment Plan: Continue group participation  · Target symptoms: weight loss   · Why chosen therapy is appropriate versus another modality: relevant to diagnosis, evidence based practice  · Outcome monitoring methods: checklist/rating scale, weight monitoring  · Return to clinic: 1 month     Length of Service (minutes direct face-to-face contact): 60    Encounter Diagnoses   Name Primary?    Class 3 severe obesity with body mass index (BMI) of 45.0 to 49.9 in adult, unspecified obesity type, unspecified whether serious comorbidity present Yes    Breast neoplasm, Tis (DCIS), left           Sugey Connors, PhD  Clinical Psychologist  [place] License #[id number]  [place] License #[id number]

## 2022-06-22 ENCOUNTER — PATIENT MESSAGE (OUTPATIENT)
Dept: PRIMARY CARE CLINIC | Facility: CLINIC | Age: 47
End: 2022-06-22
Payer: MEDICAID

## 2022-06-22 DIAGNOSIS — M54.10 RADICULAR PAIN OF LEFT LOWER EXTREMITY: ICD-10-CM

## 2022-06-22 DIAGNOSIS — M43.27 FUSION OF LUMBOSACRAL SPINE: ICD-10-CM

## 2022-06-22 DIAGNOSIS — M48.061 NEURAL FORAMINAL STENOSIS OF LUMBAR SPINE: ICD-10-CM

## 2022-06-23 ENCOUNTER — CLINICAL SUPPORT (OUTPATIENT)
Dept: REHABILITATION | Facility: HOSPITAL | Age: 47
End: 2022-06-23
Payer: MEDICAID

## 2022-06-23 DIAGNOSIS — Z74.09 DECREASED FUNCTIONAL MOBILITY AND ENDURANCE: ICD-10-CM

## 2022-06-23 DIAGNOSIS — R26.9 ABNORMALITY OF GAIT: ICD-10-CM

## 2022-06-23 DIAGNOSIS — R26.89 BALANCE PROBLEM: ICD-10-CM

## 2022-06-23 DIAGNOSIS — R29.898 WEAKNESS OF BOTH LOWER EXTREMITIES: Primary | ICD-10-CM

## 2022-06-23 PROCEDURE — 97110 THERAPEUTIC EXERCISES: CPT

## 2022-06-23 RX ORDER — PREGABALIN 25 MG/1
25 CAPSULE ORAL 3 TIMES DAILY
Qty: 90 CAPSULE | Refills: 0 | Status: SHIPPED | OUTPATIENT
Start: 2022-06-23 | End: 2022-07-12

## 2022-06-23 RX ORDER — PREGABALIN 50 MG/1
50 CAPSULE ORAL 3 TIMES DAILY
Qty: 90 CAPSULE | Refills: 0 | Status: SHIPPED | OUTPATIENT
Start: 2022-06-23 | End: 2022-07-12 | Stop reason: SDUPTHER

## 2022-06-23 NOTE — PROGRESS NOTES
"/Physical Therapy Daily Treatment Note     Name: Dianne Aguilar [facility] Number: [id number]    Therapy Diagnosis:   Encounter Diagnoses   Name Primary?    Weakness of both lower extremities Yes    Balance problem     Abnormality of gait     Decreased functional mobility and endurance      Physician: Lilian Villagran PA-C    Visit Date: 6/23/2022    Physician Orders: PT Eval and Treat Survivorship Weight Loss Program Participant  Medical Diagnosis from Referral:   E66.01,Z68.42 (ICD-10-CM) - Class 3 severe obesity with body mass index (BMI) of 45.0 to 49.9 in adult, unspecified obesity type, unspecified whether serious comorbidity present   D05.12 (ICD-10-CM) - Breast neoplasm, Tis (DCIS), left   Evaluation Date: 5/9/2022  Authorization Period Expiration: 11/9/2022  Plan of Care Expiration: 11/9/2022  Visit # / Visits authorized: 5/ 16 (plus evaluation)  Insurance: MEDICAID/[employer] [employer]     Time In: 10:03 AM  Time Out: 11:00 AM  Total Billable Time: 54minutes     Precautions: Standard and cancer        Subjective     Pt reports: She has been noticing her left knee is "giving out" on her since Tuesday. Pt denies falls. Pt states she does a 1.5 hour massage once a week. Pt states she does a lot of work on her IT bands    She was compliant with home exercise program.  Response to previous treatment: I don't remember, I may have been swollen after that visit  Functional change: all the same    Pain: 7/10  Location: left leg    Cancer Related Surgery and Date: left partial mastectomy without sentinel node biopsy on 2/14/2020     Chemotherapy: N/A  Radiation: 5/20/2020 To 6/19/2020  Adjuvant endocrine therapy started on started, but stopped due to side effects.    Objective     Objective Measures updated at progress report unless specified.        Treatment       Dianne received therapeutic exercises to develop strength, endurance, ROM, flexibility, posture and core stabilization for 54 minutes " including:    Aerobic/Cardio Activity: Recumbent bike, Level 4, seat 7, 6min    Supine:  -short arc quad, 2 x 10 reps/LE (no weight on left, 2# on right)  -Hip ADD, 3 x 10  -core activation with orange swiss ball, 2 x 10 reps  -marching x 12 reps B  -SLR, 10/LE  -bridges with LE's on bolster, 2 x 10 reps  -Hip ABD with purple band, 3 x 10 reps  -pt attempts hip flexor stretch, but endorses tingling, so it was stopped    Seated:  Hamstring stretch, 2 x 30s/LE    Standing:  Hip ext, 10/LE    Home Exercises Provided and Patient Education Provided     Education provided:   - exercise technique    Written Home Exercises Provided: Patient instructed to cont prior HEP.  Exercises were reviewed and Dianne was able to demonstrate them prior to the end of the session.  Dianne demonstrated good  understanding of the education provided.     See EMR under Patient Instructions for exercises provided Eval.    Assessment     Pt tolerated session fairly well. Given pt's c/o pain and instability around left knee, focused exercises on LLE strengthening. Pt endorses occasional tingling in distal LLE with 2 exercises, which resolves with modification. Otherwise, pt able to complete exercises without issue. Will progress as tolerated.  Dianne Is progressing well towards her goals.   Pt prognosis is Good.     Pt will continue to benefit from skilled outpatient physical therapy to address the deficits listed in the problem list box on initial evaluation, provide pt/family education and to maximize pt's level of independence in the home and community environment.     Pt's spiritual, cultural and educational needs considered and pt agreeable to plan of care and goals.     Anticipated barriers to physical therapy: co-morbidities    Goals:   Short Term Goals:  4 weeks     1. Pt will demo >/= 4+/5 strength in bilateral lower extremities for improved functional mobility. (progressing, not met)  2. Pt. will improve single leg stance balance test  score to >/=  15s to be in a moderate/low risk category for falls. (met, 6/14/22)  3. Pt will perform >/= 9 sit to stands in 30 seconds with no arms for improved endurance. (progressing, not met)  4. Pt will increase 6 minute walk test score by >/=  50 meters in order to increase ambulation in the community. (exceeded, 6/14/22)  5. Pt will initiate home exercise program to improve strength, flexibility, endurance, mobility & balance. (met, 6/14/22)  6. Pt will tolerate 10 min or greater of time in light-->moderate intensity cardio (I.e. Bike, Treadmill) to improve endurance to assist in walking her dog for exercise (progressing, not met)  7. Pt will verbalize understanding of exercise recommendation guidelines for moderate intensity exercise to safely perform and progress home exercise routine. (progressing, not met)     Long Term Goals: 8 weeks  1. Pt will increase strength in bilateral lower extremities to 5/5 for improved functional mobility and tolerance for ADL and work activities. (progressing, not met)  2. Pt will be independent with home exercise program to improve range of motion, strength, balance and return to prior level of function. (progressing, not met)  3. Pt. will improve Single Leg Stance balance test score to >/=  30s for patient to be in low risk category for falls. (progressing, not met)  4. Pt will perform >/= 15 sit to stands in 30 seconds with no arms for improved endurance. (progressing, not met)  5. Pt will increase 6 minute walk test score by >/= 75 meters in order to increase community ambulation. (progressing, not met)  6. Patient will report compliance with  20 -30min  of moderate intensity exercise 5 days a week to improve overall cardiovascular function and decrease cancer related fatigue. (progressing, not met)     Plan   Plan of care Certification: 5/9/2022 to 11/9/2022.     Outpatient Physical Therapy 2 times weekly for 8 weeks to include the following interventions: Gait Training,  Manual Therapy, Neuromuscular Re-ed, Patient Education, Self Care, Therapeutic Activities, Therapeutic Exercise and IASTM. Dry needling with manual therapy techniques to decrease pain, inflammation and swelling, increase circulation and promote healing process.       Pt will also benefit from re-assessment at 3 months and 6 months following evaluation to assess for any change in functional status.       Lilian Zapata, PT

## 2022-06-28 ENCOUNTER — NUTRITION (OUTPATIENT)
Dept: PSYCHIATRY | Facility: CLINIC | Age: 47
End: 2022-06-28
Payer: MEDICAID

## 2022-06-28 ENCOUNTER — CLINICAL SUPPORT (OUTPATIENT)
Dept: REHABILITATION | Facility: HOSPITAL | Age: 47
End: 2022-06-28
Payer: MEDICAID

## 2022-06-28 VITALS — BODY MASS INDEX: 44.41 KG/M2 | HEIGHT: 68 IN | WEIGHT: 293 LBS

## 2022-06-28 DIAGNOSIS — R29.898 WEAKNESS OF BOTH LOWER EXTREMITIES: Primary | ICD-10-CM

## 2022-06-28 DIAGNOSIS — Z74.09 DECREASED FUNCTIONAL MOBILITY AND ENDURANCE: ICD-10-CM

## 2022-06-28 DIAGNOSIS — R26.9 ABNORMALITY OF GAIT: ICD-10-CM

## 2022-06-28 DIAGNOSIS — D05.12 BREAST NEOPLASM, TIS (DCIS), LEFT: ICD-10-CM

## 2022-06-28 DIAGNOSIS — E66.01 CLASS 3 SEVERE OBESITY WITH BODY MASS INDEX (BMI) OF 45.0 TO 49.9 IN ADULT, UNSPECIFIED OBESITY TYPE, UNSPECIFIED WHETHER SERIOUS COMORBIDITY PRESENT: ICD-10-CM

## 2022-06-28 DIAGNOSIS — Z71.3 NUTRITIONAL COUNSELING: Primary | ICD-10-CM

## 2022-06-28 DIAGNOSIS — R26.89 BALANCE PROBLEM: ICD-10-CM

## 2022-06-28 PROCEDURE — 99999 PR PBB SHADOW E&M-EST. PATIENT-LVL I: ICD-10-PCS | Mod: PBBFAC,HB,,

## 2022-06-28 PROCEDURE — 99999 PR PBB SHADOW E&M-EST. PATIENT-LVL I: CPT | Mod: PBBFAC,HB,,

## 2022-06-28 PROCEDURE — 97804 MEDICAL NUTRITION GROUP: CPT | Mod: PBBFAC | Performed by: DIETITIAN, REGISTERED

## 2022-06-28 PROCEDURE — 99211 OFF/OP EST MAY X REQ PHY/QHP: CPT | Mod: PBBFAC

## 2022-06-28 PROCEDURE — 97110 THERAPEUTIC EXERCISES: CPT

## 2022-06-28 NOTE — PROGRESS NOTES
/Physical Therapy Daily Treatment Note     Name: Dianne Aguilar [facility] [address] Number: [id number]    Therapy Diagnosis:   Encounter Diagnoses   Name Primary?    Weakness of both lower extremities Yes    Balance problem     Abnormality of gait     Decreased functional mobility and endurance      Physician: Lilian Villagran PA-C    Visit Date: 6/28/2022    Physician Orders: PT Eval and Treat Survivorship Weight Loss Program Participant  Medical Diagnosis from Referral:   E66.01,Z68.42 (ICD-10-CM) - Class 3 severe obesity with body mass index (BMI) of 45.0 to 49.9 in adult, unspecified obesity type, unspecified whether serious comorbidity present   D05.12 (ICD-10-CM) - Breast neoplasm, Tis (DCIS), left   Evaluation Date: 5/9/2022  Authorization Period Expiration: 11/9/2022  Plan of Care Expiration: 11/9/2022  Visit # / Visits authorized: 6/ 16 (plus evaluation)  Insurance: MEDICAID/[employer]     Time In: 1:15 PM  Time Out: 2:00 PM  Total Billable Time: 45 minutes     Precautions: Standard and cancer        Subjective     Pt reports: her left leg is on and off, she was very sore yesterday when it rained, but her right knee keeps going out on her. Her hips feeling better since last visit. She has been doing HEP and not doing too bad with it.   She was compliant with home exercise program.  Response to previous treatment: hips felt better  Functional change: all the same    Pain: 7/10  Location: lower back, legs    Cancer Related Surgery and Date: left partial mastectomy without sentinel node biopsy on 2/14/2020     Chemotherapy: N/A  Radiation: 5/20/2020 To 6/19/2020  Adjuvant endocrine therapy started on started, but stopped due to side effects.    Objective     Objective Measures updated at progress report unless specified.        Treatment       Dianne received therapeutic exercises to develop strength, endurance, ROM, flexibility, posture and core stabilization for 45 minutes  including:    Aerobic/Cardio Activity: Recumbent bike, Level 4, seat 7, 6min    Supine:  -short arc quad, 2 x 10 reps/LE (no weight on left, 2# on right)  -Hip ADD, 3 x 10  -core activation with orange swiss ball, 2 x 10 reps  -marching x 15 reps B  -SLR, 15/LE  -bridges with LE's on bolster, 2 x 10 reps  -Hip ABD with purple band, 3 x 10 reps    Seated:  Hamstring stretch, 2 x 30s/LE    Home Exercises Provided and Patient Education Provided     Education provided:   - exercise technique    Written Home Exercises Provided: Patient instructed to cont prior HEP.  Exercises were reviewed and Dianne was able to demonstrate them prior to the end of the session.  Dianne demonstrated good  understanding of the education provided.     See EMR under Patient Instructions for exercises provided Eval.    Assessment     Pt tolerated session fairly well. Today's session continued to focus on L LE strength due to continued complaints of pain and instability. She was able to perform all of today's progressions with no increase in symptoms prior to leaving the clinic. She did not perform standing hip strengthening due to time constraints as was in a meeting that ran late at the scheduled start of her session. Will progress as tolerated.  Dianne Is progressing well towards her goals.   Pt prognosis is Good.     Pt will continue to benefit from skilled outpatient physical therapy to address the deficits listed in the problem list box on initial evaluation, provide pt/family education and to maximize pt's level of independence in the home and community environment.     Pt's spiritual, cultural and educational needs considered and pt agreeable to plan of care and goals.     Anticipated barriers to physical therapy: co-morbidities    Goals:   Short Term Goals:  4 weeks     1. Pt will demo >/= 4+/5 strength in bilateral lower extremities for improved functional mobility. (progressing, not met)  2. Pt. will improve single leg stance  balance test score to >/=  15s to be in a moderate/low risk category for falls. (met, 6/14/22)  3. Pt will perform >/= 9 sit to stands in 30 seconds with no arms for improved endurance. (progressing, not met)  4. Pt will increase 6 minute walk test score by >/=  50 meters in order to increase ambulation in the community. (exceeded, 6/14/22)  5. Pt will initiate home exercise program to improve strength, flexibility, endurance, mobility & balance. (met, 6/14/22)  6. Pt will tolerate 10 min or greater of time in light-->moderate intensity cardio (I.e. Bike, Treadmill) to improve endurance to assist in walking her dog for exercise (progressing, not met)  7. Pt will verbalize understanding of exercise recommendation guidelines for moderate intensity exercise to safely perform and progress home exercise routine. (progressing, not met)     Long Term Goals: 8 weeks  1. Pt will increase strength in bilateral lower extremities to 5/5 for improved functional mobility and tolerance for ADL and work activities. (progressing, not met)  2. Pt will be independent with home exercise program to improve range of motion, strength, balance and return to prior level of function. (progressing, not met)  3. Pt. will improve Single Leg Stance balance test score to >/=  30s for patient to be in low risk category for falls. (progressing, not met)  4. Pt will perform >/= 15 sit to stands in 30 seconds with no arms for improved endurance. (progressing, not met)  5. Pt will increase 6 minute walk test score by >/= 75 meters in order to increase community ambulation. (progressing, not met)  6. Patient will report compliance with  20 -30min  of moderate intensity exercise 5 days a week to improve overall cardiovascular function and decrease cancer related fatigue. (progressing, not met)     Plan   Plan of care Certification: 5/9/2022 to 11/9/2022.     Outpatient Physical Therapy 2 times weekly for 8 weeks to include the following interventions:  Gait Training, Manual Therapy, Neuromuscular Re-ed, Patient Education, Self Care, Therapeutic Activities, Therapeutic Exercise and IASTM. Dry needling with manual therapy techniques to decrease pain, inflammation and swelling, increase circulation and promote healing process.       Pt will also benefit from re-assessment at 3 months and 6 months following evaluation to assess for any change in functional status.       Koki Ferrera, PT

## 2022-06-28 NOTE — PROGRESS NOTES
"Oncology Survivorship Weight Loss Program   Group Medical Nutrition Therapy Visit     Session # 5    Dianne Hemphill   1975    Referring Provider:  No ref. provider found      Reason for Visit: Pt here for nutrition education and counseling for weight loss following breast or endometrial cancer     PMHx:   Past Medical History:   Diagnosis Date    Anxiety     Breast cancer     DCIS, left partical mastectomy    CRPS (complex regional pain syndrome type I)     Elevated liver function tests 2/22/2022    Fatigue     General anesthetics causing adverse effect in therapeutic use     verbal combative after 1 procedure    GERD (gastroesophageal reflux disease)     History of migraine headaches     Hx of psychiatric care     Obesity     Pollen allergies     PONV (postoperative nausea and vomiting)     Psychiatric problem     Sleep difficulties     Smoker     Therapy     Urinary tract infection 5/6/2021       Nutrition Assessment    This is a 46 y.o.female with a history of breast cancer.   Patient was seen today as part of 6- month weight management program. Today was session 5 which covered the following topics:   Plant-based diets  Mediterranean diet   Phytonutrients   Patient participated in lecture and subsequent discussion. Weights, calories goals, and tracking reinforced.   Patient has maintained weight since previous visit. Suffering from gastroparesis and constipation.     Weight:(!) 147 kg (323 lb 15.5 oz)  Height:5' 8" (1.727 m)  BMI:Body mass index is 49.26 kg/m².   IBW: Ideal body weight: 63.9 kg (140 lb 14 oz)  Adjusted ideal body weight: 97.1 kg (214 lb 1.8 oz)    Allergies: Ativan [lorazepam], Gluten protein, Milk containing products, Wasp venom, Demerol [meperidine], Imitrex [sumatriptan succinate], Morphine, Tetracyclines, Ciprofloxacin hcl, Codeine, Erythromycin, and Macrobid [nitrofurantoin monohyd/m-cryst]    Current Medications:    Current Outpatient Medications:     " acetaminophen (TYLENOL) 650 MG TbSR, Take 650 mg by mouth as needed. , Disp: , Rfl:     amitriptyline (ELAVIL) 50 MG tablet, Take 1 tablet (50 mg total) by mouth every evening., Disp: 90 tablet, Rfl: 2    baclofen (LIORESAL) 10 MG tablet, Take 1 tablet (10 mg total) by mouth 3 (three) times daily., Disp: 270 tablet, Rfl: 1    cetirizine (ZYRTEC) 10 MG tablet, Take 10 mg by mouth once daily., Disp: , Rfl:     cholecalciferol, vitamin D3, (VITAMIN D3) 50 mcg (2,000 unit) Tab, Take 5,000 Units by mouth once daily. , Disp: , Rfl:     DULoxetine (CYMBALTA) 30 MG capsule, Take 1 capsule by mouth once daily, Disp: 90 capsule, Rfl: 0    EPINEPHrine (EPIPEN) 0.3 mg/0.3 mL AtIn, epinephrine 0.3 mg/0.3 mL injection, auto-injector  INJECT 0.6 MG INTRAMUSCULARLY AT ONCE AS DIRECTED, Disp: , Rfl:     estradioL (ESTRACE) 0.01 % (0.1 mg/gram) vaginal cream, Apply as directed TIW, Disp: 42.5 g, Rfl: 1    fexofenadine (ALLEGRA) 180 MG tablet, Take 180 mg by mouth as needed., Disp: , Rfl:     fluticasone propionate (FLONASE) 50 mcg/actuation nasal spray, 1 spray (50 mcg total) by Each Nostril route once daily., Disp: 16 g, Rfl: 11    hyoscyamine (LEVSIN/SL) 0.125 mg Subl, Place 1 tablet (0.125 mg total) under the tongue every 6 (six) hours as needed (abdominal pain). (Patient not taking: Reported on 6/1/2022), Disp: 120 tablet, Rfl: 2    ketoconazole (NIZORAL) 2 % shampoo, APPLY TOPICALLY TWICE A  WEEK., Disp: 120 mL, Rfl: 0    ondansetron (ZOFRAN-ODT) 4 MG TbDL, Take 2 tablets (8 mg total) by mouth every 6 (six) hours as needed., Disp: 60 tablet, Rfl: 1    pantoprazole (PROTONIX) 40 MG tablet, Take 1 tablet (40 mg total) by mouth once daily., Disp: 90 tablet, Rfl: 3    polyethylene glycol (GLYCOLAX) 17 gram/dose powder, Take 17 g by mouth once daily., Disp: 850 g, Rfl: 0    pregabalin (LYRICA) 25 MG capsule, Take 1 capsule (25 mg total) by mouth 3 (three) times daily., Disp: 90 capsule, Rfl: 0    pregabalin (LYRICA)  50 MG capsule, Take 1 capsule (50 mg total) by mouth 3 (three) times daily., Disp: 90 capsule, Rfl: 0    promethazine (PHENERGAN) 25 MG tablet, Take 25 mg by mouth every 4 (four) hours., Disp: , Rfl:     scopolamine (TRANSDERM-SCOP) 1.3-1.5 mg (1 mg over 3 days), Place 1 patch onto the skin every 72 hours. (Patient taking differently: Place 1 patch onto the skin as needed.), Disp: 4 patch, Rfl: 1    traMADoL (ULTRAM-ER) 100 MG Tb24, Take 100 mg by mouth daily as needed., Disp: , Rfl:   No current facility-administered medications for this visit.    Facility-Administered Medications Ordered in Other Visits:     lidocaine (PF) 10 mg/ml (1%) injection 10 mg, 1 mL, Intradermal, Once, Clemencia Watts Jr., MD    lidocaine (PF) 10 mg/ml (1%) injection 10 mg, 1 mL, Intradermal, Once, Clemencia Watts Jr., MD    Nutrition Diagnosis    Problem: Obese  Etiology (related to): medication and lack of physical activity   Signs/Symptoms (as evidenced by): BMI= 49.2    Nutrition Intervention and Program Adherence    Nutrition Prescription   Patient has been given a goal of 2050 kcal per day for the duration of the weight loss program     Materials Provided/Reviewed Mediterranean food pyramid, plant based recipe      Nutrition Monitoring and Evaluation    Monitor: energy intake and weight and step counts     Goals: weight loss 1-2lb per week    Follow up in 4 weeks      Communication to referring provider/care team: note available in chart     Counseling time: 30 Minutes    Zoe Schwarz, MPH, RD, , LDN, FAND   [phone]

## 2022-06-30 ENCOUNTER — CLINICAL SUPPORT (OUTPATIENT)
Dept: REHABILITATION | Facility: HOSPITAL | Age: 47
End: 2022-06-30
Payer: MEDICAID

## 2022-06-30 DIAGNOSIS — R29.898 WEAKNESS OF BOTH LOWER EXTREMITIES: Primary | ICD-10-CM

## 2022-06-30 DIAGNOSIS — Z74.09 DECREASED FUNCTIONAL MOBILITY AND ENDURANCE: ICD-10-CM

## 2022-06-30 DIAGNOSIS — R26.9 ABNORMALITY OF GAIT: ICD-10-CM

## 2022-06-30 DIAGNOSIS — R26.89 BALANCE PROBLEM: ICD-10-CM

## 2022-06-30 PROCEDURE — 97110 THERAPEUTIC EXERCISES: CPT

## 2022-06-30 NOTE — PROGRESS NOTES
/Physical Therapy Daily Treatment Note     Name: Dianne Aguilar [facility] Number: [id number]    Therapy Diagnosis:   Encounter Diagnoses   Name Primary?    Weakness of both lower extremities Yes    Balance problem     Abnormality of gait     Decreased functional mobility and endurance      Physician: Lilian Villagran PA-C    Visit Date: 6/30/2022    Physician Orders: PT Eval and Treat Survivorship Weight Loss Program Participant  Medical Diagnosis from Referral:   E66.01,Z68.42 (ICD-10-CM) - Class 3 severe obesity with body mass index (BMI) of 45.0 to 49.9 in adult, unspecified obesity type, unspecified whether serious comorbidity present   D05.12 (ICD-10-CM) - Breast neoplasm, Tis (DCIS), left   Evaluation Date: 5/9/2022  Authorization Period Expiration: 11/9/2022  Plan of Care Expiration: 11/9/2022  Visit # / Visits authorized: 6/ 16 (plus evaluation)  Insurance: MEDICAID/[employer]     Time In: 1:07 PM  Time Out: 2:00 PM  Total Billable Time: 53 minutes     Precautions: Standard and cancer        Subjective     Pt reports: doing ok, sore after last visit from a busy day. L knee keeps buckling, low back is hurting today  She was compliant with home exercise program.  Response to previous treatment: hips felt better  Functional change: all the same    Pain: 8/10  Location: lower back, legs    Cancer Related Surgery and Date: left partial mastectomy without sentinel node biopsy on 2/14/2020     Chemotherapy: N/A  Radiation: 5/20/2020 To 6/19/2020  Adjuvant endocrine therapy started on started, but stopped due to side effects.    Objective     Objective Measures updated at progress report unless specified.        Treatment       Dianne received therapeutic exercises to develop strength, endurance, ROM, flexibility, posture and core stabilization for 33 minutes including:    Aerobic/Cardio Activity: Recumbent bike, Level 4, seat 7, 6 min    Supine:  -short arc quad, 2 x 10 reps/LE (no weight  on left, 3# on right)  -core activation with orange swiss ball, 2 x 10 reps  -marching x 15 reps B  -bridges with LE's on bolster, 2 x 10 reps    Seated:  Hamstring stretch, 2 x 30s/LE    Dianne received the following manual therapy techniques were performed to increased myofascial/soft tissue length, mobility and pliability, increase PROM, AROM and function as well as to decrease pain for 20 minutes:  Dry needling with trigger point/manual therapy techniques was performed in prone. Dry needling performed with 44mm needles to symptomatic points in bilateral lumbar paraspinals L3-L5(6 needles total), B QL insertion point on pelvis(4 needles), and B piriformis(4 needles). All needles were removed and changes in signs and symptoms were noted, no adverse events. Dry needling was performed to decrease inflammation, increase circulation, decrease pain and restore homeostasis.  Dry needling consent form was reviewed with the patient addressing all questions and concerns and signed by patient.  Copy of the consent form was provided to patient and copy of consent form scanned to patient Epic chart.       Home Exercises Provided and Patient Education Provided     Education provided:   - exercise technique    Written Home Exercises Provided: Patient instructed to cont prior HEP.  Exercises were reviewed and Dianne was able to demonstrate them prior to the end of the session.  Dianne demonstrated good  understanding of the education provided.     See EMR under Patient Instructions for exercises provided Eval.    Assessment     Pt tolerated session fairly well. She did not perform all of her usual exercises due to time needed for manual therapy. She responded well to the dry needling as she endorsed feeling looser and less pain afterwards. Will attempt to resume her usual strengthening exercises next visit. Will progress as tolerated.  Dianne Is progressing well towards her goals.   Pt prognosis is Good.     Pt will continue to  benefit from skilled outpatient physical therapy to address the deficits listed in the problem list box on initial evaluation, provide pt/family education and to maximize pt's level of independence in the home and community environment.     Pt's spiritual, cultural and educational needs considered and pt agreeable to plan of care and goals.     Anticipated barriers to physical therapy: co-morbidities    Goals:   Short Term Goals:  4 weeks     1. Pt will demo >/= 4+/5 strength in bilateral lower extremities for improved functional mobility. (progressing, not met)  2. Pt. will improve single leg stance balance test score to >/=  15s to be in a moderate/low risk category for falls. (met, 6/14/22)  3. Pt will perform >/= 9 sit to stands in 30 seconds with no arms for improved endurance. (progressing, not met)  4. Pt will increase 6 minute walk test score by >/=  50 meters in order to increase ambulation in the community. (exceeded, 6/14/22)  5. Pt will initiate home exercise program to improve strength, flexibility, endurance, mobility & balance. (met, 6/14/22)  6. Pt will tolerate 10 min or greater of time in light-->moderate intensity cardio (I.e. Bike, Treadmill) to improve endurance to assist in walking her dog for exercise (progressing, not met)  7. Pt will verbalize understanding of exercise recommendation guidelines for moderate intensity exercise to safely perform and progress home exercise routine. (progressing, not met)     Long Term Goals: 8 weeks  1. Pt will increase strength in bilateral lower extremities to 5/5 for improved functional mobility and tolerance for ADL and work activities. (progressing, not met)  2. Pt will be independent with home exercise program to improve range of motion, strength, balance and return to prior level of function. (progressing, not met)  3. Pt. will improve Single Leg Stance balance test score to >/=  30s for patient to be in low risk category for falls. (progressing, not  met)  4. Pt will perform >/= 15 sit to stands in 30 seconds with no arms for improved endurance. (progressing, not met)  5. Pt will increase 6 minute walk test score by >/= 75 meters in order to increase community ambulation. (progressing, not met)  6. Patient will report compliance with  20 -30min  of moderate intensity exercise 5 days a week to improve overall cardiovascular function and decrease cancer related fatigue. (progressing, not met)     Plan   Plan of care Certification: 5/9/2022 to 11/9/2022.     Outpatient Physical Therapy 2 times weekly for 8 weeks to include the following interventions: Gait Training, Manual Therapy, Neuromuscular Re-ed, Patient Education, Self Care, Therapeutic Activities, Therapeutic Exercise and IASTM. Dry needling with manual therapy techniques to decrease pain, inflammation and swelling, increase circulation and promote healing process.       Pt will also benefit from re-assessment at 3 months and 6 months following evaluation to assess for any change in functional status.       Koki Ferrera, PT

## 2022-07-05 ENCOUNTER — PATIENT MESSAGE (OUTPATIENT)
Dept: PRIMARY CARE CLINIC | Facility: CLINIC | Age: 47
End: 2022-07-05
Payer: MEDICAID

## 2022-07-05 DIAGNOSIS — M43.27 FUSION OF LUMBOSACRAL SPINE: ICD-10-CM

## 2022-07-05 DIAGNOSIS — M54.10 RADICULAR PAIN OF LEFT LOWER EXTREMITY: ICD-10-CM

## 2022-07-05 DIAGNOSIS — M48.061 NEURAL FORAMINAL STENOSIS OF LUMBAR SPINE: ICD-10-CM

## 2022-07-05 RX ORDER — PREGABALIN 25 MG/1
25 CAPSULE ORAL 3 TIMES DAILY
Qty: 90 CAPSULE | Refills: 0 | Status: CANCELLED | OUTPATIENT
Start: 2022-07-05 | End: 2023-01-03

## 2022-07-05 NOTE — TELEPHONE ENCOUNTER
No new care gaps identified.  [facility] Embedded Care Gaps. Reference number: [id number]. 7/05/2022   3:20:29 PM CDT

## 2022-07-12 ENCOUNTER — OFFICE VISIT (OUTPATIENT)
Dept: PRIMARY CARE CLINIC | Facility: CLINIC | Age: 47
End: 2022-07-12
Payer: MEDICAID

## 2022-07-12 VITALS — WEIGHT: 293 LBS | HEIGHT: 68 IN | BODY MASS INDEX: 44.41 KG/M2

## 2022-07-12 DIAGNOSIS — G90.522 COMPLEX REGIONAL PAIN SYNDROME TYPE 1 OF LEFT LOWER EXTREMITY: ICD-10-CM

## 2022-07-12 DIAGNOSIS — M43.27 FUSION OF LUMBOSACRAL SPINE: ICD-10-CM

## 2022-07-12 DIAGNOSIS — T63.484S: ICD-10-CM

## 2022-07-12 DIAGNOSIS — K21.9 GASTROESOPHAGEAL REFLUX DISEASE WITHOUT ESOPHAGITIS: ICD-10-CM

## 2022-07-12 DIAGNOSIS — M54.10 RADICULAR PAIN OF LEFT LOWER EXTREMITY: ICD-10-CM

## 2022-07-12 DIAGNOSIS — M48.061 NEURAL FORAMINAL STENOSIS OF LUMBAR SPINE: Primary | ICD-10-CM

## 2022-07-12 DIAGNOSIS — Z74.09 DECREASED FUNCTIONAL MOBILITY AND ENDURANCE: ICD-10-CM

## 2022-07-12 PROCEDURE — 3044F HG A1C LEVEL LT 7.0%: CPT | Mod: CPTII,95,, | Performed by: FAMILY MEDICINE

## 2022-07-12 PROCEDURE — 3008F PR BODY MASS INDEX (BMI) DOCUMENTED: ICD-10-PCS | Mod: CPTII,95,, | Performed by: FAMILY MEDICINE

## 2022-07-12 PROCEDURE — 99214 OFFICE O/P EST MOD 30 MIN: CPT | Mod: 95,,, | Performed by: FAMILY MEDICINE

## 2022-07-12 PROCEDURE — 3044F PR MOST RECENT HEMOGLOBIN A1C LEVEL <7.0%: ICD-10-PCS | Mod: CPTII,95,, | Performed by: FAMILY MEDICINE

## 2022-07-12 PROCEDURE — 3008F BODY MASS INDEX DOCD: CPT | Mod: CPTII,95,, | Performed by: FAMILY MEDICINE

## 2022-07-12 PROCEDURE — 99214 PR OFFICE/OUTPT VISIT, EST, LEVL IV, 30-39 MIN: ICD-10-PCS | Mod: 95,,, | Performed by: FAMILY MEDICINE

## 2022-07-12 RX ORDER — EPINEPHRINE 0.3 MG/.3ML
INJECTION SUBCUTANEOUS
Status: CANCELLED | OUTPATIENT
Start: 2022-07-12

## 2022-07-12 RX ORDER — EPINEPHRINE 0.3 MG/.3ML
1 INJECTION SUBCUTANEOUS ONCE
Qty: 0.3 ML | Refills: 3 | Status: SHIPPED | OUTPATIENT
Start: 2022-07-12 | End: 2023-12-08 | Stop reason: SDUPTHER

## 2022-07-12 RX ORDER — PREGABALIN 75 MG/1
75 CAPSULE ORAL 3 TIMES DAILY
Qty: 90 CAPSULE | Refills: 1 | Status: SHIPPED | OUTPATIENT
Start: 2022-07-12 | End: 2022-08-31
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
## 2022-07-14 ENCOUNTER — TELEPHONE (OUTPATIENT)
Dept: GASTROENTEROLOGY | Facility: CLINIC | Age: 47
End: 2022-07-14
Payer: MEDICAID

## 2022-07-14 NOTE — TELEPHONE ENCOUNTER
Attempted to contact pt to offer an shital w/ Dr. Oliveira   Pt didn't answer and I left a detail message.  ----- Message from Jaswinder Oliveira MD sent at 7/12/2022  9:55 PM CDT -----  Contact: pt  We can see her but I don't think she has gastroparesis. Has a normal gastric emptying study  ----- Message -----  From: Stephanie Gonzalez MA  Sent: 7/12/2022   4:38 PM CDT  To: Jaswinder Oliveira MD    Are you willing to see this patient for gastroparesis or refer to IBD?   ----- Message -----  From: Carmen Dangelo  Sent: 7/12/2022  10:36 AM CDT  To: Tee San Staff    Pt requesting call back RE: schedule np appt , nothing available in Epic, pt was referred by another provider. Please call with updates      Confirmed contact below:  Contact Name:Dianne Hemphill  Phone Number: [phone]

## 2022-07-19 ENCOUNTER — NUTRITION (OUTPATIENT)
Dept: PSYCHIATRY | Facility: CLINIC | Age: 47
End: 2022-07-19
Payer: MEDICAID

## 2022-07-19 ENCOUNTER — PATIENT MESSAGE (OUTPATIENT)
Dept: HEMATOLOGY/ONCOLOGY | Facility: CLINIC | Age: 47
End: 2022-07-19
Payer: MEDICAID

## 2022-07-19 DIAGNOSIS — D05.12 BREAST NEOPLASM, TIS (DCIS), LEFT: ICD-10-CM

## 2022-07-19 DIAGNOSIS — E66.01 CLASS 3 SEVERE OBESITY WITH BODY MASS INDEX (BMI) OF 45.0 TO 49.9 IN ADULT, UNSPECIFIED OBESITY TYPE, UNSPECIFIED WHETHER SERIOUS COMORBIDITY PRESENT: Primary | ICD-10-CM

## 2022-07-19 PROCEDURE — 96165 HLTH BHV IVNTJ GRP EA ADDL: CPT | Mod: AH,HB,, | Performed by: PSYCHOLOGIST

## 2022-07-19 PROCEDURE — 96164 HLTH BHV IVNTJ GRP 1ST 30: CPT | Mod: AH,HB,, | Performed by: PSYCHOLOGIST

## 2022-07-19 PROCEDURE — 96165 PR INTERVENTION, HEALTH BEHAVIOR, GROUP, EA ADDTL 15 MINS: ICD-10-PCS | Mod: AH,HB,, | Performed by: PSYCHOLOGIST

## 2022-07-19 PROCEDURE — 96164 PR INTERVENTION, HEALTH BEHAVIOR, GROUP, 1ST 30 MINS: ICD-10-PCS | Mod: AH,HB,, | Performed by: PSYCHOLOGIST

## 2022-07-21 ENCOUNTER — CLINICAL SUPPORT (OUTPATIENT)
Dept: REHABILITATION | Facility: HOSPITAL | Age: 47
End: 2022-07-21
Payer: MEDICAID

## 2022-07-21 DIAGNOSIS — R29.898 WEAKNESS OF BOTH LOWER EXTREMITIES: Primary | ICD-10-CM

## 2022-07-21 DIAGNOSIS — R26.9 ABNORMALITY OF GAIT: ICD-10-CM

## 2022-07-21 DIAGNOSIS — R26.89 BALANCE PROBLEM: ICD-10-CM

## 2022-07-21 DIAGNOSIS — Z74.09 DECREASED FUNCTIONAL MOBILITY AND ENDURANCE: ICD-10-CM

## 2022-07-21 PROCEDURE — 97110 THERAPEUTIC EXERCISES: CPT
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
## 2022-07-28 ENCOUNTER — CLINICAL SUPPORT (OUTPATIENT)
Dept: REHABILITATION | Facility: HOSPITAL | Age: 47
End: 2022-07-28
Payer: MEDICAID

## 2022-07-28 ENCOUNTER — OFFICE VISIT (OUTPATIENT)
Dept: HEMATOLOGY/ONCOLOGY | Facility: CLINIC | Age: 47
End: 2022-07-28
Payer: MEDICAID

## 2022-07-28 VITALS
WEIGHT: 293 LBS | HEART RATE: 95 BPM | DIASTOLIC BLOOD PRESSURE: 88 MMHG | SYSTOLIC BLOOD PRESSURE: 141 MMHG | BODY MASS INDEX: 43.4 KG/M2 | HEIGHT: 69 IN

## 2022-07-28 DIAGNOSIS — D05.12 BREAST NEOPLASM, TIS (DCIS), LEFT: Primary | ICD-10-CM

## 2022-07-28 DIAGNOSIS — R26.89 BALANCE PROBLEM: ICD-10-CM

## 2022-07-28 DIAGNOSIS — Z74.09 DECREASED FUNCTIONAL MOBILITY AND ENDURANCE: ICD-10-CM

## 2022-07-28 DIAGNOSIS — R29.898 WEAKNESS OF BOTH LOWER EXTREMITIES: Primary | ICD-10-CM

## 2022-07-28 DIAGNOSIS — R26.9 ABNORMALITY OF GAIT: ICD-10-CM

## 2022-07-28 DIAGNOSIS — E66.9 OBESITY, UNSPECIFIED CLASSIFICATION, UNSPECIFIED OBESITY TYPE, UNSPECIFIED WHETHER SERIOUS COMORBIDITY PRESENT: ICD-10-CM

## 2022-07-28 PROCEDURE — 1159F PR MEDICATION LIST DOCUMENTED IN MEDICAL RECORD: ICD-10-PCS | Mod: CPTII,,, | Performed by: PHYSICIAN ASSISTANT

## 2022-07-28 PROCEDURE — 99213 OFFICE O/P EST LOW 20 MIN: CPT | Mod: S$PBB,,, | Performed by: PHYSICIAN ASSISTANT

## 2022-07-28 PROCEDURE — 99214 OFFICE O/P EST MOD 30 MIN: CPT | Mod: PBBFAC | Performed by: PHYSICIAN ASSISTANT

## 2022-07-28 PROCEDURE — 3077F SYST BP >= 140 MM HG: CPT | Mod: CPTII,,, | Performed by: PHYSICIAN ASSISTANT

## 2022-07-28 PROCEDURE — 1160F PR REVIEW ALL MEDS BY PRESCRIBER/CLIN PHARMACIST DOCUMENTED: ICD-10-PCS | Mod: CPTII,,, | Performed by: PHYSICIAN ASSISTANT

## 2022-07-28 PROCEDURE — 3008F BODY MASS INDEX DOCD: CPT | Mod: CPTII,,, | Performed by: PHYSICIAN ASSISTANT

## 2022-07-28 PROCEDURE — 3044F PR MOST RECENT HEMOGLOBIN A1C LEVEL <7.0%: ICD-10-PCS | Mod: CPTII,,, | Performed by: PHYSICIAN ASSISTANT

## 2022-07-28 PROCEDURE — 99213 PR OFFICE/OUTPT VISIT, EST, LEVL III, 20-29 MIN: ICD-10-PCS | Mod: S$PBB,,, | Performed by: PHYSICIAN ASSISTANT

## 2022-07-28 PROCEDURE — 3077F PR MOST RECENT SYSTOLIC BLOOD PRESSURE >= 140 MM HG: ICD-10-PCS | Mod: CPTII,,, | Performed by: PHYSICIAN ASSISTANT

## 2022-07-28 PROCEDURE — 1159F MED LIST DOCD IN RCRD: CPT | Mod: CPTII,,, | Performed by: PHYSICIAN ASSISTANT

## 2022-07-28 PROCEDURE — 1160F RVW MEDS BY RX/DR IN RCRD: CPT | Mod: CPTII,,, | Performed by: PHYSICIAN ASSISTANT

## 2022-07-28 PROCEDURE — 99999 PR PBB SHADOW E&M-EST. PATIENT-LVL IV: CPT | Mod: PBBFAC,,, | Performed by: PHYSICIAN ASSISTANT

## 2022-07-28 PROCEDURE — 3079F PR MOST RECENT DIASTOLIC BLOOD PRESSURE 80-89 MM HG: ICD-10-PCS | Mod: CPTII,,, | Performed by: PHYSICIAN ASSISTANT

## 2022-07-28 PROCEDURE — 97110 THERAPEUTIC EXERCISES: CPT

## 2022-07-28 PROCEDURE — 99999 PR PBB SHADOW E&M-EST. PATIENT-LVL IV: ICD-10-PCS | Mod: PBBFAC,,, | Performed by: PHYSICIAN ASSISTANT

## 2022-07-28 PROCEDURE — 3044F HG A1C LEVEL LT 7.0%: CPT | Mod: CPTII,,, | Performed by: PHYSICIAN ASSISTANT

## 2022-07-28 PROCEDURE — 3008F PR BODY MASS INDEX (BMI) DOCUMENTED: ICD-10-PCS | Mod: CPTII,,, | Performed by: PHYSICIAN ASSISTANT

## 2022-07-28 PROCEDURE — 3079F DIAST BP 80-89 MM HG: CPT | Mod: CPTII,,, | Performed by: PHYSICIAN ASSISTANT

## 2022-07-28 NOTE — PROGRESS NOTES
Subjective:      Dianne Hemphill is a 46 y.o. female who presents for 3 month follow up for the Survivorship Weight Loss Program. History of DCIS ER+/NC + of the left breast. S/p lumpectomy and completed radiation therapy on 6/19/20. Unfortunately this visit was delayed due to having to reschedule.  Struggling with diet due to gastroparesis and foods she needs to avoid. Continues to walk daily. Increased stress in her personal life and has gained weight in the last few weeks.    Past Medical History:   Diagnosis Date    Anxiety     Breast cancer     DCIS, left partical mastectomy    CRPS (complex regional pain syndrome type I)     Elevated liver function tests 2/22/2022    Fatigue     General anesthetics causing adverse effect in therapeutic use     verbal combative after 1 procedure    GERD (gastroesophageal reflux disease)     History of migraine headaches     Hx of psychiatric care     Obesity     Pollen allergies     PONV (postoperative nausea and vomiting)     Psychiatric problem     Sleep difficulties     Smoker     Therapy     Urinary tract infection 5/6/2021     Past Surgical History:   Procedure Laterality Date    CHOLECYSTECTOMY      ESOPHAGOGASTRODUODENOSCOPY      ESOPHAGOGASTRODUODENOSCOPY N/A 2/7/2022    Procedure: EGD (ESOPHAGOGASTRODUODENOSCOPY);  Surgeon: Richelle Denson MD;  Location: [facility];  Service: Endoscopy;  Laterality: N/A;    ESOPHAGOGASTRODUODENOSCOPY N/A 2/18/2022    Procedure: EGD (ESOPHAGOGASTRODUODENOSCOPY);  Surgeon: Richelle Denson MD;  Location: [facility];  Service: Endoscopy;  Laterality: N/A;    ESOPHAGOGASTRODUODENOSCOPY N/A 3/22/2022    Procedure: EGD (ESOPHAGOGASTRODUODENOSCOPY);  Surgeon: Richelle Denson MD;  Location: [facility];  Service: Endoscopy;  Laterality: N/A;    EXPLORATORY LAPAROTOMY      HYSTERECTOMY      fibroids    INSERTION OF DORSAL COLUMN NERVE STIMULATOR FOR TRIAL N/A 10/22/2020    Procedure: SPINAL CORD STIMULATOR  TRIAL (Nevro);  Surgeon: Clemencia Watts Jr., MD;  Location: [facility];  Service: Pain Management;  Laterality: N/A;    LUMBAR FUSION N/A 2019    Procedure: FUSION, SPINE, LUMBAR Procedure: STG 1: L5-S1 anterior Lumbar interbody fusion / STG 2:L5-S1 Posterior instrumentation;  Surgeon: Corona Bazan MD;  Location: [facility] OR;  Service: Neurosurgery;  Laterality: N/A;  FUSION, SPINE, LUMBARProcedure: STG 1: L5-S1 anterior Lumbar interbody fusion / STG 2:L5-S1 Posterior instrumentationSURGERY TIME: 2.5hrsLOS:ANESTHESIA: GeneralBlood:    LUMBAR SYMPATHETIC NERVE BLOCK Left 2020    Procedure: BLOCK, NERVE, SYMPATHETIC, LUMBAR--Left;  Surgeon: Clemencia Watts Jr., MD;  Location: [facility];  Service: Pain Management;  Laterality: Left;    MASTECTOMY, PARTIAL Left 2020    Procedure: MASTECTOMY, PARTIAL LEFT with RADIOLOGIC MARKER;  Surgeon: Mara Munoz MD;  Location: [facility] OR;  Service: General;  Laterality: Left;    SPINAL FUSION      TONSILLECTOMY       Social History     Tobacco Use    Smoking status: Former Smoker     Packs/day: 0.50     Years: 15.00     Pack years: 7.50     Types: Vaping with nicotine     Quit date: 2019     Years since quittin.8    Smokeless tobacco: Former User   Substance Use Topics    Alcohol use: Not Currently     Comment: 2-3 drinks per year    Drug use: No     Family History   Problem Relation Age of Onset    Cancer Mother     Alcohol abuse Mother     No Known Problems Father     Cancer Maternal Grandfather     Diabetes Neg Hx     Heart disease Neg Hx     Stroke Neg Hx      OB History    Para Term  AB Living   2 2 2     2   SAB IAB Ectopic Multiple Live Births                  # Outcome Date GA Lbr Carlos/2nd Weight Sex Delivery Anes PTL Lv   2 Term            1 Term                Current Outpatient Medications:     acetaminophen (TYLENOL) 650 MG TbSR, Take 650 mg by mouth as needed. , Disp: , Rfl:     amitriptyline (ELAVIL) 50  MG tablet, Take 1 tablet (50 mg total) by mouth every evening., Disp: 90 tablet, Rfl: 2    baclofen (LIORESAL) 10 MG tablet, Take 1 tablet (10 mg total) by mouth 3 (three) times daily., Disp: 270 tablet, Rfl: 1    cetirizine (ZYRTEC) 10 MG tablet, Take 10 mg by mouth once daily., Disp: , Rfl:     cholecalciferol, vitamin D3, (VITAMIN D3) 50 mcg (2,000 unit) Tab, Take 5,000 Units by mouth once daily. , Disp: , Rfl:     DULoxetine (CYMBALTA) 30 MG capsule, Take 1 capsule by mouth once daily, Disp: 90 capsule, Rfl: 0    estradioL (ESTRACE) 0.01 % (0.1 mg/gram) vaginal cream, Apply as directed TIW, Disp: 42.5 g, Rfl: 1    fexofenadine (ALLEGRA) 180 MG tablet, Take 180 mg by mouth as needed., Disp: , Rfl:     fluticasone propionate (FLONASE) 50 mcg/actuation nasal spray, 1 spray (50 mcg total) by Each Nostril route once daily., Disp: 16 g, Rfl: 11    ketoconazole (NIZORAL) 2 % shampoo, APPLY TOPICALLY TWICE A  WEEK., Disp: 120 mL, Rfl: 0    ondansetron (ZOFRAN-ODT) 4 MG TbDL, Take 2 tablets (8 mg total) by mouth every 6 (six) hours as needed., Disp: 60 tablet, Rfl: 1    pantoprazole (PROTONIX) 40 MG tablet, Take 1 tablet (40 mg total) by mouth once daily., Disp: 90 tablet, Rfl: 3    polyethylene glycol (GLYCOLAX) 17 gram/dose powder, Take 17 g by mouth once daily., Disp: 850 g, Rfl: 0    pregabalin (LYRICA) 75 MG capsule, Take 1 capsule (75 mg total) by mouth 3 (three) times daily., Disp: 90 capsule, Rfl: 1    promethazine (PHENERGAN) 25 MG tablet, Take 25 mg by mouth every 4 (four) hours., Disp: , Rfl:     scopolamine (TRANSDERM-SCOP) 1.3-1.5 mg (1 mg over 3 days), Place 1 patch onto the skin every 72 hours. (Patient taking differently: Place 1 patch onto the skin as needed.), Disp: 4 patch, Rfl: 1    traMADoL (ULTRAM-ER) 100 MG Tb24, Take 100 mg by mouth daily as needed., Disp: , Rfl:     EPINEPHrine (EPIPEN) 0.3 mg/0.3 mL AtIn, Inject 0.3 mLs (0.3 mg total) into the muscle once. for 1 dose, Disp: 0.3  "mL, Rfl: 3    hyoscyamine (LEVSIN/SL) 0.125 mg Subl, Place 1 tablet (0.125 mg total) under the tongue every 6 (six) hours as needed (abdominal pain). (Patient not taking: No sig reported), Disp: 120 tablet, Rfl: 2  No current facility-administered medications for this visit.    Facility-Administered Medications Ordered in Other Visits:     lidocaine (PF) 10 mg/ml (1%) injection 10 mg, 1 mL, Intradermal, Once, Clemencia Watst Jr., MD    lidocaine (PF) 10 mg/ml (1%) injection 10 mg, 1 mL, Intradermal, Once, Clemencia Watts Jr., MD    Review of Systems:  General: No fever, chills, or weight loss.  Chest: No chest pain, shortness of breath, or palpitations.  Breast: No pain, masses, or nipple discharge.  Vulva: No pain, lesions, or itching.  Vagina: No relaxation, itching, discharge, or lesions.  Abdomen: No pain, nausea, vomiting, diarrhea, or constipation.  Urinary: No incontinence, nocturia, frequency, or dysuria.  Extremities:  No leg cramps, edema, or calf pain.  Neurologic: No headaches, dizziness, or visual changes.    Objective:     Vitals:    07/28/22 1315   BP: (!) 141/88   Pulse: 95   Weight: (!) 147.7 kg (325 lb 9.9 oz)   Height: 5' 9" (1.753 m)   PainSc:   8     Body mass index is 48.09 kg/m².  WAIST CIRCUMFERENCE:  135cm    PHYSICAL EXAM:  APPEARANCE: Well nourished, well developed, in no acute distress.      Assessment:    Breast neoplasm, Tis (DCIS), left    Obesity, unspecified classification, unspecified obesity type, unspecified whether serious comorbidity present    She has gained 14 pounds and 6 cm around the waist since her initial visit.    Plan:   Goal of 5,000 steps daily.  Food diary daily in the [facility].  Goal of 2,038 calories per day.  Weight via scale once a week.  Follow up with PT as scheduled.  Follow up at completion of program.    Instructed patient to call if she experiences any side effects or has any questions.    I spent a total of 25 minutes on the day of the visit.This " includes face to face time and non-face to face time preparing to see the patient (eg, review of tests), obtaining and/or reviewing separately obtained history, documenting clinical information in the electronic or other health record, independently interpreting results and communicating results to the patient/family/caregiver, or care coordinator.

## 2022-07-28 NOTE — PROGRESS NOTES
/Physical Therapy Daily Treatment Note     Name: Dianne Aguilar [facility] Number: [id number]    Therapy Diagnosis:   Encounter Diagnoses   Name Primary?    Weakness of both lower extremities Yes    Balance problem     Abnormality of gait     Decreased functional mobility and endurance      Physician: Lilian Villagran PA-C    Visit Date: 7/28/2022    Physician Orders: PT Eval and Treat Survivorship Weight Loss Program Participant  Medical Diagnosis from Referral:   E66.01,Z68.42 (ICD-10-CM) - Class 3 severe obesity with body mass index (BMI) of 45.0 to 49.9 in adult, unspecified obesity type, unspecified whether serious comorbidity present   D05.12 (ICD-10-CM) - Breast neoplasm, Tis (DCIS), left   Evaluation Date: 5/9/2022  Authorization Period Expiration: 11/9/2022  Plan of Care Expiration: 11/9/2022  Visit # / Visits authorized: 8/ 16 (plus evaluation)  Insurance: MEDICAID/[employer] ([employer])     Time In: 1:44 PM  Time Out: 2:38 PM  Total Billable Time: 54 minutes     Precautions: Standard and cancer        Subjective     Pt reports: not doing too good today. She woke up this morning and her left ankle foot are locking up and like skin is being torn off. Her stomach is upset today also.  She was compliant with home exercise program.  Response to previous treatment: exhausted but not too bad  Functional change: all the same    Pain: 8/10  Location: left ankle and foot    Cancer Related Surgery and Date: left partial mastectomy without sentinel node biopsy on 2/14/2020     Chemotherapy: N/A  Radiation: 5/20/2020 To 6/19/2020  Adjuvant endocrine therapy started on started, but stopped due to side effects.    Objective     Objective Measures updated at progress report unless specified.        Treatment       Dianne received therapeutic exercises to develop strength, endurance, ROM, flexibility, posture and core stabilization for 54 minutes including:    Aerobic/Cardio Activity: Recumbent bike, Level 4,  seat 8, 5 min    Seated:  hamstring stretch, 2 x 30s/LE  marching, 2 x 10  ankle dorsiflexion, 2 x 10  bicep curl with 4# bar, 2 x 10  triceps ext with red band, 2 x 10/UE  Side lying hip ADD, 2 x 10 BLE  Side lying hip abduction, 2 x 10 R LE, 1 x 10 LLE  Bridge with legs on orange ball, 1 x 10  Pec stretch over 2 towels, 2 x 1'  Horizontal abduction, red band, 2 x 10  Ankle dorsiflexion, red band, 2 x 10  Scapula retraction, 1 x 15  Overhead press, 4# wand, 1 x 10    Home Exercises Provided and Patient Education Provided     Education provided:   - exercise technique    Written Home Exercises Provided: Patient instructed to cont prior HEP.  Exercises were reviewed and Dianne was able to demonstrate them prior to the end of the session.  Dianne demonstrated good  understanding of the education provided.     See EMR under Patient Instructions for exercises provided Eval.    Assessment     Pt tolerated session fairly well. Patient was able to perform all of today's progressions and new exercises with no increase in symptoms prior to leaving the clinic. She was only able to perform 1 set of 10 reps of side lying hip abduction on the left due to increased L LE symptoms in her foot and ankle. She continues to require occasional cues to keep her exercises in a pain free range and for posture. Reassess next visit for 3 month progress note. Will progress as tolerated.  Dianne Is progressing well towards her goals.   Pt prognosis is Good.     Pt will continue to benefit from skilled outpatient physical therapy to address the deficits listed in the problem list box on initial evaluation, provide pt/family education and to maximize pt's level of independence in the home and community environment.     Pt's spiritual, cultural and educational needs considered and pt agreeable to plan of care and goals.     Anticipated barriers to physical therapy: co-morbidities    Goals:   Short Term Goals:  4 weeks     1. Pt will demo >/= 4+/5  strength in bilateral lower extremities for improved functional mobility. (progressing, not met)  2. Pt. will improve single leg stance balance test score to >/=  15s to be in a moderate/low risk category for falls. (met, 6/14/22)  3. Pt will perform >/= 9 sit to stands in 30 seconds with no arms for improved endurance. (progressing, not met)  4. Pt will increase 6 minute walk test score by >/=  50 meters in order to increase ambulation in the community. (exceeded, 6/14/22)  5. Pt will initiate home exercise program to improve strength, flexibility, endurance, mobility & balance. (met, 6/14/22)  6. Pt will tolerate 10 min or greater of time in light-->moderate intensity cardio (I.e. Bike, Treadmill) to improve endurance to assist in walking her dog for exercise (progressing, not met)  7. Pt will verbalize understanding of exercise recommendation guidelines for moderate intensity exercise to safely perform and progress home exercise routine. (progressing, not met)     Long Term Goals: 8 weeks  1. Pt will increase strength in bilateral lower extremities to 5/5 for improved functional mobility and tolerance for ADL and work activities. (progressing, not met)  2. Pt will be independent with home exercise program to improve range of motion, strength, balance and return to prior level of function. (progressing, not met)  3. Pt. will improve Single Leg Stance balance test score to >/=  30s for patient to be in low risk category for falls. (progressing, not met)  4. Pt will perform >/= 15 sit to stands in 30 seconds with no arms for improved endurance. (progressing, not met)  5. Pt will increase 6 minute walk test score by >/= 75 meters in order to increase community ambulation. (progressing, not met)  6. Patient will report compliance with  20 -30min  of moderate intensity exercise 5 days a week to improve overall cardiovascular function and decrease cancer related fatigue. (progressing, not met)     Plan   Plan of care  Certification: 5/9/2022 to 11/9/2022.     Outpatient Physical Therapy 2 times weekly for 8 weeks to include the following interventions: Gait Training, Manual Therapy, Neuromuscular Re-ed, Patient Education, Self Care, Therapeutic Activities, Therapeutic Exercise and IASTM. Dry needling with manual therapy techniques to decrease pain, inflammation and swelling, increase circulation and promote healing process.       Pt will also benefit from re-assessment at 3 months and 6 months following evaluation to assess for any change in functional status.       Koki Ferrera, PT

## 2022-08-01 ENCOUNTER — TELEPHONE (OUTPATIENT)
Dept: GASTROENTEROLOGY | Facility: CLINIC | Age: 47
End: 2022-08-01
Payer: MEDICAID

## 2022-08-01 ENCOUNTER — PATIENT MESSAGE (OUTPATIENT)
Dept: PRIMARY CARE CLINIC | Facility: CLINIC | Age: 47
End: 2022-08-01
Payer: MEDICAID

## 2022-08-01 DIAGNOSIS — M48.061 NEURAL FORAMINAL STENOSIS OF LUMBAR SPINE: ICD-10-CM

## 2022-08-01 DIAGNOSIS — F43.23 SITUATIONAL MIXED ANXIETY AND DEPRESSIVE DISORDER: ICD-10-CM

## 2022-08-01 DIAGNOSIS — Z72.820 POOR SLEEP: ICD-10-CM

## 2022-08-01 DIAGNOSIS — M54.10 RADICULAR PAIN OF LEFT LOWER EXTREMITY: ICD-10-CM

## 2022-08-01 DIAGNOSIS — R11.0 NAUSEA: Primary | ICD-10-CM

## 2022-08-01 DIAGNOSIS — M43.27 FUSION OF LUMBOSACRAL SPINE: ICD-10-CM

## 2022-08-01 RX ORDER — SCOLOPAMINE TRANSDERMAL SYSTEM 1 MG/1
1 PATCH, EXTENDED RELEASE TRANSDERMAL
Qty: 4 PATCH | Refills: 1 | Status: CANCELLED | OUTPATIENT
Start: 2022-08-01

## 2022-08-01 RX ORDER — BACLOFEN 10 MG/1
10 TABLET ORAL 3 TIMES DAILY
Qty: 270 TABLET | Refills: 1 | Status: SHIPPED | OUTPATIENT
Start: 2022-08-01 | End: 2022-08-22 | Stop reason: SDUPTHER

## 2022-08-01 RX ORDER — PROMETHAZINE HYDROCHLORIDE 25 MG/1
25 TABLET ORAL EVERY 4 HOURS PRN
Qty: 60 TABLET | Refills: 2 | Status: SHIPPED | OUTPATIENT
Start: 2022-08-01 | End: 2022-08-22 | Stop reason: SDUPTHER

## 2022-08-01 RX ORDER — BACLOFEN 10 MG/1
10 TABLET ORAL 3 TIMES DAILY
Qty: 270 TABLET | Refills: 1 | Status: CANCELLED | OUTPATIENT
Start: 2022-08-01

## 2022-08-01 RX ORDER — SCOLOPAMINE TRANSDERMAL SYSTEM 1 MG/1
1 PATCH, EXTENDED RELEASE TRANSDERMAL
Qty: 4 PATCH | Refills: 2 | Status: SHIPPED | OUTPATIENT
Start: 2022-08-01 | End: 2022-08-15 | Stop reason: SDUPTHER

## 2022-08-01 RX ORDER — ONDANSETRON 4 MG/1
8 TABLET, ORALLY DISINTEGRATING ORAL EVERY 6 HOURS PRN
Qty: 60 TABLET | Refills: 1 | Status: SHIPPED | OUTPATIENT
Start: 2022-08-01 | End: 2022-08-22 | Stop reason: SDUPTHER

## 2022-08-01 NOTE — TELEPHONE ENCOUNTER
No new care gaps identified.  [facility] Embedded Care Gaps. Reference number: [id number]. 8/01/2022   9:49:15 AM CDT

## 2022-08-01 NOTE — PROGRESS NOTES
INFORMED CONSENT: Dianne Hemphill   is known to this provider and identity was confirmed via NAME and .  The patient has been informed of the risks and benefits associated with engaging in psychotherapy, the handling of protected health information, the rights of privacy and the limits of confidentiality. The patient has also been informed of the importance of reporting any suicidal or homicidal ideation to this or any provider to ensure safety of all parties, and the Dianne Hemphill expressed understanding. The patient was agreeable to these terms and freely participates    Health and Behavior Intervention Group Note -Psychologist       Date: 2022    Site:   [facility]     Number of patients in attendance: 5    Group Type: Health and Behavior Group Intervention for Cancer Survivors (10447 first 30 minutes, 10039 additional 15 minutes)    Dianne Hemphill arrived on time for the group. Patient was appropriately dressed and groomed. Dianne Hemphill  appeared alert and oriented in all spheres. No suicidal or homicidal ideation was reported.  Dianne Hemphill actively participated in the group process and provided both prompted and unprompted contribution to the group discussion.      Content of session:       Group 5: Sleep, weight, and well-being    Introduced to dynamics of sleep.  Identified connection between sleep and weight management.  Reviewed Sleep Hygiene  Developed SMART Goals related to sleep management.   Goals for next visit: Document Sleep using Sleep Diary            Patient's response to intervention:The patient's response to intervention is accepting. Patient stated she has always struggled with sleep.     Behavior: Interested    Insight: good     Progress toward goals:Progress as Expected      Goals from last visit: Met    Treatment Plan: Continue group participation  · Target symptoms: weight loss   · Why chosen therapy is appropriate versus another modality:  relevant to diagnosis, evidence based practice  · Outcome monitoring methods: checklist/rating scale, weight monitoring  · Return to clinic: 1 month     Length of Service (minutes direct face-to-face contact): 60    Encounter Diagnoses   Name Primary?    Class 3 severe obesity with body mass index (BMI) of 45.0 to 49.9 in adult, unspecified obesity type, unspecified whether serious comorbidity present Yes    Breast neoplasm, Tis (DCIS), left           Sugey Connors, PhD  Clinical Psychologist  [place] License #[id number]  [place] License #[id number]

## 2022-08-01 NOTE — TELEPHONE ENCOUNTER
Spoke to pt regarding message below.   Informed pt Dr. Oliveira's next available appt will be in November. Pt denied scheduling an appt and denied being scheduled with another gi provider and thank me for th offers.   ----- Message from Kenya Parry sent at 8/1/2022 11:28 AM CDT -----  Regarding: Appointment  Contact: [phone]  Calling to schedule a virtual appointment for any time available, or in person between 10am and 2pm for gastroparesis. Please schedule and confirm with patient or send message to portal. Thanks!

## 2022-08-02 ENCOUNTER — PATIENT MESSAGE (OUTPATIENT)
Dept: REHABILITATION | Facility: HOSPITAL | Age: 47
End: 2022-08-02
Payer: MEDICAID

## 2022-08-02 ENCOUNTER — PATIENT MESSAGE (OUTPATIENT)
Dept: HEMATOLOGY/ONCOLOGY | Facility: CLINIC | Age: 47
End: 2022-08-02
Payer: MEDICAID

## 2022-08-04 ENCOUNTER — TELEPHONE (OUTPATIENT)
Dept: FAMILY MEDICINE | Facility: CLINIC | Age: 47
End: 2022-08-04
Payer: MEDICAID

## 2022-08-04 ENCOUNTER — PATIENT MESSAGE (OUTPATIENT)
Dept: PRIMARY CARE CLINIC | Facility: CLINIC | Age: 47
End: 2022-08-04
Payer: MEDICAID

## 2022-08-04 ENCOUNTER — OFFICE VISIT (OUTPATIENT)
Dept: GASTROENTEROLOGY | Facility: CLINIC | Age: 47
End: 2022-08-04
Payer: MEDICAID

## 2022-08-04 ENCOUNTER — TELEPHONE (OUTPATIENT)
Dept: PRIMARY CARE CLINIC | Facility: CLINIC | Age: 47
End: 2022-08-04
Payer: MEDICAID

## 2022-08-04 VITALS
HEART RATE: 78 BPM | BODY MASS INDEX: 43.4 KG/M2 | HEIGHT: 69 IN | SYSTOLIC BLOOD PRESSURE: 142 MMHG | DIASTOLIC BLOOD PRESSURE: 104 MMHG | WEIGHT: 293 LBS

## 2022-08-04 DIAGNOSIS — E66.01 CLASS 3 SEVERE OBESITY WITH SERIOUS COMORBIDITY AND BODY MASS INDEX (BMI) OF 45.0 TO 49.9 IN ADULT, UNSPECIFIED OBESITY TYPE: ICD-10-CM

## 2022-08-04 DIAGNOSIS — R10.13 EPIGASTRIC PAIN: ICD-10-CM

## 2022-08-04 DIAGNOSIS — R11.0 NAUSEA: Primary | ICD-10-CM

## 2022-08-04 DIAGNOSIS — K21.9 GASTROESOPHAGEAL REFLUX DISEASE WITHOUT ESOPHAGITIS: ICD-10-CM

## 2022-08-04 PROCEDURE — 1159F MED LIST DOCD IN RCRD: CPT | Mod: CPTII,,, | Performed by: NURSE PRACTITIONER

## 2022-08-04 PROCEDURE — 1160F RVW MEDS BY RX/DR IN RCRD: CPT | Mod: CPTII,,, | Performed by: NURSE PRACTITIONER

## 2022-08-04 PROCEDURE — 3008F BODY MASS INDEX DOCD: CPT | Mod: CPTII,,, | Performed by: NURSE PRACTITIONER

## 2022-08-04 PROCEDURE — 3008F PR BODY MASS INDEX (BMI) DOCUMENTED: ICD-10-PCS | Mod: CPTII,,, | Performed by: NURSE PRACTITIONER

## 2022-08-04 PROCEDURE — 1160F PR REVIEW ALL MEDS BY PRESCRIBER/CLIN PHARMACIST DOCUMENTED: ICD-10-PCS | Mod: CPTII,,, | Performed by: NURSE PRACTITIONER

## 2022-08-04 PROCEDURE — 3044F HG A1C LEVEL LT 7.0%: CPT | Mod: CPTII,,, | Performed by: NURSE PRACTITIONER

## 2022-08-04 PROCEDURE — 1159F PR MEDICATION LIST DOCUMENTED IN MEDICAL RECORD: ICD-10-PCS | Mod: CPTII,,, | Performed by: NURSE PRACTITIONER

## 2022-08-04 PROCEDURE — 99214 OFFICE O/P EST MOD 30 MIN: CPT | Mod: S$PBB,,, | Performed by: NURSE PRACTITIONER

## 2022-08-04 PROCEDURE — 3044F PR MOST RECENT HEMOGLOBIN A1C LEVEL <7.0%: ICD-10-PCS | Mod: CPTII,,, | Performed by: NURSE PRACTITIONER

## 2022-08-04 PROCEDURE — 3080F PR MOST RECENT DIASTOLIC BLOOD PRESSURE >= 90 MM HG: ICD-10-PCS | Mod: CPTII,,, | Performed by: NURSE PRACTITIONER

## 2022-08-04 PROCEDURE — 99214 OFFICE O/P EST MOD 30 MIN: CPT | Mod: PBBFAC,PO | Performed by: NURSE PRACTITIONER

## 2022-08-04 PROCEDURE — 3077F PR MOST RECENT SYSTOLIC BLOOD PRESSURE >= 140 MM HG: ICD-10-PCS | Mod: CPTII,,, | Performed by: NURSE PRACTITIONER

## 2022-08-04 PROCEDURE — 99214 PR OFFICE/OUTPT VISIT, EST, LEVL IV, 30-39 MIN: ICD-10-PCS | Mod: S$PBB,,, | Performed by: NURSE PRACTITIONER

## 2022-08-04 PROCEDURE — 99999 PR PBB SHADOW E&M-EST. PATIENT-LVL IV: CPT | Mod: PBBFAC,,, | Performed by: NURSE PRACTITIONER

## 2022-08-04 PROCEDURE — 3080F DIAST BP >= 90 MM HG: CPT | Mod: CPTII,,, | Performed by: NURSE PRACTITIONER

## 2022-08-04 PROCEDURE — 99999 PR PBB SHADOW E&M-EST. PATIENT-LVL IV: ICD-10-PCS | Mod: PBBFAC,,, | Performed by: NURSE PRACTITIONER

## 2022-08-04 PROCEDURE — 3077F SYST BP >= 140 MM HG: CPT | Mod: CPTII,,, | Performed by: NURSE PRACTITIONER

## 2022-08-04 RX ORDER — HYOSCYAMINE SULFATE 0.12 MG/1
0.12 TABLET SUBLINGUAL EVERY 6 HOURS PRN
Qty: 120 TABLET | Refills: 2 | Status: SHIPPED | OUTPATIENT
Start: 2022-08-04 | End: 2024-01-04

## 2022-08-04 NOTE — TELEPHONE ENCOUNTER
Scopolamine:  Apply 1 patch (1 mg/3 days) behind ear at least 4 hours prior for use for up to 72 hours.  If needed for >72 hours, remove old patch and place new one behind other  ear.

## 2022-08-04 NOTE — TELEPHONE ENCOUNTER
----- Message from Ceci Pinto sent at 8/4/2022 12:28 PM CDT -----  Regarding: Pharmacy Authorization            Name of Who is Calling:  [id number]    Who Left The Message:  [id number]      What is the request in detail:    Please clarify the directions / instructions for the medication scopolamine (TRANSDERM-SCOP) 1.3-1.5 mg (1 mg over 3 days).  Thank you!    Reply by [facility]:  No    Preferred Pharmacy:       [id number] - [place] - [id number]     Phone: [phone]  Fax:  [phone]

## 2022-08-04 NOTE — TELEPHONE ENCOUNTER
Patient states that her prescription for her scopalimine patch did not have a frequency on prescription.

## 2022-08-04 NOTE — TELEPHONE ENCOUNTER
----- Message from Jamal Branch sent at 8/4/2022 11:39 AM CDT -----      Name of Who is Calling: [facility] [[id number]]      What is the request in detail: [facility] called to confirm frequency for script scopolamine (TRANSDERM-SCOP) 1.3-1.5 mg (1 mg over 3 days).Please contact to further discuss and advise.          Can the clinic reply by [facility]: N      What Number to Call Back if not in [facility]: [id number] - [place], [place] - [phone]

## 2022-08-08 NOTE — PROGRESS NOTES
Subjective:       Patient ID: Dianne Hemphill is a 46 y.o. female.    Chief Complaint: Emesis, Nausea, and Abdominal Pain    45 y/o female with chronic pain and hx of breast cancer presents to clinic with c/o nausea and abdominal pain. Reports increased nausea and epigastric abdominal pain for 2 weeks. She is not able to tolerate solid food only drink smoothies and Gatorade. No significant weight loss noted. Wt Readings from Last 2 Encounters: 08/04/22 1133 : (!) 146.4 kg (322 lb 12.8 oz)  07/28/22 1315 : (!) 147.7 kg (325 lb 9.9 oz). States scopolamine patches decreases nausea significantly.  EGD 3/2022 normal with exception of hiatal hernia and mild gastritis; biopsy (-) HP & (-) celiac. GES normal; but has clinical symptoms of gastroparesis. Has tried to follow the recommended diet but is limited due to food preferences. Recommend scheduling colonoscopy; pt would like to defer until she is back in her home.          Past Medical History:   Diagnosis Date    Anxiety     Breast cancer     DCIS, left partical mastectomy    CRPS (complex regional pain syndrome type I)     Elevated liver function tests 2/22/2022    Fatigue     General anesthetics causing adverse effect in therapeutic use     verbal combative after 1 procedure    GERD (gastroesophageal reflux disease)     History of migraine headaches     Hx of psychiatric care     Obesity     Pollen allergies     PONV (postoperative nausea and vomiting)     Psychiatric problem     Sleep difficulties     Smoker     Therapy     Urinary tract infection 5/6/2021       Past Surgical History:   Procedure Laterality Date    CHOLECYSTECTOMY      ESOPHAGOGASTRODUODENOSCOPY      ESOPHAGOGASTRODUODENOSCOPY N/A 2/7/2022    Procedure: EGD (ESOPHAGOGASTRODUODENOSCOPY);  Surgeon: Richelle eDnson MD;  Location[facility];  Service: Endoscopy;  Laterality: N/A;    ESOPHAGOGASTRODUODENOSCOPY N/A 2/18/2022    Procedure: EGD (ESOPHAGOGASTRODUODENOSCOPY);   Surgeon: Richelle Denson MD;  Location: [facility] ENDO;  Service: Endoscopy;  Laterality: N/A;    ESOPHAGOGASTRODUODENOSCOPY N/A 3/22/2022    Procedure: EGD (ESOPHAGOGASTRODUODENOSCOPY);  Surgeon: Richelle Denson MD;  Location: [facility] ENDO;  Service: Endoscopy;  Laterality: N/A;    EXPLORATORY LAPAROTOMY      HYSTERECTOMY      fibroids    INSERTION OF DORSAL COLUMN NERVE STIMULATOR FOR TRIAL N/A 10/22/2020    Procedure: SPINAL CORD STIMULATOR TRIAL (Nevro);  Surgeon: Clemencia Watts Jr., MD;  Location: [facility];  Service: Pain Management;  Laterality: N/A;    LUMBAR FUSION N/A 11/7/2019    Procedure: FUSION, SPINE, LUMBAR Procedure: STG 1: L5-S1 anterior Lumbar interbody fusion / STG 2:L5-S1 Posterior instrumentation;  Surgeon: Corona Bazan MD;  Location: [facility];  Service: Neurosurgery;  Laterality: N/A;  FUSION, SPINE, LUMBARProcedure: STG 1: L5-S1 anterior Lumbar interbody fusion / STG 2:L5-S1 Posterior instrumentationSURGERY TIME: 2.5hrsLOS:ANESTHESIA: GeneralBlood:    LUMBAR SYMPATHETIC NERVE BLOCK Left 6/11/2020    Procedure: BLOCK, NERVE, SYMPATHETIC, LUMBAR--Left;  Surgeon: Clemencia Watts Jr., MD;  Location: [facility];  Service: Pain Management;  Laterality: Left;    MASTECTOMY, PARTIAL Left 2/14/2020    Procedure: MASTECTOMY, PARTIAL LEFT with RADIOLOGIC MARKER;  Surgeon: Mara Munoz MD;  Location: [facility];  Service: General;  Laterality: Left;    SPINAL FUSION      TONSILLECTOMY         Family History   Problem Relation Age of Onset    Cancer Mother     Alcohol abuse Mother     No Known Problems Father     Cancer Maternal Grandfather     Diabetes Neg Hx     Heart disease Neg Hx     Stroke Neg Hx        Social History     Socioeconomic History    Marital status:     Number of children: 4   Occupational History    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   HENT: Negative for trouble swallowing.    Respiratory: Negative for shortness of breath.    Cardiovascular: Negative for chest pain.   Gastrointestinal: Positive for abdominal pain and nausea. Negative for constipation and diarrhea.   Genitourinary: Negative for dysuria.   Neurological: Negative for dizziness and weakness.   Hematological: Negative for adenopathy. Does not bruise/bleed easily.   Psychiatric/Behavioral: Negative for dysphoric mood.         Objective:     Vitals:    22 1133   BP: (!) 142/104   BP Location: Right arm   Pulse: 78   Weight: (!) 146.4 kg (322 lb 12.8 oz)   Height: 5' 9" (1.753 m)          Physical Exam  Constitutional:       General: She is not in acute distress.     Appearance: Normal appearance. She is not ill-appearing.   HENT:      Head: Normocephalic.   Eyes:      Conjunctiva/sclera: Conjunctivae normal.      Pupils: Pupils are equal, round, and reactive to light.   Pulmonary:      Effort: Pulmonary effort is normal. No respiratory distress.   Musculoskeletal:         General: Normal range of motion.      Cervical back: Normal range of motion.   Skin:     General: Skin is warm and dry.   Neurological:      Mental Status: She is alert and oriented to person, place, and time.   Psychiatric:         Mood and Affect: Mood normal.         Behavior: Behavior normal.               Assessment:         ICD-10-CM ICD-9-CM   1. Nausea  R11.0 787.02   2. Epigastric pain  R10.13 789.06   3. Gastroesophageal reflux disease without " esophagitis  K21.9 530.81   4. Class 3 severe obesity with serious comorbidity and body mass index (BMI) of 45.0 to 49.9 in adult, unspecified obesity type  E66.01 278.01    Z68.42 V85.42       Plan:       Nausea    Epigastric pain  -     hyoscyamine (LEVSIN/SL) 0.125 mg Subl; Place 1 tablet (0.125 mg total) under the tongue every 6 (six) hours as needed (abdominal pain).  Dispense: 120 tablet; Refill: 2    Gastroesophageal reflux disease without esophagitis    Class 3 severe obesity with serious comorbidity and body mass index (BMI) of 45.0 to 49.9 in adult, unspecified obesity type    - Pantoprazole 40 mg every morning on empty stomach 30 minutes prior to food or other medication  - Levsin prn abdominal discomfort; take at least once daily  - Zofran; scopolamine prn nausea    Follow up if symptoms worsen or fail to improve.     Patient's Medications   New Prescriptions    No medications on file   Previous Medications    ACETAMINOPHEN (TYLENOL) 650 MG TBSR    Take 650 mg by mouth as needed.     AMITRIPTYLINE (ELAVIL) 50 MG TABLET    Take 1 tablet (50 mg total) by mouth every evening.    BACLOFEN (LIORESAL) 10 MG TABLET    TAKE 1 TABLET BY MOUTH THREE TIMES DAILY    BACLOFEN (LIORESAL) 10 MG TABLET    Take 1 tablet (10 mg total) by mouth 3 (three) times daily.    CETIRIZINE (ZYRTEC) 10 MG TABLET    Take 10 mg by mouth once daily.    CHOLECALCIFEROL, VITAMIN D3, (VITAMIN D3) 50 MCG (2,000 UNIT) TAB    Take 5,000 Units by mouth once daily.     DULOXETINE (CYMBALTA) 30 MG CAPSULE    Take 1 capsule by mouth once daily    EPINEPHRINE (EPIPEN) 0.3 MG/0.3 ML ATIN    Inject 0.3 mLs (0.3 mg total) into the muscle once. for 1 dose    ESTRADIOL (ESTRACE) 0.01 % (0.1 MG/GRAM) VAGINAL CREAM    Apply as directed TIW    FEXOFENADINE (ALLEGRA) 180 MG TABLET    Take 180 mg by mouth as needed.    FLUTICASONE PROPIONATE (FLONASE) 50 MCG/ACTUATION NASAL SPRAY    1 spray (50 mcg total) by Each Nostril route once daily.    KETOCONAZOLE  (NIZORAL) 2 % SHAMPOO    APPLY TOPICALLY TWICE A  WEEK.    ONDANSETRON (ZOFRAN-ODT) 4 MG TBDL    Take 2 tablets (8 mg total) by mouth every 6 (six) hours as needed.    PANTOPRAZOLE (PROTONIX) 40 MG TABLET    Take 1 tablet (40 mg total) by mouth once daily.    POLYETHYLENE GLYCOL (GLYCOLAX) 17 GRAM/DOSE POWDER    Take 17 g by mouth once daily.    PREGABALIN (LYRICA) 75 MG CAPSULE    Take 1 capsule (75 mg total) by mouth 3 (three) times daily.    PROMETHAZINE (PHENERGAN) 25 MG TABLET    Take 1 tablet (25 mg total) by mouth every 4 (four) hours as needed for Nausea.    SCOPOLAMINE (TRANSDERM-SCOP) 1.3-1.5 MG (1 MG OVER 3 DAYS)    Place 1 patch onto the skin as needed.    TRAMADOL (ULTRAM-ER) 100 MG TB24    Take 100 mg by mouth daily as needed.   Modified Medications    Modified Medication Previous Medication    HYOSCYAMINE (LEVSIN/SL) 0.125 MG SUBL hyoscyamine (LEVSIN/SL) 0.125 mg Subl       Place 1 tablet (0.125 mg total) under the tongue every 6 (six) hours as needed (abdominal pain).    Place 1 tablet (0.125 mg total) under the tongue every 6 (six) hours as needed (abdominal pain).   Discontinued Medications    No medications on file

## 2022-08-10 ENCOUNTER — PATIENT MESSAGE (OUTPATIENT)
Dept: REHABILITATION | Facility: HOSPITAL | Age: 47
End: 2022-08-10
Payer: MEDICAID

## 2022-08-15 ENCOUNTER — PATIENT MESSAGE (OUTPATIENT)
Dept: GASTROENTEROLOGY | Facility: CLINIC | Age: 47
End: 2022-08-15
Payer: MEDICAID

## 2022-08-15 DIAGNOSIS — R11.0 NAUSEA: Primary | ICD-10-CM

## 2022-08-15 DIAGNOSIS — F43.23 SITUATIONAL MIXED ANXIETY AND DEPRESSIVE DISORDER: ICD-10-CM

## 2022-08-15 RX ORDER — SCOLOPAMINE TRANSDERMAL SYSTEM 1 MG/1
1 PATCH, EXTENDED RELEASE TRANSDERMAL
Qty: 10 PATCH | Refills: 2 | Status: SHIPPED | OUTPATIENT
Start: 2022-08-15

## 2022-08-16 ENCOUNTER — NUTRITION (OUTPATIENT)
Dept: PSYCHIATRY | Facility: CLINIC | Age: 47
End: 2022-08-16
Payer: MEDICAID

## 2022-08-16 ENCOUNTER — CLINICAL SUPPORT (OUTPATIENT)
Dept: REHABILITATION | Facility: HOSPITAL | Age: 47
End: 2022-08-16
Payer: MEDICAID

## 2022-08-16 DIAGNOSIS — D05.12 BREAST NEOPLASM, TIS (DCIS), LEFT: ICD-10-CM

## 2022-08-16 DIAGNOSIS — R29.898 WEAKNESS OF BOTH LOWER EXTREMITIES: Primary | ICD-10-CM

## 2022-08-16 DIAGNOSIS — R26.89 BALANCE PROBLEM: ICD-10-CM

## 2022-08-16 DIAGNOSIS — R26.9 ABNORMALITY OF GAIT: ICD-10-CM

## 2022-08-16 DIAGNOSIS — E66.01 CLASS 3 SEVERE OBESITY WITH BODY MASS INDEX (BMI) OF 45.0 TO 49.9 IN ADULT, UNSPECIFIED OBESITY TYPE, UNSPECIFIED WHETHER SERIOUS COMORBIDITY PRESENT: Primary | ICD-10-CM

## 2022-08-16 DIAGNOSIS — Z74.09 DECREASED FUNCTIONAL MOBILITY AND ENDURANCE: ICD-10-CM

## 2022-08-16 PROCEDURE — 96164 HLTH BHV IVNTJ GRP 1ST 30: CPT | Mod: HB,AH,, | Performed by: PSYCHOLOGIST

## 2022-08-16 PROCEDURE — 97110 THERAPEUTIC EXERCISES: CPT

## 2022-08-16 PROCEDURE — 96164 PR INTERVENTION, HEALTH BEHAVIOR, GROUP, 1ST 30 MINS: ICD-10-PCS | Mod: HB,AH,, | Performed by: PSYCHOLOGIST

## 2022-08-16 PROCEDURE — 96165 PR INTERVENTION, HEALTH BEHAVIOR, GROUP, EA ADDTL 15 MINS: ICD-10-PCS | Mod: HB,AH,, | Performed by: PSYCHOLOGIST

## 2022-08-16 PROCEDURE — 96165 HLTH BHV IVNTJ GRP EA ADDL: CPT | Mod: HB,AH,, | Performed by: PSYCHOLOGIST

## 2022-08-16 NOTE — PROGRESS NOTES
/Physical Therapy Daily Treatment Note     Name: Dianne Aguilar [facility] Number: [id number]    Therapy Diagnosis:   Encounter Diagnoses   Name Primary?    Weakness of both lower extremities Yes    Balance problem     Abnormality of gait     Decreased functional mobility and endurance      Physician: Lilian Villagran PA-C    Visit Date: 8/16/2022    Physician Orders: PT Eval and Treat Survivorship Weight Loss Program Participant  Medical Diagnosis from Referral:   E66.01,Z68.42 (ICD-10-CM) - Class 3 severe obesity with body mass index (BMI) of 45.0 to 49.9 in adult, unspecified obesity type, unspecified whether serious comorbidity present   D05.12 (ICD-10-CM) - Breast neoplasm, Tis (DCIS), left   Evaluation Date: 5/9/2022  Authorization Period Expiration: 11/9/2022  Plan of Care Expiration: 11/9/2022  Visit # / Visits authorized: 8/ 16 (plus evaluation)  Insurance: MEDICAID/[employer] BLUE ([employer] [place]     Time In: 2:50 PM (late arrival)  Time Out: 3:25 PM  Total Billable Time: 35 minutes     Precautions: Standard and cancer        Subjective     Pt reports: not doing too good today, she is still having GI issues and possible sinus infection. She has been sleeping a lot with everything going on. She has not been eating much over the past few days due to having difficulty keeping stuff down. She will be moving to [place] in Sept and may be back in the area in the Spring.  She was compliant with home exercise program.  Response to previous treatment: exhausted but not too bad  Functional change: all the same    Pain: 7- 8/10, 5-6/10  Location: left ankle and foot; stomach    Cancer Related Surgery and Date: left partial mastectomy without sentinel node biopsy on 2/14/2020     Chemotherapy: N/A  Radiation: 5/20/2020 To 6/19/2020  Adjuvant endocrine therapy started on started, but stopped due to side effects.    Objective     Objective Measures updated at progress report unless specified.     Upper Extremity  Strength    (R) UE (L) UE   Shoulder flexion: 4+/5 4+/5   Shoulder Abduction: 5/5 5/5   Shoulder IR 5/5 5/5   Shoulder ER 5/5 5/5   Elbow flexion: 5/5 5/5   Elbow extension: 5/5 5/5   Wrist flexion: 5/5 5/5   Wrist extension: 5/5 5/5          Lower Extremity Strength  Right LE   Left LE     Hip Flexion: 5/5 Hip Flexion: 5/5   Hip Extension:  5/5 Hip Extension: 5/5   Hip Abduction: 5/5 Hip Abduction: 5/5   Hip Adduction: 5/5 Hip Adduction 5/5   Knee Extension: 4+/5 Knee Extension: 4+/5   Knee Flexion: 5/5 Knee Flexion: 5/5   Ankle Dorsiflexion: 5/5 Ankle Dorsiflexion: 4-/5*   Ankle Plantarflexion: 5/5 Ankle Plantarflexion: 5/5*   * discomfort due to CRPS      Strength (in pounds, setting II, average of three trials):    Left Right   Trial 1: 45.4 lbs 83.1 lbs   Trial 2: 64.8 lbs 72.3 lbs   Trial 3: 69.0 lbs 92.3 lbs   Average: 59.7 lbs 82.6 lbs       Strength (in pounds, setting II, average of three trials): 6/14/22    Left Right   Trial 1: 61.0 lbs 87.7 lbs   Trial 2: 71.6 lbs 87.3 lbs   Trial 3: 68.5 lbs 83.1 lbs   Average: 67.03 lbs 86.03 lbs       Strength (in pounds, setting II, average of three trials): 8/16/22    Left Right   Trial 1: 60.6 lbs 95.6 lbs   Trial 2: 72.9 lbs 82.0 lbs   Trial 3: 73.9 lbs 86.6 lbs   Average: 69.13 lbs 88.06 lbs     Normal  Average Values  Female Right Left Male: Right Left   20-29 66 lbs 62 lbs         94 lbs 86 lbs   30-39 68 lbs 64 lbs 90 lbs 82 lbs   40-49 64 lbs 62 lbs 80 lbs 74 lbs   50-59 62 lbs 57 lbs 72 lbs 65 lbs   60-69 53 lbs 51 lbs 63 lbs 56 lbs   70+ 44 lbs 42 lbs 54 lbs 48 lbs         Balance Assessment:     For Single Limb Stance, enter best of 3 trials for each leg. Test times out at 30s.    Evaluation 6/14/22 8/16/22   Single Limb Stance R LE 11.73 seconds  (<10 sec = HIGH FALL RISK) 15.14 seconds 30 seconds   Single Limb Stance L LE 6.69 seconds  (<10 sec = HIGH FALL RISK) 25.53 seconds 30 seconds        Evaluation 6/14/22 8/16/22   Timed Up and Go  14.46 sec  10.08 sec 9.75 sec         Table: Population Norms for TUG    Age  Average TUG    60 - 69 years  8.1 seconds    70 - 79 years  9.2 seconds    80 - 99 years  11.3 seconds          Endurance:     6 Minute Walk Test Distance in meters: 170.60 meters, required 1 standing rest break  6/14/22: 232.40 meters, no rest breaks   8/16/22: 294.67 meters, no rest breaks  - AD used: SPC( no AD when she arrived, borrowed clinic's cane for test)  - Assistance: supervision  - Distance: 170.60; 6/14/22 232.40 meters     GAIT DEVIATIONS:  Dianne displays the following deviations with ambulation: antalgic gait, wearing slides          Evaluation 6/14/22 8/16/22   30 second Chair Rise  (adults > 61 y/o) 5 completed with no arms 8 completed with hands on knees 8 completed (1 with hands on knees, 7 no arms)               CMS Impairment/Limitation/Restriction for FOTO Cancer Survey     Therapist reviewed FOTO scores for Dianne Hemphill on 6/14/2022.   FOTO documents entered into [employer] - see Media section.     Limitation Score: 52%  Category: Mobility             Treatment     Dianne received a physical performance assessment with report for 35 minutes:  See objective section for measurements.   Dianne did not receive therapeutic exercises to develop strength, endurance, ROM, flexibility, posture and core stabilization due to arriving late to her scheduled appointment and having another appointment at 3:30 pm.    Home Exercises Provided and Patient Education Provided     Education provided:   - exercise technique    Written Home Exercises Provided: Patient instructed to cont prior HEP.  Exercises were reviewed and Dianne was able to demonstrate them prior to the end of the session.  Dianne demonstrated good  understanding of the education provided.     See EMR under Patient Instructions for exercises provided Eval.    Assessment     Pt was able to demonstrate an increase in B LE strength compared to previous assessment. Her  scores on single leg stance have improved to 30 seconds on each leg which places her in the low risk for falls category and she demonstrate ankle strategies on both LEs. Her score on TUG has also improved to indicate she is at a low risk for falls. Her score on 6 minute walk test improved to indicate an improvement in her endurance. Her score on 30 second sit to stand test has remained the same but she was able to perform 7 reps without UE leverage. Despite her strength, balance, and endurance improvements she continues to be limited in her ADLs due to her GI issues and CRPS. Will progress as tolerated.  Dianne Is progressing well towards her goals.   Pt prognosis is Good.     Pt will continue to benefit from skilled outpatient physical therapy to address the deficits listed in the problem list box on initial evaluation, provide pt/family education and to maximize pt's level of independence in the home and community environment.     Pt's spiritual, cultural and educational needs considered and pt agreeable to plan of care and goals.     Anticipated barriers to physical therapy: co-morbidities    Goals:   Short Term Goals:  4 weeks     1. Pt will demo >/= 4+/5 strength in bilateral lower extremities for improved functional mobility. (progressing, not met)  2. Pt. will improve single leg stance balance test score to >/=  15s to be in a moderate/low risk category for falls. (met, 6/14/22)  3. Pt will perform >/= 9 sit to stands in 30 seconds with no arms for improved endurance. (progressing, not met)  4. Pt will increase 6 minute walk test score by >/=  50 meters in order to increase ambulation in the community. (exceeded, 6/14/22)  5. Pt will initiate home exercise program to improve strength, flexibility, endurance, mobility & balance. (met, 6/14/22)  6. Pt will tolerate 10 min or greater of time in light-->moderate intensity cardio (I.e. Bike, Treadmill) to improve endurance to assist in walking her dog for exercise  (progressing, not met)  7. Pt will verbalize understanding of exercise recommendation guidelines for moderate intensity exercise to safely perform and progress home exercise routine. (progressing, not met)     Long Term Goals: 8 weeks  1. Pt will increase strength in bilateral lower extremities to 5/5 for improved functional mobility and tolerance for ADL and work activities. (progressing, not met)  2. Pt will be independent with home exercise program to improve range of motion, strength, balance and return to prior level of function. (progressing, not met)  3. Pt. will improve Single Leg Stance balance test score to >/=  30s for patient to be in low risk category for falls. (met, 8/16/22)  4. Pt will perform >/= 15 sit to stands in 30 seconds with no arms for improved endurance. (progressing, not met)  5. Pt will increase 6 minute walk test score by >/= 75 meters in order to increase community ambulation. (met, 8/16/22)  6. Patient will report compliance with  20 -30min  of moderate intensity exercise 5 days a week to improve overall cardiovascular function and decrease cancer related fatigue. (progressing, not met)     Plan   Plan of care Certification: 5/9/2022 to 11/9/2022.     Outpatient Physical Therapy 2 times weekly for 8 weeks to include the following interventions: Gait Training, Manual Therapy, Neuromuscular Re-ed, Patient Education, Self Care, Therapeutic Activities, Therapeutic Exercise and IASTM. Dry needling with manual therapy techniques to decrease pain, inflammation and swelling, increase circulation and promote healing process.       Pt will also benefit from re-assessment at 3 months and 6 months following evaluation to assess for any change in functional status.       Koki Ferrera, PT

## 2022-08-18 NOTE — PROGRESS NOTES
INFORMED CONSENT: Dianne Hemphill   is known to this provider and identity was confirmed via NAME and .  The patient has been informed of the risks and benefits associated with engaging in psychotherapy, the handling of protected health information, the rights of privacy and the limits of confidentiality. The patient has also been informed of the importance of reporting any suicidal or homicidal ideation to this or any provider to ensure safety of all parties, and the Dianne Hemphill expressed understanding. The patient was agreeable to these terms and freely participates    Health and Behavior Intervention Group Note -Psychologist       Date: 2022    Site:   [facility]     Number of patients in attendance: 5    Group Type: Health and Behavior Group Intervention for Cancer Survivors ([id number] first 30 minutes, [id number] additional 15 minutes)    Dianne Hemphill arrived on time for the group. Patient was appropriately dressed and groomed. Dianne Hemphill  appeared alert and oriented in all spheres. No suicidal or homicidal ideation was reported.  Dianne Hemphill actively participated in the group process and provided both prompted and unprompted contribution to the group discussion.      Content of session:          Group 6: Preparing for the future: relapse prevention, self-management, and review of skills  Reviewed Skills from session 1-5.  Identified High-risk situations that involve triggers and barriers.  Developed plan for preparing for high-risk situations that threatened weight management goals.   Revisited patient reasons for engaging in Weight Management and whole health and well-being.  Provided additional resources for continued self-management.   Completion of Group.       Patient's response to intervention:The patient's response to intervention is accepting. Patient stated she continues to work with her providers regarding GI restrictions    Behavior:  Interested    Insight: good     Progress toward goals:Progress as Expected      Goals from last visit: Met    Treatment Plan: Continue group participation  · Target symptoms: weight loss   · Why chosen therapy is appropriate versus another modality: relevant to diagnosis, evidence based practice  · Outcome monitoring methods: checklist/rating scale, weight monitoring  · Return to clinic: 1 month     Length of Service (minutes direct face-to-face contact): 60    Encounter Diagnoses   Name Primary?    Class 3 severe obesity with body mass index (BMI) of 45.0 to 49.9 in adult, unspecified obesity type, unspecified whether serious comorbidity present Yes    Breast neoplasm, Tis (DCIS), left           Sugey Connors, PhD  Clinical Psychologist  [place] License #[id number]  [place] License #[id number]

## 2022-08-22 ENCOUNTER — PATIENT MESSAGE (OUTPATIENT)
Dept: PRIMARY CARE CLINIC | Facility: CLINIC | Age: 47
End: 2022-08-22
Payer: MEDICAID

## 2022-08-22 DIAGNOSIS — M54.10 RADICULAR PAIN OF LEFT LOWER EXTREMITY: ICD-10-CM

## 2022-08-22 DIAGNOSIS — R11.0 NAUSEA: ICD-10-CM

## 2022-08-22 DIAGNOSIS — K21.9 GASTROESOPHAGEAL REFLUX DISEASE WITHOUT ESOPHAGITIS: ICD-10-CM

## 2022-08-22 DIAGNOSIS — F43.23 SITUATIONAL MIXED ANXIETY AND DEPRESSIVE DISORDER: ICD-10-CM

## 2022-08-22 DIAGNOSIS — R13.10 DYSPHAGIA, UNSPECIFIED TYPE: ICD-10-CM

## 2022-08-22 DIAGNOSIS — M43.10 ANTEROLISTHESIS: ICD-10-CM

## 2022-08-22 DIAGNOSIS — Z98.890 S/P SPINAL SURGERY: ICD-10-CM

## 2022-08-22 DIAGNOSIS — M48.061 NEURAL FORAMINAL STENOSIS OF LUMBAR SPINE: ICD-10-CM

## 2022-08-22 DIAGNOSIS — M47.816 LUMBAR SPONDYLOSIS: ICD-10-CM

## 2022-08-22 DIAGNOSIS — M43.27 FUSION OF LUMBOSACRAL SPINE: ICD-10-CM

## 2022-08-22 DIAGNOSIS — Z72.820 POOR SLEEP: ICD-10-CM

## 2022-08-22 RX ORDER — PROMETHAZINE HYDROCHLORIDE 25 MG/1
25 TABLET ORAL EVERY 6 HOURS PRN
Qty: 60 TABLET | Refills: 2 | Status: SHIPPED | OUTPATIENT
Start: 2022-08-22 | End: 2022-10-03 | Stop reason: SDUPTHER

## 2022-08-22 RX ORDER — AMITRIPTYLINE HYDROCHLORIDE 50 MG/1
50 TABLET, FILM COATED ORAL NIGHTLY
Qty: 90 TABLET | Refills: 2 | Status: SHIPPED | OUTPATIENT
Start: 2022-08-22 | End: 2023-12-13 | Stop reason: SDUPTHER

## 2022-08-22 RX ORDER — PANTOPRAZOLE SODIUM 40 MG/1
40 TABLET, DELAYED RELEASE ORAL DAILY
Qty: 90 TABLET | Refills: 3 | Status: SHIPPED | OUTPATIENT
Start: 2022-08-22 | End: 2022-10-03 | Stop reason: SDUPTHER

## 2022-08-22 RX ORDER — BACLOFEN 10 MG/1
10 TABLET ORAL 3 TIMES DAILY
Qty: 270 TABLET | Refills: 1 | Status: SHIPPED | OUTPATIENT
Start: 2022-08-22 | End: 2024-01-04

## 2022-08-22 RX ORDER — ONDANSETRON 4 MG/1
8 TABLET, ORALLY DISINTEGRATING ORAL EVERY 8 HOURS PRN
Qty: 60 TABLET | Refills: 1 | Status: SHIPPED | OUTPATIENT
Start: 2022-08-22 | End: 2024-01-04

## 2022-08-22 RX ORDER — DULOXETIN HYDROCHLORIDE 30 MG/1
30 CAPSULE, DELAYED RELEASE ORAL DAILY
Qty: 90 CAPSULE | Refills: 1 | Status: SHIPPED | OUTPATIENT
Start: 2022-08-22 | End: 2022-08-29

## 2022-08-22 NOTE — TELEPHONE ENCOUNTER
Faxed RX with cover sheet to [facility] @ [phone]    Sent pt [facility] via portal re: RX's have been faxed over

## 2022-08-29 ENCOUNTER — PATIENT MESSAGE (OUTPATIENT)
Dept: PRIMARY CARE CLINIC | Facility: CLINIC | Age: 47
End: 2022-08-29
Payer: MEDICAID

## 2022-08-31 ENCOUNTER — OFFICE VISIT (OUTPATIENT)
Dept: PRIMARY CARE CLINIC | Facility: CLINIC | Age: 47
End: 2022-08-31
Payer: MEDICAID

## 2022-08-31 ENCOUNTER — TELEPHONE (OUTPATIENT)
Dept: PRIMARY CARE CLINIC | Facility: CLINIC | Age: 47
End: 2022-08-31

## 2022-08-31 VITALS
DIASTOLIC BLOOD PRESSURE: 76 MMHG | BODY MASS INDEX: 43.4 KG/M2 | SYSTOLIC BLOOD PRESSURE: 128 MMHG | HEIGHT: 69 IN | WEIGHT: 293 LBS | OXYGEN SATURATION: 97 % | HEART RATE: 103 BPM | TEMPERATURE: 98 F | RESPIRATION RATE: 18 BRPM

## 2022-08-31 DIAGNOSIS — M54.17 LUMBOSACRAL RADICULOPATHY AT L5: ICD-10-CM

## 2022-08-31 DIAGNOSIS — K21.9 GASTROESOPHAGEAL REFLUX DISEASE WITHOUT ESOPHAGITIS: Primary | ICD-10-CM

## 2022-08-31 DIAGNOSIS — M48.061 NEURAL FORAMINAL STENOSIS OF LUMBAR SPINE: ICD-10-CM

## 2022-08-31 DIAGNOSIS — R13.10 DYSPHAGIA, UNSPECIFIED TYPE: ICD-10-CM

## 2022-08-31 DIAGNOSIS — R11.0 NAUSEA: ICD-10-CM

## 2022-08-31 DIAGNOSIS — G90.522 COMPLEX REGIONAL PAIN SYNDROME TYPE 1 OF LEFT LOWER EXTREMITY: ICD-10-CM

## 2022-08-31 DIAGNOSIS — Z98.890 S/P SPINAL SURGERY: ICD-10-CM

## 2022-08-31 PROCEDURE — 99214 PR OFFICE/OUTPT VISIT, EST, LEVL IV, 30-39 MIN: ICD-10-PCS | Mod: S$PBB,,, | Performed by: FAMILY MEDICINE

## 2022-08-31 PROCEDURE — 3080F PR MOST RECENT DIASTOLIC BLOOD PRESSURE >= 90 MM HG: ICD-10-PCS | Mod: CPTII,,, | Performed by: FAMILY MEDICINE

## 2022-08-31 PROCEDURE — 1159F PR MEDICATION LIST DOCUMENTED IN MEDICAL RECORD: ICD-10-PCS | Mod: CPTII,,, | Performed by: FAMILY MEDICINE

## 2022-08-31 PROCEDURE — 99214 OFFICE O/P EST MOD 30 MIN: CPT | Mod: S$PBB,,, | Performed by: FAMILY MEDICINE

## 2022-08-31 PROCEDURE — S0119 ONDANSETRON 4 MG: HCPCS | Mod: PBBFAC,PN

## 2022-08-31 PROCEDURE — 3008F PR BODY MASS INDEX (BMI) DOCUMENTED: ICD-10-PCS | Mod: CPTII,,, | Performed by: FAMILY MEDICINE

## 2022-08-31 PROCEDURE — 3044F PR MOST RECENT HEMOGLOBIN A1C LEVEL <7.0%: ICD-10-PCS | Mod: CPTII,,, | Performed by: FAMILY MEDICINE

## 2022-08-31 PROCEDURE — 3077F PR MOST RECENT SYSTOLIC BLOOD PRESSURE >= 140 MM HG: ICD-10-PCS | Mod: CPTII,,, | Performed by: FAMILY MEDICINE

## 2022-08-31 PROCEDURE — 1159F MED LIST DOCD IN RCRD: CPT | Mod: CPTII,,, | Performed by: FAMILY MEDICINE

## 2022-08-31 PROCEDURE — 99999 PR PBB SHADOW E&M-EST. PATIENT-LVL IV: ICD-10-PCS | Mod: PBBFAC,,, | Performed by: FAMILY MEDICINE

## 2022-08-31 PROCEDURE — 3044F HG A1C LEVEL LT 7.0%: CPT | Mod: CPTII,,, | Performed by: FAMILY MEDICINE

## 2022-08-31 PROCEDURE — 99999 PR PBB SHADOW E&M-EST. PATIENT-LVL IV: CPT | Mod: PBBFAC,,, | Performed by: FAMILY MEDICINE

## 2022-08-31 PROCEDURE — 3077F SYST BP >= 140 MM HG: CPT | Mod: CPTII,,, | Performed by: FAMILY MEDICINE

## 2022-08-31 PROCEDURE — 3080F DIAST BP >= 90 MM HG: CPT | Mod: CPTII,,, | Performed by: FAMILY MEDICINE

## 2022-08-31 PROCEDURE — 3008F BODY MASS INDEX DOCD: CPT | Mod: CPTII,,, | Performed by: FAMILY MEDICINE

## 2022-08-31 PROCEDURE — 99214 OFFICE O/P EST MOD 30 MIN: CPT | Mod: PBBFAC,PN | Performed by: FAMILY MEDICINE

## 2022-08-31 RX ORDER — ONDANSETRON 4 MG/1
4 TABLET, ORALLY DISINTEGRATING ORAL
Status: COMPLETED | OUTPATIENT
Start: 2022-08-31 | End: 2022-08-31

## 2022-08-31 RX ORDER — PREGABALIN 50 MG/1
50 CAPSULE ORAL 3 TIMES DAILY
COMMUNITY
End: 2022-08-31

## 2022-08-31 RX ORDER — FAMOTIDINE 20 MG/1
20 TABLET, FILM COATED ORAL 2 TIMES DAILY PRN
Qty: 60 TABLET | Refills: 11 | Status: SHIPPED | OUTPATIENT
Start: 2022-08-31 | End: 2024-01-04 | Stop reason: ALTCHOICE

## 2022-08-31 RX ORDER — PREGABALIN 25 MG/1
25 CAPSULE ORAL 3 TIMES DAILY
Qty: 90 CAPSULE | Refills: 3 | Status: SHIPPED | OUTPATIENT
Start: 2022-08-31 | End: 2022-09-23 | Stop reason: SDUPTHER

## 2022-08-31 RX ADMIN — ONDANSETRON 4 MG: 4 TABLET, ORALLY DISINTEGRATING ORAL at 11:08

## 2022-08-31 NOTE — TELEPHONE ENCOUNTER
----- Message from Bree Nugent sent at 8/31/2022 11:19 AM CDT -----  Contact: [facility] [phone]  Pharmacy is calling to clarify an RX.  RX name: pregabalin (LYRICA) 25 MG capsule  What do they need to clarify: is patient dropping from 100 MG to 25 MG    [id number] [place] - [address]  [address]  [place] [address]  Phone: [phone] Fax: [phone]    Comments:     Thank You
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
## 2022-09-03 DIAGNOSIS — M54.10 RADICULAR PAIN OF LEFT LOWER EXTREMITY: ICD-10-CM

## 2022-09-03 DIAGNOSIS — Z72.820 POOR SLEEP: ICD-10-CM

## 2022-09-03 DIAGNOSIS — M48.061 NEURAL FORAMINAL STENOSIS OF LUMBAR SPINE: ICD-10-CM

## 2022-09-03 DIAGNOSIS — M43.27 FUSION OF LUMBOSACRAL SPINE: ICD-10-CM

## 2022-09-07 RX ORDER — BACLOFEN 10 MG/1
TABLET ORAL
Qty: 90 TABLET | Refills: 0 | OUTPATIENT
Start: 2022-09-07

## 2022-09-23 ENCOUNTER — PATIENT MESSAGE (OUTPATIENT)
Dept: PRIMARY CARE CLINIC | Facility: CLINIC | Age: 47
End: 2022-09-23
Payer: MEDICAID

## 2022-09-23 DIAGNOSIS — M54.17 LUMBOSACRAL RADICULOPATHY AT L5: ICD-10-CM

## 2022-09-23 DIAGNOSIS — M48.061 NEURAL FORAMINAL STENOSIS OF LUMBAR SPINE: ICD-10-CM

## 2022-09-23 DIAGNOSIS — Z98.890 S/P SPINAL SURGERY: ICD-10-CM

## 2022-09-23 DIAGNOSIS — G90.522 COMPLEX REGIONAL PAIN SYNDROME TYPE 1 OF LEFT LOWER EXTREMITY: ICD-10-CM

## 2022-09-23 RX ORDER — PREGABALIN 50 MG/1
50 CAPSULE ORAL 3 TIMES DAILY
Qty: 90 CAPSULE | Refills: 0 | Status: SHIPPED | OUTPATIENT
Start: 2022-09-23 | End: 2022-10-03 | Stop reason: SDUPTHER

## 2022-09-23 NOTE — TELEPHONE ENCOUNTER
No new care gaps identified.  [facility] Embedded Care Gaps. Reference number: [id number]. 9/23/2022   12:47:05 PM CDT

## 2022-09-23 NOTE — TELEPHONE ENCOUNTER
Spoke with Lowell @ [facility] pharmacy re: clarification of lyrica.   Pt is taking 50 mg now & weaning down to 25mg. Ok to fill   O-L Flap Text: The defect edges were debeveled with a #15 scalpel blade.  Given the location of the defect, shape of the defect and the proximity to free margins an O-L flap was deemed most appropriate.  Using a sterile surgical marker, an appropriate advancement flap was drawn incorporating the defect and placing the expected incisions within the relaxed skin tension lines where possible.    The area thus outlined was incised deep to adipose tissue with a #15 scalpel blade.  The skin margins were undermined to an appropriate distance in all directions utilizing iris scissors.

## 2022-09-26 ENCOUNTER — TELEPHONE (OUTPATIENT)
Dept: HEMATOLOGY/ONCOLOGY | Facility: CLINIC | Age: 47
End: 2022-09-26
Payer: MEDICAID

## 2022-09-26 NOTE — TELEPHONE ENCOUNTER
Called pt to setup 6 month bootcamp f/u.  is currently in [place] with her daughter and doesn't know when she will be back. I insturcted pt to call when she is back in town and we can set it up for her.

## 2022-10-03 ENCOUNTER — PATIENT MESSAGE (OUTPATIENT)
Dept: PRIMARY CARE CLINIC | Facility: CLINIC | Age: 47
End: 2022-10-03
Payer: MEDICAID

## 2022-10-03 DIAGNOSIS — K21.9 GASTROESOPHAGEAL REFLUX DISEASE WITHOUT ESOPHAGITIS: ICD-10-CM

## 2022-10-03 DIAGNOSIS — R13.10 DYSPHAGIA, UNSPECIFIED TYPE: ICD-10-CM

## 2022-10-03 DIAGNOSIS — R11.0 NAUSEA: ICD-10-CM

## 2022-10-03 DIAGNOSIS — M54.17 LUMBOSACRAL RADICULOPATHY AT L5: ICD-10-CM

## 2022-10-03 DIAGNOSIS — G90.522 COMPLEX REGIONAL PAIN SYNDROME TYPE 1 OF LEFT LOWER EXTREMITY: ICD-10-CM

## 2022-10-03 DIAGNOSIS — M48.061 NEURAL FORAMINAL STENOSIS OF LUMBAR SPINE: ICD-10-CM

## 2022-10-03 DIAGNOSIS — Z98.890 S/P SPINAL SURGERY: ICD-10-CM

## 2022-10-03 NOTE — TELEPHONE ENCOUNTER
No new care gaps identified.  [facility] Embedded Care Gaps. Reference number: [id number]. 10/03/2022   4:56:18 PM CDT

## 2022-10-03 NOTE — TELEPHONE ENCOUNTER
Please advise    Pt requesting paper RX with letter stating why the pt is on the medication:  Sent to:  I need the doctors prescription for Lyrica 50mg, Pantoprazole, and promethazine, emailed to [email or web address]  Subject Attn: [id number]    Please advise

## 2022-10-04 ENCOUNTER — PATIENT MESSAGE (OUTPATIENT)
Dept: PRIMARY CARE CLINIC | Facility: CLINIC | Age: 47
End: 2022-10-04
Payer: MEDICAID

## 2022-10-04 RX ORDER — PANTOPRAZOLE SODIUM 40 MG/1
40 TABLET, DELAYED RELEASE ORAL DAILY
Qty: 30 TABLET | Refills: 0 | Status: SHIPPED | OUTPATIENT
Start: 2022-10-04 | End: 2023-12-08 | Stop reason: SDUPTHER

## 2022-10-04 RX ORDER — PREGABALIN 50 MG/1
50 CAPSULE ORAL 3 TIMES DAILY
Qty: 90 CAPSULE | Refills: 0 | Status: SHIPPED | OUTPATIENT
Start: 2022-10-04 | End: 2022-10-27

## 2022-10-04 RX ORDER — PROMETHAZINE HYDROCHLORIDE 25 MG/1
25 TABLET ORAL EVERY 6 HOURS PRN
Qty: 60 TABLET | Refills: 0 | Status: SHIPPED | OUTPATIENT
Start: 2022-10-04

## 2022-10-04 NOTE — TELEPHONE ENCOUNTER
Sent an email to:  [email or web address]  Subject Attn: [id number]    With copies of the medication Rx's & the Dx associated with them.    Sent pt a msg via portal

## 2022-10-05 NOTE — TELEPHONE ENCOUNTER
I received an email today from [facility] stating:  ood Morning Tammy,    We have been advised by the port of entry that the customer will be clearing this shipment themselves with [employer] (customs)

## 2022-10-19 ENCOUNTER — PATIENT MESSAGE (OUTPATIENT)
Dept: PRIMARY CARE CLINIC | Facility: CLINIC | Age: 47
End: 2022-10-19
Payer: MEDICAID

## 2022-10-26 ENCOUNTER — PATIENT MESSAGE (OUTPATIENT)
Dept: PRIMARY CARE CLINIC | Facility: CLINIC | Age: 47
End: 2022-10-26
Payer: MEDICAID

## 2022-10-26 DIAGNOSIS — G90.522 COMPLEX REGIONAL PAIN SYNDROME TYPE 1 OF LEFT LOWER EXTREMITY: ICD-10-CM

## 2022-10-26 DIAGNOSIS — M54.17 LUMBOSACRAL RADICULOPATHY AT L5: Primary | ICD-10-CM

## 2022-10-26 DIAGNOSIS — M48.061 NEURAL FORAMINAL STENOSIS OF LUMBAR SPINE: ICD-10-CM

## 2022-10-26 RX ORDER — PREGABALIN 25 MG/1
25 CAPSULE ORAL 2 TIMES DAILY
Status: CANCELLED | OUTPATIENT
Start: 2022-10-26

## 2022-10-26 RX ORDER — PREGABALIN 25 MG/1
25 CAPSULE ORAL 2 TIMES DAILY
COMMUNITY
End: 2022-10-27 | Stop reason: SDUPTHER

## 2022-10-26 NOTE — TELEPHONE ENCOUNTER
No new care gaps identified.  [facility] Embedded Care Gaps. Reference number: [id number]. 10/26/2022   1:26:09 PM CDT

## 2022-10-27 RX ORDER — PREGABALIN 25 MG/1
25 CAPSULE ORAL 3 TIMES DAILY
Qty: 90 CAPSULE | Refills: 3 | Status: SHIPPED | OUTPATIENT
Start: 2022-10-27 | End: 2023-12-13

## 2022-10-31 ENCOUNTER — PATIENT MESSAGE (OUTPATIENT)
Dept: PRIMARY CARE CLINIC | Facility: CLINIC | Age: 47
End: 2022-10-31
Payer: MEDICAID

## 2022-12-07 NOTE — PLAN OF CARE
Final Anesthesia Post-op Assessment    Patient: Abdullahi Headley  Procedure(s) Performed: RIGHT TOTAL KNEE REPLACEMENT - RIGHT  Anesthesia type: Spinal    Vitals Value Taken Time   Temp 36.3 °C (97.4 °F) 12/07/22 1200   Pulse 77 12/07/22 1200   Resp 18 12/07/22 1200   SpO2 99 % 12/07/22 1200   /53 12/07/22 1200         Post-op Vital Signs:stable  Level of Consciousness: participates in exam, awake and answers questions appropriately  Respiratory Status: spontaneous ventilation  Cardiovascular stable  Hydration: euvolemic  Pain Management: adequately controlled  Handoff: Handoff to receiving clinician was performed and questions were answered  Vomiting: none  Nausea: None  Airway Patency:patent  Post-op Assessment: awake, alert, appropriately conversant, or baseline, no complications, patient tolerated procedure well with no complications and no evidence of recall      No complications documented.    Pt AAOx4. Spouse at the bedside. Medications administered per order.  Ambulated to bathroom with assistance and brace on. PRN oxy IR administered once through the night for pain, relief maintained. Pt slept through the night. Encouraged to call with questions/concerns. Will continue to monitor. Safety maintained.

## 2022-12-16 ENCOUNTER — DOCUMENTATION ONLY (OUTPATIENT)
Dept: REHABILITATION | Facility: HOSPITAL | Age: 47
End: 2022-12-16
Payer: MEDICAID

## 2022-12-16 DIAGNOSIS — R29.898 WEAKNESS OF BOTH LOWER EXTREMITIES: Primary | ICD-10-CM

## 2022-12-16 DIAGNOSIS — Z74.09 DECREASED FUNCTIONAL MOBILITY AND ENDURANCE: ICD-10-CM

## 2022-12-16 DIAGNOSIS — R26.9 ABNORMALITY OF GAIT: ICD-10-CM

## 2022-12-16 DIAGNOSIS — R26.89 BALANCE PROBLEM: ICD-10-CM

## 2022-12-16 NOTE — PROGRESS NOTES
PHYSICAL THERAPY  Discharge  Date of Discharge 12/16/2022    Name: Dianne Aguilar [facility] #: [id number]    Pt was seen in Physical Therapy for initial evaluation on:05/09/2022  Pt's last date of treatment in Physical Therapy was:08/16/2022  Number of treatment sessions completed: 11  Reason for discharge:    self discharge[place]  Status at Discharge:  Goals not met

## 2023-04-21 ENCOUNTER — PATIENT MESSAGE (OUTPATIENT)
Dept: ADMINISTRATIVE | Facility: HOSPITAL | Age: 48
End: 2023-04-21
Payer: MEDICAID

## 2023-07-01 NOTE — PLAN OF CARE
5 Problem: Pain, Acute (Adult)  Goal: Acceptable Pain Control/Comfort Level  Patient will demonstrate the desired outcomes by discharge/transition of care.   Outcome: Ongoing (interventions implemented as appropriate)  Assess for pain and medicate as ordered.no c/o pain at present time.       Statement Selected

## 2023-08-22 ENCOUNTER — PATIENT MESSAGE (OUTPATIENT)
Dept: PRIMARY CARE CLINIC | Facility: CLINIC | Age: 48
End: 2023-08-22
Payer: MEDICAID

## 2023-12-07 ENCOUNTER — PATIENT MESSAGE (OUTPATIENT)
Dept: PRIMARY CARE CLINIC | Facility: CLINIC | Age: 48
End: 2023-12-07
Payer: MEDICAID

## 2023-12-07 DIAGNOSIS — K21.9 GASTROESOPHAGEAL REFLUX DISEASE WITHOUT ESOPHAGITIS: ICD-10-CM

## 2023-12-07 DIAGNOSIS — T63.484S: ICD-10-CM

## 2023-12-07 DIAGNOSIS — Z72.820 POOR SLEEP: ICD-10-CM

## 2023-12-07 DIAGNOSIS — F43.23 SITUATIONAL MIXED ANXIETY AND DEPRESSIVE DISORDER: ICD-10-CM

## 2023-12-07 DIAGNOSIS — M47.816 LUMBAR SPONDYLOSIS: ICD-10-CM

## 2023-12-07 DIAGNOSIS — M48.061 NEURAL FORAMINAL STENOSIS OF LUMBAR SPINE: ICD-10-CM

## 2023-12-07 DIAGNOSIS — M43.27 FUSION OF LUMBOSACRAL SPINE: ICD-10-CM

## 2023-12-07 DIAGNOSIS — M54.10 RADICULAR PAIN OF LEFT LOWER EXTREMITY: ICD-10-CM

## 2023-12-07 DIAGNOSIS — M43.10 ANTEROLISTHESIS: ICD-10-CM

## 2023-12-07 DIAGNOSIS — R13.10 DYSPHAGIA, UNSPECIFIED TYPE: ICD-10-CM

## 2023-12-08 NOTE — TELEPHONE ENCOUNTER
Care Due:                  Date            Visit Type   Department     Provider  --------------------------------------------------------------------------------                                EP -                              PRIMARY      [facility]  Last Visit: 08-      CARE ([facility])   Care           Annette Burton                              EP -                              PRIMARY      [facility] Primary  Next Visit: 01-      CARE ([facility])   Care           Annette Burton                                                            Last  Test          Frequency    Reason                     Performed    Due Date  --------------------------------------------------------------------------------    Cr..........  12 months..  famotidine...............  09- 08-    [facility] Embedded Care Due Messages. Reference number: [id number].   12/08/2023 4:20:01 PM CST

## 2023-12-10 RX ORDER — PANTOPRAZOLE SODIUM 40 MG/1
40 TABLET, DELAYED RELEASE ORAL DAILY
Qty: 30 TABLET | Refills: 0 | Status: SHIPPED | OUTPATIENT
Start: 2023-12-10 | End: 2023-12-13 | Stop reason: SDUPTHER

## 2023-12-10 RX ORDER — EPINEPHRINE 0.3 MG/.3ML
1 INJECTION SUBCUTANEOUS ONCE
Qty: 0.3 ML | Refills: 3 | Status: SHIPPED | OUTPATIENT
Start: 2023-12-10 | End: 2023-12-10

## 2023-12-10 RX ORDER — BACLOFEN 10 MG/1
10 TABLET ORAL 3 TIMES DAILY
Qty: 90 TABLET | Refills: 0 | Status: SHIPPED | OUTPATIENT
Start: 2023-12-10 | End: 2023-12-13 | Stop reason: SDUPTHER

## 2023-12-10 RX ORDER — DULOXETIN HYDROCHLORIDE 30 MG/1
30 CAPSULE, DELAYED RELEASE ORAL DAILY
Qty: 90 CAPSULE | Refills: 0 | Status: SHIPPED | OUTPATIENT
Start: 2023-12-10 | End: 2023-12-13 | Stop reason: SDUPTHER

## 2023-12-12 ENCOUNTER — E-VISIT (OUTPATIENT)
Dept: PRIMARY CARE CLINIC | Facility: CLINIC | Age: 48
End: 2023-12-12
Payer: MEDICAID

## 2023-12-12 ENCOUNTER — PATIENT MESSAGE (OUTPATIENT)
Dept: PRIMARY CARE CLINIC | Facility: CLINIC | Age: 48
End: 2023-12-12

## 2023-12-12 DIAGNOSIS — Z98.890 S/P SPINAL SURGERY: ICD-10-CM

## 2023-12-12 DIAGNOSIS — R11.0 NAUSEA: ICD-10-CM

## 2023-12-12 DIAGNOSIS — M43.27 FUSION OF LUMBOSACRAL SPINE: ICD-10-CM

## 2023-12-12 DIAGNOSIS — R13.10 DYSPHAGIA, UNSPECIFIED TYPE: ICD-10-CM

## 2023-12-12 DIAGNOSIS — K21.9 GASTROESOPHAGEAL REFLUX DISEASE WITHOUT ESOPHAGITIS: ICD-10-CM

## 2023-12-12 DIAGNOSIS — Z72.820 POOR SLEEP: ICD-10-CM

## 2023-12-12 DIAGNOSIS — F43.23 SITUATIONAL MIXED ANXIETY AND DEPRESSIVE DISORDER: ICD-10-CM

## 2023-12-12 DIAGNOSIS — M48.061 NEURAL FORAMINAL STENOSIS OF LUMBAR SPINE: ICD-10-CM

## 2023-12-12 DIAGNOSIS — M47.816 LUMBAR SPONDYLOSIS: ICD-10-CM

## 2023-12-12 DIAGNOSIS — M43.10 ANTEROLISTHESIS: ICD-10-CM

## 2023-12-12 DIAGNOSIS — M54.10 RADICULAR PAIN OF LEFT LOWER EXTREMITY: ICD-10-CM

## 2023-12-12 PROCEDURE — 99421 PR E&M, ONLINE DIGIT, EST, < 7 DAYS, 5-10 MINS: ICD-10-PCS | Mod: S$PBB,,, | Performed by: FAMILY MEDICINE

## 2023-12-12 PROCEDURE — 99421 OL DIG E/M SVC 5-10 MIN: CPT | Mod: S$PBB,,, | Performed by: FAMILY MEDICINE

## 2023-12-12 NOTE — PROGRESS NOTES
Subjective:       Patient ID: Dianne Hemphill is a 48 y.o. female.    Chief Complaint: URI (Entered automatically based on patient selection in Patient Portal.)    HPI    Patient ID: Dianne Hemphill is a 48 y.o. female.    Chief Complaint: URI (Entered automatically based on patient selection in Patient Portal.)    The patient initiated a request through [employer] on 12/12/2023 for evaluation and management with a chief complaint of URI (Entered automatically based on patient selection in Patient Portal.)     I evaluated the questionnaire submission on 12/12/23.    [facility] Peq Evisit Upper Respitatory/Cough Questionnaire    12/12/2023  1:20 PM CST - Filed by Patient   Do you agree to participate in an E-Visit? Yes   If you have any of the following symptoms, please present to your local ER or call 911:  I acknowledge   What is the main issue that you would like for your doctor to address today? Sore throat   Are you able to take your vital signs? No   Are you currently pregnant, could you be pregnant, or are you breast feeding? None of the above   What symptoms do you currently have?  Fatigue;  Headache;  Sore throat;  Pain around the nose and face   Have you had any of the following? None of the above   Have you ever smoked? I have smoked in the past   Have you had a fever? No   When did your symptoms first appear? 12/7/2023   In the last two weeks, have you been in close contact with someone who has COVID-19 or the Flu? No   In the last two weeks, have you worked or volunteered in a healthcare facility or as a ? Healthcare facilities include a hospital, medical or dental clinic, long-term care facility, or nursing home No   Do you live in a long-term care facility, nursing home, group home, or homeless shelter? No   List what you have done or taken to help your symptoms. Tylenol, hot tea with lemon and honey, face steam   How severe are your symptoms? Moderate   Have your symptoms improved  since they first appeared? No change   Have you taken an at home Covid test? Yes   What were the results? Negative   Have you taken a Flu test? No   Have you been fully vaccinated for COVID? (2 Pfizer, 2 Moderna or 1 Anmol & Anmol vaccine injections) Yes   Have you received a booster? No   Have you recieved a Flu shot? No   Do you have transportation to get tested for COVID if it is indicated and ordered for you at an [facility] location? Yes   Provide any information you feel is important to your history not asked above    Please attach any relevant images or files          Recent Labs Obtained:  No visits with results within 7 Day(s) from this visit.   Latest known visit with results is:   Admission on 09/01/2022, Discharged on 09/01/2022   Component Date Value Ref Range Status    WBC 09/01/2022 13.55 (H)  3.90 - 12.70 K/uL Final    RBC 09/01/2022 5.32  4.00 - 5.40 M/uL Final    Hemoglobin 09/01/2022 15.5  12.0 - 16.0 g/dL Final    Hematocrit 09/01/2022 46.6  37.0 - 48.5 % Final    MCV 09/01/2022 88  82 - 98 fL Final    MCH 09/01/2022 29.1  27.0 - 31.0 pg Final    MCHC 09/01/2022 33.3  32.0 - 36.0 g/dL Final    RDW 09/01/2022 13.2  11.5 - 14.5 % Final    Platelets 09/01/2022 461 (H)  150 - 450 K/uL Final    MPV 09/01/2022 9.8  9.2 - 12.9 fL Final    Immature Granulocytes 09/01/2022 0.4  0.0 - 0.5 % Final    Gran # (ANC) 09/01/2022 10.4 (H)  1.8 - 7.7 K/uL Final    Immature Grans (Abs) 09/01/2022 0.05 (H)  0.00 - 0.04 K/uL Final    Comment: Mild elevation in immature granulocytes is non specific and   can be seen in a variety of conditions including stress response,   acute inflammation, trauma and pregnancy. Correlation with other   laboratory and clinical findings is essential.      Lymph # 09/01/2022 2.6  1.0 - 4.8 K/uL Final    Mono # 09/01/2022 0.4  0.3 - 1.0 K/uL Final    Eos # 09/01/2022 0.0  0.0 - 0.5 K/uL Final    Baso # 09/01/2022 0.05  0.00 - 0.20 K/uL Final    nRBC 09/01/2022 0  0 /100 WBC Final     Gran % 09/01/2022 76.8 (H)  38.0 - 73.0 % Final    Lymph % 09/01/2022 19.3  18.0 - 48.0 % Final    Mono % 09/01/2022 3.0 (L)  4.0 - 15.0 % Final    Eosinophil % 09/01/2022 0.1  0.0 - 8.0 % Final    Basophil % 09/01/2022 0.4  0.0 - 1.9 % Final    Differential Method 09/01/2022 Automated   Final    Sodium 09/01/2022 142  136 - 145 mmol/L Final    Potassium 09/01/2022 4.3  3.5 - 5.1 mmol/L Final    Chloride 09/01/2022 102  95 - 110 mmol/L Final    CO2 09/01/2022 28  23 - 29 mmol/L Final    Glucose 09/01/2022 143 (H)  70 - 110 mg/dL Final    BUN 09/01/2022 10  7 - 17 mg/dL Final    Creatinine 09/01/2022 0.72  0.50 - 1.40 mg/dL Final    Calcium 09/01/2022 9.7  8.7 - 10.5 mg/dL Final    Total Protein 09/01/2022 7.6  6.0 - 8.4 g/dL Final    Albumin 09/01/2022 4.1  3.5 - 5.2 g/dL Final    Total Bilirubin 09/01/2022 0.7  0.1 - 1.0 mg/dL Final    Comment: For infants and newborns, interpretation of results should be based  on gestational age, weight and in agreement with clinical  observations.    Premature Infant recommended reference ranges:  Up to 24 hours.............<8.0 mg/dL  Up to 48 hours............<12.0 mg/dL  3-5 days..................<15.0 mg/dL  6-29 days.................<15.0 mg/dL      Alkaline Phosphatase 09/01/2022 104  38 - 126 U/L Final    AST 09/01/2022 40  15 - 46 U/L Final    ALT 09/01/2022 39  10 - 44 U/L Final    Anion Gap 09/01/2022 12  8 - 16 mmol/L Final    eGFR 09/01/2022 >60.0  >60 mL/min/1.73 m^2 Final    Lipase Result 09/01/2022 39  23 - 300 U/L Final       Encounter Diagnoses   Name Primary?    Fusion of lumbosacral spine     Neural foraminal stenosis of lumbar spine     Radicular pain of left lower extremity     Poor sleep     Nausea     Gastroesophageal reflux disease without esophagitis     Dysphagia, unspecified type     S/P spinal surgery     Anterolisthesis     Lumbar spondylosis     Situational mixed anxiety and depressive disorder         No orders of the defined types were placed in  this encounter.     Medications Ordered This Encounter   Medications    amitriptyline (ELAVIL) 50 MG tablet     Sig: Take 1 tablet (50 mg total) by mouth every evening.     Dispense:  90 tablet     Refill:  0    baclofen (LIORESAL) 10 MG tablet     Sig: Take 1 tablet (10 mg total) by mouth 3 (three) times daily.     Dispense:  90 tablet     Refill:  0    DULoxetine (CYMBALTA) 30 MG capsule     Sig: Take 1 capsule (30 mg total) by mouth once daily.     Dispense:  90 capsule     Refill:  0    pantoprazole (PROTONIX) 40 MG tablet     Sig: Take 1 tablet (40 mg total) by mouth once daily.     Dispense:  30 tablet     Refill:  0        No follow-ups on file.      E-Visit Time Tracking:    Day 1 Time (in minutes): 7     Total Time (in minutes): 7           Review of Systems    Objective:      There were no vitals taken for this visit.  Physical Exam    Assessment:       1. Fusion of lumbosacral spine    2. Neural foraminal stenosis of lumbar spine    3. Radicular pain of left lower extremity    4. Poor sleep    5. Nausea    6. Gastroesophageal reflux disease without esophagitis    7. Dysphagia, unspecified type    8. S/P spinal surgery    9. Anterolisthesis    10. Lumbar spondylosis    11. Situational mixed anxiety and depressive disorder        Plan:   Dianne was seen today for uri.    Diagnoses and all orders for this visit:    Fusion of lumbosacral spine  -     baclofen (LIORESAL) 10 MG tablet; Take 1 tablet (10 mg total) by mouth 3 (three) times daily.    Neural foraminal stenosis of lumbar spine  -     baclofen (LIORESAL) 10 MG tablet; Take 1 tablet (10 mg total) by mouth 3 (three) times daily.  -     amitriptyline (ELAVIL) 50 MG tablet; Take 1 tablet (50 mg total) by mouth every evening.  -     DULoxetine (CYMBALTA) 30 MG capsule; Take 1 capsule (30 mg total) by mouth once daily.    Radicular pain of left lower extremity  -     baclofen (LIORESAL) 10 MG tablet; Take 1 tablet (10 mg total) by mouth 3 (three) times  daily.  -     amitriptyline (ELAVIL) 50 MG tablet; Take 1 tablet (50 mg total) by mouth every evening.    Poor sleep  -     baclofen (LIORESAL) 10 MG tablet; Take 1 tablet (10 mg total) by mouth 3 (three) times daily.  -     amitriptyline (ELAVIL) 50 MG tablet; Take 1 tablet (50 mg total) by mouth every evening.    Nausea    Gastroesophageal reflux disease without esophagitis  -     pantoprazole (PROTONIX) 40 MG tablet; Take 1 tablet (40 mg total) by mouth once daily.    Dysphagia, unspecified type  -     pantoprazole (PROTONIX) 40 MG tablet; Take 1 tablet (40 mg total) by mouth once daily.    S/P spinal surgery  -     amitriptyline (ELAVIL) 50 MG tablet; Take 1 tablet (50 mg total) by mouth every evening.    Anterolisthesis  -     DULoxetine (CYMBALTA) 30 MG capsule; Take 1 capsule (30 mg total) by mouth once daily.    Lumbar spondylosis  -     DULoxetine (CYMBALTA) 30 MG capsule; Take 1 capsule (30 mg total) by mouth once daily.    Situational mixed anxiety and depressive disorder  -     DULoxetine (CYMBALTA) 30 MG capsule; Take 1 capsule (30 mg total) by mouth once daily.

## 2023-12-13 RX ORDER — DULOXETIN HYDROCHLORIDE 30 MG/1
30 CAPSULE, DELAYED RELEASE ORAL DAILY
Qty: 90 CAPSULE | Refills: 0 | Status: SHIPPED | OUTPATIENT
Start: 2023-12-13

## 2023-12-13 RX ORDER — AMITRIPTYLINE HYDROCHLORIDE 50 MG/1
50 TABLET, FILM COATED ORAL NIGHTLY
Qty: 90 TABLET | Refills: 0 | Status: SHIPPED | OUTPATIENT
Start: 2023-12-13 | End: 2024-01-04 | Stop reason: SDUPTHER

## 2023-12-13 RX ORDER — PANTOPRAZOLE SODIUM 40 MG/1
40 TABLET, DELAYED RELEASE ORAL DAILY
Qty: 30 TABLET | Refills: 0 | Status: SHIPPED | OUTPATIENT
Start: 2023-12-13 | End: 2024-01-04 | Stop reason: SDUPTHER

## 2023-12-13 RX ORDER — BACLOFEN 10 MG/1
10 TABLET ORAL 3 TIMES DAILY
Qty: 90 TABLET | Refills: 0 | Status: SHIPPED | OUTPATIENT
Start: 2023-12-13 | End: 2024-01-04 | Stop reason: SDUPTHER

## 2023-12-16 ENCOUNTER — ON-DEMAND VIRTUAL (OUTPATIENT)
Dept: URGENT CARE | Facility: CLINIC | Age: 48
End: 2023-12-16
Payer: MEDICAID

## 2023-12-16 DIAGNOSIS — J02.9 PHARYNGITIS, UNSPECIFIED ETIOLOGY: Primary | ICD-10-CM

## 2023-12-16 DIAGNOSIS — B37.9 ANTIBIOTIC-INDUCED YEAST INFECTION: ICD-10-CM

## 2023-12-16 DIAGNOSIS — T36.95XA ANTIBIOTIC-INDUCED YEAST INFECTION: ICD-10-CM

## 2023-12-16 PROCEDURE — 99213 OFFICE O/P EST LOW 20 MIN: CPT | Mod: 95,,, | Performed by: NURSE PRACTITIONER

## 2023-12-16 PROCEDURE — 99213 PR OFFICE/OUTPT VISIT, EST, LEVL III, 20-29 MIN: ICD-10-PCS | Mod: 95,,, | Performed by: NURSE PRACTITIONER

## 2023-12-16 RX ORDER — AMOXICILLIN 875 MG/1
875 TABLET, FILM COATED ORAL EVERY 12 HOURS
Qty: 14 TABLET | Refills: 0 | Status: SHIPPED | OUTPATIENT
Start: 2023-12-16 | End: 2024-01-04

## 2023-12-16 RX ORDER — AMOXICILLIN 875 MG/1
875 TABLET, FILM COATED ORAL EVERY 12 HOURS
Qty: 14 TABLET | Refills: 0 | Status: SHIPPED | OUTPATIENT
Start: 2023-12-16 | End: 2023-12-16

## 2023-12-16 RX ORDER — FLUCONAZOLE 150 MG/1
150 TABLET ORAL DAILY
Qty: 2 TABLET | Refills: 0 | Status: SHIPPED | OUTPATIENT
Start: 2023-12-16 | End: 2023-12-16

## 2023-12-16 RX ORDER — FLUCONAZOLE 150 MG/1
150 TABLET ORAL DAILY
Qty: 2 TABLET | Refills: 0 | Status: SHIPPED | OUTPATIENT
Start: 2023-12-16 | End: 2023-12-18

## 2023-12-16 NOTE — PROGRESS NOTES
Subjective:      Patient ID: Dianne Hemphill is a 48 y.o. female.    Vitals:  vitals were not taken for this visit.     Chief Complaint: Sore Throat      Visit Type: TELE AUDIOVISUAL    Present with the patient at the time of consultation: TELEMED PRESENT WITH PATIENT: None    Past Medical History:   Diagnosis Date    Anxiety     Breast cancer     DCIS, left partical mastectomy    CRPS (complex regional pain syndrome type I)     Elevated liver function tests 2/22/2022    Fatigue     General anesthetics causing adverse effect in therapeutic use     verbal combative after 1 procedure    GERD (gastroesophageal reflux disease)     History of migraine headaches     Hx of psychiatric care     Obesity     Pollen allergies     PONV (postoperative nausea and vomiting)     Psychiatric problem     Sleep difficulties     Smoker     Therapy     Urinary tract infection 5/6/2021     Past Surgical History:   Procedure Laterality Date    CHOLECYSTECTOMY      ESOPHAGOGASTRODUODENOSCOPY      ESOPHAGOGASTRODUODENOSCOPY N/A 2/7/2022    Procedure: EGD (ESOPHAGOGASTRODUODENOSCOPY);  Surgeon: Richelle Denson MD;  Location: [facility];  Service: Endoscopy;  Laterality: N/A;    ESOPHAGOGASTRODUODENOSCOPY N/A 2/18/2022    Procedure: EGD (ESOPHAGOGASTRODUODENOSCOPY);  Surgeon: Richelle Denson MD;  Location: [facility];  Service: Endoscopy;  Laterality: N/A;    ESOPHAGOGASTRODUODENOSCOPY N/A 3/22/2022    Procedure: EGD (ESOPHAGOGASTRODUODENOSCOPY);  Surgeon: Richelle Denson MD;  Location: [facility];  Service: Endoscopy;  Laterality: N/A;    EXPLORATORY LAPAROTOMY      HYSTERECTOMY      fibroids    INSERTION OF DORSAL COLUMN NERVE STIMULATOR FOR TRIAL N/A 10/22/2020    Procedure: SPINAL CORD STIMULATOR TRIAL (Nevro);  Surgeon: Clemencia Watts Jr., MD;  Location: [facility];  Service: Pain Management;  Laterality: N/A;    LUMBAR FUSION N/A 11/7/2019    Procedure: FUSION, SPINE, LUMBAR Procedure: STG 1: L5-S1 anterior Lumbar  "interbody fusion / STG 2:L5-S1 Posterior instrumentation;  Surgeon: Corona Bazan MD;  Location: [facility] OR;  Service: Neurosurgery;  Laterality: N/A;  FUSION, SPINE, LUMBARProcedure: STG 1: L5-S1 anterior Lumbar interbody fusion / STG 2:L5-S1 Posterior instrumentationSURGERY TIME: 2.5hrsLOS:ANESTHESIA: GeneralBlood:    LUMBAR SYMPATHETIC NERVE BLOCK Left 6/11/2020    Procedure: BLOCK, NERVE, SYMPATHETIC, LUMBAR--Left;  Surgeon: Clemencia Watts Jr., MD;  Location: [facility]IN [facility];  Service: Pain Management;  Laterality: Left;    MASTECTOMY, PARTIAL Left 2/14/2020    Procedure: MASTECTOMY, PARTIAL LEFT with RADIOLOGIC MARKER;  Surgeon: Mara Munoz MD;  Location: [facility] OR;  Service: General;  Laterality: Left;    SPINAL FUSION      TONSILLECTOMY       Review of patient's allergies indicates:   Allergen Reactions    Ativan [lorazepam] Other (See Comments)     Pt states she cannot "wake up or sleeps for three days."    Gluten protein Nausea And Vomiting    Milk containing products (dairy) Hives    Wasp venom Anaphylaxis    Demerol [meperidine] Nausea And Vomiting    Imitrex [sumatriptan succinate]      Hysterics      Morphine Nausea And Vomiting    Tetracyclines Nausea And Vomiting    Ciprofloxacin hcl Rash    Codeine Nausea And Vomiting     Can take Dilaudid, oxycontin, oxycodone & tramadol    Erythromycin Rash    Macrobid [nitrofurantoin monohyd/m-cryst] Rash     Current Outpatient Medications on File Prior to Visit   Medication Sig Dispense Refill    acetaminophen (TYLENOL) 650 MG TbSR Take 650 mg by mouth as needed.       amitriptyline (ELAVIL) 50 MG tablet Take 1 tablet (50 mg total) by mouth every evening. 90 tablet 0    baclofen (LIORESAL) 10 MG tablet Take 1 tablet (10 mg total) by mouth 3 (three) times daily. 270 tablet 1    baclofen (LIORESAL) 10 MG tablet Take 1 tablet (10 mg total) by mouth 3 (three) times daily. 90 tablet 0    cetirizine (ZYRTEC) 10 MG tablet Take 10 mg by mouth once daily.      " cholecalciferol, vitamin D3, (VITAMIN D3) 50 mcg (2,000 unit) Tab Take 5,000 Units by mouth once daily.       DULoxetine (CYMBALTA) 30 MG capsule Take 1 capsule (30 mg total) by mouth once daily. 90 capsule 0    EPINEPHrine (EPIPEN) 0.3 mg/0.3 mL AtIn Inject 0.3 mLs (0.3 mg total) into the muscle once. for 1 dose 0.3 mL 3    estradioL (ESTRACE) 0.01 % (0.1 mg/gram) vaginal cream Apply as directed TIW 42.5 g 1    famotidine (PEPCID) 20 MG tablet Take 1 tablet (20 mg total) by mouth 2 (two) times daily as needed for Heartburn. 60 tablet 11    fexofenadine (ALLEGRA) 180 MG tablet Take 180 mg by mouth as needed.      fluticasone propionate (FLONASE) 50 mcg/actuation nasal spray 1 spray (50 mcg total) by Each Nostril route once daily. 16 g 11    hyoscyamine (LEVSIN/SL) 0.125 mg Subl Place 1 tablet (0.125 mg total) under the tongue every 6 (six) hours as needed (abdominal pain). 120 tablet 2    ketoconazole (NIZORAL) 2 % shampoo APPLY TOPICALLY TWICE A  WEEK. 120 mL 0    ondansetron (ZOFRAN-ODT) 4 MG TbDL Take 2 tablets (8 mg total) by mouth every 8 (eight) hours as needed. 60 tablet 1    pantoprazole (PROTONIX) 40 MG tablet Take 1 tablet (40 mg total) by mouth once daily. 30 tablet 0    polyethylene glycol (GLYCOLAX) 17 gram/dose powder Take 17 g by mouth once daily. 850 g 0    promethazine (PHENERGAN) 25 MG tablet Take 1 tablet (25 mg total) by mouth every 6 (six) hours as needed for Nausea. 60 tablet 0    scopolamine (TRANSDERM-SCOP) 1.3-1.5 mg (1 mg over 3 days) Place 1 patch onto the skin Every 3 (three) days. 10 patch 2    traMADoL (ULTRAM-ER) 100 MG Tb24 Take 100 mg by mouth daily as needed.       Current Facility-Administered Medications on File Prior to Visit   Medication Dose Route Frequency Provider Last Rate Last Admin    lidocaine (PF) 10 mg/ml (1%) injection 10 mg  1 mL Intradermal Once Wickboldt, Alvah T Jr., MD        lidocaine (PF) 10 mg/ml (1%) injection 10 mg  1 mL Intradermal Once Clemencia Watts  MD Deandre         Family History   Problem Relation Age of Onset    Cancer Mother     Alcohol abuse Mother     No Known Problems Father     Cancer Maternal Grandfather     Diabetes Neg Hx     Heart disease Neg Hx     Stroke Neg Hx        Medications Ordered                [id number] [address] [place] - [address]   [address], [place] [address]    Telephone: [phone]   Fax: [phone]   Hours: Not open 24 hours                         E-Prescribed (2 of 2)              amoxicillin (AMOXIL) 875 MG tablet    Sig: Take 1 tablet (875 mg total) by mouth every 12 (twelve) hours. for 7 days       Start: 12/16/23     Quantity: 14 tablet Refills: 0                         fluconazole (DIFLUCAN) 150 MG Tab    Sig: Take 1 tablet (150 mg total) by mouth once daily. Take one now and may repeat in 72 h prn for 2 doses       Start: 12/16/23     Quantity: 2 tablet Refills: 0                           Ohs Peq Odvv Intake    12/16/2023 11:45 AM CST - Filed by Patient   Describe your reason for todays visit Sore throat x 6 days   What is your current physical address in the event of a medical emergency? [address] [place] [address] [place], [address]   Are you able to take your vital signs? No   Please attach any relevant images or files          47 yo female with c/o sore throat, pnd. She states she takes allergy medication on a daily basis . She denies fever. Denies cough and congestion.     Sore Throat         Constitution: Negative.   HENT:  Positive for postnasal drip and sore throat.    Cardiovascular: Negative.    Respiratory: Negative.     Gastrointestinal: Negative.    Endocrine: negative.   Genitourinary: Negative.  Negative for frequency and urgency.   Musculoskeletal: Negative.    Skin: Negative.    Allergic/Immunologic: Negative.    Neurological: Negative.    Hematologic/Lymphatic: Negative.    Psychiatric/Behavioral: Negative.          Objective:   The physical exam was conducted virtually.  Physical Exam    Constitutional: She is oriented to person, place, and time. She appears well-developed.   HENT:   Head: Normocephalic and atraumatic.   Ears:   Right Ear: Hearing, tympanic membrane and external ear normal.   Left Ear: Hearing, tympanic membrane and external ear normal.   Nose: Nose normal.   Mouth/Throat: Uvula is midline and mucous membranes are normal. Posterior oropharyngeal erythema present.   Eyes: Conjunctivae and EOM are normal. Pupils are equal, round, and reactive to light.   Neck: Neck supple.   Cardiovascular: Normal rate.   Pulmonary/Chest: Effort normal and breath sounds normal.   Musculoskeletal: Normal range of motion.         General: Normal range of motion.   Neurological: She is alert and oriented to person, place, and time.   Skin: Skin is warm.   Psychiatric: Her behavior is normal. Thought content normal.   Nursing note and vitals reviewed.      Assessment:     1. Pharyngitis, unspecified etiology    2. Antibiotic-induced yeast infection        Plan:       Pharyngitis, unspecified etiology  -     Discontinue: amoxicillin (AMOXIL) 875 MG tablet; Take 1 tablet (875 mg total) by mouth every 12 (twelve) hours. for 7 days  Dispense: 14 tablet; Refill: 0  -     amoxicillin (AMOXIL) 875 MG tablet; Take 1 tablet (875 mg total) by mouth every 12 (twelve) hours. for 7 days  Dispense: 14 tablet; Refill: 0    Antibiotic-induced yeast infection  -     Discontinue: fluconazole (DIFLUCAN) 150 MG Tab; Take 1 tablet (150 mg total) by mouth once daily. Take one now and may repeat in 72 h prn for 2 doses  Dispense: 2 tablet; Refill: 0  -     fluconazole (DIFLUCAN) 150 MG Tab; Take 1 tablet (150 mg total) by mouth once daily. Take one now and may repeat in 72 h prn for 2 doses  Dispense: 2 tablet; Refill: 0

## 2023-12-28 ENCOUNTER — PATIENT MESSAGE (OUTPATIENT)
Dept: PRIMARY CARE CLINIC | Facility: CLINIC | Age: 48
End: 2023-12-28
Payer: MEDICAID

## 2023-12-28 DIAGNOSIS — L21.9 SEBORRHEIC DERMATITIS OF SCALP: ICD-10-CM

## 2023-12-28 RX ORDER — KETOCONAZOLE 20 MG/ML
SHAMPOO, SUSPENSION TOPICAL
Qty: 120 ML | Refills: 0 | Status: SHIPPED | OUTPATIENT
Start: 2023-12-28

## 2024-01-04 ENCOUNTER — OFFICE VISIT (OUTPATIENT)
Dept: PRIMARY CARE CLINIC | Facility: CLINIC | Age: 49
End: 2024-01-04
Payer: MEDICAID

## 2024-01-04 ENCOUNTER — LAB VISIT (OUTPATIENT)
Dept: LAB | Facility: HOSPITAL | Age: 49
End: 2024-01-04
Attending: FAMILY MEDICINE
Payer: MEDICAID

## 2024-01-04 VITALS
HEART RATE: 77 BPM | SYSTOLIC BLOOD PRESSURE: 134 MMHG | RESPIRATION RATE: 18 BRPM | TEMPERATURE: 98 F | WEIGHT: 293 LBS | OXYGEN SATURATION: 96 % | BODY MASS INDEX: 43.4 KG/M2 | HEIGHT: 69 IN | DIASTOLIC BLOOD PRESSURE: 84 MMHG

## 2024-01-04 DIAGNOSIS — M48.061 NEURAL FORAMINAL STENOSIS OF LUMBAR SPINE: ICD-10-CM

## 2024-01-04 DIAGNOSIS — K21.9 GASTROESOPHAGEAL REFLUX DISEASE WITHOUT ESOPHAGITIS: ICD-10-CM

## 2024-01-04 DIAGNOSIS — Z00.00 ANNUAL PHYSICAL EXAM: ICD-10-CM

## 2024-01-04 DIAGNOSIS — M43.27 FUSION OF LUMBOSACRAL SPINE: ICD-10-CM

## 2024-01-04 DIAGNOSIS — Z72.820 POOR SLEEP: ICD-10-CM

## 2024-01-04 DIAGNOSIS — Z98.890 S/P SPINAL SURGERY: ICD-10-CM

## 2024-01-04 DIAGNOSIS — Z00.00 ANNUAL PHYSICAL EXAM: Primary | ICD-10-CM

## 2024-01-04 DIAGNOSIS — Z79.899 ENCOUNTER FOR MONITORING LONG-TERM PROTON PUMP INHIBITOR THERAPY: ICD-10-CM

## 2024-01-04 DIAGNOSIS — Z51.81 ENCOUNTER FOR MONITORING LONG-TERM PROTON PUMP INHIBITOR THERAPY: ICD-10-CM

## 2024-01-04 DIAGNOSIS — Z13.220 ENCOUNTER FOR LIPID SCREENING FOR CARDIOVASCULAR DISEASE: ICD-10-CM

## 2024-01-04 DIAGNOSIS — R13.10 DYSPHAGIA, UNSPECIFIED TYPE: ICD-10-CM

## 2024-01-04 DIAGNOSIS — E66.01 CLASS 3 SEVERE OBESITY WITH BODY MASS INDEX (BMI) OF 45.0 TO 49.9 IN ADULT, UNSPECIFIED OBESITY TYPE, UNSPECIFIED WHETHER SERIOUS COMORBIDITY PRESENT: ICD-10-CM

## 2024-01-04 DIAGNOSIS — R23.2 HOT FLASHES: ICD-10-CM

## 2024-01-04 DIAGNOSIS — Z85.3 HISTORY OF BREAST CANCER: ICD-10-CM

## 2024-01-04 DIAGNOSIS — Z13.6 ENCOUNTER FOR LIPID SCREENING FOR CARDIOVASCULAR DISEASE: ICD-10-CM

## 2024-01-04 DIAGNOSIS — M54.10 RADICULAR PAIN OF LEFT LOWER EXTREMITY: ICD-10-CM

## 2024-01-04 LAB
25(OH)D3+25(OH)D2 SERPL-MCNC: 60 NG/ML (ref 30–96)
ALBUMIN SERPL BCP-MCNC: 3.7 G/DL (ref 3.5–5.2)
ALP SERPL-CCNC: 85 U/L (ref 55–135)
ALT SERPL W/O P-5'-P-CCNC: 22 U/L (ref 10–44)
ANION GAP SERPL CALC-SCNC: 14 MMOL/L (ref 8–16)
AST SERPL-CCNC: 19 U/L (ref 10–40)
BASOPHILS # BLD AUTO: 0.1 K/UL (ref 0–0.2)
BASOPHILS NFR BLD: 1 % (ref 0–1.9)
BILIRUB SERPL-MCNC: 0.3 MG/DL (ref 0.1–1)
BUN SERPL-MCNC: 17 MG/DL (ref 6–20)
CALCIUM SERPL-MCNC: 9.7 MG/DL (ref 8.7–10.5)
CHLORIDE SERPL-SCNC: 105 MMOL/L (ref 95–110)
CHOLEST SERPL-MCNC: 208 MG/DL (ref 120–199)
CHOLEST/HDLC SERPL: 5 {RATIO} (ref 2–5)
CO2 SERPL-SCNC: 23 MMOL/L (ref 23–29)
CREAT SERPL-MCNC: 0.8 MG/DL (ref 0.5–1.4)
DIFFERENTIAL METHOD BLD: ABNORMAL
EOSINOPHIL # BLD AUTO: 0.3 K/UL (ref 0–0.5)
EOSINOPHIL NFR BLD: 3.2 % (ref 0–8)
ERYTHROCYTE [DISTWIDTH] IN BLOOD BY AUTOMATED COUNT: 13.2 % (ref 11.5–14.5)
EST. GFR  (NO RACE VARIABLE): >60 ML/MIN/1.73 M^2
ESTIMATED AVG GLUCOSE: 111 MG/DL (ref 68–131)
ESTRADIOL SERPL-MCNC: <10 PG/ML
FSH SERPL-ACNC: 9.31 MIU/ML
GLUCOSE SERPL-MCNC: 83 MG/DL (ref 70–110)
HBA1C MFR BLD: 5.5 % (ref 4–5.6)
HCT VFR BLD AUTO: 47.7 % (ref 37–48.5)
HDLC SERPL-MCNC: 42 MG/DL (ref 40–75)
HDLC SERPL: 20.2 % (ref 20–50)
HGB BLD-MCNC: 15.4 G/DL (ref 12–16)
IMM GRANULOCYTES # BLD AUTO: 0.03 K/UL (ref 0–0.04)
IMM GRANULOCYTES NFR BLD AUTO: 0.3 % (ref 0–0.5)
LDLC SERPL CALC-MCNC: 124.2 MG/DL (ref 63–159)
LH SERPL-ACNC: 3.9 MIU/ML
LYMPHOCYTES # BLD AUTO: 4 K/UL (ref 1–4.8)
LYMPHOCYTES NFR BLD: 39.2 % (ref 18–48)
MAGNESIUM SERPL-MCNC: 2.4 MG/DL (ref 1.6–2.6)
MCH RBC QN AUTO: 29.4 PG (ref 27–31)
MCHC RBC AUTO-ENTMCNC: 32.3 G/DL (ref 32–36)
MCV RBC AUTO: 91 FL (ref 82–98)
MONOCYTES # BLD AUTO: 0.7 K/UL (ref 0.3–1)
MONOCYTES NFR BLD: 6.7 % (ref 4–15)
NEUTROPHILS # BLD AUTO: 5.1 K/UL (ref 1.8–7.7)
NEUTROPHILS NFR BLD: 49.6 % (ref 38–73)
NONHDLC SERPL-MCNC: 166 MG/DL
NRBC BLD-RTO: 0 /100 WBC
PLATELET # BLD AUTO: 475 K/UL (ref 150–450)
PMV BLD AUTO: 10.1 FL (ref 9.2–12.9)
POTASSIUM SERPL-SCNC: 4.6 MMOL/L (ref 3.5–5.1)
PROT SERPL-MCNC: 7.6 G/DL (ref 6–8.4)
RBC # BLD AUTO: 5.23 M/UL (ref 4–5.4)
SODIUM SERPL-SCNC: 142 MMOL/L (ref 136–145)
TRIGL SERPL-MCNC: 209 MG/DL (ref 30–150)
TSH SERPL DL<=0.005 MIU/L-ACNC: 1.31 UIU/ML (ref 0.4–4)
VIT B12 SERPL-MCNC: 463 PG/ML (ref 210–950)
WBC # BLD AUTO: 10.26 K/UL (ref 3.9–12.7)

## 2024-01-04 PROCEDURE — 80053 COMPREHEN METABOLIC PANEL: CPT | Performed by: FAMILY MEDICINE

## 2024-01-04 PROCEDURE — 3079F DIAST BP 80-89 MM HG: CPT | Mod: CPTII,,, | Performed by: FAMILY MEDICINE

## 2024-01-04 PROCEDURE — 3008F BODY MASS INDEX DOCD: CPT | Mod: CPTII,,, | Performed by: FAMILY MEDICINE

## 2024-01-04 PROCEDURE — 99215 OFFICE O/P EST HI 40 MIN: CPT | Mod: PBBFAC,PN | Performed by: FAMILY MEDICINE

## 2024-01-04 PROCEDURE — 83002 ASSAY OF GONADOTROPIN (LH): CPT | Performed by: FAMILY MEDICINE

## 2024-01-04 PROCEDURE — 36415 COLL VENOUS BLD VENIPUNCTURE: CPT | Mod: PN | Performed by: FAMILY MEDICINE

## 2024-01-04 PROCEDURE — 82306 VITAMIN D 25 HYDROXY: CPT | Performed by: FAMILY MEDICINE

## 2024-01-04 PROCEDURE — 3075F SYST BP GE 130 - 139MM HG: CPT | Mod: CPTII,,, | Performed by: FAMILY MEDICINE

## 2024-01-04 PROCEDURE — 82607 VITAMIN B-12: CPT | Performed by: FAMILY MEDICINE

## 2024-01-04 PROCEDURE — 82670 ASSAY OF TOTAL ESTRADIOL: CPT | Performed by: FAMILY MEDICINE

## 2024-01-04 PROCEDURE — 1160F RVW MEDS BY RX/DR IN RCRD: CPT | Mod: CPTII,,, | Performed by: FAMILY MEDICINE

## 2024-01-04 PROCEDURE — 84443 ASSAY THYROID STIM HORMONE: CPT | Performed by: FAMILY MEDICINE

## 2024-01-04 PROCEDURE — 85025 COMPLETE CBC W/AUTO DIFF WBC: CPT | Performed by: FAMILY MEDICINE

## 2024-01-04 PROCEDURE — 83735 ASSAY OF MAGNESIUM: CPT | Performed by: FAMILY MEDICINE

## 2024-01-04 PROCEDURE — 83036 HEMOGLOBIN GLYCOSYLATED A1C: CPT | Performed by: FAMILY MEDICINE

## 2024-01-04 PROCEDURE — 1159F MED LIST DOCD IN RCRD: CPT | Mod: CPTII,,, | Performed by: FAMILY MEDICINE

## 2024-01-04 PROCEDURE — 99999 PR PBB SHADOW E&M-EST. PATIENT-LVL V: CPT | Mod: PBBFAC,,, | Performed by: FAMILY MEDICINE

## 2024-01-04 PROCEDURE — 80061 LIPID PANEL: CPT | Performed by: FAMILY MEDICINE

## 2024-01-04 PROCEDURE — 99396 PREV VISIT EST AGE 40-64: CPT | Mod: S$PBB,,, | Performed by: FAMILY MEDICINE

## 2024-01-04 PROCEDURE — 83001 ASSAY OF GONADOTROPIN (FSH): CPT | Performed by: FAMILY MEDICINE

## 2024-01-04 RX ORDER — BACLOFEN 10 MG/1
10 TABLET ORAL 3 TIMES DAILY
Qty: 90 TABLET | Refills: 1 | Status: SHIPPED | OUTPATIENT
Start: 2024-01-04

## 2024-01-04 RX ORDER — AMITRIPTYLINE HYDROCHLORIDE 25 MG/1
25 TABLET, FILM COATED ORAL NIGHTLY
Qty: 90 TABLET | Refills: 1 | Status: SHIPPED | OUTPATIENT
Start: 2024-01-04 | End: 2025-01-03

## 2024-01-04 RX ORDER — PANTOPRAZOLE SODIUM 40 MG/1
40 TABLET, DELAYED RELEASE ORAL 2 TIMES DAILY
Qty: 180 TABLET | Refills: 1 | Status: SHIPPED | OUTPATIENT
Start: 2024-01-04

## 2024-01-04 RX ORDER — AMITRIPTYLINE HYDROCHLORIDE 50 MG/1
50 TABLET, FILM COATED ORAL NIGHTLY
Qty: 90 TABLET | Refills: 1 | Status: SHIPPED | OUTPATIENT
Start: 2024-01-04
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
## 2024-01-08 ENCOUNTER — PATIENT MESSAGE (OUTPATIENT)
Dept: PRIMARY CARE CLINIC | Facility: CLINIC | Age: 49
End: 2024-01-08
Payer: MEDICAID

## 2024-05-15 DIAGNOSIS — M48.061 NEURAL FORAMINAL STENOSIS OF LUMBAR SPINE: ICD-10-CM

## 2024-05-15 DIAGNOSIS — L21.9 SEBORRHEIC DERMATITIS OF SCALP: ICD-10-CM

## 2024-05-15 DIAGNOSIS — Z72.820 POOR SLEEP: ICD-10-CM

## 2024-05-15 DIAGNOSIS — M54.10 RADICULAR PAIN OF LEFT LOWER EXTREMITY: ICD-10-CM

## 2024-05-15 DIAGNOSIS — M43.27 FUSION OF LUMBOSACRAL SPINE: ICD-10-CM

## 2024-05-15 RX ORDER — KETOCONAZOLE 20 MG/ML
1 SHAMPOO, SUSPENSION TOPICAL 2 TIMES DAILY
Qty: 120 ML | Refills: 0 | OUTPATIENT
Start: 2024-05-15

## 2024-05-16 RX ORDER — KETOCONAZOLE 20 MG/ML
SHAMPOO, SUSPENSION TOPICAL
Qty: 120 ML | Refills: 0 | Status: SHIPPED | OUTPATIENT
Start: 2024-05-16

## 2024-05-16 RX ORDER — BACLOFEN 10 MG/1
10 TABLET ORAL 3 TIMES DAILY
Qty: 90 TABLET | Refills: 0 | Status: SHIPPED | OUTPATIENT
Start: 2024-05-16

## 2024-05-17 ENCOUNTER — E-VISIT (OUTPATIENT)
Dept: PRIMARY CARE CLINIC | Facility: CLINIC | Age: 49
End: 2024-05-17
Payer: MEDICAID

## 2024-05-17 DIAGNOSIS — B02.9 HERPES ZOSTER WITHOUT COMPLICATION: Primary | ICD-10-CM

## 2024-05-17 PROCEDURE — 99421 OL DIG E/M SVC 5-10 MIN: CPT | Mod: ,,, | Performed by: FAMILY MEDICINE

## 2024-05-20 RX ORDER — VALACYCLOVIR HYDROCHLORIDE 1 G/1
1000 TABLET, FILM COATED ORAL 3 TIMES DAILY
Qty: 21 TABLET | Refills: 0 | Status: SHIPPED | OUTPATIENT
Start: 2024-05-20 | End: 2024-05-27

## 2024-05-20 NOTE — PROGRESS NOTES
Subjective:       Patient ID: Dianne Hemphill is a 48 y.o. female.    Chief Complaint: Rash (Entered automatically based on patient selection in Patient Portal.)    HPI  Patient ID: Dianne Hemphill is a 48 y.o. female.    Chief Complaint: Rash (Entered automatically based on patient selection in Patient Portal.)    The patient initiated a request through [employer] on 5/17/2024 for evaluation and management with a chief complaint of Rash (Entered automatically based on patient selection in Patient Portal.)     I evaluated the questionnaire submission on 5/20/24.    Ohs Peq Evisit Rash    5/17/2024  2:05 PM CDT - Filed by Patient   Do you agree to participate in an E-Visit? Yes   If you have any of the following symptoms, please present to your local ER or call 911:  I acknowledge   What is the main issue you would like addressed today? Rash on foot. Is painful. On foot with CRPS.   Are you able to take your vital signs? No   Are you pregnant, could you be pregnant, or are you breast feeding? None of the above   How would you describe your skin problem? Rash   When did your symptoms first appear? 5/11/2024   Where is it located?  Foot/Feet   Does it itch? No   Does it hurt? Yes   Where is the pain located? Where the skin change is noted   The pain came on: Gradually   The pain has the character of: Burning   Frequency of the pain (How often does it appear)? This is the first time I have had this rash   Please select the face that most closely captures your pain level: 8   Is there discharge or drainage? No   Is there bleeding? No   Describe the character Raised   Describe the color Red   Has it changed over time? Grown in size   Frequency of skin problem Daily   Duration of the skin problem (how long does it stay when it is present) Days   I have had a new exposure to No new exposures   I have had a new exposure to No new exposures   What have you used to treat the skin problem? Tried benadryl cream and  cortisone cream. Nothing helps.   If you have used anything for treatment, has it helped the symptoms? No   Other generalized symptoms that you associate with the rash No other symptoms   Provide any additional information you feel is important.    At least one photo is required for treatment to be provided. You can upload a maximum of three photos of the affected area.           Encounter Diagnosis   Name Primary?    Herpes zoster without complication Yes        No orders of the defined types were placed in this encounter.     Medications Ordered This Encounter   Medications    valACYclovir (VALTREX) 1000 MG tablet     Sig: Take 1 tablet (1,000 mg total) by mouth 3 (three) times daily. for 7 days     Dispense:  21 tablet     Refill:  0        No follow-ups on file.      E-Visit Time Tracking:                        Review of Systems    Objective:      There were no vitals taken for this visit.  Physical Exam    Assessment:       1. Herpes zoster without complication        Plan:   Dianne was seen today for rash.    Diagnoses and all orders for this visit:    Herpes zoster without complication  -     valACYclovir (VALTREX) 1000 MG tablet; Take 1 tablet (1,000 mg total) by mouth 3 (three) times daily. for 7 days

## 2024-05-28 ENCOUNTER — OFFICE VISIT (OUTPATIENT)
Dept: PRIMARY CARE CLINIC | Facility: CLINIC | Age: 49
End: 2024-05-28
Payer: MEDICAID

## 2024-05-28 VITALS
HEIGHT: 69 IN | TEMPERATURE: 98 F | OXYGEN SATURATION: 97 % | WEIGHT: 293 LBS | HEART RATE: 95 BPM | DIASTOLIC BLOOD PRESSURE: 72 MMHG | SYSTOLIC BLOOD PRESSURE: 138 MMHG | BODY MASS INDEX: 43.4 KG/M2 | RESPIRATION RATE: 18 BRPM

## 2024-05-28 DIAGNOSIS — Z85.3 HISTORY OF BREAST CANCER: ICD-10-CM

## 2024-05-28 DIAGNOSIS — G90.522 COMPLEX REGIONAL PAIN SYNDROME TYPE 1 OF LEFT LOWER EXTREMITY: ICD-10-CM

## 2024-05-28 DIAGNOSIS — L30.4 INTERTRIGO: Primary | ICD-10-CM

## 2024-05-28 DIAGNOSIS — K21.9 GASTROESOPHAGEAL REFLUX DISEASE WITHOUT ESOPHAGITIS: ICD-10-CM

## 2024-05-28 DIAGNOSIS — R11.0 NAUSEA: ICD-10-CM

## 2024-05-28 DIAGNOSIS — E66.01 CLASS 3 SEVERE OBESITY WITH BODY MASS INDEX (BMI) OF 45.0 TO 49.9 IN ADULT, UNSPECIFIED OBESITY TYPE, UNSPECIFIED WHETHER SERIOUS COMORBIDITY PRESENT: ICD-10-CM

## 2024-05-28 PROCEDURE — 99215 OFFICE O/P EST HI 40 MIN: CPT | Mod: PBBFAC,PN | Performed by: FAMILY MEDICINE

## 2024-05-28 PROCEDURE — 3008F BODY MASS INDEX DOCD: CPT | Mod: CPTII,,, | Performed by: FAMILY MEDICINE

## 2024-05-28 PROCEDURE — 3075F SYST BP GE 130 - 139MM HG: CPT | Mod: CPTII,,, | Performed by: FAMILY MEDICINE

## 2024-05-28 PROCEDURE — 3044F HG A1C LEVEL LT 7.0%: CPT | Mod: CPTII,,, | Performed by: FAMILY MEDICINE

## 2024-05-28 PROCEDURE — 99214 OFFICE O/P EST MOD 30 MIN: CPT | Mod: S$PBB,,, | Performed by: FAMILY MEDICINE

## 2024-05-28 PROCEDURE — 99999 PR PBB SHADOW E&M-EST. PATIENT-LVL V: CPT | Mod: PBBFAC,,, | Performed by: FAMILY MEDICINE

## 2024-05-28 PROCEDURE — 1159F MED LIST DOCD IN RCRD: CPT | Mod: CPTII,,, | Performed by: FAMILY MEDICINE

## 2024-05-28 PROCEDURE — 3078F DIAST BP <80 MM HG: CPT | Mod: CPTII,,, | Performed by: FAMILY MEDICINE

## 2024-05-28 RX ORDER — TRIAMCINOLONE ACETONIDE 1 MG/G
OINTMENT TOPICAL 2 TIMES DAILY
Qty: 30 G | Refills: 0 | Status: SHIPPED | OUTPATIENT
Start: 2024-05-28

## 2024-05-28 RX ORDER — KETOCONAZOLE 20 MG/G
CREAM TOPICAL DAILY
Qty: 30 G | Refills: 0 | Status: SHIPPED | OUTPATIENT
Start: 2024-05-28
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
## 2024-05-28 NOTE — PATIENT INSTRUCTIONS
Mix a pea size of Ketoconazole + Triamcinolone + Zinc oxide paste (Desitin or boudreauxs, can do this twice daily then once no longer burning or itchy just use Zinc oxide paste to prevent

## 2024-09-24 NOTE — TELEPHONE ENCOUNTER
Chief Complaint   Patient presents with    Office Visit     New patient visit left small finger injury DOI 9/8/2024         Preferred Pharmacy:    [facility]/pharmacy #[id number] - [place], [place] - [address]  [address] [place] [id number]  Phone: [phone] Fax: [phone]     Pt states she will take otc medication first before scheduling appt, she also stated that if the otc medication does not work she'll call to get an appt

## 2025-02-02 NOTE — PLAN OF CARE
Problem: Patient Care Overview  Goal: Plan of Care Review  Outcome: Ongoing (interventions implemented as appropriate)  No issues in recovery, vss       no pain, swelling or deformity of joints

## (undated) DEVICE — DRESSING SURGICAL 1/2X1/2

## (undated) DEVICE — NDL SPINAL SPINOCAN 22GX3.5

## (undated) DEVICE — GLOVE PROTEXIS HYDROGEL SZ7

## (undated) DEVICE — DRESSING AQUACEL SACRAL 9 X 9

## (undated) DEVICE — KIT TUNNELING TOOL 35CM

## (undated) DEVICE — KIT TEMPLATE IPG

## (undated) DEVICE — GLOVE SURG BIOGEL LATEX SZ 7.5

## (undated) DEVICE — SEE MEDLINE ITEM 157117

## (undated) DEVICE — KIT SPINAL PATIENT CARE JACK

## (undated) DEVICE — TAPE ADH MEDIPORE 4 X 10YDS

## (undated) DEVICE — DRAPE STERI INSTRUMENT 1018

## (undated) DEVICE — SEE MEDLINE ITEM 157116

## (undated) DEVICE — APPLIER CLIP LIAGCLIP 9.375IN

## (undated) DEVICE — TOWEL OR NONABSORB ADH 17X26

## (undated) DEVICE — SUT VICRYL 2 0 CT 2

## (undated) DEVICE — COVER OVERHEAD SURG LT BLUE

## (undated) DEVICE — DRESSING AQUACEL FOAM 5 X 5

## (undated) DEVICE — SYR 10CC LUER LOCK

## (undated) DEVICE — KIT INSERTION NEEDLE 6

## (undated) DEVICE — ELECTRODE BLADE INSULATED 1 IN

## (undated) DEVICE — GAUZE SPONGE 4X4 12PLY

## (undated) DEVICE — BRA CLASSIC COMFORT 46 BEIGE

## (undated) DEVICE — TAPE SILK 3IN

## (undated) DEVICE — SEE MEDLINE ITEM 146313

## (undated) DEVICE — GLOVE 7.5 PROTEXIS PI BLUE

## (undated) DEVICE — SUT VICRYL CTD 2-0 GI 27 SH

## (undated) DEVICE — SPONGE GAUZE 16PLY 4X4

## (undated) DEVICE — DRAPE INCISE IOBAN 2 23X23IN

## (undated) DEVICE — SUPPORT ULNA NERVE PROTECTOR

## (undated) DEVICE — Device

## (undated) DEVICE — SEE MEDLINE ITEM 157131

## (undated) DEVICE — SEE MEDLINE ITEM 152622

## (undated) DEVICE — SYS ILLUMINATION MARS3V SPINE

## (undated) DEVICE — NDL HYPO REG 25G X 1 1/2

## (undated) DEVICE — DRAPE C-ARMOR EQUIPMENT COVER

## (undated) DEVICE — ADHESIVE DERMABOND ADVANCED

## (undated) DEVICE — CONTAINER SPECIMEN STRL 4OZ

## (undated) DEVICE — DRAPE C-ARM/MOBILE XRAY 44X80

## (undated) DEVICE — SEE MEDLINE ITEM 156905

## (undated) DEVICE — SUT JJ41G O VICRYL

## (undated) DEVICE — DRESSING TELFA STRL 4X3 LF

## (undated) DEVICE — SUT MONOCYRL 4-0 PS2 UND

## (undated) DEVICE — ELECTRODE REM PLYHSV RETURN 9

## (undated) DEVICE — DRESSING MEPILEX BORDER 4 X 4

## (undated) DEVICE — STAPLER SKIN ROTATING HEAD

## (undated) DEVICE — SEE MEDLINE ITEM 157148

## (undated) DEVICE — CORD BIPOLAR 12 FOOT

## (undated) DEVICE — DRESSING TRANS 4X4 TEGADERM

## (undated) DEVICE — SUT MONOCRYL 3-0 PS-2 UND

## (undated) DEVICE — GLOVE BIOGEL SKINSENSE PI 6.5

## (undated) DEVICE — PENCIL ROCKER SWITCH 10FT CORD

## (undated) DEVICE — DRESSING TEGADERM 2 3/8 X 2.75

## (undated) DEVICE — SUT ETHIBOND 0 CR/CT-2 8-18

## (undated) DEVICE — DRESSING TELFA N ADH 3X8

## (undated) DEVICE — APPLICATOR CHLORAPREP ORN 26ML

## (undated) DEVICE — SPONGE LAP 18X18 PREWASHED

## (undated) DEVICE — SUT CTD VICRYL 2.0

## (undated) DEVICE — SKINMARKER W/RULER DEVON

## (undated) DEVICE — DRESSING TRANS 4X4 3/4

## (undated) DEVICE — COVER SNAP KAP 30

## (undated) DEVICE — SEE MEDLINE ITEM 157110

## (undated) DEVICE — DRAPE STERI-DRAPE 1000 17X11IN

## (undated) DEVICE — BLADE ELECTRO EDGE INSULATED

## (undated) DEVICE — DRESSING DERMACEA SPNG 10S

## (undated) DEVICE — DISSECTOR 5MM ENDOPATH

## (undated) DEVICE — PACK DRAPE UNIVERSAL CONVERTOR

## (undated) DEVICE — DRAPE LEICA MICROSCOPE 48X120

## (undated) DEVICE — COVER BACK TABLE 72X21

## (undated) DEVICE — SUT VICRYL PLUS 0 CT1 36IN

## (undated) DEVICE — SEE MEDLINE ITEM 157150

## (undated) DEVICE — SUT 2/0 30IN SILK BLK BRAI

## (undated) DEVICE — KIT POWDER ABSORBABLE GELATIN

## (undated) DEVICE — SYR LUER SLIP GLASS 5ML

## (undated) DEVICE — SUT VICRYL PLUS 0 CT1 18IN

## (undated) DEVICE — SEE MEDLINE ITEM 146417

## (undated) DEVICE — FORCEP BPLR BAYONETTED STR